# Patient Record
Sex: MALE | Race: BLACK OR AFRICAN AMERICAN | Employment: UNEMPLOYED | URBAN - METROPOLITAN AREA
[De-identification: names, ages, dates, MRNs, and addresses within clinical notes are randomized per-mention and may not be internally consistent; named-entity substitution may affect disease eponyms.]

---

## 2018-04-18 LAB — DIABETIC RETINOPATHY: POSITIVE

## 2018-07-26 ENCOUNTER — HOSPITAL ENCOUNTER (OUTPATIENT)
Dept: VASCULAR LAB | Age: 48
Discharge: HOME | End: 2018-07-26

## 2018-07-26 DIAGNOSIS — R42 DIZZINESS AND GIDDINESS: ICD-10-CM

## 2018-08-06 ENCOUNTER — INITIAL CONSULT (OUTPATIENT)
Dept: SURGERY | Age: 48
End: 2018-08-06

## 2018-08-06 VITALS
WEIGHT: 264 LBS | SYSTOLIC BLOOD PRESSURE: 148 MMHG | DIASTOLIC BLOOD PRESSURE: 91 MMHG | BODY MASS INDEX: 42.61 KG/M2 | HEART RATE: 81 BPM

## 2018-08-06 DIAGNOSIS — N60.81 CYST OF SKIN OF BREAST, RIGHT: Primary | ICD-10-CM

## 2018-08-06 PROCEDURE — G8417 CALC BMI ABV UP PARAM F/U: HCPCS | Performed by: SURGERY

## 2018-08-06 PROCEDURE — 99213 OFFICE O/P EST LOW 20 MIN: CPT | Performed by: SURGERY

## 2018-08-06 PROCEDURE — G8427 DOCREV CUR MEDS BY ELIG CLIN: HCPCS | Performed by: SURGERY

## 2018-08-07 ENCOUNTER — PROCEDURE VISIT (OUTPATIENT)
Dept: SURGERY | Age: 48
End: 2018-08-07

## 2018-08-07 VITALS
HEART RATE: 80 BPM | DIASTOLIC BLOOD PRESSURE: 96 MMHG | BODY MASS INDEX: 42.61 KG/M2 | WEIGHT: 264 LBS | SYSTOLIC BLOOD PRESSURE: 194 MMHG

## 2018-08-07 DIAGNOSIS — N60.81 CYST OF SKIN OF BREAST, RIGHT: ICD-10-CM

## 2018-08-07 DIAGNOSIS — L02.213 ABSCESS OF CHEST WALL: Primary | ICD-10-CM

## 2018-08-07 PROCEDURE — 10060 I&D ABSCESS SIMPLE/SINGLE: CPT | Performed by: SURGERY

## 2018-08-07 RX ORDER — SULFAMETHOXAZOLE AND TRIMETHOPRIM 800; 160 MG/1; MG/1
1 TABLET ORAL 2 TIMES DAILY
Qty: 20 TABLET | Refills: 0 | Status: SHIPPED | OUTPATIENT
Start: 2018-08-07 | End: 2018-08-17

## 2018-08-07 NOTE — PATIENT INSTRUCTIONS
OK to shower overtop of water tight dressing. You can remove this Thursday 8-9-18. Inner Sutures are dissolvable. These will absorb over time. Will need to get outer sutures removed in 10-14 days. OK to take tylenol or ibuprofen for pain control. Will start on antibiotics for any residual infection or bacteria. Call for any worsening redness, pain, or fevers greater than 101.5. No restrictions, activities as tolerated.   Follow up in 2 weeks to check incision and suture removal.   If questions or concerns, please call Flory @ [phone]

## 2018-08-21 NOTE — PROGRESS NOTES
apparent that the patient had an abscess most likely from hydradenitis. The skin and subcutaneous tissue that was involved was removed, and the wound was only partially closed on the periphery of the incision so it could continue to drain. A corner of sterile guaze was inserted for hemostasis. He tolerated the procedure well. Will start him on bactrim for his abscess. Follow up in 1-2 weeks prn.     Electronically signed by Callie Wilkins MD

## 2018-08-28 ENCOUNTER — OFFICE VISIT (OUTPATIENT)
Dept: SURGERY | Age: 48
End: 2018-08-28

## 2018-08-28 VITALS
BODY MASS INDEX: 42.77 KG/M2 | DIASTOLIC BLOOD PRESSURE: 85 MMHG | WEIGHT: 265 LBS | SYSTOLIC BLOOD PRESSURE: 152 MMHG | HEART RATE: 86 BPM

## 2018-08-28 DIAGNOSIS — L02.213 ABSCESS OF CHEST WALL: Primary | ICD-10-CM

## 2018-08-28 PROCEDURE — 99024 POSTOP FOLLOW-UP VISIT: CPT | Performed by: SURGERY

## 2018-08-28 RX ORDER — SULFAMETHOXAZOLE AND TRIMETHOPRIM 800; 160 MG/1; MG/1
1 TABLET ORAL 2 TIMES DAILY
Qty: 28 TABLET | Refills: 0 | Status: SHIPPED | OUTPATIENT
Start: 2018-08-28 | End: 2018-09-11

## 2018-08-28 NOTE — PROGRESS NOTES
awake  Orientation:   person, place, time  SKIN: right chest incision healing well, sutures removed, no surrounding erythema, no active drainage        ASSESSMENT:     Diagnosis Orders   1. Abscess of chest wall  sulfamethoxazole-trimethoprim (BACTRIM DS) 800-160 MG per tablet       PLAN:    Kaylee Vang is doing well s/p I&D of chest wall abscess/possible hydradenitis. His incision is healing well. Continue abx until gone. If still having drainage after completing current course of antibiotics, continue Bactrim x 14 more days. Will plan on having him follow up prn.      Electronically signed by Gunner Fatima MD on 8/28/2018 at 9:42 AM

## 2018-09-28 ENCOUNTER — OFFICE VISIT (OUTPATIENT)
Dept: SURGERY | Age: 48
End: 2018-09-28
Payer: COMMERCIAL

## 2018-09-28 VITALS
WEIGHT: 265 LBS | SYSTOLIC BLOOD PRESSURE: 136 MMHG | DIASTOLIC BLOOD PRESSURE: 73 MMHG | HEIGHT: 66 IN | HEART RATE: 84 BPM | BODY MASS INDEX: 42.59 KG/M2

## 2018-09-28 DIAGNOSIS — I70.229 TYPE 2 DIABETES MELLITUS WITH ATHEROSCLEROSIS OF NATIVE ARTERIES OF EXTREMITY WITH REST PAIN (HCC): Primary | ICD-10-CM

## 2018-09-28 DIAGNOSIS — I73.9 PVD (PERIPHERAL VASCULAR DISEASE) (HCC): ICD-10-CM

## 2018-09-28 DIAGNOSIS — I70.229 CRITICAL LOWER LIMB ISCHEMIA (HCC): ICD-10-CM

## 2018-09-28 DIAGNOSIS — I70.245 ATHEROSCLEROSIS OF NATIVE ARTERY OF LEFT LOWER EXTREMITY WITH ULCERATION OF OTHER PART OF FOOT (HCC): ICD-10-CM

## 2018-09-28 DIAGNOSIS — E11.51 TYPE 2 DIABETES MELLITUS WITH ATHEROSCLEROSIS OF NATIVE ARTERIES OF EXTREMITY WITH REST PAIN (HCC): Primary | ICD-10-CM

## 2018-09-28 PROBLEM — I70.209 ATHEROSCLEROSIS OF NATIVE ARTERY OF EXTREMITY (HCC): Status: ACTIVE | Noted: 2018-09-28

## 2018-09-28 PROBLEM — I70.25 ATHEROSCLEROSIS OF NATIVE ARTERY OF EXTREMITY WITH ULCERATION (HCC): Status: ACTIVE | Noted: 2018-09-28

## 2018-09-28 PROCEDURE — 2022F DILAT RTA XM EVC RTNOPTHY: CPT | Performed by: SURGERY

## 2018-09-28 PROCEDURE — G8417 CALC BMI ABV UP PARAM F/U: HCPCS | Performed by: SURGERY

## 2018-09-28 PROCEDURE — G8427 DOCREV CUR MEDS BY ELIG CLIN: HCPCS | Performed by: SURGERY

## 2018-09-28 PROCEDURE — 99244 OFF/OP CNSLTJ NEW/EST MOD 40: CPT | Performed by: SURGERY

## 2018-09-28 RX ORDER — ATORVASTATIN CALCIUM 40 MG/1
40 TABLET, FILM COATED ORAL
COMMUNITY
End: 2018-09-28 | Stop reason: SDUPTHER

## 2018-09-28 ASSESSMENT — ENCOUNTER SYMPTOMS
ALLERGIC/IMMUNOLOGIC NEGATIVE: 1
EYES NEGATIVE: 1
RESPIRATORY NEGATIVE: 1
GASTROINTESTINAL NEGATIVE: 1

## 2018-09-28 NOTE — PROGRESS NOTES
Subjective:      Patient ID: Brandyn Rodriguez is a 51 y.o. male. Chief Complaint   Patient presents with   [facility] Consult     Patient here today referred by Dr. Eva Cabrera for left foot ulcer. cancer      Leg Pain    The incident occurred more than 1 week ago. The incident occurred at home. The pain is present in the left leg and right leg. The quality of the pain is described as shooting and aching. The pain is at a severity of 6/10. The pain is moderate. The pain has been intermittent since onset. Pertinent negatives include no loss of sensation or muscle weakness. It is unknown if a foreign body is present. He has tried rest and elevation for the symptoms. The treatment provided mild relief. Review of Systems   Constitutional: Negative. HENT: Negative. Eyes: Negative. Respiratory: Negative. Cardiovascular: Negative. Gastrointestinal: Negative. Endocrine: Negative. Genitourinary: Negative. Musculoskeletal: Negative. Skin: Positive for wound (left great toe). Allergic/Immunologic: Negative. Hematological: Negative. Psychiatric/Behavioral: Negative. Objective:   Physical Exam   Constitutional: He is oriented to person, place, and time. He appears well-developed and well-nourished. HENT:   Head: Normocephalic and atraumatic. Right Ear: External ear normal.   Left Ear: External ear normal.   Nose: Nose normal.   Mouth/Throat: Oropharynx is clear and moist.   Eyes: Pupils are equal, round, and reactive to light. Conjunctivae and EOM are normal.   Neck: Normal range of motion. Neck supple. Cardiovascular: Normal rate, regular rhythm and normal heart sounds. Pulses:       Carotid pulses are 2+ on the right side, and 2+ on the left side. Radial pulses are 2+ on the right side, and 2+ on the left side. Femoral pulses are 2+ on the right side, and 2+ on the left side. Popliteal pulses are 1+ on the right side, and 1+ on the left side. Dorsalis pedis pulses are 0 on the right side, and 0 on the left side. Posterior tibial pulses are 0 on the right side, and 0 on the left side. ABIs are noncompressible   Pulmonary/Chest: Effort normal and breath sounds normal.   Abdominal: Soft. Bowel sounds are normal.   Musculoskeletal: Normal range of motion. Neurological: He is alert and oriented to person, place, and time. Skin: Skin is warm and dry. Ulcer left first toe   Psychiatric: He has a normal mood and affect. His behavior is normal.       Assessment:       Diagnosis Orders   1. Type 2 diabetes mellitus with atherosclerosis of native arteries of extremity with rest pain ([address]     2. 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 Critical lower limb ischemia     4. PVD (peripheral vascular disease) ([address]     Patient presents with critical limb ischemia with absent pedal pulses and nonhealing ulceration of the left foot with bone exposed at the distal left first toe. He is at high risk for major amputation and/or death without revascularization. Plan:       Patient is to continue his daily 81MG Aspirin for his daily antiplatelet therapy. Patient is to continue his 40MG Lipitor for his daily statin therapy. Patient is to keep follow up with Dr. Zain Saul. In order to promote wound healing and provide long-term limb salvage, recommend proceeding with diagnostic angiography with possible endovascular revascularization. The Patient will be scheduled for an Out-patient angiogram of the aorta and bilateral lower extremity w/possible revascularization at [facility].    Patient is scheduled for the angiogram on 10/04/2018. Patient was given the letter, procedure date and arrival time in office today.      PITER Guadarrama MA am scribing for and in the presence of Tien Brandon MD on this date of 09/28/18 at 11:23 AM    I Tien Brandon MD personally performed the

## 2018-09-28 NOTE — LETTER
[facility] Vascular Surgery  [address] [place] [id number]  Phone: [phone]  Fax: [phone]      September 28, 2018       Patient: Moraima Duncan   MR Number: [id number]   YOB: 1970   Date of Visit: 9/28/2018   Dear Dr. Toyin Thomson:    Thank you for the request for consultation for Moraima Duncan to me. Below are the relevant portions of my assessment and plan of care. Assessment:       Diagnosis   1. Type 2 diabetes mellitus with atherosclerosis of native arteries of extremity with rest pain ([address]    2. Atherosclerosis of native artery of left lower extremity with ulceration of other part of foot ([address]    3. Critical lower limb ischemia    4. PVD (peripheral vascular disease) ([facility]    Patient presents with critical limb ischemia with absent pedal pulses and nonhealing ulceration of the left foot with bone exposed at the distal left first toe. He is at high risk for major amputation and/or death without revascularization. Plan:       Patient is to continue his daily 81MG Aspirin for his daily antiplatelet therapy. Patient is to continue his 40MG Lipitor for his daily statin therapy. Patient is to keep follow up with Dr. Toyin Thomson. In order to promote wound healing and provide long-term limb salvage, recommend proceeding with diagnostic angiography with possible endovascular revascularization. The Patient will be scheduled for an Out-patient angiogram of the aorta and bilateral lower extremity w/possible revascularization at [facility].    Patient is scheduled for the angiogram on 10/04/2018. Patient was given the letter, procedure date and arrival time in office today. If you have questions, please do not hesitate to call me. I look forward to following Rabia Peterson along with you.     Sincerely,          Lew Black MD    CC providers:  No Recipients

## 2018-09-28 NOTE — PATIENT INSTRUCTIONS
Patient is to continue his daily 81MG Aspirin for his daily antiplatelet therapy. Patient is to continue his 40MG Lipitor for his daily statin therapy. Patient is to keep follow up with Dr. Tran Quintana. In order to promote wound healing and provide long-term limb salvage, recommend proceeding with diagnostic angiography with possible endovascular revascularization. The Patient will be scheduled for an Out-patient angiogram of the aorta and bilateral lower extremity w/possible revascularization at [facility].    Patient is scheduled for the angiogram on 10/04/2018. Patient was given the letter, procedure date and arrival time in office today.

## 2018-10-01 ENCOUNTER — APPOINTMENT (OUTPATIENT)
Dept: CT IMAGING | Age: 48
End: 2018-10-01
Payer: COMMERCIAL

## 2018-10-01 LAB
ANION GAP SERPL CALCULATED.3IONS-SCNC: 13 MMOL/L (ref 3–16)
BASOPHILS ABSOLUTE: 0 K/UL (ref 0–0.2)
BASOPHILS RELATIVE PERCENT: 0.3 %
BUN BLDV-MCNC: 25 MG/DL (ref 7–20)
CALCIUM SERPL-MCNC: 9.4 MG/DL (ref 8.3–10.6)
CHLORIDE BLD-SCNC: 91 MMOL/L (ref 99–110)
CO2: 27 MMOL/L (ref 21–32)
CREAT SERPL-MCNC: 1.2 MG/DL (ref 0.9–1.3)
EOSINOPHILS ABSOLUTE: 0.1 K/UL (ref 0–0.6)
EOSINOPHILS RELATIVE PERCENT: 1.2 %
GFR AFRICAN AMERICAN: >60
GFR NON-AFRICAN AMERICAN: >60
GLUCOSE BLD-MCNC: 429 MG/DL (ref 70–99)
HCT VFR BLD CALC: 39.7 % (ref 40.5–52.5)
HEMOGLOBIN: 12.9 G/DL (ref 13.5–17.5)
LYMPHOCYTES ABSOLUTE: 2.8 K/UL (ref 1–5.1)
LYMPHOCYTES RELATIVE PERCENT: 25.3 %
MCH RBC QN AUTO: 27.5 PG (ref 26–34)
MCHC RBC AUTO-ENTMCNC: 32.4 G/DL (ref 31–36)
MCV RBC AUTO: 84.7 FL (ref 80–100)
MONOCYTES ABSOLUTE: 0.9 K/UL (ref 0–1.3)
MONOCYTES RELATIVE PERCENT: 7.6 %
NEUTROPHILS ABSOLUTE: 7.4 K/UL (ref 1.7–7.7)
NEUTROPHILS RELATIVE PERCENT: 65.6 %
PDW BLD-RTO: 13.9 % (ref 12.4–15.4)
PLATELET # BLD: 215 K/UL (ref 135–450)
PMV BLD AUTO: 9.1 FL (ref 5–10.5)
POTASSIUM SERPL-SCNC: 4.4 MMOL/L (ref 3.5–5.1)
RBC # BLD: 4.68 M/UL (ref 4.2–5.9)
SODIUM BLD-SCNC: 131 MMOL/L (ref 136–145)
WBC # BLD: 11.2 K/UL (ref 4–11)

## 2018-10-01 PROCEDURE — 85025 COMPLETE CBC W/AUTO DIFF WBC: CPT

## 2018-10-01 PROCEDURE — 80048 BASIC METABOLIC PNL TOTAL CA: CPT

## 2018-10-01 PROCEDURE — 70491 CT SOFT TISSUE NECK W/DYE: CPT

## 2018-10-01 PROCEDURE — 6360000004 HC RX CONTRAST MEDICATION: Performed by: PHYSICIAN ASSISTANT

## 2018-10-01 ASSESSMENT — PAIN SCALES - GENERAL: PAINLEVEL_OUTOF10: 8

## 2018-10-01 ASSESSMENT — PAIN DESCRIPTION - LOCATION: LOCATION: NECK

## 2018-10-02 ENCOUNTER — HOSPITAL ENCOUNTER (EMERGENCY)
Age: 48
Discharge: HOME | End: 2018-10-02
Payer: COMMERCIAL

## 2018-10-02 VITALS
SYSTOLIC BLOOD PRESSURE: 137 MMHG | RESPIRATION RATE: 18 BRPM | BODY MASS INDEX: 42.43 KG/M2 | HEIGHT: 66 IN | DIASTOLIC BLOOD PRESSURE: 70 MMHG | OXYGEN SATURATION: 95 % | WEIGHT: 264 LBS | TEMPERATURE: 99 F | HEART RATE: 86 BPM

## 2018-10-02 DIAGNOSIS — L02.11 ABSCESS OF SKIN OF NECK: Primary | ICD-10-CM

## 2018-10-02 PROCEDURE — APPNB30 APP NON BILLABLE TIME 0-30 MINS: Performed by: NURSE PRACTITIONER

## 2018-10-02 PROCEDURE — 99284 EMERGENCY DEPT VISIT MOD MDM: CPT

## 2018-10-02 PROCEDURE — 6370000000 HC RX 637 (ALT 250 FOR IP): Performed by: PHYSICIAN ASSISTANT

## 2018-10-02 PROCEDURE — 4500000024 HC ED LEVEL 4 PROCEDURE

## 2018-10-02 PROCEDURE — 6360000002 HC RX W HCPCS: Performed by: PHYSICIAN ASSISTANT

## 2018-10-02 PROCEDURE — 90471 IMMUNIZATION ADMIN: CPT | Performed by: PHYSICIAN ASSISTANT

## 2018-10-02 PROCEDURE — 90715 TDAP VACCINE 7 YRS/> IM: CPT | Performed by: PHYSICIAN ASSISTANT

## 2018-10-02 PROCEDURE — 6360000004 HC RX CONTRAST MEDICATION: Performed by: PHYSICIAN ASSISTANT

## 2018-10-02 RX ORDER — BACITRACIN, NEOMYCIN, POLYMYXIN B 400; 3.5; 5 [USP'U]/G; MG/G; [USP'U]/G
OINTMENT TOPICAL ONCE
Status: COMPLETED | OUTPATIENT
Start: 2018-10-02 | End: 2018-10-02

## 2018-10-02 RX ORDER — SULFAMETHOXAZOLE AND TRIMETHOPRIM 800; 160 MG/1; MG/1
1 TABLET ORAL 2 TIMES DAILY
Qty: 20 TABLET | Refills: 0 | Status: SHIPPED | OUTPATIENT
Start: 2018-10-02 | End: 2018-10-12

## 2018-10-02 RX ADMIN — IOPAMIDOL 75 ML: 755 INJECTION, SOLUTION INTRAVENOUS at 01:23

## 2018-10-02 RX ADMIN — BACITRACIN ZINC, NEOMYCIN SULFATE, AND POLYMYXIN B SULFATE: 400; 3.5; 5 OINTMENT TOPICAL at 02:24

## 2018-10-02 RX ADMIN — TETANUS TOXOID, REDUCED DIPHTHERIA TOXOID AND ACELLULAR PERTUSSIS VACCINE, ADSORBED 0.5 ML: 5; 2.5; 8; 8; 2.5 SUSPENSION INTRAMUSCULAR at 02:24

## 2018-10-02 NOTE — ED PROVIDER NOTES
labs:  Results for orders placed or performed during the hospital encounter of 10/02/18   CBC Auto Differential   Result Value Ref Range    WBC 11.2 (H) 4.0 - 11.0 K/uL    RBC 4.68 4.20 - 5.90 M/uL    Hemoglobin 12.9 (L) 13.5 - 17.5 g/dL    Hematocrit 39.7 (L) 40.5 - 52.5 %    MCV 84.7 80.0 - 100.0 fL    MCH 27.5 26.0 - 34.0 pg    MCHC 32.4 31.0 - 36.0 g/dL    RDW 13.9 12.4 - 15.4 %    Platelets 047 489 - 104 K/uL    MPV 9.1 5.0 - 10.5 fL    Neutrophils % 65.6 %    Lymphocytes % 25.3 %    Monocytes % 7.6 %    Eosinophils % 1.2 %    Basophils % 0.3 %    Neutrophils # 7.4 1.7 - 7.7 K/uL    Lymphocytes # 2.8 1.0 - 5.1 K/uL    Monocytes # 0.9 0.0 - 1.3 K/uL    Eosinophils # 0.1 0.0 - 0.6 K/uL    Basophils # 0.0 0.0 - 0.2 K/uL   Basic Metabolic Panel   Result Value Ref Range    Sodium 131 (L) 136 - 145 mmol/L    Potassium 4.4 3.5 - 5.1 mmol/L    Chloride 91 (L) 99 - 110 mmol/L    CO2 27 21 - 32 mmol/L    Anion Gap 13 3 - 16    Glucose 429 (H) 70 - 99 mg/dL    BUN 25 (H) 7 - 20 mg/dL    CREATININE 1.2 0.9 - 1.3 mg/dL    GFR Non-African American >60 >60    GFR African American >60 >60    Calcium 9.4 8.3 - 10.6 mg/dL       DIFFERENTIAL DIAGNOSIS   I have considered the following differential diagnoses however, I estimate there is LOW risk for SEVERE CELLULITIS, SEPSIS OR NECROTIZING FASCIITIS. EMERGENCY DEPARTMENT COURSE and DIFFERENTIAL DIAGNOSIS/MDM:   Vitals:    Vitals:    10/01/18 2255 10/02/18 0153   BP: (!) 166/102    Pulse: 90    Resp: 16 16   Temp: 99.4 °F (37.4 °C) 99 °F (37.2 °C)   TempSrc: Oral    SpO2: 95%    Weight: 264 lb (119.7 kg)    Height: 5' 6\" (1.676 m)      Medications   neomycin-bacitracin-polymyxin (NEOSPORIN) ointment (not administered)   Tetanus-Diphth-Acell Pertussis (BOOSTRIX) injection 0.5 mL (not administered)   iopamidol (ISOVUE-370) 76 % injection 75 mL (75 mLs Intravenous Given 10/2/18 0123)       PROCEDURES:  Incision and Drainage Procedure Note    Indication: Abscess    Procedure:  The

## 2018-10-02 NOTE — ED NOTES
Discharge and education instructions reviewed. Patient verbalized understanding, teach-back successful. Patient denied questions at this time. No acute distress noted. Patient instructed to follow-up as noted - return to emergency department if symptoms worsen. Patient verbalized understanding. Discharged per EDMD with discharge instructions.         Luis M Lyons RN  10/02/18 6119

## 2018-10-03 ENCOUNTER — PREP FOR PROCEDURE (OUTPATIENT)
Dept: VASCULAR SURGERY | Age: 48
End: 2018-10-03

## 2018-10-03 DIAGNOSIS — I73.9 PVD (PERIPHERAL VASCULAR DISEASE) (HCC): Primary | ICD-10-CM

## 2018-10-04 ENCOUNTER — HOSPITAL ENCOUNTER (OUTPATIENT)
Dept: CARDIAC CATH/INVASIVE PROCEDURES | Age: 48
Discharge: HOME | End: 2018-10-04
Payer: COMMERCIAL

## 2018-10-04 ENCOUNTER — ANESTHESIA EVENT (OUTPATIENT)
Dept: OPERATING ROOM | Age: 48
End: 2018-10-04
Payer: COMMERCIAL

## 2018-10-04 VITALS — HEIGHT: 66 IN | WEIGHT: 262.35 LBS | BODY MASS INDEX: 42.16 KG/M2

## 2018-10-04 DIAGNOSIS — I70.245 ATHEROSCLEROSIS OF NATIVE ARTERY OF LEFT LOWER EXTREMITY WITH ULCERATION OF OTHER PART OF FOOT (HCC): Primary | ICD-10-CM

## 2018-10-04 LAB
POC ACT LR: 232 SEC
POC ACT LR: 237 SEC

## 2018-10-04 PROCEDURE — 37225 HC FEM POP TERRITORY ATHERECTOMY: CPT | Performed by: SURGERY

## 2018-10-04 PROCEDURE — 37225 PR REVSC OPN/PRQ FEM/POP W/ATHRC/ANGIOP SM VSL: CPT | Performed by: SURGERY

## 2018-10-04 PROCEDURE — 37232 HC TIB PER TERR ADDL PLASTY: CPT | Performed by: SURGERY

## 2018-10-04 PROCEDURE — 76937 US GUIDE VASCULAR ACCESS: CPT | Performed by: SURGERY

## 2018-10-04 PROCEDURE — 37186 SEC ART THROMBECTOMY ADD-ON: CPT | Performed by: SURGERY

## 2018-10-04 PROCEDURE — 37232 PR REVSC OPN/PRQ TIB/PERO W/ANGIOPLASTY UNI EA VSL: CPT | Performed by: SURGERY

## 2018-10-04 PROCEDURE — 37229 HC TIB PER TERRITORY ATHER: CPT | Performed by: SURGERY

## 2018-10-04 PROCEDURE — 99152 MOD SED SAME PHYS/QHP 5/>YRS: CPT | Performed by: SURGERY

## 2018-10-04 PROCEDURE — 75716 ARTERY X-RAYS ARMS/LEGS: CPT | Performed by: SURGERY

## 2018-10-04 PROCEDURE — C1894 INTRO/SHEATH, NON-LASER: HCPCS

## 2018-10-04 PROCEDURE — 6360000004 HC RX CONTRAST MEDICATION: Performed by: SURGERY

## 2018-10-04 PROCEDURE — 99153 MOD SED SAME PHYS/QHP EA: CPT | Performed by: SURGERY

## 2018-10-04 PROCEDURE — C1769 GUIDE WIRE: HCPCS

## 2018-10-04 PROCEDURE — C1887 CATHETER, GUIDING: HCPCS

## 2018-10-04 PROCEDURE — 75625 CONTRAST EXAM ABDOMINL AORTA: CPT | Performed by: SURGERY

## 2018-10-04 PROCEDURE — 75774 ARTERY X-RAY EACH VESSEL: CPT | Performed by: SURGERY

## 2018-10-04 PROCEDURE — C1757 CATH, THROMBECTOMY/EMBOLECT: HCPCS

## 2018-10-04 PROCEDURE — 2780000010 HC IMPLANT OTHER

## 2018-10-04 PROCEDURE — 2709999900 HC NON-CHARGEABLE SUPPLY

## 2018-10-04 PROCEDURE — 37229 PR REVSC OPN/PRQ TIB/PERO W/ATHRC/ANGIOP SM VSL: CPT | Performed by: SURGERY

## 2018-10-04 PROCEDURE — 85347 COAGULATION TIME ACTIVATED: CPT

## 2018-10-04 PROCEDURE — C1724 CATH, TRANS ATHEREC,ROTATION: HCPCS

## 2018-10-04 PROCEDURE — C1725 CATH, TRANSLUMIN NON-LASER: HCPCS

## 2018-10-04 RX ORDER — ACETAMINOPHEN 325 MG/1
650 TABLET ORAL EVERY 4 HOURS PRN
Status: DISCONTINUED | OUTPATIENT
Start: 2018-10-04 | End: 2018-10-05 | Stop reason: HOSPADM

## 2018-10-04 RX ORDER — ONDANSETRON 2 MG/ML
4 INJECTION INTRAMUSCULAR; INTRAVENOUS EVERY 6 HOURS PRN
Status: DISCONTINUED | OUTPATIENT
Start: 2018-10-04 | End: 2018-10-05 | Stop reason: HOSPADM

## 2018-10-04 RX ORDER — 0.9 % SODIUM CHLORIDE 0.9 %
500 INTRAVENOUS SOLUTION INTRAVENOUS PRN
Status: DISCONTINUED | OUTPATIENT
Start: 2018-10-04 | End: 2018-10-05 | Stop reason: HOSPADM

## 2018-10-04 RX ORDER — SODIUM CHLORIDE 9 MG/ML
INJECTION, SOLUTION INTRAVENOUS CONTINUOUS
Status: DISCONTINUED | OUTPATIENT
Start: 2018-10-04 | End: 2018-10-04 | Stop reason: ALTCHOICE

## 2018-10-04 RX ORDER — SODIUM CHLORIDE 0.9 % (FLUSH) 0.9 %
10 SYRINGE (ML) INJECTION PRN
Status: DISCONTINUED | OUTPATIENT
Start: 2018-10-04 | End: 2018-10-04 | Stop reason: ALTCHOICE

## 2018-10-04 RX ORDER — SODIUM CHLORIDE 9 MG/ML
INJECTION, SOLUTION INTRAVENOUS CONTINUOUS
Status: DISCONTINUED | OUTPATIENT
Start: 2018-10-04 | End: 2018-10-05 | Stop reason: HOSPADM

## 2018-10-04 RX ORDER — IODIXANOL 320 MG/ML
190 INJECTION, SOLUTION INTRAVASCULAR
Status: COMPLETED | OUTPATIENT
Start: 2018-10-04 | End: 2018-10-04

## 2018-10-04 RX ORDER — MORPHINE SULFATE 10 MG/ML
2 INJECTION, SOLUTION INTRAMUSCULAR; INTRAVENOUS
Status: ACTIVE | OUTPATIENT
Start: 2018-10-04 | End: 2018-10-04

## 2018-10-04 RX ORDER — ALPRAZOLAM 0.25 MG/1
0.5 TABLET ORAL
Status: DISCONTINUED | OUTPATIENT
Start: 2018-10-04 | End: 2018-10-04 | Stop reason: ALTCHOICE

## 2018-10-04 RX ORDER — SODIUM CHLORIDE 0.9 % (FLUSH) 0.9 %
10 SYRINGE (ML) INJECTION EVERY 12 HOURS SCHEDULED
Status: DISCONTINUED | OUTPATIENT
Start: 2018-10-04 | End: 2018-10-04 | Stop reason: ALTCHOICE

## 2018-10-04 RX ORDER — FENTANYL CITRATE 50 UG/ML
25 INJECTION, SOLUTION INTRAMUSCULAR; INTRAVENOUS
Status: ACTIVE | OUTPATIENT
Start: 2018-10-04 | End: 2018-10-04

## 2018-10-04 RX ADMIN — IODIXANOL 190 ML: 320 INJECTION, SOLUTION INTRAVASCULAR at 14:37

## 2018-10-04 NOTE — H&P
Dorsalis pedis pulses are 0 on the right side, and 0 on the left side. Posterior tibial pulses are 0 on the right side, and 0 on the left side. ABIs are noncompressible   Pulmonary/Chest: Effort normal and breath sounds normal.   Abdominal: Soft. Bowel sounds are normal.   Musculoskeletal: Normal range of motion. Neurological: He is alert and oriented to person, place, and time. Skin: Skin is warm and dry. Ulcer left first toe   Psychiatric: He has a normal mood and affect. His behavior is normal.         Assessment:     Diagnosis Orders   1. Type 2 diabetes mellitus with atherosclerosis of native arteries of extremity with rest pain (HCC)      2. Atherosclerosis of native artery of left lower extremity with ulceration of other part of foot ([facility]      3. Critical lower limb ischemia      4. PVD (peripheral vascular disease) ([facility] [facility]      Patient presents with critical limb ischemia with absent pedal pulses and nonhealing ulceration of the left foot with bone exposed at the distal left first toe. He is at high risk for major amputation and/or death without revascularization.                   Plan:       Patient is to continue his daily 81MG Aspirin for his daily antiplatelet therapy. Patient is to continue his 40MG Lipitor for his daily statin therapy. Patient is to keep follow up with Dr. Oscar Mora.      In order to promote wound healing and provide long-term limb salvage, recommend proceeding with diagnostic angiography with possible endovascular revascularization. The Patient will be scheduled for an Out-patient angiogram of the aorta and bilateral lower extremity w/possible revascularization at [facility] [address].     Patient is scheduled for the angiogram on 10/04/2018.  Patient was given the letter, procedure date and arrival time in office today.

## 2018-10-04 NOTE — PROGRESS NOTES
C-Difficile admission screening and protocol:     * Admitted with diarrhea? YES____    NO__X___     *Prior history of C-Diff. In last 3 months? YES____   NO__X___     *Antibiotic use in the past 6-8 weeks? NO______YES_X_____                 If yes which  ANTIBIOTIC AND REASON__INFECTION IN FOOT____     *Prior hospitalization or nursing home in the last month?  YES____   NO__X__

## 2018-10-05 ENCOUNTER — HOSPITAL ENCOUNTER (OUTPATIENT)
Age: 48
Setting detail: OUTPATIENT SURGERY
Discharge: HOME | End: 2018-10-05
Attending: PODIATRIST | Admitting: PODIATRIST
Payer: COMMERCIAL

## 2018-10-05 ENCOUNTER — APPOINTMENT (OUTPATIENT)
Dept: GENERAL RADIOLOGY | Age: 48
End: 2018-10-05
Attending: PODIATRIST
Payer: COMMERCIAL

## 2018-10-05 ENCOUNTER — ANESTHESIA (OUTPATIENT)
Dept: OPERATING ROOM | Age: 48
End: 2018-10-05
Payer: COMMERCIAL

## 2018-10-05 VITALS
DIASTOLIC BLOOD PRESSURE: 82 MMHG | SYSTOLIC BLOOD PRESSURE: 170 MMHG | WEIGHT: 258.93 LBS | HEART RATE: 83 BPM | RESPIRATION RATE: 18 BRPM | OXYGEN SATURATION: 93 % | HEIGHT: 66 IN | TEMPERATURE: 97.9 F | BODY MASS INDEX: 41.61 KG/M2

## 2018-10-05 VITALS
SYSTOLIC BLOOD PRESSURE: 118 MMHG | DIASTOLIC BLOOD PRESSURE: 65 MMHG | RESPIRATION RATE: 25 BRPM | OXYGEN SATURATION: 100 %

## 2018-10-05 PROBLEM — M86.9 OSTEOMYELITIS OF TOE OF LEFT FOOT (HCC): Status: ACTIVE | Noted: 2018-10-05

## 2018-10-05 LAB
ANAEROBIC CULTURE: NORMAL
GLUCOSE BLD-MCNC: 357 MG/DL (ref 70–99)
GLUCOSE BLD-MCNC: 364 MG/DL (ref 70–99)
PERFORMED ON: ABNORMAL
PERFORMED ON: ABNORMAL
WOUND/ABSCESS: NORMAL

## 2018-10-05 PROCEDURE — 2580000003 HC RX 258: Performed by: ANESTHESIOLOGY

## 2018-10-05 PROCEDURE — 7100000011 HC PHASE II RECOVERY - ADDTL 15 MIN: Performed by: PODIATRIST

## 2018-10-05 PROCEDURE — 3700000000 HC ANESTHESIA ATTENDED CARE: Performed by: PODIATRIST

## 2018-10-05 PROCEDURE — 87077 CULTURE AEROBIC IDENTIFY: CPT

## 2018-10-05 PROCEDURE — 88305 TISSUE EXAM BY PATHOLOGIST: CPT

## 2018-10-05 PROCEDURE — 3700000001 HC ADD 15 MINUTES (ANESTHESIA): Performed by: PODIATRIST

## 2018-10-05 PROCEDURE — 2500000003 HC RX 250 WO HCPCS: Performed by: NURSE ANESTHETIST, CERTIFIED REGISTERED

## 2018-10-05 PROCEDURE — 7100000000 HC PACU RECOVERY - FIRST 15 MIN: Performed by: PODIATRIST

## 2018-10-05 PROCEDURE — 3600000003 HC SURGERY LEVEL 3 BASE: Performed by: PODIATRIST

## 2018-10-05 PROCEDURE — 87070 CULTURE OTHR SPECIMN AEROBIC: CPT

## 2018-10-05 PROCEDURE — S0028 INJECTION, FAMOTIDINE, 20 MG: HCPCS | Performed by: ANESTHESIOLOGY

## 2018-10-05 PROCEDURE — 87076 CULTURE ANAEROBE IDENT EACH: CPT

## 2018-10-05 PROCEDURE — 2500000003 HC RX 250 WO HCPCS: Performed by: PODIATRIST

## 2018-10-05 PROCEDURE — 87176 TISSUE HOMOGENIZATION CULTR: CPT

## 2018-10-05 PROCEDURE — 87185 SC STD ENZYME DETCJ PER NZM: CPT

## 2018-10-05 PROCEDURE — 2709999900 HC NON-CHARGEABLE SUPPLY: Performed by: PODIATRIST

## 2018-10-05 PROCEDURE — 87075 CULTR BACTERIA EXCEPT BLOOD: CPT

## 2018-10-05 PROCEDURE — 87205 SMEAR GRAM STAIN: CPT

## 2018-10-05 PROCEDURE — 73630 X-RAY EXAM OF FOOT: CPT

## 2018-10-05 PROCEDURE — 6360000002 HC RX W HCPCS: Performed by: NURSE ANESTHETIST, CERTIFIED REGISTERED

## 2018-10-05 PROCEDURE — 7100000001 HC PACU RECOVERY - ADDTL 15 MIN: Performed by: PODIATRIST

## 2018-10-05 PROCEDURE — 3600000013 HC SURGERY LEVEL 3 ADDTL 15MIN: Performed by: PODIATRIST

## 2018-10-05 PROCEDURE — 7100000010 HC PHASE II RECOVERY - FIRST 15 MIN: Performed by: PODIATRIST

## 2018-10-05 PROCEDURE — 2500000003 HC RX 250 WO HCPCS: Performed by: ANESTHESIOLOGY

## 2018-10-05 RX ORDER — SODIUM CHLORIDE 9 MG/ML
INJECTION, SOLUTION INTRAVENOUS CONTINUOUS
Status: DISCONTINUED | OUTPATIENT
Start: 2018-10-05 | End: 2018-10-08 | Stop reason: HOSPADM

## 2018-10-05 RX ORDER — LIDOCAINE HYDROCHLORIDE 20 MG/ML
INJECTION, SOLUTION EPIDURAL; INFILTRATION; INTRACAUDAL; PERINEURAL PRN
Status: DISCONTINUED | OUTPATIENT
Start: 2018-10-05 | End: 2018-10-05 | Stop reason: SDUPTHER

## 2018-10-05 RX ORDER — LIDOCAINE HYDROCHLORIDE 10 MG/ML
INJECTION, SOLUTION INFILTRATION; PERINEURAL
Status: COMPLETED | OUTPATIENT
Start: 2018-10-05 | End: 2018-10-05

## 2018-10-05 RX ORDER — BUPIVACAINE HYDROCHLORIDE 5 MG/ML
INJECTION, SOLUTION EPIDURAL; INTRACAUDAL
Status: COMPLETED | OUTPATIENT
Start: 2018-10-05 | End: 2018-10-05

## 2018-10-05 RX ORDER — OXYCODONE HYDROCHLORIDE AND ACETAMINOPHEN 5; 325 MG/1; MG/1
1 TABLET ORAL PRN
Status: ACTIVE | OUTPATIENT
Start: 2018-10-05 | End: 2018-10-05

## 2018-10-05 RX ORDER — FENTANYL CITRATE 50 UG/ML
25 INJECTION, SOLUTION INTRAMUSCULAR; INTRAVENOUS EVERY 5 MIN PRN
Status: DISCONTINUED | OUTPATIENT
Start: 2018-10-05 | End: 2018-10-08 | Stop reason: HOSPADM

## 2018-10-05 RX ORDER — FENTANYL CITRATE 50 UG/ML
INJECTION, SOLUTION INTRAMUSCULAR; INTRAVENOUS PRN
Status: DISCONTINUED | OUTPATIENT
Start: 2018-10-05 | End: 2018-10-05 | Stop reason: SDUPTHER

## 2018-10-05 RX ORDER — SODIUM CHLORIDE 0.9 % (FLUSH) 0.9 %
10 SYRINGE (ML) INJECTION PRN
Status: DISCONTINUED | OUTPATIENT
Start: 2018-10-05 | End: 2018-10-08 | Stop reason: HOSPADM

## 2018-10-05 RX ORDER — MIDAZOLAM HYDROCHLORIDE 1 MG/ML
INJECTION INTRAMUSCULAR; INTRAVENOUS PRN
Status: DISCONTINUED | OUTPATIENT
Start: 2018-10-05 | End: 2018-10-05 | Stop reason: SDUPTHER

## 2018-10-05 RX ORDER — PROPOFOL 10 MG/ML
INJECTION, EMULSION INTRAVENOUS PRN
Status: DISCONTINUED | OUTPATIENT
Start: 2018-10-05 | End: 2018-10-05 | Stop reason: SDUPTHER

## 2018-10-05 RX ORDER — SODIUM CHLORIDE 0.9 % (FLUSH) 0.9 %
10 SYRINGE (ML) INJECTION EVERY 12 HOURS SCHEDULED
Status: DISCONTINUED | OUTPATIENT
Start: 2018-10-05 | End: 2018-10-08 | Stop reason: HOSPADM

## 2018-10-05 RX ORDER — GLYCOPYRROLATE 0.2 MG/ML
INJECTION INTRAMUSCULAR; INTRAVENOUS PRN
Status: DISCONTINUED | OUTPATIENT
Start: 2018-10-05 | End: 2018-10-05 | Stop reason: SDUPTHER

## 2018-10-05 RX ORDER — OXYCODONE HYDROCHLORIDE AND ACETAMINOPHEN 5; 325 MG/1; MG/1
2 TABLET ORAL PRN
Status: ACTIVE | OUTPATIENT
Start: 2018-10-05 | End: 2018-10-05

## 2018-10-05 RX ORDER — ONDANSETRON 2 MG/ML
4 INJECTION INTRAMUSCULAR; INTRAVENOUS
Status: ACTIVE | OUTPATIENT
Start: 2018-10-05 | End: 2018-10-05

## 2018-10-05 RX ADMIN — PROPOFOL 100 MG: 10 INJECTION, EMULSION INTRAVENOUS at 12:51

## 2018-10-05 RX ADMIN — PROPOFOL 50 MG: 10 INJECTION, EMULSION INTRAVENOUS at 13:01

## 2018-10-05 RX ADMIN — FAMOTIDINE 20 MG: 10 INJECTION, SOLUTION INTRAVENOUS at 12:41

## 2018-10-05 RX ADMIN — PROPOFOL 50 MG: 10 INJECTION, EMULSION INTRAVENOUS at 13:05

## 2018-10-05 RX ADMIN — LIDOCAINE HYDROCHLORIDE 100 MG: 20 INJECTION, SOLUTION EPIDURAL; INFILTRATION; INTRACAUDAL; PERINEURAL at 12:51

## 2018-10-05 RX ADMIN — PROPOFOL 50 MG: 10 INJECTION, EMULSION INTRAVENOUS at 12:58

## 2018-10-05 RX ADMIN — SODIUM CHLORIDE: 9 INJECTION, SOLUTION INTRAVENOUS at 12:41

## 2018-10-05 RX ADMIN — PROPOFOL 50 MG: 10 INJECTION, EMULSION INTRAVENOUS at 13:10

## 2018-10-05 RX ADMIN — PROPOFOL 50 MG: 10 INJECTION, EMULSION INTRAVENOUS at 13:08

## 2018-10-05 RX ADMIN — MIDAZOLAM 2 MG: 1 INJECTION INTRAMUSCULAR; INTRAVENOUS at 12:45

## 2018-10-05 RX ADMIN — GLYCOPYRROLATE 0.2 MG: 0.2 INJECTION, SOLUTION INTRAMUSCULAR; INTRAVENOUS at 12:45

## 2018-10-05 RX ADMIN — PROPOFOL 50 MG: 10 INJECTION, EMULSION INTRAVENOUS at 12:54

## 2018-10-05 RX ADMIN — FENTANYL CITRATE 100 MCG: 50 INJECTION INTRAMUSCULAR; INTRAVENOUS at 12:50

## 2018-10-05 ASSESSMENT — PULMONARY FUNCTION TESTS
PIF_VALUE: 0
PIF_VALUE: 1
PIF_VALUE: 0
PIF_VALUE: 0
PIF_VALUE: 22
PIF_VALUE: 0
PIF_VALUE: 3
PIF_VALUE: 0
PIF_VALUE: 0
PIF_VALUE: 2
PIF_VALUE: 0
PIF_VALUE: 1
PIF_VALUE: 0
PIF_VALUE: 1
PIF_VALUE: 18

## 2018-10-05 ASSESSMENT — PAIN SCALES - GENERAL: PAINLEVEL_OUTOF10: 0

## 2018-10-05 ASSESSMENT — PAIN DESCRIPTION - LOCATION: LOCATION: FOOT

## 2018-10-05 ASSESSMENT — PAIN DESCRIPTION - PAIN TYPE: TYPE: SURGICAL PAIN

## 2018-10-05 ASSESSMENT — LIFESTYLE VARIABLES: SMOKING_STATUS: 0

## 2018-10-05 ASSESSMENT — PAIN DESCRIPTION - ORIENTATION: ORIENTATION: LEFT

## 2018-10-05 ASSESSMENT — PAIN - FUNCTIONAL ASSESSMENT: PAIN_FUNCTIONAL_ASSESSMENT: 0-10

## 2018-10-05 ASSESSMENT — ENCOUNTER SYMPTOMS: SHORTNESS OF BREATH: 0

## 2018-10-05 NOTE — DISCHARGE SUMMARY
Patient had outpatient surgery on 10/05/2018 (refer to operative note). Patient tolerated the procedure and anesthesia well, and he was discharged to home the same day.

## 2018-10-09 ENCOUNTER — INITIAL CONSULT (OUTPATIENT)
Dept: SURGERY | Age: 48
End: 2018-10-09
Payer: COMMERCIAL

## 2018-10-09 ENCOUNTER — TELEPHONE (OUTPATIENT)
Dept: SURGERY | Age: 48
End: 2018-10-09

## 2018-10-09 VITALS
SYSTOLIC BLOOD PRESSURE: 130 MMHG | BODY MASS INDEX: 41.3 KG/M2 | DIASTOLIC BLOOD PRESSURE: 80 MMHG | HEIGHT: 66 IN | WEIGHT: 257 LBS | HEART RATE: 91 BPM

## 2018-10-09 DIAGNOSIS — L02.11 ABSCESS OF NECK: Primary | ICD-10-CM

## 2018-10-09 PROCEDURE — 10060 I&D ABSCESS SIMPLE/SINGLE: CPT | Performed by: SURGERY

## 2018-10-09 RX ORDER — SULFAMETHOXAZOLE AND TRIMETHOPRIM 800; 160 MG/1; MG/1
1 TABLET ORAL 2 TIMES DAILY
Qty: 20 TABLET | Refills: 0 | Status: SHIPPED | OUTPATIENT
Start: 2018-10-09 | End: 2018-10-19

## 2018-10-11 LAB
ANAEROBIC CULTURE: ABNORMAL
ANAEROBIC CULTURE: ABNORMAL
CULTURE SURGICAL: ABNORMAL
GRAM STAIN RESULT: ABNORMAL
GRAM STAIN RESULT: ABNORMAL
ORGANISM: ABNORMAL

## 2018-10-14 LAB
ANAEROBIC CULTURE: ABNORMAL
GRAM STAIN RESULT: ABNORMAL
ORGANISM: ABNORMAL
ORGANISM: ABNORMAL
WOUND/ABSCESS: ABNORMAL

## 2018-10-17 PROCEDURE — APPNB15 APP NON BILLABLE TIME 0-15 MINS: Performed by: NURSE PRACTITIONER

## 2018-10-18 ENCOUNTER — HOSPITAL ENCOUNTER (OUTPATIENT)
Dept: CARDIAC CATH/INVASIVE PROCEDURES | Age: 48
Discharge: HOME | DRG: 710 | End: 2018-10-18
Payer: COMMERCIAL

## 2018-10-18 VITALS — WEIGHT: 252.87 LBS | HEIGHT: 66 IN | BODY MASS INDEX: 40.64 KG/M2

## 2018-10-18 DIAGNOSIS — I70.245 ATHEROSCLEROSIS OF NATIVE ARTERY OF LEFT LOWER EXTREMITY WITH ULCERATION OF OTHER PART OF FOOT (HCC): ICD-10-CM

## 2018-10-18 DIAGNOSIS — M86.9 OSTEOMYELITIS OF TOE OF LEFT FOOT (HCC): Primary | ICD-10-CM

## 2018-10-18 LAB
CALCIUM IONIZED: 1.18 MMOL/L (ref 1.12–1.32)
GFR AFRICAN AMERICAN: >60
GFR NON-AFRICAN AMERICAN: >60
GLUCOSE BLD-MCNC: 445 MG/DL (ref 70–99)
HCT VFR BLD CALC: 37.8 % (ref 40.5–52.5)
HEMOGLOBIN: 12.2 G/DL (ref 13.5–17.5)
MCH RBC QN AUTO: 27.2 PG (ref 26–34)
MCHC RBC AUTO-ENTMCNC: 32.3 G/DL (ref 31–36)
MCV RBC AUTO: 84.2 FL (ref 80–100)
PDW BLD-RTO: 13.5 % (ref 12.4–15.4)
PERFORMED ON: ABNORMAL
PLATELET # BLD: 388 K/UL (ref 135–450)
PMV BLD AUTO: 8.1 FL (ref 5–10.5)
POC ACT LR: 267 SEC
POC CHLORIDE: 93 MMOL/L (ref 99–110)
POC CREATININE: 1.2 MG/DL (ref 0.9–1.3)
POC POTASSIUM: 4.4 MMOL/L (ref 3.5–5.1)
POC SAMPLE TYPE: ABNORMAL
POC SODIUM: 129 MMOL/L (ref 136–145)
RBC # BLD: 4.48 M/UL (ref 4.2–5.9)
WBC # BLD: 17.8 K/UL (ref 4–11)

## 2018-10-18 PROCEDURE — 75710 ARTERY X-RAYS ARM/LEG: CPT | Performed by: SURGERY

## 2018-10-18 PROCEDURE — 2709999900 HC NON-CHARGEABLE SUPPLY

## 2018-10-18 PROCEDURE — 37225 HC FEM POP TERRITORY ATHERECTOMY: CPT | Performed by: SURGERY

## 2018-10-18 PROCEDURE — 82565 ASSAY OF CREATININE: CPT

## 2018-10-18 PROCEDURE — C1724 CATH, TRANS ATHEREC,ROTATION: HCPCS

## 2018-10-18 PROCEDURE — C1725 CATH, TRANSLUMIN NON-LASER: HCPCS

## 2018-10-18 PROCEDURE — 82435 ASSAY OF BLOOD CHLORIDE: CPT

## 2018-10-18 PROCEDURE — 99152 MOD SED SAME PHYS/QHP 5/>YRS: CPT | Performed by: SURGERY

## 2018-10-18 PROCEDURE — 04CM3ZZ EXTIRPATION OF MATTER FROM RIGHT POPLITEAL ARTERY, PERCUTANEOUS APPROACH: ICD-10-PCS | Performed by: SURGERY

## 2018-10-18 PROCEDURE — 85027 COMPLETE CBC AUTOMATED: CPT

## 2018-10-18 PROCEDURE — 99153 MOD SED SAME PHYS/QHP EA: CPT | Performed by: SURGERY

## 2018-10-18 PROCEDURE — 85347 COAGULATION TIME ACTIVATED: CPT

## 2018-10-18 PROCEDURE — 37229 PR REVSC OPN/PRQ TIB/PERO W/ATHRC/ANGIOP SM VSL: CPT | Performed by: SURGERY

## 2018-10-18 PROCEDURE — 82947 ASSAY GLUCOSE BLOOD QUANT: CPT

## 2018-10-18 PROCEDURE — C1894 INTRO/SHEATH, NON-LASER: HCPCS

## 2018-10-18 PROCEDURE — 84295 ASSAY OF SERUM SODIUM: CPT

## 2018-10-18 PROCEDURE — 6360000004 HC RX CONTRAST MEDICATION: Performed by: SURGERY

## 2018-10-18 PROCEDURE — 76937 US GUIDE VASCULAR ACCESS: CPT | Performed by: SURGERY

## 2018-10-18 PROCEDURE — 75774 ARTERY X-RAY EACH VESSEL: CPT | Performed by: SURGERY

## 2018-10-18 PROCEDURE — 37225 PR REVSC OPN/PRQ FEM/POP W/ATHRC/ANGIOP SM VSL: CPT | Performed by: SURGERY

## 2018-10-18 PROCEDURE — C1887 CATHETER, GUIDING: HCPCS

## 2018-10-18 PROCEDURE — 82330 ASSAY OF CALCIUM: CPT

## 2018-10-18 PROCEDURE — 37229 HC TIB PER TERRITORY ATHER: CPT | Performed by: SURGERY

## 2018-10-18 PROCEDURE — 2780000010 HC IMPLANT OTHER

## 2018-10-18 PROCEDURE — 37232 HC TIB PER TERR ADDL PLASTY: CPT | Performed by: SURGERY

## 2018-10-18 PROCEDURE — 04CP3ZZ EXTIRPATION OF MATTER FROM RIGHT ANTERIOR TIBIAL ARTERY, PERCUTANEOUS APPROACH: ICD-10-PCS | Performed by: SURGERY

## 2018-10-18 PROCEDURE — 047M3ZZ DILATION OF RIGHT POPLITEAL ARTERY, PERCUTANEOUS APPROACH: ICD-10-PCS | Performed by: SURGERY

## 2018-10-18 PROCEDURE — 047P3ZZ DILATION OF RIGHT ANTERIOR TIBIAL ARTERY, PERCUTANEOUS APPROACH: ICD-10-PCS | Performed by: SURGERY

## 2018-10-18 PROCEDURE — 37232 PR REVSC OPN/PRQ TIB/PERO W/ANGIOPLASTY UNI EA VSL: CPT | Performed by: SURGERY

## 2018-10-18 PROCEDURE — B41F1ZZ FLUOROSCOPY OF RIGHT LOWER EXTREMITY ARTERIES USING LOW OSMOLAR CONTRAST: ICD-10-PCS | Performed by: SURGERY

## 2018-10-18 PROCEDURE — C1769 GUIDE WIRE: HCPCS

## 2018-10-18 PROCEDURE — 047T3ZZ DILATION OF RIGHT PERONEAL ARTERY, PERCUTANEOUS APPROACH: ICD-10-PCS | Performed by: SURGERY

## 2018-10-18 PROCEDURE — 84132 ASSAY OF SERUM POTASSIUM: CPT

## 2018-10-18 RX ORDER — ALPRAZOLAM 0.25 MG/1
0.5 TABLET ORAL
Status: CANCELLED | OUTPATIENT
Start: 2018-10-18 | End: 2018-10-18

## 2018-10-18 RX ORDER — FENTANYL CITRATE 50 UG/ML
25 INJECTION, SOLUTION INTRAMUSCULAR; INTRAVENOUS
Status: ACTIVE | OUTPATIENT
Start: 2018-10-18 | End: 2018-10-18

## 2018-10-18 RX ORDER — IODIXANOL 320 MG/ML
110 INJECTION, SOLUTION INTRAVASCULAR
Status: COMPLETED | OUTPATIENT
Start: 2018-10-18 | End: 2018-10-18

## 2018-10-18 RX ORDER — 0.9 % SODIUM CHLORIDE 0.9 %
500 INTRAVENOUS SOLUTION INTRAVENOUS PRN
Status: DISCONTINUED | OUTPATIENT
Start: 2018-10-18 | End: 2018-10-19 | Stop reason: HOSPADM

## 2018-10-18 RX ORDER — SODIUM CHLORIDE 9 MG/ML
INJECTION, SOLUTION INTRAVENOUS CONTINUOUS
Status: DISCONTINUED | OUTPATIENT
Start: 2018-10-18 | End: 2018-10-18 | Stop reason: ALTCHOICE

## 2018-10-18 RX ORDER — ONDANSETRON 2 MG/ML
4 INJECTION INTRAMUSCULAR; INTRAVENOUS EVERY 6 HOURS PRN
Status: DISCONTINUED | OUTPATIENT
Start: 2018-10-18 | End: 2018-10-19 | Stop reason: HOSPADM

## 2018-10-18 RX ORDER — SODIUM CHLORIDE 9 MG/ML
INJECTION, SOLUTION INTRAVENOUS CONTINUOUS
Status: CANCELLED | OUTPATIENT
Start: 2018-10-18

## 2018-10-18 RX ORDER — ACETAMINOPHEN 325 MG/1
650 TABLET ORAL EVERY 4 HOURS PRN
Status: DISCONTINUED | OUTPATIENT
Start: 2018-10-18 | End: 2018-10-19 | Stop reason: HOSPADM

## 2018-10-18 RX ORDER — SODIUM CHLORIDE 0.9 % (FLUSH) 0.9 %
10 SYRINGE (ML) INJECTION EVERY 12 HOURS SCHEDULED
Status: DISCONTINUED | OUTPATIENT
Start: 2018-10-18 | End: 2018-10-18 | Stop reason: ALTCHOICE

## 2018-10-18 RX ORDER — SODIUM CHLORIDE 0.9 % (FLUSH) 0.9 %
10 SYRINGE (ML) INJECTION EVERY 12 HOURS SCHEDULED
Status: CANCELLED | OUTPATIENT
Start: 2018-10-18

## 2018-10-18 RX ORDER — MORPHINE SULFATE 10 MG/ML
2 INJECTION, SOLUTION INTRAMUSCULAR; INTRAVENOUS
Status: ACTIVE | OUTPATIENT
Start: 2018-10-18 | End: 2018-10-18

## 2018-10-18 RX ORDER — SODIUM CHLORIDE 9 MG/ML
INJECTION, SOLUTION INTRAVENOUS CONTINUOUS
Status: DISCONTINUED | OUTPATIENT
Start: 2018-10-18 | End: 2018-10-19 | Stop reason: HOSPADM

## 2018-10-18 RX ORDER — SODIUM CHLORIDE 0.9 % (FLUSH) 0.9 %
10 SYRINGE (ML) INJECTION PRN
Status: DISCONTINUED | OUTPATIENT
Start: 2018-10-18 | End: 2018-10-18 | Stop reason: ALTCHOICE

## 2018-10-18 RX ORDER — SODIUM CHLORIDE 0.9 % (FLUSH) 0.9 %
10 SYRINGE (ML) INJECTION PRN
Status: CANCELLED | OUTPATIENT
Start: 2018-10-18

## 2018-10-18 RX ORDER — ALPRAZOLAM 0.25 MG/1
0.5 TABLET ORAL
Status: DISCONTINUED | OUTPATIENT
Start: 2018-10-18 | End: 2018-10-18 | Stop reason: ALTCHOICE

## 2018-10-18 RX ADMIN — IODIXANOL 110 ML: 320 INJECTION, SOLUTION INTRAVASCULAR at 10:31

## 2018-10-18 NOTE — PRE SEDATION
Sedation Pre-Procedure Note    Patient Name: Nick Haley   YOB: 1970  Room/Bed: Room/bed info not found  Medical Record Number: [id number]  Date: 10/18/2018   Time: 9:19 AM       Indication:  PAD    Consent: I have discussed with the patient and/or the patient representative the indication, alternatives, and the possible risks and/or complications of the planned procedure and the anesthesia methods. The patient and/or patient representative appear to understand and agree to proceed. Vital Signs: There were no vitals filed for this visit. Past Medical History:   has a past medical history of Angina effort ([facility] Arthritis; CAD (coronary artery disease); Carotid stenosis; Diabetes mellitus with neurological manifestations, uncontrolled ([facility] Diarrhea; Hyperlipidemia; Hypertension; Obesity; and Type II or unspecified type diabetes mellitus without mention of complication, not stated as uncontrolled. Past Surgical History:   has a past surgical history that includes Cholecystectomy; Cardiac catheterization; Carotid endarterectomy (Right, 4-9-2015); Toe amputation (Left, 10/05/2018); and pr office/outpt visit,procedure only (Left, 10/5/2018). Medications:   Scheduled Meds:    sodium chloride flush  10 mL Intravenous 2 times per day     Continuous Infusions:    sodium chloride       PRN Meds: sodium chloride flush, ALPRAZolam  Home Meds:   Prior to Admission medications    Medication Sig Start Date End Date Taking? Authorizing Provider   sulfamethoxazole-trimethoprim (BACTRIM DS) 800-160 MG per tablet Take 1 tablet by mouth 2 times daily for 10 days 10/9/18 10/19/18 Yes Sherly Argueta MD   metFORMIN (GLUCOPHAGE) 500 MG tablet Take 1 tablet by mouth 2 times daily (with meals) 10/31/17  Yes CASANDRA Kern   L-methylfolate-B6-B12 (METANX) 3-35-2 MG tablet Take 1 tablet by mouth daily For neuropathy.  12/27/15  Yes Didi Simon MD   glucose blood VI test strips [facility] VERIO) strip 1 each by In Vitro route 3 times daily ; Dx E11.65; Z79.4 12/24/15  Yes GINA Mendes CNP   canagliflozin-metformin HCl (INVOKAMET) 150-500 MG tablet Take 2 tablets by mouth daily 12/10/15  Yes Luigi Anthony MD   exenatide (BYDUREON) 2 MG SUSR injection Inject 1 Syringe into the skin once a week 12/10/15  Yes Luigi Anthony MD   insulin aspart (NOVOLOG FLEXPEN) 100 UNIT/ML injection pen Inject 10-15 Units into the skin 3 times daily (before meals) 12/10/15  Yes Luigi Anthony MD   metoprolol (TOPROL-XL) 25 MG XL tablet Take 25 mg by mouth every morning    Yes Historical Provider, MD   atorvastatin (LIPITOR) 40 MG tablet Take 40 mg by mouth every morning  11/30/14  Yes Historical Provider, MD   lisinopril (PRINIVIL;ZESTRIL) 10 MG tablet 40 mg every morning  11/30/14  Yes Historical Provider, MD   aspirin (ASPIRIN LOW DOSE) 81 MG EC tablet Take 1 tablet by mouth daily 7/21/18   CASANDRA Lucio   Blood Glucose Monitoring Suppl (FREESTYLE LITE) INEZ TEST BLOOD GLUCOSE THREE TO FOUR TIMES DAILY 1/7/16   Luigi Anthony MD   ONE TOUCH LANCETS MISC 1 each by Does not apply route 3 times daily ; Dx E11.65; Z79.4 12/24/15   GINA Mendes CNP   Insulin Pen Needle (B-D UF III MINI PEN NEEDLES) 31G X 5 MM MISC 1 each by Does not apply route 4 times daily (before meals and nightly) 12/10/15   Luigi Anthony MD         Pre-Sedation Documentation and Exam:   I have personally completed a history, physical exam & review of systems for this patient (see notes).     Mallampati Airway Assessment:  normal    Prior History of Anesthesia Complications:   none    ASA Classification:  Class 3 - A patient with severe systemic disease that limits activity but is not incapacitating    Sedation/ Anesthesia Plan:   intravenous sedation    Medications Planned:   midazolam (Versed) intravenously and fentanyl intravenously    Patient is an appropriate candidate for plan of sedation:

## 2018-10-18 NOTE — H&P
Dorsalis pedis pulses are 0 on the right side, and 0 on the left side. Posterior tibial pulses are 0 on the right side, and 0 on the left side. ABIs are noncompressible   Pulmonary/Chest: Effort normal and breath sounds normal.   Abdominal: Soft. Bowel sounds are normal.   Musculoskeletal: Normal range of motion. Neurological: He is alert and oriented to person, place, and time. Skin: Skin is warm and dry. Ulcer left first toe   Psychiatric: He has a normal mood and affect. His behavior is normal.         Assessment:     Diagnosis Orders   1. Type 2 diabetes mellitus with atherosclerosis of native arteries of extremity with rest pain (HCC)      2. Atherosclerosis of native artery of left lower extremity with ulceration of other part of foot ([facility]      3. Critical lower limb ischemia      4. PVD (peripheral vascular disease) ([facility] [address]      Patient presents with critical limb ischemia with absent pedal pulses and nonhealing ulceration of the left foot with bone exposed at the distal left first toe. He is at high risk for major amputation and/or death without revascularization.                   Plan:       Patient is to continue his daily 81MG Aspirin for his daily antiplatelet therapy. Patient is to continue his 40MG Lipitor for his daily statin therapy. Patient is to keep follow up with Dr. Alex Palacio.      In order to promote wound healing and provide long-term limb salvage, recommend proceeding with diagnostic angiography with possible endovascular revascularization.   The Patient will be scheduled for an Out-patient angiogram of the aorta and bilateral lower extremity w/possible revascularization at [facility].

## 2018-10-21 ENCOUNTER — HOSPITAL ENCOUNTER (INPATIENT)
Age: 48
LOS: 8 days | Discharge: HOME HEALTH | DRG: 710 | End: 2018-10-29
Attending: EMERGENCY MEDICINE | Admitting: INTERNAL MEDICINE
Payer: COMMERCIAL

## 2018-10-21 ENCOUNTER — APPOINTMENT (OUTPATIENT)
Dept: GENERAL RADIOLOGY | Age: 48
DRG: 710 | End: 2018-10-21
Payer: COMMERCIAL

## 2018-10-21 DIAGNOSIS — M86.9 OSTEOMYELITIS OF TOE OF LEFT FOOT (HCC): Primary | ICD-10-CM

## 2018-10-21 LAB
A/G RATIO: 0.6 (ref 1.1–2.2)
ABO/RH: NORMAL
ALBUMIN SERPL-MCNC: 3.1 G/DL (ref 3.4–5)
ALP BLD-CCNC: 162 U/L (ref 40–129)
ALT SERPL-CCNC: 16 U/L (ref 10–40)
ANION GAP SERPL CALCULATED.3IONS-SCNC: 16 MMOL/L (ref 3–16)
ANTIBODY SCREEN: NORMAL
AST SERPL-CCNC: 17 U/L (ref 15–37)
ATYPICAL LYMPHOCYTE RELATIVE PERCENT: 2 % (ref 0–6)
BANDED NEUTROPHILS RELATIVE PERCENT: 4 % (ref 0–7)
BASOPHILS ABSOLUTE: 0 K/UL (ref 0–0.2)
BASOPHILS RELATIVE PERCENT: 0 %
BILIRUB SERPL-MCNC: 0.4 MG/DL (ref 0–1)
BUN BLDV-MCNC: 26 MG/DL (ref 7–20)
CALCIUM SERPL-MCNC: 9.4 MG/DL (ref 8.3–10.6)
CHLORIDE BLD-SCNC: 91 MMOL/L (ref 99–110)
CO2: 25 MMOL/L (ref 21–32)
CREAT SERPL-MCNC: 0.9 MG/DL (ref 0.9–1.3)
EOSINOPHILS ABSOLUTE: 0 K/UL (ref 0–0.6)
EOSINOPHILS RELATIVE PERCENT: 0 %
GFR AFRICAN AMERICAN: >60
GFR NON-AFRICAN AMERICAN: >60
GLOBULIN: 4.9 G/DL
GLUCOSE BLD-MCNC: 297 MG/DL (ref 70–99)
GLUCOSE BLD-MCNC: 313 MG/DL (ref 70–99)
GLUCOSE BLD-MCNC: 330 MG/DL (ref 70–99)
HCT VFR BLD CALC: 36 % (ref 40.5–52.5)
HEMOGLOBIN: 11.6 G/DL (ref 13.5–17.5)
INR BLD: 1.25 (ref 0.86–1.14)
LACTIC ACID: 1.6 MMOL/L (ref 0.4–2)
LYMPHOCYTES ABSOLUTE: 3.5 K/UL (ref 1–5.1)
LYMPHOCYTES RELATIVE PERCENT: 15 %
MCH RBC QN AUTO: 27.2 PG (ref 26–34)
MCHC RBC AUTO-ENTMCNC: 32.3 G/DL (ref 31–36)
MCV RBC AUTO: 84 FL (ref 80–100)
MONOCYTES ABSOLUTE: 1.6 K/UL (ref 0–1.3)
MONOCYTES RELATIVE PERCENT: 8 %
NEUTROPHILS ABSOLUTE: 15.3 K/UL (ref 1.7–7.7)
NEUTROPHILS RELATIVE PERCENT: 71 %
PDW BLD-RTO: 13.3 % (ref 12.4–15.4)
PERFORMED ON: ABNORMAL
PERFORMED ON: ABNORMAL
PLATELET # BLD: 402 K/UL (ref 135–450)
PLATELET SLIDE REVIEW: ADEQUATE
PMV BLD AUTO: 8.1 FL (ref 5–10.5)
POTASSIUM SERPL-SCNC: 5.2 MMOL/L (ref 3.5–5.1)
PROTHROMBIN TIME: 14.2 SEC (ref 9.8–13)
RBC # BLD: 4.28 M/UL (ref 4.2–5.9)
SLIDE REVIEW: ABNORMAL
SODIUM BLD-SCNC: 132 MMOL/L (ref 136–145)
TOTAL PROTEIN: 8 G/DL (ref 6.4–8.2)
WBC # BLD: 20.4 K/UL (ref 4–11)

## 2018-10-21 PROCEDURE — 2580000003 HC RX 258: Performed by: PHYSICIAN ASSISTANT

## 2018-10-21 PROCEDURE — 0JBR0ZZ EXCISION OF LEFT FOOT SUBCUTANEOUS TISSUE AND FASCIA, OPEN APPROACH: ICD-10-PCS | Performed by: PODIATRIST

## 2018-10-21 PROCEDURE — 94760 N-INVAS EAR/PLS OXIMETRY 1: CPT

## 2018-10-21 PROCEDURE — 87205 SMEAR GRAM STAIN: CPT

## 2018-10-21 PROCEDURE — 2580000003 HC RX 258: Performed by: INTERNAL MEDICINE

## 2018-10-21 PROCEDURE — 83036 HEMOGLOBIN GLYCOSYLATED A1C: CPT

## 2018-10-21 PROCEDURE — 86850 RBC ANTIBODY SCREEN: CPT

## 2018-10-21 PROCEDURE — 87070 CULTURE OTHR SPECIMN AEROBIC: CPT

## 2018-10-21 PROCEDURE — 6370000000 HC RX 637 (ALT 250 FOR IP): Performed by: NURSE PRACTITIONER

## 2018-10-21 PROCEDURE — 86900 BLOOD TYPING SEROLOGIC ABO: CPT

## 2018-10-21 PROCEDURE — 87040 BLOOD CULTURE FOR BACTERIA: CPT

## 2018-10-21 PROCEDURE — 6370000000 HC RX 637 (ALT 250 FOR IP): Performed by: INTERNAL MEDICINE

## 2018-10-21 PROCEDURE — 85025 COMPLETE CBC W/AUTO DIFF WBC: CPT

## 2018-10-21 PROCEDURE — 83605 ASSAY OF LACTIC ACID: CPT

## 2018-10-21 PROCEDURE — 86901 BLOOD TYPING SEROLOGIC RH(D): CPT

## 2018-10-21 PROCEDURE — 73630 X-RAY EXAM OF FOOT: CPT

## 2018-10-21 PROCEDURE — 85610 PROTHROMBIN TIME: CPT

## 2018-10-21 PROCEDURE — 80053 COMPREHEN METABOLIC PANEL: CPT

## 2018-10-21 PROCEDURE — 1200000000 HC SEMI PRIVATE

## 2018-10-21 PROCEDURE — 6360000002 HC RX W HCPCS: Performed by: INTERNAL MEDICINE

## 2018-10-21 PROCEDURE — 99285 EMERGENCY DEPT VISIT HI MDM: CPT

## 2018-10-21 PROCEDURE — 36415 COLL VENOUS BLD VENIPUNCTURE: CPT

## 2018-10-21 RX ORDER — SODIUM CHLORIDE 0.9 % (FLUSH) 0.9 %
10 SYRINGE (ML) INJECTION EVERY 12 HOURS SCHEDULED
Status: DISCONTINUED | OUTPATIENT
Start: 2018-10-21 | End: 2018-10-29 | Stop reason: HOSPADM

## 2018-10-21 RX ORDER — DEXTROSE MONOHYDRATE 50 MG/ML
100 INJECTION, SOLUTION INTRAVENOUS PRN
Status: DISCONTINUED | OUTPATIENT
Start: 2018-10-21 | End: 2018-10-29 | Stop reason: HOSPADM

## 2018-10-21 RX ORDER — SODIUM CHLORIDE 0.9 % (FLUSH) 0.9 %
10 SYRINGE (ML) INJECTION PRN
Status: DISCONTINUED | OUTPATIENT
Start: 2018-10-21 | End: 2018-10-29 | Stop reason: HOSPADM

## 2018-10-21 RX ORDER — DEXTROSE MONOHYDRATE 50 MG/ML
100 INJECTION, SOLUTION INTRAVENOUS PRN
Status: DISCONTINUED | OUTPATIENT
Start: 2018-10-21 | End: 2018-10-21 | Stop reason: SDUPTHER

## 2018-10-21 RX ORDER — ACETAMINOPHEN 325 MG/1
650 TABLET ORAL EVERY 4 HOURS PRN
Status: DISCONTINUED | OUTPATIENT
Start: 2018-10-21 | End: 2018-10-29 | Stop reason: HOSPADM

## 2018-10-21 RX ORDER — NICOTINE POLACRILEX 4 MG
15 LOZENGE BUCCAL PRN
Status: DISCONTINUED | OUTPATIENT
Start: 2018-10-21 | End: 2018-10-29 | Stop reason: HOSPADM

## 2018-10-21 RX ORDER — LISINOPRIL 20 MG/1
40 TABLET ORAL DAILY
Status: DISCONTINUED | OUTPATIENT
Start: 2018-10-22 | End: 2018-10-22

## 2018-10-21 RX ORDER — ASPIRIN 81 MG/1
81 TABLET ORAL DAILY
Status: DISCONTINUED | OUTPATIENT
Start: 2018-10-21 | End: 2018-10-29 | Stop reason: HOSPADM

## 2018-10-21 RX ORDER — DEXTROSE MONOHYDRATE 25 G/50ML
12.5 INJECTION, SOLUTION INTRAVENOUS PRN
Status: DISCONTINUED | OUTPATIENT
Start: 2018-10-21 | End: 2018-10-21 | Stop reason: SDUPTHER

## 2018-10-21 RX ORDER — NICOTINE POLACRILEX 4 MG
15 LOZENGE BUCCAL PRN
Status: DISCONTINUED | OUTPATIENT
Start: 2018-10-21 | End: 2018-10-21 | Stop reason: SDUPTHER

## 2018-10-21 RX ORDER — METOPROLOL SUCCINATE 25 MG/1
25 TABLET, EXTENDED RELEASE ORAL EVERY MORNING
Status: DISCONTINUED | OUTPATIENT
Start: 2018-10-22 | End: 2018-10-27

## 2018-10-21 RX ORDER — MECOBAL/LEVOMEFOLAT CA/B6 PHOS 2-3-35 MG
1 TABLET ORAL DAILY
Status: DISCONTINUED | OUTPATIENT
Start: 2018-10-22 | End: 2018-10-22 | Stop reason: SDUPTHER

## 2018-10-21 RX ORDER — ATORVASTATIN CALCIUM 40 MG/1
40 TABLET, FILM COATED ORAL EVERY MORNING
Status: DISCONTINUED | OUTPATIENT
Start: 2018-10-22 | End: 2018-10-29 | Stop reason: HOSPADM

## 2018-10-21 RX ORDER — DEXTROSE MONOHYDRATE 25 G/50ML
12.5 INJECTION, SOLUTION INTRAVENOUS PRN
Status: DISCONTINUED | OUTPATIENT
Start: 2018-10-21 | End: 2018-10-29 | Stop reason: HOSPADM

## 2018-10-21 RX ORDER — SODIUM CHLORIDE 9 MG/ML
INJECTION, SOLUTION INTRAVENOUS ONCE
Status: DISCONTINUED | OUTPATIENT
Start: 2018-10-21 | End: 2018-10-22

## 2018-10-21 RX ORDER — ONDANSETRON 2 MG/ML
4 INJECTION INTRAMUSCULAR; INTRAVENOUS EVERY 6 HOURS PRN
Status: DISCONTINUED | OUTPATIENT
Start: 2018-10-21 | End: 2018-10-29 | Stop reason: HOSPADM

## 2018-10-21 RX ADMIN — PIPERACILLIN SODIUM,TAZOBACTAM SODIUM 3.38 G: 3; .375 INJECTION, POWDER, FOR SOLUTION INTRAVENOUS at 15:28

## 2018-10-21 RX ADMIN — ENOXAPARIN SODIUM 40 MG: 40 INJECTION SUBCUTANEOUS at 21:26

## 2018-10-21 RX ADMIN — INSULIN LISPRO 2 UNITS: 100 INJECTION, SOLUTION INTRAVENOUS; SUBCUTANEOUS at 21:27

## 2018-10-21 RX ADMIN — ACETAMINOPHEN 650 MG: 325 TABLET, FILM COATED ORAL at 21:25

## 2018-10-21 RX ADMIN — Medication 10 ML: at 21:26

## 2018-10-21 RX ADMIN — INSULIN LISPRO 4 UNITS: 100 INJECTION, SOLUTION INTRAVENOUS; SUBCUTANEOUS at 16:41

## 2018-10-21 RX ADMIN — VANCOMYCIN HYDROCHLORIDE 1500 MG: 10 INJECTION, POWDER, LYOPHILIZED, FOR SOLUTION INTRAVENOUS at 16:41

## 2018-10-21 RX ADMIN — ASPIRIN 81 MG: 81 TABLET, COATED ORAL at 15:48

## 2018-10-21 RX ADMIN — PIPERACILLIN SODIUM,TAZOBACTAM SODIUM 3.38 G: 3; .375 INJECTION, POWDER, FOR SOLUTION INTRAVENOUS at 21:26

## 2018-10-21 ASSESSMENT — PAIN DESCRIPTION - ORIENTATION: ORIENTATION: LEFT

## 2018-10-21 ASSESSMENT — PAIN SCALES - GENERAL
PAINLEVEL_OUTOF10: 0
PAINLEVEL_OUTOF10: 2
PAINLEVEL_OUTOF10: 0
PAINLEVEL_OUTOF10: 0

## 2018-10-21 ASSESSMENT — PAIN DESCRIPTION - FREQUENCY: FREQUENCY: CONTINUOUS

## 2018-10-21 ASSESSMENT — PAIN DESCRIPTION - DESCRIPTORS: DESCRIPTORS: ACHING

## 2018-10-21 ASSESSMENT — PAIN DESCRIPTION - LOCATION: LOCATION: FOOT

## 2018-10-21 ASSESSMENT — PAIN DESCRIPTION - PAIN TYPE: TYPE: CHRONIC PAIN

## 2018-10-21 NOTE — ED NOTES
Pt toe/foot cleaned with sterile water, all the dried blood was removed, toe appears to be healing well, no puss or mal-ordeous smell noted.      Toma Galdamez RN  10/21/18 9427

## 2018-10-21 NOTE — ED NOTES
Bed: B-10  Expected date:   Expected time:   Means of arrival:   Comments:  Held #1     Joann Lindsey RN  10/21/18 9077

## 2018-10-21 NOTE — CONSULTS
understanding. Dispo: Decompressed infection with bedside I&D in ED this afternoon. Patient hemodynamically stable and will go to the OR Monday for likely amputation. Patient npo at midnight, ordered consent and pre-op labs. Discussed case with Dr. Mel Ramirez, Anesthesia and ED. Dr. Mel Ramirez will perform the surgery as patient is established with him. Over 30 minutes spent reviewing labs/imaging, coordinating care, evaluating patient and discussion with family and/or staff with >50% of the time spent with the patient.      Electronically signed by Bharti Roe DPM on 10/21/2018 at 1:47 PM    Bharti Roe, [address] [facility] hospitalsLaBelle: [phone]  Fax: [phone]

## 2018-10-21 NOTE — H&P
first toe, patient did have surgery early on this month, this morning he did notice worsening of edema along with bleeding from surgical site, admitted to the hospital inpatient, IV antibiotics, IV vancomycin, and Zosyn, podiatry consult, and plan discussed with the patient or questions answered. 2.  Diabetes mellitus, sliding scale. 3.  Peripheral vascular disease, consult vascular surgery. 4.  Essential hypertension, by mouth medication. 5. CAD, no chest pain  DVT Prophylaxis: lovenox  Diet:    Code Status: Prior        Dispo - inpatient 2-3 days. Santana Cardona MD    Thank you Nafisa Lemos for the opportunity to be involved in this patient's care. If you have any questions or concerns please feel free to contact me at [phone].

## 2018-10-21 NOTE — ED NOTES
Podiatry Dr. Zackery Lazaro called back. Speaking to Yuval GUAJARDO at this time.       Micahel Perez RN  10/21/18 6302

## 2018-10-21 NOTE — PROGRESS NOTES
4 Eyes Skin Assessment     The patient is being assess for  Admission    I agree that 2 RN's have performed a thorough Head to Toe Skin Assessment on the patient. ALL assessment sites listed below have been assessed. Areas assessed by both nurses: yes  [x]   Head, Face, and Ears   [x]   Shoulders, Back, and Chest  [x]   Arms, Elbows, and Hands   [x]   Coccyx, Sacrum, and IschIum  [x]   Legs, Feet, and Heels        Does the Patient have Skin Breakdown?   Yes LDA WOUND CARE was Initiated documentation include the Jina-wound, Wound Assessment, Measurements, Dressing Treatment, Drainage, and Color\",         Randolph Prevention initiated:  NA   Wound Care Orders initiated:  NA      WOC nurse consulted for Pressure Injury (Stage 3,4, Unstageable, DTI, NWPT, and Complex wounds), New and Established Ostomies:  NA      Nurse 1 eSignature: Electronically signed by Ismael Knight RN on 10/21/18 at 6:27 PM    **SHARE this note so that the co-signing nurse is able to place an eSignature**    Nurse 2 eSignature: Electronically signed by Roseanne Echeverria RN on 10/21/18 at 6:37 PM

## 2018-10-22 ENCOUNTER — ANESTHESIA (OUTPATIENT)
Dept: OPERATING ROOM | Age: 48
DRG: 710 | End: 2018-10-22
Payer: COMMERCIAL

## 2018-10-22 ENCOUNTER — APPOINTMENT (OUTPATIENT)
Dept: GENERAL RADIOLOGY | Age: 48
DRG: 710 | End: 2018-10-22
Payer: COMMERCIAL

## 2018-10-22 ENCOUNTER — ANESTHESIA EVENT (OUTPATIENT)
Dept: OPERATING ROOM | Age: 48
DRG: 710 | End: 2018-10-22
Payer: COMMERCIAL

## 2018-10-22 VITALS
TEMPERATURE: 100.9 F | RESPIRATION RATE: 27 BRPM | SYSTOLIC BLOOD PRESSURE: 92 MMHG | DIASTOLIC BLOOD PRESSURE: 55 MMHG | OXYGEN SATURATION: 100 %

## 2018-10-22 LAB
A/G RATIO: 0.5 (ref 1.1–2.2)
ALBUMIN SERPL-MCNC: 2.6 G/DL (ref 3.4–5)
ALP BLD-CCNC: 139 U/L (ref 40–129)
ALT SERPL-CCNC: 12 U/L (ref 10–40)
ANION GAP SERPL CALCULATED.3IONS-SCNC: 15 MMOL/L (ref 3–16)
AST SERPL-CCNC: 12 U/L (ref 15–37)
BASOPHILS ABSOLUTE: 0.1 K/UL (ref 0–0.2)
BASOPHILS RELATIVE PERCENT: 0.4 %
BILIRUB SERPL-MCNC: 0.5 MG/DL (ref 0–1)
BUN BLDV-MCNC: 28 MG/DL (ref 7–20)
CALCIUM SERPL-MCNC: 9.4 MG/DL (ref 8.3–10.6)
CHLORIDE BLD-SCNC: 87 MMOL/L (ref 99–110)
CO2: 24 MMOL/L (ref 21–32)
CREAT SERPL-MCNC: 1.4 MG/DL (ref 0.9–1.3)
EOSINOPHILS ABSOLUTE: 0.1 K/UL (ref 0–0.6)
EOSINOPHILS RELATIVE PERCENT: 0.4 %
ESTIMATED AVERAGE GLUCOSE: 389.5 MG/DL
GFR AFRICAN AMERICAN: >60
GFR NON-AFRICAN AMERICAN: 54
GLOBULIN: 5.7 G/DL
GLUCOSE BLD-MCNC: 210 MG/DL (ref 70–99)
GLUCOSE BLD-MCNC: 235 MG/DL (ref 70–99)
GLUCOSE BLD-MCNC: 281 MG/DL (ref 70–99)
GLUCOSE BLD-MCNC: 302 MG/DL (ref 70–99)
GLUCOSE BLD-MCNC: 310 MG/DL (ref 70–99)
GLUCOSE BLD-MCNC: 326 MG/DL (ref 70–99)
GLUCOSE BLD-MCNC: 467 MG/DL (ref 70–99)
HBA1C MFR BLD: 15.2 %
HCT VFR BLD CALC: 32.8 % (ref 40.5–52.5)
HEMOGLOBIN: 10.8 G/DL (ref 13.5–17.5)
LYMPHOCYTES ABSOLUTE: 2.2 K/UL (ref 1–5.1)
LYMPHOCYTES RELATIVE PERCENT: 11.4 %
MCH RBC QN AUTO: 27.5 PG (ref 26–34)
MCHC RBC AUTO-ENTMCNC: 32.9 G/DL (ref 31–36)
MCV RBC AUTO: 83.5 FL (ref 80–100)
MONOCYTES ABSOLUTE: 1.7 K/UL (ref 0–1.3)
MONOCYTES RELATIVE PERCENT: 8.8 %
NEUTROPHILS ABSOLUTE: 15.2 K/UL (ref 1.7–7.7)
NEUTROPHILS RELATIVE PERCENT: 79 %
PDW BLD-RTO: 13.2 % (ref 12.4–15.4)
PERFORMED ON: ABNORMAL
PLATELET # BLD: 419 K/UL (ref 135–450)
PMV BLD AUTO: 7.8 FL (ref 5–10.5)
POTASSIUM REFLEX MAGNESIUM: 4.6 MMOL/L (ref 3.5–5.1)
RBC # BLD: 3.93 M/UL (ref 4.2–5.9)
SODIUM BLD-SCNC: 126 MMOL/L (ref 136–145)
TOTAL PROTEIN: 8.3 G/DL (ref 6.4–8.2)
WBC # BLD: 19.2 K/UL (ref 4–11)

## 2018-10-22 PROCEDURE — 83930 ASSAY OF BLOOD OSMOLALITY: CPT

## 2018-10-22 PROCEDURE — 87186 SC STD MICRODIL/AGAR DIL: CPT

## 2018-10-22 PROCEDURE — 3600000015 HC SURGERY LEVEL 5 ADDTL 15MIN: Performed by: PODIATRIST

## 2018-10-22 PROCEDURE — 87185 SC STD ENZYME DETCJ PER NZM: CPT

## 2018-10-22 PROCEDURE — 73630 X-RAY EXAM OF FOOT: CPT

## 2018-10-22 PROCEDURE — 6360000002 HC RX W HCPCS: Performed by: NURSE ANESTHETIST, CERTIFIED REGISTERED

## 2018-10-22 PROCEDURE — 88311 DECALCIFY TISSUE: CPT

## 2018-10-22 PROCEDURE — 3700000000 HC ANESTHESIA ATTENDED CARE: Performed by: PODIATRIST

## 2018-10-22 PROCEDURE — 0Y6Q0Z0 DETACHMENT AT LEFT 1ST TOE, COMPLETE, OPEN APPROACH: ICD-10-PCS | Performed by: PODIATRIST

## 2018-10-22 PROCEDURE — 87070 CULTURE OTHR SPECIMN AEROBIC: CPT

## 2018-10-22 PROCEDURE — 87076 CULTURE ANAEROBE IDENT EACH: CPT

## 2018-10-22 PROCEDURE — 6360000002 HC RX W HCPCS: Performed by: ANESTHESIOLOGY

## 2018-10-22 PROCEDURE — 7100000001 HC PACU RECOVERY - ADDTL 15 MIN: Performed by: PODIATRIST

## 2018-10-22 PROCEDURE — 1200000000 HC SEMI PRIVATE

## 2018-10-22 PROCEDURE — 85025 COMPLETE CBC W/AUTO DIFF WBC: CPT

## 2018-10-22 PROCEDURE — 6360000002 HC RX W HCPCS: Performed by: INTERNAL MEDICINE

## 2018-10-22 PROCEDURE — 6370000000 HC RX 637 (ALT 250 FOR IP): Performed by: INTERNAL MEDICINE

## 2018-10-22 PROCEDURE — 2709999900 HC NON-CHARGEABLE SUPPLY: Performed by: PODIATRIST

## 2018-10-22 PROCEDURE — 80053 COMPREHEN METABOLIC PANEL: CPT

## 2018-10-22 PROCEDURE — 87077 CULTURE AEROBIC IDENTIFY: CPT

## 2018-10-22 PROCEDURE — 87205 SMEAR GRAM STAIN: CPT

## 2018-10-22 PROCEDURE — 3700000001 HC ADD 15 MINUTES (ANESTHESIA): Performed by: PODIATRIST

## 2018-10-22 PROCEDURE — 87075 CULTR BACTERIA EXCEPT BLOOD: CPT

## 2018-10-22 PROCEDURE — 2580000003 HC RX 258: Performed by: INTERNAL MEDICINE

## 2018-10-22 PROCEDURE — 88304 TISSUE EXAM BY PATHOLOGIST: CPT

## 2018-10-22 PROCEDURE — 2580000003 HC RX 258: Performed by: STUDENT IN AN ORGANIZED HEALTH CARE EDUCATION/TRAINING PROGRAM

## 2018-10-22 PROCEDURE — 36415 COLL VENOUS BLD VENIPUNCTURE: CPT

## 2018-10-22 PROCEDURE — 0QBP0ZZ EXCISION OF LEFT METATARSAL, OPEN APPROACH: ICD-10-PCS | Performed by: PODIATRIST

## 2018-10-22 PROCEDURE — 7100000000 HC PACU RECOVERY - FIRST 15 MIN: Performed by: PODIATRIST

## 2018-10-22 PROCEDURE — 94760 N-INVAS EAR/PLS OXIMETRY 1: CPT

## 2018-10-22 PROCEDURE — 0Y6S0Z0 DETACHMENT AT LEFT 2ND TOE, COMPLETE, OPEN APPROACH: ICD-10-PCS | Performed by: PODIATRIST

## 2018-10-22 PROCEDURE — 2500000003 HC RX 250 WO HCPCS: Performed by: NURSE ANESTHETIST, CERTIFIED REGISTERED

## 2018-10-22 PROCEDURE — 3600000005 HC SURGERY LEVEL 5 BASE: Performed by: PODIATRIST

## 2018-10-22 PROCEDURE — 6370000000 HC RX 637 (ALT 250 FOR IP): Performed by: NURSE PRACTITIONER

## 2018-10-22 PROCEDURE — 2500000003 HC RX 250 WO HCPCS: Performed by: PODIATRIST

## 2018-10-22 RX ORDER — LIDOCAINE HYDROCHLORIDE 10 MG/ML
INJECTION, SOLUTION INFILTRATION; PERINEURAL
Status: COMPLETED | OUTPATIENT
Start: 2018-10-22 | End: 2018-10-22

## 2018-10-22 RX ORDER — FENTANYL CITRATE 50 UG/ML
25 INJECTION, SOLUTION INTRAMUSCULAR; INTRAVENOUS EVERY 5 MIN PRN
Status: DISCONTINUED | OUTPATIENT
Start: 2018-10-22 | End: 2018-10-22 | Stop reason: HOSPADM

## 2018-10-22 RX ORDER — LABETALOL HYDROCHLORIDE 5 MG/ML
5 INJECTION, SOLUTION INTRAVENOUS EVERY 10 MIN PRN
Status: DISCONTINUED | OUTPATIENT
Start: 2018-10-22 | End: 2018-10-22 | Stop reason: HOSPADM

## 2018-10-22 RX ORDER — MORPHINE SULFATE 4 MG/ML
4 INJECTION, SOLUTION INTRAMUSCULAR; INTRAVENOUS
Status: DISCONTINUED | OUTPATIENT
Start: 2018-10-22 | End: 2018-10-29 | Stop reason: HOSPADM

## 2018-10-22 RX ORDER — OXYCODONE HYDROCHLORIDE 5 MG/1
5 TABLET ORAL EVERY 4 HOURS PRN
Status: DISCONTINUED | OUTPATIENT
Start: 2018-10-22 | End: 2018-10-29 | Stop reason: HOSPADM

## 2018-10-22 RX ORDER — MORPHINE SULFATE 2 MG/ML
2 INJECTION, SOLUTION INTRAMUSCULAR; INTRAVENOUS
Status: DISCONTINUED | OUTPATIENT
Start: 2018-10-22 | End: 2018-10-29 | Stop reason: HOSPADM

## 2018-10-22 RX ORDER — LINEZOLID 2 MG/ML
600 INJECTION, SOLUTION INTRAVENOUS EVERY 12 HOURS
Status: DISCONTINUED | OUTPATIENT
Start: 2018-10-22 | End: 2018-10-23

## 2018-10-22 RX ORDER — DEXAMETHASONE SODIUM PHOSPHATE 4 MG/ML
INJECTION, SOLUTION INTRA-ARTICULAR; INTRALESIONAL; INTRAMUSCULAR; INTRAVENOUS; SOFT TISSUE PRN
Status: DISCONTINUED | OUTPATIENT
Start: 2018-10-22 | End: 2018-10-22 | Stop reason: SDUPTHER

## 2018-10-22 RX ORDER — MIDAZOLAM HYDROCHLORIDE 1 MG/ML
INJECTION INTRAMUSCULAR; INTRAVENOUS PRN
Status: DISCONTINUED | OUTPATIENT
Start: 2018-10-22 | End: 2018-10-22 | Stop reason: SDUPTHER

## 2018-10-22 RX ORDER — SODIUM CHLORIDE 9 MG/ML
INJECTION, SOLUTION INTRAVENOUS CONTINUOUS
Status: DISCONTINUED | OUTPATIENT
Start: 2018-10-22 | End: 2018-10-23

## 2018-10-22 RX ORDER — INSULIN GLARGINE 100 [IU]/ML
10 INJECTION, SOLUTION SUBCUTANEOUS NIGHTLY
Status: DISCONTINUED | OUTPATIENT
Start: 2018-10-22 | End: 2018-10-22

## 2018-10-22 RX ORDER — GLYCOPYRROLATE 0.2 MG/ML
INJECTION INTRAMUSCULAR; INTRAVENOUS PRN
Status: DISCONTINUED | OUTPATIENT
Start: 2018-10-22 | End: 2018-10-22 | Stop reason: SDUPTHER

## 2018-10-22 RX ORDER — INSULIN GLARGINE 100 [IU]/ML
15 INJECTION, SOLUTION SUBCUTANEOUS NIGHTLY
Status: DISCONTINUED | OUTPATIENT
Start: 2018-10-22 | End: 2018-10-23

## 2018-10-22 RX ORDER — PROPOFOL 10 MG/ML
INJECTION, EMULSION INTRAVENOUS PRN
Status: DISCONTINUED | OUTPATIENT
Start: 2018-10-22 | End: 2018-10-22 | Stop reason: SDUPTHER

## 2018-10-22 RX ORDER — PROMETHAZINE HYDROCHLORIDE 25 MG/ML
6.25 INJECTION, SOLUTION INTRAMUSCULAR; INTRAVENOUS
Status: DISCONTINUED | OUTPATIENT
Start: 2018-10-22 | End: 2018-10-22 | Stop reason: HOSPADM

## 2018-10-22 RX ORDER — SUCCINYLCHOLINE CHLORIDE 20 MG/ML
INJECTION INTRAMUSCULAR; INTRAVENOUS PRN
Status: DISCONTINUED | OUTPATIENT
Start: 2018-10-22 | End: 2018-10-22 | Stop reason: SDUPTHER

## 2018-10-22 RX ORDER — LIDOCAINE HYDROCHLORIDE 20 MG/ML
INJECTION, SOLUTION INFILTRATION; PERINEURAL PRN
Status: DISCONTINUED | OUTPATIENT
Start: 2018-10-22 | End: 2018-10-22 | Stop reason: SDUPTHER

## 2018-10-22 RX ORDER — FENTANYL CITRATE 50 UG/ML
INJECTION, SOLUTION INTRAMUSCULAR; INTRAVENOUS PRN
Status: DISCONTINUED | OUTPATIENT
Start: 2018-10-22 | End: 2018-10-22 | Stop reason: SDUPTHER

## 2018-10-22 RX ORDER — ONDANSETRON 2 MG/ML
INJECTION INTRAMUSCULAR; INTRAVENOUS PRN
Status: DISCONTINUED | OUTPATIENT
Start: 2018-10-22 | End: 2018-10-22 | Stop reason: SDUPTHER

## 2018-10-22 RX ORDER — BUPIVACAINE HYDROCHLORIDE 2.5 MG/ML
INJECTION, SOLUTION EPIDURAL; INFILTRATION; INTRACAUDAL
Status: COMPLETED | OUTPATIENT
Start: 2018-10-22 | End: 2018-10-22

## 2018-10-22 RX ADMIN — MIDAZOLAM 2 MG: 1 INJECTION INTRAMUSCULAR; INTRAVENOUS at 12:50

## 2018-10-22 RX ADMIN — PIPERACILLIN SODIUM,TAZOBACTAM SODIUM 3.38 G: 3; .375 INJECTION, POWDER, FOR SOLUTION INTRAVENOUS at 17:02

## 2018-10-22 RX ADMIN — LISINOPRIL 40 MG: 20 TABLET ORAL at 09:43

## 2018-10-22 RX ADMIN — INSULIN LISPRO 4 UNITS: 100 INJECTION, SOLUTION INTRAVENOUS; SUBCUTANEOUS at 17:11

## 2018-10-22 RX ADMIN — ONDANSETRON 4 MG: 2 INJECTION INTRAMUSCULAR; INTRAVENOUS at 13:00

## 2018-10-22 RX ADMIN — Medication 10 ML: at 09:43

## 2018-10-22 RX ADMIN — INSULIN GLARGINE 15 UNITS: 100 INJECTION, SOLUTION SUBCUTANEOUS at 21:34

## 2018-10-22 RX ADMIN — FENTANYL CITRATE 25 MCG: 50 INJECTION, SOLUTION INTRAMUSCULAR; INTRAVENOUS at 14:46

## 2018-10-22 RX ADMIN — FENTANYL CITRATE 25 MCG: 50 INJECTION, SOLUTION INTRAMUSCULAR; INTRAVENOUS at 14:51

## 2018-10-22 RX ADMIN — Medication 1 TABLET: at 09:43

## 2018-10-22 RX ADMIN — Medication 10 ML: at 21:28

## 2018-10-22 RX ADMIN — INSULIN LISPRO 8 UNITS: 100 INJECTION, SOLUTION INTRAVENOUS; SUBCUTANEOUS at 17:10

## 2018-10-22 RX ADMIN — ATORVASTATIN CALCIUM 40 MG: 40 TABLET, FILM COATED ORAL at 09:43

## 2018-10-22 RX ADMIN — SODIUM CHLORIDE: 9 INJECTION, SOLUTION INTRAVENOUS at 12:48

## 2018-10-22 RX ADMIN — INSULIN LISPRO 10 UNITS: 100 INJECTION, SOLUTION INTRAVENOUS; SUBCUTANEOUS at 21:35

## 2018-10-22 RX ADMIN — VANCOMYCIN HYDROCHLORIDE 1500 MG: 10 INJECTION, POWDER, LYOPHILIZED, FOR SOLUTION INTRAVENOUS at 04:28

## 2018-10-22 RX ADMIN — METOPROLOL SUCCINATE 25 MG: 25 TABLET, EXTENDED RELEASE ORAL at 09:43

## 2018-10-22 RX ADMIN — INSULIN LISPRO 8 UNITS: 100 INJECTION, SOLUTION INTRAVENOUS; SUBCUTANEOUS at 09:41

## 2018-10-22 RX ADMIN — FENTANYL CITRATE 100 MCG: 50 INJECTION INTRAMUSCULAR; INTRAVENOUS at 12:53

## 2018-10-22 RX ADMIN — PIPERACILLIN SODIUM,TAZOBACTAM SODIUM 3.38 G: 3; .375 INJECTION, POWDER, FOR SOLUTION INTRAVENOUS at 02:44

## 2018-10-22 RX ADMIN — HYDROMORPHONE HYDROCHLORIDE 1 MG: 1 INJECTION, SOLUTION INTRAMUSCULAR; INTRAVENOUS; SUBCUTANEOUS at 13:40

## 2018-10-22 RX ADMIN — INSULIN LISPRO 6 UNITS: 100 INJECTION, SOLUTION INTRAVENOUS; SUBCUTANEOUS at 21:38

## 2018-10-22 RX ADMIN — MORPHINE SULFATE 2 MG: 2 INJECTION, SOLUTION INTRAMUSCULAR; INTRAVENOUS at 17:08

## 2018-10-22 RX ADMIN — PIPERACILLIN SODIUM,TAZOBACTAM SODIUM 3.38 G: 3; .375 INJECTION, POWDER, FOR SOLUTION INTRAVENOUS at 09:40

## 2018-10-22 RX ADMIN — PROPOFOL 100 MG: 10 INJECTION, EMULSION INTRAVENOUS at 13:30

## 2018-10-22 RX ADMIN — PIPERACILLIN SODIUM,TAZOBACTAM SODIUM 3.38 G: 3; .375 INJECTION, POWDER, FOR SOLUTION INTRAVENOUS at 21:28

## 2018-10-22 RX ADMIN — DEXAMETHASONE SODIUM PHOSPHATE 8 MG: 4 INJECTION, SOLUTION INTRAMUSCULAR; INTRAVENOUS at 13:00

## 2018-10-22 RX ADMIN — LIDOCAINE HYDROCHLORIDE 50 MG: 20 INJECTION, SOLUTION INFILTRATION; PERINEURAL at 12:53

## 2018-10-22 RX ADMIN — LINEZOLID 600 MG: 600 INJECTION, SOLUTION INTRAVENOUS at 18:27

## 2018-10-22 RX ADMIN — ENOXAPARIN SODIUM 40 MG: 40 INJECTION SUBCUTANEOUS at 21:28

## 2018-10-22 RX ADMIN — INSULIN LISPRO 6 UNITS: 100 INJECTION, SOLUTION INTRAVENOUS; SUBCUTANEOUS at 11:52

## 2018-10-22 RX ADMIN — SUCCINYLCHOLINE CHLORIDE 160 MG: 20 INJECTION, SOLUTION INTRAMUSCULAR; INTRAVENOUS at 12:53

## 2018-10-22 RX ADMIN — PROPOFOL 150 MG: 10 INJECTION, EMULSION INTRAVENOUS at 12:53

## 2018-10-22 RX ADMIN — GLYCOPYRROLATE 0.2 MG: 0.2 INJECTION, SOLUTION INTRAMUSCULAR; INTRAVENOUS at 13:00

## 2018-10-22 ASSESSMENT — PULMONARY FUNCTION TESTS
PIF_VALUE: 20
PIF_VALUE: 20
PIF_VALUE: 4
PIF_VALUE: 20
PIF_VALUE: 20
PIF_VALUE: 0
PIF_VALUE: 21
PIF_VALUE: 21
PIF_VALUE: 16
PIF_VALUE: 21
PIF_VALUE: 1
PIF_VALUE: 16
PIF_VALUE: 21
PIF_VALUE: 21
PIF_VALUE: 4
PIF_VALUE: 21
PIF_VALUE: 16
PIF_VALUE: 20
PIF_VALUE: 4
PIF_VALUE: 20
PIF_VALUE: 21
PIF_VALUE: 21
PIF_VALUE: 19
PIF_VALUE: 19
PIF_VALUE: 21
PIF_VALUE: 4
PIF_VALUE: 22
PIF_VALUE: 4
PIF_VALUE: 21
PIF_VALUE: 16
PIF_VALUE: 20
PIF_VALUE: 19
PIF_VALUE: 20
PIF_VALUE: 21
PIF_VALUE: 4
PIF_VALUE: 16
PIF_VALUE: 22
PIF_VALUE: 0
PIF_VALUE: 46
PIF_VALUE: 20
PIF_VALUE: 20
PIF_VALUE: 21
PIF_VALUE: 21
PIF_VALUE: 20
PIF_VALUE: 0
PIF_VALUE: 23
PIF_VALUE: 21
PIF_VALUE: 4
PIF_VALUE: 24
PIF_VALUE: 20
PIF_VALUE: 19
PIF_VALUE: 0
PIF_VALUE: 20
PIF_VALUE: 4
PIF_VALUE: 21
PIF_VALUE: 20
PIF_VALUE: 23
PIF_VALUE: 20
PIF_VALUE: 22
PIF_VALUE: 26
PIF_VALUE: 20
PIF_VALUE: 22
PIF_VALUE: 23
PIF_VALUE: 22
PIF_VALUE: 19

## 2018-10-22 ASSESSMENT — PAIN SCALES - GENERAL
PAINLEVEL_OUTOF10: 5
PAINLEVEL_OUTOF10: 5
PAINLEVEL_OUTOF10: 2
PAINLEVEL_OUTOF10: 4
PAINLEVEL_OUTOF10: 2

## 2018-10-22 NOTE — ANESTHESIA PRE PROCEDURE
[facility] Department of Anesthesiology  Pre-Anesthesia Evaluation/Consultation       Name:  Charles Galicia  : 1970  Age:  51 y.o. MRN:  [id number]  Date: 10/22/2018           Surgeon: Surgeon(s):  Juan Plaza    Procedure: Procedure(s):  INCISION AND DRAINAGE LEFT FOOT     No Known Allergies  Patient Active Problem List   Diagnosis    Carotid artery stenosis without cerebral infarction    Diabetes mellitus type 2, insulin dependent ([address]    Obese    Hypertension    Other and unspecified hyperlipidemia    Diabetes mellitus with peripheral circulatory disorder ([address]    Carotid stenosis    Type 2 diabetes mellitus with atherosclerosis of native arteries of extremity with rest pain (HCC)    Atherosclerosis of native artery of extremity with ulceration ([address]    Critical ischemia of lower extremity    PVD (peripheral vascular disease) (HCC)    Osteomyelitis of toe of left foot ([address]    Osteomyelitis ([address]     Past Medical History:   Diagnosis Date    Angina effort ([address]     Arthritis     CAD (coronary artery disease)     Carotid stenosis     Diabetes mellitus with neurological manifestations, uncontrolled ([address]     Diarrhea     Hyperlipidemia     Hypertension     Obesity     Type II or unspecified type diabetes mellitus without mention of complication, not stated as uncontrolled      Past Surgical History:   Procedure Laterality Date    CARDIAC CATHETERIZATION      CAROTID ENDARTERECTOMY Right 2015    CHOLECYSTECTOMY      [place] OFFICE/OUTPT VISIT,PROCEDURE ONLY Left 10/5/2018    AMPUTATION LEFT GREAT DIGIT performed by [address], DPM at [address] [facility] [address] TOE AMPUTATION Left 10/05/2018    left great toe amputation     Social History   Substance Use Topics    Smoking status: Never Smoker    Smokeless tobacco: Never Used    Alcohol use No     Medications  No current facility-administered medications on file prior to encounter.

## 2018-10-22 NOTE — ANESTHESIA POSTPROCEDURE EVALUATION
[facility] Department of Anesthesiology  Post-Anesthesia Note       Name:  Alexandra Helm                                  Age:  51 y.o. MRN:  [id number]     Last Vitals & Oxygen Saturation: BP (!) 116/51   Pulse 79   Temp 99 °F (37.2 °C) (Oral)   Resp 18   Ht 5' 6\" (1.676 m)   Wt 246 lb 4.1 oz (111.7 kg)   SpO2 92%   BMI 39.75 kg/m²   Patient Vitals for the past 4 hrs:   BP Temp Temp src Pulse Resp SpO2   10/22/18 1530 - - - - - 92 %   10/22/18 1529 (!) 116/51 99 °F (37.2 °C) Oral 79 18 (!) 84 %   10/22/18 1505 123/73 - - 82 13 94 %   10/22/18 1500 115/72 - - 83 17 95 %   10/22/18 1455 115/72 99.1 °F (37.3 °C) Temporal 82 18 94 %   10/22/18 1450 122/71 - - 82 22 95 %   10/22/18 1445 122/71 - - 83 10 97 %   10/22/18 1435 111/68 - - 86 9 94 %   10/22/18 1430 108/69 - - 173 23 94 %   10/22/18 1425 108/69 - - 174 22 94 %   10/22/18 1420 95/66 - - 173 24 95 %   10/22/18 1415 99/63 - - 173 18 96 %   10/22/18 1411 95/62 99.8 °F (37.7 °C) Temporal 175 23 96 %   10/22/18 1215 123/76 98.1 °F (36.7 °C) Temporal 91 16 94 %       Level of consciousness:  Awake, alert to baseline    Respiratory: Respirations easy, no distress. Stable. Cardiovascular: Hemodynamically stable. Hydration: Adequate. PONV: Adequately managed. Post-op pain: Adequately controlled. Post-op assessment: Tolerated anesthetic well without complication. Complications:  None.     Maya Whitlock MD  October 22, 2018   3:54 PM

## 2018-10-22 NOTE — CONSULTS
Infectious Diseases Inpatient Consult Note      Reason for Consult:  Left diabetic foot infection and osteomyelitis      Requesting Physician:      Primary Care Physician:  Iram Terry    History Obtained From:  Pt and Medical records     CHIEF COMPLAINT:     Chief Complaint   Patient presents with    Laceration     Pt had partial left big toe amputation on 10/05/18 due to diabetis, stiches are in but he bumped his foot this morning and the area started to bleed. . Pt foot is currently wrapped, follow scheduled for 10/23/18. HISTORY OF PRESENT ILLNESS:  51 y.o. man with DM and poor control and diabetic foot infection followed by  and h/o PVD s/p intervention by Fernanda Mejia admitted with non healing wound and he underwent surgery on 10/5 partial amputation of Left Great toe. He is now admitted for Left foot infection on going and for further ID and drainage of abscess on 10/22 and or cx from 10/22 pending he recently had Neck abscess s/p ID by  and cx MRSA on Broth only. WBC elevation on admit and now with KOLE with elevation in creat, fevers on admit and we are consulted for antibiotic recommendations.         Past Medical History:    Past Medical History:   Diagnosis Date    Angina effort ([address]     Arthritis     CAD (coronary artery disease)     Carotid stenosis     Diabetes mellitus with neurological manifestations, uncontrolled ([address]     Diarrhea     Hyperlipidemia     Hypertension     Obesity     Type II or unspecified type diabetes mellitus without mention of complication, not stated as uncontrolled        Past Surgical History:    Past Surgical History:   Procedure Laterality Date    CARDIAC CATHETERIZATION      CAROTID ENDARTERECTOMY Right 4-9-2015    CHOLECYSTECTOMY      [place] OFFICE/OUTPT VISIT,PROCEDURE ONLY Left 10/5/2018    AMPUTATION LEFT GREAT DIGIT performed by Dwayne Tong DPM at [facility] OFFICE/OUTPT [facility] Left 10/22/2018 sensorium, mood normal  Lines:  Left foot post op dressing+  Skin changes from DM+           DATA:    Lab Results   Component Value Date    WBC 19.8 (H) 10/23/2018    HGB 10.3 (L) 10/23/2018    HCT 32.0 (L) 10/23/2018    MCV 83.9 10/23/2018     10/23/2018     Lab Results   Component Value Date    CREATININE 1.8 (H) 10/23/2018    BUN 34 (H) 10/23/2018     (L) 10/23/2018    K 4.7 10/23/2018    CL 88 (L) 10/23/2018    CO2 24 10/23/2018       Hepatic Function Panel:   Lab Results   Component Value Date    ALKPHOS 139 10/22/2018    ALT 12 10/22/2018    AST 12 10/22/2018    PROT 8.3 10/22/2018    BILITOT 0.5 10/22/2018    LABALBU 2.4 10/23/2018     UA:  Lab Results   Component Value Date    COLORU YELLOW 12/01/2017    CLARITYU Clear 12/01/2017    GLUCOSEU >=1000 12/01/2017    BILIRUBINUR Negative 12/01/2017    KETUA Negative 12/01/2017    SPECGRAV 1.015 12/01/2017    BLOODU SMALL 12/01/2017    PHUR 5.0 12/01/2017    PHUR 5.0 12/01/2017    PROTEINU 30 12/01/2017    UROBILINOGEN 0.2 12/01/2017    NITRU Negative 12/01/2017    LEUKOCYTESUR Negative 12/01/2017    LABMICR YES 12/01/2017    URINETYPE Not Specified 12/01/2017      Urine Microscopic:   Lab Results   Component Value Date    LABCAST 5-10 Hyaline 03/03/2016    HYALCAST 0 12/01/2017    WBCUA 0 12/01/2017    RBCUA 1 12/01/2017    EPIU 0 12/01/2017         Scheduled Meds:   heparin (porcine)  5,000 Units Subcutaneous 3 times per day    insulin regular  10 Units Intravenous Once    albumin human  25 g Intravenous O1F    folic acid-pyridoxine-cyanocobalamine  1 tablet Oral Daily    linezolid  600 mg Intravenous Q12H    insulin glargine  15 Units Subcutaneous Nightly    aspirin  81 mg Oral Daily    atorvastatin  40 mg Oral QAM    metoprolol succinate  25 mg Oral QAM    sodium chloride flush  10 mL Intravenous 2 times per day    piperacillin-tazobactam  3.375 g Intravenous 4 times per day    influenza virus vaccine  0.5 mL Intramuscular Once    Organism  (Abnormal) 10/05/2018  2:00 PM [address] [facility]   Prevotella corporis     Anaerobic Culture 10/05/2018  2:00 PM [address] [facility]   Light growth   Beta Lactamase POSITIVE. Sensitivities not routinely done. Drugs of choice are: Metronidazole,   Cefoxitin, or Piperacillin/Tazobactam.     Organism  (Abnormal) 10/05/2018  2:00 PM [address] [facility]   Non-hemolytic Streptococcus     Culture Surgical 10/05/2018  2:00 PM [address] [facility]   Rare growth   Organism is non-viable for further testing   No further workup     Testing Performed By     Lab - Abbreviation Name Director Address Valid Date Range   -[facility] Si[facility] M.D., Ph.D. [address] [place] [address] 08/30/17 1217-Present   Narrative     ORDER#: [id number]                          ORDERED BY: DWIGHT Sheehan  SOURCE: Tissue Toe (1st), Great Left Foot  COLLECTED:  10/05/18 14:00  ANTIBIOTICS AT [facility]. :                      RECEIVED :  10/05/18 14:15  Performed at:     ORDER#: [id number]                          ORDERED BY: Javon Manzo  SOURCE: Abscess                            COLLECTED:  10/09/18 11:39  ANTIBIOTICS AT [facility]. :                      RECEIVED :  10/09/18 16:00  Performed at:  [address] [place] [address]   Phone [phone]   Culture & Susceptibility     [facility] MRSA     Antibiotic Interpretation [facility] Unit   ceFAZolin Resistant >16 mcg/mL   clindamycin Sensitive <=0.5 mcg/mL   erythromycin Resistant >4 mcg/mL   oxacillin Resistant >2 mcg/mL   tetracycline Sensitive <=0.5 mcg/mL   trimethoprim-sulfamethoxazole Sensitive <=0.5/9.5 mcg/mL   vancomycin Sensitive 1 mcg/mL        Time       Surgical Culture [[id number]] Collected: 10/22/18 1336   Order Status: Sent Specimen: Specimen from Foot Updated: 10/22/18 1353   Culture Blood #1 [[id number]] Collected: 10/21/18 1135   Order Status:

## 2018-10-22 NOTE — INTERVAL H&P NOTE
I reviewed the patient's History and Physical performed by Dr. Taylor Elmore on 10/21/2018. Patient was admitted on 10/21/2018 for cellulitis and osteomyelitis of the left foot. Patient is scheduled for inpatient surgery on 10/22/2018. Patient is currently on IV antibiotics as an inpatient.  There are no changes to be made to the H&P.

## 2018-10-22 NOTE — PLAN OF CARE
Problem: Nutrition  Goal: Optimal nutrition therapy  Outcome: Ongoing  Nutrition Problem: No nutrition diagnosis at this time  Intervention: Food and/or Nutrient Delivery: Continue current diet  Nutritional Goals:  Tolerate most appropriate form of nutrition once diet is advanced

## 2018-10-23 ENCOUNTER — APPOINTMENT (OUTPATIENT)
Dept: MRI IMAGING | Age: 48
DRG: 710 | End: 2018-10-23
Payer: COMMERCIAL

## 2018-10-23 ENCOUNTER — APPOINTMENT (OUTPATIENT)
Dept: ULTRASOUND IMAGING | Age: 48
DRG: 710 | End: 2018-10-23
Payer: COMMERCIAL

## 2018-10-23 LAB
ALBUMIN SERPL-MCNC: 2.4 G/DL (ref 3.4–5)
ANION GAP SERPL CALCULATED.3IONS-SCNC: 11 MMOL/L (ref 3–16)
BASOPHILS ABSOLUTE: 0 K/UL (ref 0–0.2)
BASOPHILS RELATIVE PERCENT: 0.1 %
BUN BLDV-MCNC: 34 MG/DL (ref 7–20)
CALCIUM SERPL-MCNC: 9.1 MG/DL (ref 8.3–10.6)
CHLORIDE BLD-SCNC: 88 MMOL/L (ref 99–110)
CO2: 24 MMOL/L (ref 21–32)
CREAT SERPL-MCNC: 1.8 MG/DL (ref 0.9–1.3)
EOSINOPHILS ABSOLUTE: 0 K/UL (ref 0–0.6)
EOSINOPHILS RELATIVE PERCENT: 0 %
GFR AFRICAN AMERICAN: 49
GFR NON-AFRICAN AMERICAN: 40
GLUCOSE BLD-MCNC: 261 MG/DL (ref 70–99)
GLUCOSE BLD-MCNC: 346 MG/DL (ref 70–99)
GLUCOSE BLD-MCNC: 406 MG/DL (ref 70–99)
GLUCOSE BLD-MCNC: 426 MG/DL (ref 70–99)
GLUCOSE BLD-MCNC: 435 MG/DL (ref 70–99)
GLUCOSE BLD-MCNC: 446 MG/DL (ref 70–99)
GRAM STAIN RESULT: ABNORMAL
HCT VFR BLD CALC: 32 % (ref 40.5–52.5)
HEMOGLOBIN: 10.3 G/DL (ref 13.5–17.5)
LYMPHOCYTES ABSOLUTE: 1.6 K/UL (ref 1–5.1)
LYMPHOCYTES RELATIVE PERCENT: 8.2 %
MCH RBC QN AUTO: 27.1 PG (ref 26–34)
MCHC RBC AUTO-ENTMCNC: 32.4 G/DL (ref 31–36)
MCV RBC AUTO: 83.9 FL (ref 80–100)
MONOCYTES ABSOLUTE: 1.5 K/UL (ref 0–1.3)
MONOCYTES RELATIVE PERCENT: 7.7 %
NEUTROPHILS ABSOLUTE: 16.7 K/UL (ref 1.7–7.7)
NEUTROPHILS RELATIVE PERCENT: 84 %
ORGANISM: ABNORMAL
OSMOLALITY: 290 MOSM/KG (ref 278–305)
OSMOLALITY: 292 MOSM/KG (ref 278–305)
PDW BLD-RTO: 13.5 % (ref 12.4–15.4)
PERFORMED ON: ABNORMAL
PHOSPHORUS: 3.4 MG/DL (ref 2.5–4.9)
PLATELET # BLD: 422 K/UL (ref 135–450)
PMV BLD AUTO: 7.7 FL (ref 5–10.5)
POTASSIUM SERPL-SCNC: 4.7 MMOL/L (ref 3.5–5.1)
RBC # BLD: 3.81 M/UL (ref 4.2–5.9)
SODIUM BLD-SCNC: 123 MMOL/L (ref 136–145)
SODIUM BLD-SCNC: 127 MMOL/L (ref 136–145)
SODIUM BLD-SCNC: 128 MMOL/L (ref 136–145)
TOTAL CK: 51 U/L (ref 39–308)
TSH SERPL DL<=0.05 MIU/L-ACNC: 0.77 UIU/ML (ref 0.27–4.2)
VANCOMYCIN RANDOM: 8.2 UG/ML
WBC # BLD: 19.8 K/UL (ref 4–11)
WOUND/ABSCESS: ABNORMAL
WOUND/ABSCESS: ABNORMAL

## 2018-10-23 PROCEDURE — 85025 COMPLETE CBC W/AUTO DIFF WBC: CPT

## 2018-10-23 PROCEDURE — G8979 MOBILITY GOAL STATUS: HCPCS | Performed by: PHYSICAL THERAPIST

## 2018-10-23 PROCEDURE — G8988 SELF CARE GOAL STATUS: HCPCS

## 2018-10-23 PROCEDURE — 36415 COLL VENOUS BLD VENIPUNCTURE: CPT

## 2018-10-23 PROCEDURE — 6370000000 HC RX 637 (ALT 250 FOR IP): Performed by: PODIATRIST

## 2018-10-23 PROCEDURE — 1200000000 HC SEMI PRIVATE

## 2018-10-23 PROCEDURE — 2580000003 HC RX 258: Performed by: INTERNAL MEDICINE

## 2018-10-23 PROCEDURE — 6370000000 HC RX 637 (ALT 250 FOR IP): Performed by: INTERNAL MEDICINE

## 2018-10-23 PROCEDURE — 97166 OT EVAL MOD COMPLEX 45 MIN: CPT

## 2018-10-23 PROCEDURE — 82550 ASSAY OF CK (CPK): CPT

## 2018-10-23 PROCEDURE — 76770 US EXAM ABDO BACK WALL COMP: CPT

## 2018-10-23 PROCEDURE — 97163 PT EVAL HIGH COMPLEX 45 MIN: CPT | Performed by: PHYSICAL THERAPIST

## 2018-10-23 PROCEDURE — 73718 MRI LOWER EXTREMITY W/O DYE: CPT

## 2018-10-23 PROCEDURE — P9046 ALBUMIN (HUMAN), 25%, 20 ML: HCPCS | Performed by: INTERNAL MEDICINE

## 2018-10-23 PROCEDURE — 6370000000 HC RX 637 (ALT 250 FOR IP): Performed by: NURSE PRACTITIONER

## 2018-10-23 PROCEDURE — 97530 THERAPEUTIC ACTIVITIES: CPT

## 2018-10-23 PROCEDURE — 94760 N-INVAS EAR/PLS OXIMETRY 1: CPT

## 2018-10-23 PROCEDURE — 84295 ASSAY OF SERUM SODIUM: CPT

## 2018-10-23 PROCEDURE — 6360000002 HC RX W HCPCS: Performed by: INTERNAL MEDICINE

## 2018-10-23 PROCEDURE — 6370000000 HC RX 637 (ALT 250 FOR IP): Performed by: STUDENT IN AN ORGANIZED HEALTH CARE EDUCATION/TRAINING PROGRAM

## 2018-10-23 PROCEDURE — 84443 ASSAY THYROID STIM HORMONE: CPT

## 2018-10-23 PROCEDURE — 6360000002 HC RX W HCPCS: Performed by: STUDENT IN AN ORGANIZED HEALTH CARE EDUCATION/TRAINING PROGRAM

## 2018-10-23 PROCEDURE — 83930 ASSAY OF BLOOD OSMOLALITY: CPT

## 2018-10-23 PROCEDURE — 97530 THERAPEUTIC ACTIVITIES: CPT | Performed by: PHYSICAL THERAPIST

## 2018-10-23 PROCEDURE — 80069 RENAL FUNCTION PANEL: CPT

## 2018-10-23 PROCEDURE — 80202 ASSAY OF VANCOMYCIN: CPT

## 2018-10-23 PROCEDURE — G8987 SELF CARE CURRENT STATUS: HCPCS

## 2018-10-23 PROCEDURE — G8978 MOBILITY CURRENT STATUS: HCPCS | Performed by: PHYSICAL THERAPIST

## 2018-10-23 PROCEDURE — 99255 IP/OBS CONSLTJ NEW/EST HI 80: CPT | Performed by: INTERNAL MEDICINE

## 2018-10-23 PROCEDURE — 2580000003 HC RX 258: Performed by: STUDENT IN AN ORGANIZED HEALTH CARE EDUCATION/TRAINING PROGRAM

## 2018-10-23 RX ORDER — SUB-Q INSULIN DEVICE, 40 UNIT
EACH MISCELLANEOUS
COMMUNITY
End: 2019-09-23

## 2018-10-23 RX ORDER — LINEZOLID 2 MG/ML
600 INJECTION, SOLUTION INTRAVENOUS EVERY 12 HOURS
Status: DISCONTINUED | OUTPATIENT
Start: 2018-10-23 | End: 2018-10-29 | Stop reason: HOSPADM

## 2018-10-23 RX ORDER — SODIUM CHLORIDE 9 MG/ML
INJECTION, SOLUTION INTRAVENOUS CONTINUOUS
Status: DISCONTINUED | OUTPATIENT
Start: 2018-10-23 | End: 2018-10-26

## 2018-10-23 RX ORDER — INSULIN GLARGINE 100 [IU]/ML
20 INJECTION, SOLUTION SUBCUTANEOUS NIGHTLY
Status: DISCONTINUED | OUTPATIENT
Start: 2018-10-23 | End: 2018-10-24

## 2018-10-23 RX ORDER — ALBUMIN (HUMAN) 12.5 G/50ML
25 SOLUTION INTRAVENOUS EVERY 8 HOURS
Status: COMPLETED | OUTPATIENT
Start: 2018-10-23 | End: 2018-10-25

## 2018-10-23 RX ORDER — HEPARIN SODIUM 5000 [USP'U]/ML
5000 INJECTION, SOLUTION INTRAVENOUS; SUBCUTANEOUS EVERY 8 HOURS SCHEDULED
Status: DISCONTINUED | OUTPATIENT
Start: 2018-10-23 | End: 2018-10-29 | Stop reason: HOSPADM

## 2018-10-23 RX ADMIN — INSULIN LISPRO 3 UNITS: 100 INJECTION, SOLUTION INTRAVENOUS; SUBCUTANEOUS at 22:16

## 2018-10-23 RX ADMIN — PIPERACILLIN SODIUM,TAZOBACTAM SODIUM 3.38 G: 3; .375 INJECTION, POWDER, FOR SOLUTION INTRAVENOUS at 08:22

## 2018-10-23 RX ADMIN — INSULIN HUMAN 10 UNITS: 100 INJECTION, SOLUTION PARENTERAL at 12:06

## 2018-10-23 RX ADMIN — INSULIN LISPRO 4 UNITS: 100 INJECTION, SOLUTION INTRAVENOUS; SUBCUTANEOUS at 08:29

## 2018-10-23 RX ADMIN — HEPARIN SODIUM 5000 UNITS: 5000 INJECTION INTRAVENOUS; SUBCUTANEOUS at 20:53

## 2018-10-23 RX ADMIN — INSULIN LISPRO 4 UNITS: 100 INJECTION, SOLUTION INTRAVENOUS; SUBCUTANEOUS at 11:48

## 2018-10-23 RX ADMIN — LINEZOLID 600 MG: 600 INJECTION, SOLUTION INTRAVENOUS at 20:53

## 2018-10-23 RX ADMIN — INSULIN LISPRO 12 UNITS: 100 INJECTION, SOLUTION INTRAVENOUS; SUBCUTANEOUS at 08:25

## 2018-10-23 RX ADMIN — PIPERACILLIN SODIUM,TAZOBACTAM SODIUM 3.38 G: 3; .375 INJECTION, POWDER, FOR SOLUTION INTRAVENOUS at 18:37

## 2018-10-23 RX ADMIN — ASPIRIN 81 MG: 81 TABLET, COATED ORAL at 08:23

## 2018-10-23 RX ADMIN — INSULIN LISPRO 10 UNITS: 100 INJECTION, SOLUTION INTRAVENOUS; SUBCUTANEOUS at 18:12

## 2018-10-23 RX ADMIN — PIPERACILLIN SODIUM,TAZOBACTAM SODIUM 3.38 G: 3; .375 INJECTION, POWDER, FOR SOLUTION INTRAVENOUS at 03:55

## 2018-10-23 RX ADMIN — SODIUM CHLORIDE: 9 INJECTION, SOLUTION INTRAVENOUS at 11:50

## 2018-10-23 RX ADMIN — LINEZOLID 600 MG: 600 INJECTION, SOLUTION INTRAVENOUS at 04:22

## 2018-10-23 RX ADMIN — ALBUMIN (HUMAN) 25 G: 0.25 INJECTION, SOLUTION INTRAVENOUS at 11:42

## 2018-10-23 RX ADMIN — SODIUM CHLORIDE: 9 INJECTION, SOLUTION INTRAVENOUS at 04:21

## 2018-10-23 RX ADMIN — Medication 1 TABLET: at 08:23

## 2018-10-23 RX ADMIN — INSULIN LISPRO 8 UNITS: 100 INJECTION, SOLUTION INTRAVENOUS; SUBCUTANEOUS at 18:04

## 2018-10-23 RX ADMIN — ALBUMIN (HUMAN) 25 G: 0.25 INJECTION, SOLUTION INTRAVENOUS at 20:53

## 2018-10-23 RX ADMIN — ACETAMINOPHEN 650 MG: 325 TABLET, FILM COATED ORAL at 18:12

## 2018-10-23 RX ADMIN — METOPROLOL SUCCINATE 25 MG: 25 TABLET, EXTENDED RELEASE ORAL at 08:23

## 2018-10-23 RX ADMIN — ATORVASTATIN CALCIUM 40 MG: 40 TABLET, FILM COATED ORAL at 08:22

## 2018-10-23 RX ADMIN — INSULIN GLARGINE 20 UNITS: 100 INJECTION, SOLUTION SUBCUTANEOUS at 20:53

## 2018-10-23 RX ADMIN — MORPHINE SULFATE 4 MG: 4 INJECTION INTRAVENOUS at 22:15

## 2018-10-23 RX ADMIN — OXYCODONE HYDROCHLORIDE 5 MG: 5 TABLET ORAL at 20:53

## 2018-10-23 RX ADMIN — INSULIN LISPRO 12 UNITS: 100 INJECTION, SOLUTION INTRAVENOUS; SUBCUTANEOUS at 11:47

## 2018-10-23 RX ADMIN — OXYCODONE HYDROCHLORIDE 5 MG: 5 TABLET ORAL at 11:42

## 2018-10-23 RX ADMIN — OXYCODONE HYDROCHLORIDE 5 MG: 5 TABLET ORAL at 04:22

## 2018-10-23 ASSESSMENT — PAIN SCALES - GENERAL
PAINLEVEL_OUTOF10: 6
PAINLEVEL_OUTOF10: 7
PAINLEVEL_OUTOF10: 6
PAINLEVEL_OUTOF10: 0
PAINLEVEL_OUTOF10: 7
PAINLEVEL_OUTOF10: 7
PAINLEVEL_OUTOF10: 4
PAINLEVEL_OUTOF10: 4
PAINLEVEL_OUTOF10: 0

## 2018-10-23 ASSESSMENT — PAIN DESCRIPTION - DESCRIPTORS: DESCRIPTORS: ACHING

## 2018-10-23 ASSESSMENT — PAIN DESCRIPTION - FREQUENCY: FREQUENCY: CONTINUOUS

## 2018-10-23 ASSESSMENT — PAIN DESCRIPTION - LOCATION: LOCATION: FOOT

## 2018-10-23 ASSESSMENT — PAIN DESCRIPTION - ORIENTATION: ORIENTATION: LEFT

## 2018-10-23 ASSESSMENT — PAIN DESCRIPTION - PAIN TYPE: TYPE: CHRONIC PAIN

## 2018-10-23 NOTE — PROCEDURES
[address]                                PROCEDURE NOTE    PATIENT NAME: Mago Bradley                      :         1970  MED REC NO:   [id number]                          ROOM:  ACCOUNT NO:   [de-identified]                           ADMIT DATE:  10/18/2018  PROVIDER:     Kalia Davison MD    DATE OF PROCEDURE:  10/18/2018    PREPROCEDURE DIAGNOSES:  1. Lower extremity atherosclerosis with ulceration. 2.  Peripheral arterial disease. POSTPROCEDURE DIAGNOSES:  1. Lower extremity atherosclerosis with ulceration. 2.  Peripheral arterial disease. PROCEDURES PERFORMED:  1. Ultrasound-guided access to the left common femoral artery. 2.  Angiogram of the right lower extremity. 3.  Selective angiography of the right popliteal artery. 4.  Selective angiography of the right anterior tibial artery. 5.  Atherectomy with angioplasty, right popliteal artery. 6.  Atherectomy with angioplasty, right anterior tibial artery. 7.  Angioplasty of the right peroneal artery. SURGEON:  Kalia Davison MD    ANESTHESIA:  Local with IV sedation. INDICATIONS:  The patient is a 39-year-old male with severe diabetic  peripheral arterial disease and nonhealing ulceration of his feet. Angiography is indicated to determine if he is a candidate for  revascularization. Endovascular revascularization is indicated to  promote wound healing and provide limb perseveration. He is aware of  the risks, benefits, and alternatives of the procedure and willing to  proceed. PROCEDURE:  Using ultrasound guidance, percutaneous access was gained  to the left common femoral artery with a long 6-Welsh sheath. A  permanent image of the access was obtained and recorded. The 6-Welsh  sheath was placed up and over the aortic bifurcation and into the  right common femoral artery.   A full and complete angiogram of the  right lower

## 2018-10-23 NOTE — PROGRESS NOTES
neurological manifestations, uncontrolled ([facility] Diarrhea; Hyperlipidemia; Hypertension; Obesity; and Type II or unspecified type diabetes mellitus without mention of complication, not stated as uncontrolled. has a past surgical history that includes Cholecystectomy; Cardiac catheterization; Carotid endarterectomy (Right, 4-9-2015); Toe amputation (Left, 10/05/2018); pr office/outpt visit,procedure only (Left, 10/5/2018); and pr office/outpt visit,procedure only (Left, 10/22/2018). Restrictions  Restrictions/Precautions  Restrictions/Precautions: Isolation, Contact Precautions, Weight Bearing (MRSA)  Lower Extremity Weight Bearing Restrictions  Left Lower Extremity Weight Bearing: Non Weight Bearing       Subjective   General  Chart Reviewed: Yes  Additional Pertinent Hx: Pt is a 51 y.o. male admitted with osteomyelitis and infection of the left foot. Pt s/p I&D of left foot, partial amputation of the left 1st and 2nd metatarsals, and amputation of the left 1st and 2nd toes 10/22/2018. Pt NWB L LE. PMHx includes: arthritis, CAD, DM II, HTN, obesity, cardiac cath, R carotid endarterectomy, L great toe amp 10/5/18  Family / Caregiver Present: No  Referring Practitioner: [place]  Diagnosis: osteomyelitis and infection of the left foot, Hyponatremia, KOLE  Subjective  Subjective: Pt met b/s for OT eval/tx with PT. Pt supine in bed on arrival, agreeable to participate in therapy. Pt denies pain L foot at rest, reports just having a headache.    Pain Assessment  Patient Currently in Pain: Denies    Social/Functional History  Social/Functional History  Lives With: Significant other  Type of Home: House  Home Layout: Bed/Bath upstairs, 1/2 bath on main level, Two level, Able to Live on Main level with bedroom/bathroom  Home Access: Stairs to enter without rails  Entrance Stairs - Number of Steps: 3  Bathroom Shower/Tub: Walk-in shower (walkin shower on main level and tub shower for upstairs)  Bathroom Toilet: Standard  Home Equipment: Contur  ADL Assistance: Independent  Homemaking Assistance:  (pt was cooking)  Homemaking Responsibilities: No  Ambulation Assistance: Independent  Transfer Assistance: Independent  Active : Yes  Mode of Transportation: Car  Occupation: On disability       Objective   Vision: Within Functional Limits  Hearing: Within functional limits      Orientation  Overall Orientation Status: Within Normal Limits     Balance  Sitting Balance: Stand by assistance  Standing Balance: Minimal assistance (min A for static standing stance at walker)  Standing Balance  Functional Mobility  Functional - Mobility Device: Rolling Walker  Assist Level: Moderate assistance  Functional Mobility Comments: Pt completed fxl mobility bed-chair with RW and min/mod A. Pt initally WB through L LE, verbal cues to correct, then needed min/mod A when hopping. ADL  LE Dressing: Minimal assistance (SBA to don R shoe, anticipate min A for complete LB dressing)  Additional Comments: Anticipate pt is independent feeding, SBA seated grooming, UB dressing/bathing, mod A LB bathing, toileting based on ROM/strength, balance, endurance. Pt educated on ADL retraining with NWB status, pt verbalized understanding and will further address next session.       Tone RUE  RUE Tone: Normotonic  Tone LUE  LUE Tone: Normotonic  Coordination  Movements Are Fluid And Coordinated: Yes     Bed mobility  Supine to Sit: Modified independent (HOB elevated)  Sit to Supine: Unable to assess (pt seated in recliner at end of session)     Transfers  Sit to stand: Minimal assistance (EOB>RW with min A x2 +verbal cues for hand placement and technique with NWB L LE)  Stand to sit: Minimal assistance (min A x2 RW>recliner +verbal cues for safe technique with NWB L LE)     Cognition  Overall Cognitive Status: WFL  Safety Judgement: Decreased awareness of need for safety;Decreased awareness of need for assistance  Insights: Decreased awareness of

## 2018-10-23 NOTE — PROGRESS NOTES
Physical Therapy    Facility/Department: [address] MED SURG  Initial Assessment    NAME: Naz Dash  : 1970  MRN: [id number]    Date of Service: 10/23/2018    Discharge Recommendations:  24 hour supervision or assist, Patient would benefit from continued therapy after discharge, 2-3 sessions per week, S Level 1  (as long as pt able to manage appropriate AD to maintain NWB status  PT Equipment Recommendations  Equipment Needed: Yes  Other: would benefit from an AD to use at home to ensure pt is able to maintain his NWB status; feel pt would not be able to maintain with RW therefore may benefit from knee scooter or w/c depending on insurance coverage and pt's home set up (unsure if w/c could fit into bathroom)      Patient Diagnosis(es): The encounter diagnosis was Osteomyelitis of toe of left foot ([address] has a past medical history of Angina effort ([address] Arthritis; CAD (coronary artery disease); Carotid stenosis; Diabetes mellitus with neurological manifestations, uncontrolled ([facility] [address] [facility] Diarrhea; Hyperlipidemia; Hypertension; Obesity; and Type II or unspecified type diabetes mellitus without mention of complication, not stated as uncontrolled. has a past surgical history that includes Cholecystectomy; Cardiac catheterization; Carotid endarterectomy (Right, 2015); Toe amputation (Left, 10/05/2018); pr office/outpt visit,procedure only (Left, 10/5/2018); and pr office/outpt visit,procedure only (Left, 10/22/2018). Restrictions  Restrictions/Precautions  Restrictions/Precautions: Isolation, Contact Precautions, Weight Bearing (MRSA)  Lower Extremity Weight Bearing Restrictions  Left Lower Extremity Weight Bearing: Non Weight Bearing  Vision/Hearing  Vision: Within Functional Limits  Hearing: Within functional limits     Subjective  General  Chart Reviewed:  Yes  Additional Pertinent Hx: pt is a 52 yo male who was adm to hosp with osteomyelitis of toe of L foot and L foot abcess s/p I&D/amputation of L 1st and 2nd metatarsal bones  Response To Previous Treatment: Not applicable  Family / Caregiver Present: No  Follows Commands: Within Functional Limits  Subjective  Subjective: pt agreeable to therapy  Pain Screening  Patient Currently in Pain: Denies  Vital Signs  Patient Currently in Pain: Denies       Orientation  Orientation  Overall Orientation Status: Within Functional Limits (decreased insight into deficits)    Social/Functional History  Social/Functional History  Lives With: Significant other  Type of Home: House  Home Layout: Bed/Bath upstairs, 1/2 bath on main level, Two level, Able to Live on Main level with bedroom/bathroom  Home Access: Stairs to enter without rails  Entrance Stairs - Number of Steps: 3  Bathroom Shower/Tub: Walk-in shower (walkin shower on main level and tub shower for upstairs)  Bathroom Toilet: Standard  Home Equipment: Cane, Crutches  ADL Assistance: Independent  Homemaking Assistance:  (pt was cooking)  Homemaking Responsibilities: No  Ambulation Assistance: Independent  Transfer Assistance: Independent  Active : Yes  Mode of Transportation: Car  Occupation: On disability  Objective          AROM RLE (degrees)  RLE AROM: WFL  AROM LLE (degrees)  LLE AROM : WFL  Strength RLE  Strength RLE: WFL  Strength LLE  Strength LLE: WFL     Sensation  Overall Sensation Status:  (reports intact sensation)     Transfers  Sit to Stand: Contact guard assistance;Minimal Assistance (x2)  Stand to sit: Contact guard assistance;Minimal Assistance (x2)  Ambulation  Ambulation?: Yes  Ambulation 1  Surface: level tile  Device: Rolling Walker  Assistance:  Moderate assistance;Minimal assistance (x2)  Quality of Gait: unsteady and difficulty with maintaining NWB LLE  Distance: 3'     Balance  Comments: sup for sitting balance; CGA/Min A for standing with walker        Assessment   Body structures, Functions, Activity limitations: Decreased functional mobility   Assessment: pt is a 52 yo male who is

## 2018-10-23 NOTE — PROGRESS NOTES
Progress Note  PGY-3    Hospital Day: 3                                                           Code:Full Code  Admit Date: 10/21/2018                                             PCP: Greer Arias                                SUBJECTIVE:   Interval Hx: Pt seen and examined at the bedside. He is doing well post op. No complaints. No acute overnight events. Amputation of left 1st and 2nd toes yesterday in OR. Sodium continues to be an issue, as is his glucose control.      MEDICATIONS:   Scheduled Meds:   heparin (porcine)  5,000 Units Subcutaneous 3 times per day    insulin regular  10 Units Intravenous Once    albumin human  25 g Intravenous I9I    folic acid-pyridoxine-cyanocobalamine  1 tablet Oral Daily    linezolid  600 mg Intravenous Q12H    insulin glargine  15 Units Subcutaneous Nightly    aspirin  81 mg Oral Daily    atorvastatin  40 mg Oral QAM    metoprolol succinate  25 mg Oral QAM    sodium chloride flush  10 mL Intravenous 2 times per day    piperacillin-tazobactam  3.375 g Intravenous 4 times per day    influenza virus vaccine  0.5 mL Intramuscular Once    insulin lispro  0-12 Units Subcutaneous TID WC    insulin lispro  0-6 Units Subcutaneous Nightly    insulin lispro  4 Units Subcutaneous TID WC      Continuous Infusions:   sodium chloride 100 mL/hr at 10/23/18 1150    dextrose       PRN Meds:morphine **OR** morphine, oxyCODONE, sodium chloride flush, magnesium hydroxide, ondansetron, glucose, dextrose, glucagon (rDNA), dextrose, acetaminophen    Allergies: No Known Allergies  Antibiotics:    PHYSICAL EXAM:       Vitals: /82   Pulse 78   Temp 97.5 °F (36.4 °C) (Oral)   Resp 18   Ht 5' 6\" (1.676 m)   Wt 251 lb 5.2 oz (114 kg)   SpO2 95%   BMI 40.56 kg/m²     I/O:    Intake/Output Summary (Last 24 hours) at 10/23/18 1151  Last data filed at 10/23/18 7061   Gross per 24 hour   Intake             2552 ml   Output             1550 ml   Net             1002 ml     I/O this shift:  In: -   Out: 900 [Urine:900]  I/O last 3 completed shifts: In: 7862 [P.O.:240; I.V.:2262; IV Piggyback:50]  Out: 7918 [Urine:1240; Blood:50]    Physical Examination:   · General appearance: alert, appears stated age and cooperative  · Skin: Skin color, texture, turgor normal.   · HEENT: EOMI: Normocephalic, no lesions, without obvious abnormality. · Lungs: clear to auscultation bilaterally  · Heart: regular rate and rhythm, S1, S2 normal, no murmur, click, rub or gallop  · Abdomen: soft, non-tender; bowel sounds normal; no masses,  no organomegaly  · MSK: left foot wrapped in thick gauze and ACEI wrap  · Neurologic:  Mental status: Alert, oriented, thought content appropriate  · Lines:   DATA:       Labs:  CBC:   Recent Labs      10/21/18   1140  10/22/18   0654  10/23/18   0705   WBC  20.4*  19.2*  19.8*   HGB  11.6*  10.8*  10.3*   HCT  36.0*  32.8*  32.0*   PLT  402  419  422       BMP: Recent Labs      10/21/18   1140  10/22/18   0654  10/23/18   0705   NA  132*  126*  123*   K  5.2*  4.6  4.7   CL  91*  87*  88*   CO2  25  24  24   BUN  26*  28*  34*   CREATININE  0.9  1.4*  1.8*   GLUCOSE  297*  310*  426*     LFT's: Recent Labs      10/21/18   1140  10/22/18   0654   AST  17  12*   ALT  16  12   BILITOT  0.4  0.5   ALKPHOS  162*  139*     Troponin: No results for input(s): TROPONINI in the last 72 hours. BNP: No results for input(s): BNP in the last 72 hours. ABGs: No results for input(s): PHART, LYL7HBC, PO2ART in the last 72 hours. INR:   Recent Labs      10/21/18   1140   INR  1.25*     Lipids: No results for input(s): CHOL, HDL in the last 72 hours. Invalid input(s): LDLCALCU    U/A:No results for input(s): NITRITE, COLORU, PHUR, LABCAST, WBCUA, RBCUA, MUCUS, TRICHOMONAS, YEAST, BACTERIA, CLARITYU, SPECGRAV, LEUKOCYTESUR, UROBILINOGEN, BILIRUBINUR, BLOODU, GLUCOSEU, AMORPHOUS in the last 72 hours. Invalid input(s): [place]     Micro:   ASSESSMENT AND PLAN:   Micha Reyes is a 51 y.o.

## 2018-10-23 NOTE — CONSULTS
texture and turgor and no rash. PSYCHIATRIC:  Appropriate affect. Alert and oriented to time, place,  and person. LABORATORY DATA:  Sodium 123, potassium 4.7, chloride 88, bicarb 24,  BUN 34, creatinine 1.8. Sugar is 426 mg/dL. IMPRESSION:  1. KOLE, likely due to the use of lisinopril along with the  hypotensive episode the patient had last night. I will continue the  patient's IV fluids and increase rate and continue to monitor I's and  O's, measure postvoid residuals and insert Tovar if needed. Ultrasound, urine lytes, UA are all pending and I would avoid  hypotension. 2.  Hyponatremia, partly hypovolemic and partly pseudohyponatremia  from hyperglycemia. His corrected serum sodium is 128. With IV fluid  resuscitation, I suspect his sodium will improve. RECOMMENDATIONS:  1. Restart IV fluids in the form of normal saline, rate of 100 an  hour. 2.  Ordered renal ultrasound. 3.  Ordered CK. 4.  Ordered urine eosinophils. 5.  Follow up on the results of the UA and urine lytes. 6.  Avoid exposure to nephrotoxic agents. 7.  Agree with holding the lisinopril. 8.  Achieve more adequate glycemic control. Thank you for the consultation.         Velma Can MD    D: 10/23/2018 10:41:02       T: 10/23/2018 17:54:33     [email or web address]  Job#: [id number]     Doc#: [id number]    CC:  <>

## 2018-10-23 NOTE — CONSULTS
Nephrology Attending Consult Note  Full Note Dictated      SUBJECTIVE:  We are following this patient for KOLE. Scheduled Meds:   heparin (porcine)  5,000 Units Subcutaneous 3 times per day    insulin regular  10 Units Intravenous Once    folic acid-pyridoxine-cyanocobalamine  1 tablet Oral Daily    linezolid  600 mg Intravenous Q12H    insulin glargine  15 Units Subcutaneous Nightly    aspirin  81 mg Oral Daily    atorvastatin  40 mg Oral QAM    metoprolol succinate  25 mg Oral QAM    sodium chloride flush  10 mL Intravenous 2 times per day    piperacillin-tazobactam  3.375 g Intravenous 4 times per day    influenza virus vaccine  0.5 mL Intramuscular Once    insulin lispro  0-12 Units Subcutaneous TID WC    insulin lispro  0-6 Units Subcutaneous Nightly    insulin lispro  4 Units Subcutaneous TID WC     Continuous Infusions:   dextrose       PRN Meds:.morphine **OR** morphine, oxyCODONE, sodium chloride flush, magnesium hydroxide, ondansetron, glucose, dextrose, glucagon (rDNA), dextrose, acetaminophen    Physical Exam:    TEMPERATURE:  Current - Temp: 97.5 °F (36.4 °C);  Max - Temp  Av.6 °F (38.1 °C)  Min: 97.5 °F (36.4 °C)  Max: 101.1 °F (38.4 °C)  RESPIRATIONS RANGE: Resp  Av.6  Min: 8  Max: 28  PULSE RANGE: Pulse  Av.3  Min: 68  Max: 175  BLOOD PRESSURE RANGE:  Systolic (46IIX), JWM:267 , Min:83 , KSX:655   ; Diastolic (61TQP), QZA:64, Min:50, Max:92    24HR INTAKE/OUTPUT:    Intake/Output Summary (Last 24 hours) at 10/23/18 1020  Last data filed at 10/23/18 7688   Gross per 24 hour   Intake             2552 ml   Output             1550 ml   Net             1002 ml           LAB DATA:    CBC: Lab Results   Component Value Date    WBC 19.8 10/23/2018    RBC 3.81 10/23/2018    HGB 10.3 10/23/2018    HCT 32.0 10/23/2018    MCV 83.9 10/23/2018    MCH 27.1 10/23/2018    MCHC 32.4 10/23/2018    RDW 13.5 10/23/2018    PLT

## 2018-10-24 ENCOUNTER — APPOINTMENT (OUTPATIENT)
Dept: GENERAL RADIOLOGY | Age: 48
DRG: 710 | End: 2018-10-24
Payer: COMMERCIAL

## 2018-10-24 LAB
ALBUMIN SERPL-MCNC: 2.6 G/DL (ref 3.4–5)
ANION GAP SERPL CALCULATED.3IONS-SCNC: 12 MMOL/L (ref 3–16)
BASOPHILS ABSOLUTE: 0 K/UL (ref 0–0.2)
BASOPHILS RELATIVE PERCENT: 0.3 %
BUN BLDV-MCNC: 25 MG/DL (ref 7–20)
CALCIUM SERPL-MCNC: 8.9 MG/DL (ref 8.3–10.6)
CHLORIDE BLD-SCNC: 95 MMOL/L (ref 99–110)
CO2: 23 MMOL/L (ref 21–32)
CREAT SERPL-MCNC: 1.1 MG/DL (ref 0.9–1.3)
EOSINOPHILS ABSOLUTE: 0.1 K/UL (ref 0–0.6)
EOSINOPHILS RELATIVE PERCENT: 0.4 %
GFR AFRICAN AMERICAN: >60
GFR NON-AFRICAN AMERICAN: >60
GLUCOSE BLD-MCNC: 153 MG/DL (ref 70–99)
GLUCOSE BLD-MCNC: 187 MG/DL (ref 70–99)
GLUCOSE BLD-MCNC: 209 MG/DL (ref 70–99)
GLUCOSE BLD-MCNC: 241 MG/DL (ref 70–99)
GLUCOSE BLD-MCNC: 249 MG/DL (ref 70–99)
HCT VFR BLD CALC: 31.9 % (ref 40.5–52.5)
HEMOGLOBIN: 10.3 G/DL (ref 13.5–17.5)
LYMPHOCYTES ABSOLUTE: 1.7 K/UL (ref 1–5.1)
LYMPHOCYTES RELATIVE PERCENT: 11.5 %
MCH RBC QN AUTO: 27 PG (ref 26–34)
MCHC RBC AUTO-ENTMCNC: 32.3 G/DL (ref 31–36)
MCV RBC AUTO: 83.5 FL (ref 80–100)
MONOCYTES ABSOLUTE: 1.1 K/UL (ref 0–1.3)
MONOCYTES RELATIVE PERCENT: 7 %
NEUTROPHILS ABSOLUTE: 12.2 K/UL (ref 1.7–7.7)
NEUTROPHILS RELATIVE PERCENT: 80.8 %
PDW BLD-RTO: 13.5 % (ref 12.4–15.4)
PERFORMED ON: ABNORMAL
PHOSPHORUS: 2.5 MG/DL (ref 2.5–4.9)
PLATELET # BLD: 434 K/UL (ref 135–450)
PMV BLD AUTO: 7.5 FL (ref 5–10.5)
POTASSIUM SERPL-SCNC: 4.6 MMOL/L (ref 3.5–5.1)
RBC # BLD: 3.82 M/UL (ref 4.2–5.9)
SODIUM BLD-SCNC: 129 MMOL/L (ref 136–145)
SODIUM BLD-SCNC: 130 MMOL/L (ref 136–145)
SODIUM BLD-SCNC: 130 MMOL/L (ref 136–145)
SODIUM BLD-SCNC: 131 MMOL/L (ref 136–145)
URIC ACID, SERUM: 6.2 MG/DL (ref 3.5–7.2)
WBC # BLD: 15.1 K/UL (ref 4–11)

## 2018-10-24 PROCEDURE — 97116 GAIT TRAINING THERAPY: CPT

## 2018-10-24 PROCEDURE — 97535 SELF CARE MNGMENT TRAINING: CPT

## 2018-10-24 PROCEDURE — 6370000000 HC RX 637 (ALT 250 FOR IP): Performed by: INTERNAL MEDICINE

## 2018-10-24 PROCEDURE — 1200000000 HC SEMI PRIVATE

## 2018-10-24 PROCEDURE — 6370000000 HC RX 637 (ALT 250 FOR IP): Performed by: STUDENT IN AN ORGANIZED HEALTH CARE EDUCATION/TRAINING PROGRAM

## 2018-10-24 PROCEDURE — 97530 THERAPEUTIC ACTIVITIES: CPT

## 2018-10-24 PROCEDURE — 90686 IIV4 VACC NO PRSV 0.5 ML IM: CPT | Performed by: INTERNAL MEDICINE

## 2018-10-24 PROCEDURE — 2580000003 HC RX 258: Performed by: INTERNAL MEDICINE

## 2018-10-24 PROCEDURE — 84295 ASSAY OF SERUM SODIUM: CPT

## 2018-10-24 PROCEDURE — 6360000002 HC RX W HCPCS: Performed by: INTERNAL MEDICINE

## 2018-10-24 PROCEDURE — 85025 COMPLETE CBC W/AUTO DIFF WBC: CPT

## 2018-10-24 PROCEDURE — 80069 RENAL FUNCTION PANEL: CPT

## 2018-10-24 PROCEDURE — P9046 ALBUMIN (HUMAN), 25%, 20 ML: HCPCS | Performed by: INTERNAL MEDICINE

## 2018-10-24 PROCEDURE — 6360000002 HC RX W HCPCS: Performed by: STUDENT IN AN ORGANIZED HEALTH CARE EDUCATION/TRAINING PROGRAM

## 2018-10-24 PROCEDURE — 84550 ASSAY OF BLOOD/URIC ACID: CPT

## 2018-10-24 PROCEDURE — 99233 SBSQ HOSP IP/OBS HIGH 50: CPT | Performed by: INTERNAL MEDICINE

## 2018-10-24 PROCEDURE — 36415 COLL VENOUS BLD VENIPUNCTURE: CPT

## 2018-10-24 PROCEDURE — 73630 X-RAY EXAM OF FOOT: CPT

## 2018-10-24 PROCEDURE — 2500000003 HC RX 250 WO HCPCS: Performed by: INTERNAL MEDICINE

## 2018-10-24 PROCEDURE — G0008 ADMIN INFLUENZA VIRUS VAC: HCPCS | Performed by: INTERNAL MEDICINE

## 2018-10-24 PROCEDURE — 6370000000 HC RX 637 (ALT 250 FOR IP): Performed by: HOSPITALIST

## 2018-10-24 RX ORDER — METFORMIN HYDROCHLORIDE 500 MG/1
1000 TABLET, EXTENDED RELEASE ORAL 2 TIMES DAILY WITH MEALS
COMMUNITY
End: 2019-03-06 | Stop reason: SINTOL

## 2018-10-24 RX ORDER — TRAMADOL HYDROCHLORIDE 50 MG/1
50 TABLET ORAL ONCE
Status: COMPLETED | OUTPATIENT
Start: 2018-10-24 | End: 2018-10-24

## 2018-10-24 RX ORDER — INSULIN GLARGINE 100 [IU]/ML
25 INJECTION, SOLUTION SUBCUTANEOUS NIGHTLY
Status: DISCONTINUED | OUTPATIENT
Start: 2018-10-24 | End: 2018-10-28

## 2018-10-24 RX ADMIN — HEPARIN SODIUM 5000 UNITS: 5000 INJECTION INTRAVENOUS; SUBCUTANEOUS at 06:14

## 2018-10-24 RX ADMIN — INSULIN LISPRO 10 UNITS: 100 INJECTION, SOLUTION INTRAVENOUS; SUBCUTANEOUS at 08:13

## 2018-10-24 RX ADMIN — Medication 1 TABLET: at 08:10

## 2018-10-24 RX ADMIN — INSULIN LISPRO 10 UNITS: 100 INJECTION, SOLUTION INTRAVENOUS; SUBCUTANEOUS at 12:59

## 2018-10-24 RX ADMIN — HEPARIN SODIUM 5000 UNITS: 5000 INJECTION INTRAVENOUS; SUBCUTANEOUS at 22:44

## 2018-10-24 RX ADMIN — PIPERACILLIN SODIUM,TAZOBACTAM SODIUM 3.38 G: 3; .375 INJECTION, POWDER, FOR SOLUTION INTRAVENOUS at 22:40

## 2018-10-24 RX ADMIN — MORPHINE SULFATE 4 MG: 4 INJECTION INTRAVENOUS at 06:14

## 2018-10-24 RX ADMIN — ALBUMIN (HUMAN) 25 G: 0.25 INJECTION, SOLUTION INTRAVENOUS at 11:29

## 2018-10-24 RX ADMIN — HEPARIN SODIUM 5000 UNITS: 5000 INJECTION INTRAVENOUS; SUBCUTANEOUS at 13:11

## 2018-10-24 RX ADMIN — Medication 10 ML: at 20:32

## 2018-10-24 RX ADMIN — ASPIRIN 81 MG: 81 TABLET, COATED ORAL at 08:09

## 2018-10-24 RX ADMIN — ATORVASTATIN CALCIUM 40 MG: 40 TABLET, FILM COATED ORAL at 08:09

## 2018-10-24 RX ADMIN — MORPHINE SULFATE 4 MG: 4 INJECTION INTRAVENOUS at 01:13

## 2018-10-24 RX ADMIN — TRAMADOL HYDROCHLORIDE 50 MG: 50 TABLET, FILM COATED ORAL at 04:37

## 2018-10-24 RX ADMIN — ALBUMIN (HUMAN) 25 G: 0.25 INJECTION, SOLUTION INTRAVENOUS at 20:33

## 2018-10-24 RX ADMIN — MORPHINE SULFATE 4 MG: 4 INJECTION INTRAVENOUS at 12:49

## 2018-10-24 RX ADMIN — PIPERACILLIN SODIUM,TAZOBACTAM SODIUM 3.38 G: 3; .375 INJECTION, POWDER, FOR SOLUTION INTRAVENOUS at 13:11

## 2018-10-24 RX ADMIN — Medication 10 ML: at 08:13

## 2018-10-24 RX ADMIN — LINEZOLID 600 MG: 600 INJECTION, SOLUTION INTRAVENOUS at 08:16

## 2018-10-24 RX ADMIN — LINEZOLID 600 MG: 600 INJECTION, SOLUTION INTRAVENOUS at 20:32

## 2018-10-24 RX ADMIN — INSULIN GLARGINE 25 UNITS: 100 INJECTION, SOLUTION SUBCUTANEOUS at 20:33

## 2018-10-24 RX ADMIN — METOPROLOL SUCCINATE 25 MG: 25 TABLET, EXTENDED RELEASE ORAL at 08:12

## 2018-10-24 RX ADMIN — INSULIN LISPRO 4 UNITS: 100 INJECTION, SOLUTION INTRAVENOUS; SUBCUTANEOUS at 08:12

## 2018-10-24 RX ADMIN — INFLUENZA A VIRUS A/MICHIGAN/45/2015 X-275 (H1N1) ANTIGEN (FORMALDEHYDE INACTIVATED), INFLUENZA A VIRUS A/SINGAPORE/INFIMH-16-0019/2016 IVR-186 (H3N2) ANTIGEN (FORMALDEHYDE INACTIVATED), INFLUENZA B VIRUS B/PHUKET/3073/2013 ANTIGEN (FORMALDEHYDE INACTIVATED), AND INFLUENZA B VIRUS B/MARYLAND/15/2016 BX-69A ANTIGEN (FORMALDEHYDE INACTIVATED) 0.5 ML: 15; 15; 15; 15 INJECTION, SUSPENSION INTRAMUSCULAR at 08:10

## 2018-10-24 RX ADMIN — PROCHLORPERAZINE EDISYLATE 10 MG: 5 INJECTION INTRAMUSCULAR; INTRAVENOUS at 15:45

## 2018-10-24 RX ADMIN — ALBUMIN (HUMAN) 25 G: 0.25 INJECTION, SOLUTION INTRAVENOUS at 04:37

## 2018-10-24 RX ADMIN — INSULIN LISPRO 2 UNITS: 100 INJECTION, SOLUTION INTRAVENOUS; SUBCUTANEOUS at 16:55

## 2018-10-24 RX ADMIN — INSULIN LISPRO 1 UNITS: 100 INJECTION, SOLUTION INTRAVENOUS; SUBCUTANEOUS at 20:35

## 2018-10-24 RX ADMIN — PIPERACILLIN SODIUM,TAZOBACTAM SODIUM 3.38 G: 3; .375 INJECTION, POWDER, FOR SOLUTION INTRAVENOUS at 06:14

## 2018-10-24 RX ADMIN — INSULIN LISPRO 10 UNITS: 100 INJECTION, SOLUTION INTRAVENOUS; SUBCUTANEOUS at 16:56

## 2018-10-24 RX ADMIN — INSULIN LISPRO 4 UNITS: 100 INJECTION, SOLUTION INTRAVENOUS; SUBCUTANEOUS at 12:59

## 2018-10-24 RX ADMIN — SODIUM PHOSPHATE, MONOBASIC, MONOHYDRATE 15 MMOL: 276; 142 INJECTION, SOLUTION INTRAVENOUS at 12:53

## 2018-10-24 RX ADMIN — PIPERACILLIN SODIUM,TAZOBACTAM SODIUM 3.38 G: 3; .375 INJECTION, POWDER, FOR SOLUTION INTRAVENOUS at 01:13

## 2018-10-24 ASSESSMENT — PAIN SCALES - GENERAL
PAINLEVEL_OUTOF10: 8
PAINLEVEL_OUTOF10: 8
PAINLEVEL_OUTOF10: 7
PAINLEVEL_OUTOF10: 7
PAINLEVEL_OUTOF10: 8
PAINLEVEL_OUTOF10: 3
PAINLEVEL_OUTOF10: 9
PAINLEVEL_OUTOF10: 5
PAINLEVEL_OUTOF10: 7
PAINLEVEL_OUTOF10: 7
PAINLEVEL_OUTOF10: 6

## 2018-10-24 ASSESSMENT — PAIN DESCRIPTION - FREQUENCY: FREQUENCY: CONTINUOUS

## 2018-10-24 ASSESSMENT — PAIN DESCRIPTION - ORIENTATION
ORIENTATION: LEFT

## 2018-10-24 ASSESSMENT — PAIN DESCRIPTION - PAIN TYPE
TYPE: CHRONIC PAIN
TYPE: ACUTE PAIN

## 2018-10-24 ASSESSMENT — PAIN DESCRIPTION - DESCRIPTORS: DESCRIPTORS: ACHING;CONSTANT

## 2018-10-24 ASSESSMENT — PAIN DESCRIPTION - LOCATION
LOCATION: FOOT
LOCATION: EYE
LOCATION: FOOT
LOCATION: HEAD;EYE

## 2018-10-24 NOTE — PROGRESS NOTES
input(s): KETONESU     Micro:   ASSESSMENT AND PLAN:   Karin Baker a 51 y.o. male with PMH of DM, PVD, and HTN who was admitted with Osteomyelitis of toe of left foot ([address]      Osteomyelitis left first toe   To Or 10/22 for I&D/amputation of the left 1st and 2nd toes; Partial resection of the left 1st and 2nd metatarsal bones. - Scheduled to return to OR for wound closure on 10/26   Findings:  1. Abscess left foot  2. Non-viable bone and soft tissue left foot  3. Healthy bleeding bone and soft tissue post procedures  - Continue Zosyn and zyvox     Hypovolemic Hyponatremia  - improving with IVF  - Nephrology consulted     KOLE  - resolved, Cr 1.1 today from 1.8  - Nephrology consulted      Diabetes mellitus with hyperglycemia  - Difficult to control  - reduce carbs in diet   - medium sliding scale  - lantus to 25u nightly  - premeal insulin     Peripheral vascular disease   - consult vascular surgery.     Essential hypertension  - by mouth medication     CAD   - Continue ASA, lipitor, BB  - no chest pain     Code Status:Full Code  FEN: NS @ 100cc/hr; NPO  PPX: [facility]  DISPO:GMF to OR  Discussed with attending physician,  Samy Higgins MD   -----------------------------  Nikolai Mckeon M.D. PGY-3, Pager [phone] 11:28 AM,        Addendum to Resident H&P/Progress note:  Pt seen,examined and evaluated. I have reviewed the current history, physical findings, labs and assessment and plan and agree with note as documented by resident MD      Surgical pain controlled. Developed Pain behind his R eye with mild R eye lacrimation. Sepsis POA due to OM L 1st and 2nd toes. S/p L 1st and 2nd toe and metatarsal amputation 10/22.        BG better this am -               s/p 10 units of humulin R IV.               - Lantus increased further    - cont Humalog prandial.               - dietary compliance discussed.      KOLE - ?due to hypotension or Vancomycin or combination.    Nephrology eval.   US no

## 2018-10-24 NOTE — PROGRESS NOTES
Johnn Soulier (: 1970), was seen at bedside for osteomyelitis and infection of the left foot. Pt is s/p incision and drainage of left foot, partial amputation of the left 1st and 2nd metatarsals, and amputation of the left 1st and 2nd toes, DOS: 10/22/2018. Pt reports he has some aching and occasionally sharp pain to the left foot, but it is well controlled with pain medication as needed. Pt states he has kept the dressings on clean, dry and intact. Pt states he is not bearing weight on the left foot and keeping the foot elevated as advised. Pt denies any F/N/V/C and SOB. Objective:  Patient is alert and oriented x 3, and is in no acute distress.     Vascular: DP pulse palpable bilateral. PT pulse weakly palpable on right foot, no palpable on left foot. CFT less than 5 seconds to all toes bilateral. Sparse pedal hair noted bilateral. Local edema noted to left foot consistent with post operative course. Derm:  Skin is warm to warm from proximal leg to distal foot bilateral, with normal texture. Nails 1-8 are thickened, yellow and dystrophic. Neuro:  Protective sensation absent to all toes bilateral via a 5.07 [place] monofilament. Musculoskeletal: Muscle strength 5/5 with PF/DF/I and E of both feet. Full and stable ROM of the right 1st MTPJ without pain or crepitus. Amputation of the left 1st and 2nd toes. Partial amputation of the left 1st and 2nd metatarsal bones.     Surgical wound: Skin edges on the medial and lateral aspects of the surgical wound are well approximated, skin sutures are all intact. Central aspect of the wound is packed open with 1/4 inch iodoform packing gauze. Mild bleeding noted during dressing change which was well controlled with pressure. Local edema noted to left foot consistent with post operative course.     Radiographs (10/22/2018):    FINDINGS:   There has been interval amputation of the 1st and 2nd toes at the level of   the distal metatarsal

## 2018-10-24 NOTE — PROGRESS NOTES
0-10  Pain Level: 9  Pain Type: Acute pain  Pain Location: Head;Eye  Pain Orientation: Left  Vital Signs  Patient Currently in Pain: Yes       Objective   Bed mobility  Supine to Sit: Modified independent (use of bedrail, HOB elevated)  Sit to Supine: Modified independent  Transfers  Sit to Stand: Minimal Assistance (from EOB and commode to roll about, therapist positioned roll about and verbal cues given for technique)  Stand to sit: Minimal Assistance  Ambulation  Ambulation?: Yes  Ambulation 1  Surface: level tile  Device:  (roll about)  Assistance: Minimal assistance  Quality of Gait: pt able to maintain balance with min A while on roll about requiring cues for turns and positioning roll about correctly for transfers; decreased abdias  Distance: 25' x2  Stairs/Curb  Stairs?: No     Balance  Comments: min A for standing balance with use of roll about     Assessment   Body structures, Functions, Activity limitations: Decreased functional mobility   Assessment: Pt demonstrating improvement with transfers and functional mobility this session with use of roll about. Pt requires min A for transfers and ambulation with use of roll about. Pt continues to require cues for safety with use of roll about. Roll about not covered by patient's insurance and pt unable to cover out of pocekt expense for roll about; recommend pt use wheelchair for mobility at home as pt is unsafe using a walker and maintaining NWB on LLE. Will continue to progress mobility as pt tolerates. Recommend continued therapy upon discharge to ensure safe transition home.   Prognosis: Fair  Patient Education: Educated on use of roll about, safety with transfers and maintaining NWB  REQUIRES PT FOLLOW UP: Yes  Activity Tolerance  Activity Tolerance: Patient limited by pain   AM-PAC Score  AM-PAC Inpatient Mobility Raw Score : 16  AM-PAC Inpatient T-Scale Score : 40.78  Mobility Inpatient CMS 0-100% Score: 54.16  Mobility Inpatient CMS G-Code Modifier :

## 2018-10-24 NOTE — PLAN OF CARE
Problem: Infection:  Goal: Will remain free from infection  Will remain free from infection   Outcome: Ongoing        Problem: Safety:  Goal: Free from accidental physical injury  Free from accidental physical injury   Outcome: Ongoing        Problem: Daily Care:  Goal: Daily care needs are met  Daily care needs are met   Outcome: Ongoing

## 2018-10-24 NOTE — PROGRESS NOTES
AMPUTATION (DIGITS ONE, TWO AND METARSAL ONE AND TWO) performed by Ghislaine Hodgson DPM at [facility] Left 10/05/2018    left great toe amputation       Current Medications:    Outpatient Prescriptions Marked as Taking for the 10/21/18 encounter [facility] Encounter)   Medication Sig Dispense Refill    metFORMIN (GLUCOPHAGE-XR) 500 MG extended release tablet Take 1,000 mg by mouth 2 times daily (with meals)      Insulin Disposable Pump (V-GO 40) KIT by Does not apply route      insulin lispro (HUMALOG) 100 UNIT/ML injection vial Inject into the skin 3 times daily (before meals)      aspirin (ASPIRIN LOW DOSE) 81 MG EC tablet Take 1 tablet by mouth daily 30 tablet 0    Blood Glucose Monitoring Suppl (FREESTYLE LITE) INEZ TEST BLOOD GLUCOSE THREE TO FOUR TIMES DAILY 1 Device 0    L-methylfolate-B6-B12 (METANX) 3-35-2 MG tablet Take 1 tablet by mouth daily For neuropathy. 90 tablet 3    glucose blood VI test strips (ONETOUCH VERIO) strip 1 each by In Vitro route 3 times daily ; Dx E11.65; Z79.4 100 each 11    ONE TOUCH LANCETS MISC 1 each by Does not apply route 3 times daily ; Dx E11.65; Z79.4 100 each 11    Insulin Pen Needle (B-D UF III MINI PEN NEEDLES) 31G X 5 MM MISC 1 each by Does not apply route 4 times daily (before meals and nightly) 150 each 6    metoprolol (TOPROL-XL) 25 MG XL tablet Take 25 mg by mouth every morning       atorvastatin (LIPITOR) 40 MG tablet Take 40 mg by mouth every morning       lisinopril (PRINIVIL;ZESTRIL) 10 MG tablet 40 mg every morning          Allergies:  Patient has no known allergies.     Immunizations :   Immunization History   Administered Date(s) Administered    Influenza, Quadv, 6 mo and older, IM, PF (Flulaval, Fluarix) 10/24/2018    Tdap (Boostrix, Adacel) 10/02/2018       Social History:   Social History   Substance Use Topics    Smoking status: Never Smoker    Smokeless tobacco: Never Used    Alcohol use No     History   Smoking Status   

## 2018-10-24 NOTE — PROGRESS NOTES
Nephrology Attending Progress Note      SUBJECTIVE:  We are following this patient for KOLE/Hyponatremia. The patient was admitted with non healing wound. S/P I & D, Amputation of the left 1st and 2nd toes; Partial resection of the left 1st and 2nd metatarsal bones. Developed KOLE. ACE was held - UO is adequate - KOLE has resolved. Appetite poor. NAD; No SOB    Scheduled Meds:   heparin (porcine)  5,000 Units Subcutaneous 3 times per day    albumin human  25 g Intravenous Q8H    insulin glargine  20 Units Subcutaneous Nightly    insulin lispro  10 Units Subcutaneous TID WC    piperacillin-tazobactam  3.375 g Intravenous 4 times per day    linezolid  600 mg Intravenous F76O    folic acid-pyridoxine-cyanocobalamine  1 tablet Oral Daily    aspirin  81 mg Oral Daily    atorvastatin  40 mg Oral QAM    metoprolol succinate  25 mg Oral QAM    sodium chloride flush  10 mL Intravenous 2 times per day    insulin lispro  0-12 Units Subcutaneous TID WC    insulin lispro  0-6 Units Subcutaneous Nightly     Continuous Infusions:   sodium chloride 100 mL/hr at 10/23/18 1150    dextrose       PRN Meds:.morphine **OR** morphine, oxyCODONE, sodium chloride flush, magnesium hydroxide, ondansetron, glucose, dextrose, glucagon (rDNA), dextrose, acetaminophen    Physical Exam:    TEMPERATURE:  Current - Temp: 98.1 °F (36.7 °C);  Max - Temp  Av.4 °F (36.9 °C)  Min: 97.9 °F (36.6 °C)  Max: 99.4 °F (37.4 °C)  RESPIRATIONS RANGE: Resp  Av.5  Min: 16  Max: 18  PULSE RANGE: Pulse  Av.5  Min: 73  Max: 86  BLOOD PRESSURE RANGE:  Systolic (21QGI), UJS:022 , Min:120 , SHT:036   ; Diastolic (69NVE), GTJ:36, Min:69, Max:90    24HR INTAKE/OUTPUT:    Intake/Output Summary (Last 24 hours) at 10/24/18 1101  Last data filed at 10/24/18 0614   Gross per 24 hour   Intake             2330 ml   Output             1125 ml   Net             1205 ml       General appearance: alert, appears stated age and cooperative  Head: Normocephalic, without obvious abnormality, atraumatic  Eyes: PERRL, EOM's intact. Nose: Nares normal.  No drainage or sinus tenderness. Neck:  no JVD, supple, symmetrical, trachea midline and thyroid not enlarged  Lungs: clear to auscultation bilaterally  Heart: regular rate and rhythm, no rubs  Abdomen: soft, non-tender; bowel sounds normal; no organomegaly  Extremities: no edema  Skin: Skin color, texture, turgor normal. No rashes or lesions  Neurologic: Grossly normal        LAB DATA:    CBC: Lab Results   Component Value Date    WBC 15.1 10/24/2018    RBC 3.82 10/24/2018    HGB 10.3 10/24/2018    HCT 31.9 10/24/2018    MCV 83.5 10/24/2018    MCH 27.0 10/24/2018    MCHC 32.3 10/24/2018    RDW 13.5 10/24/2018     10/24/2018    MPV 7.5 10/24/2018     BMP:  Lab Results   Component Value Date     10/24/2018    K 4.6 10/24/2018    K 4.6 10/22/2018    CL 95 10/24/2018    CO2 23 10/24/2018    BUN 25 10/24/2018    CREATININE 1.1 10/24/2018    CALCIUM 8.9 10/24/2018    GFRAA >60 10/24/2018    LABGLOM >60 10/24/2018    GLUCOSE 249 10/24/2018     Ionized Calcium:  No results found for: IONCA  Magnesium:  No results found for: MG  Phosphorus:    Lab Results   Component Value Date    PHOS 2.5 10/24/2018     IMPRESSION/RECOMMENDATIONS:      Principal Problem:    Osteomyelitis of toe of left foot ([address]  Active Problems:    Hypertension    Type 2 diabetes mellitus with atherosclerosis of native arteries of extremity with rest pain (HCC)    PVD (peripheral vascular disease) (HCC)    Osteomyelitis (HCC)  Resolved Problems:    * No resolved hospital problems. *    1. KOLE - resolved - Etiology likely prerenal/Medication induced. Reduce IVF - US revealed no Hydronephrosis    2. Osteomyelitis of the left hallux with abscess of left foot - s/p I & D and Amputation of the left 1st and 2nd toes; Partial resection of the left 1st and 2nd metatarsal bones     3.

## 2018-10-25 ENCOUNTER — ANESTHESIA EVENT (OUTPATIENT)
Dept: OPERATING ROOM | Age: 48
DRG: 710 | End: 2018-10-25
Payer: COMMERCIAL

## 2018-10-25 LAB
ALBUMIN SERPL-MCNC: 3.4 G/DL (ref 3.4–5)
ANION GAP SERPL CALCULATED.3IONS-SCNC: 13 MMOL/L (ref 3–16)
BASOPHILS ABSOLUTE: 0.1 K/UL (ref 0–0.2)
BASOPHILS RELATIVE PERCENT: 0.4 %
BILIRUBIN URINE: NEGATIVE
BLOOD, URINE: ABNORMAL
BUN BLDV-MCNC: 12 MG/DL (ref 7–20)
CALCIUM SERPL-MCNC: 9 MG/DL (ref 8.3–10.6)
CASTS: ABNORMAL /LPF
CHLORIDE BLD-SCNC: 93 MMOL/L (ref 99–110)
CLARITY: ABNORMAL
CO2: 24 MMOL/L (ref 21–32)
COLOR: YELLOW
COMMENT UA: ABNORMAL
CREAT SERPL-MCNC: 0.9 MG/DL (ref 0.9–1.3)
CREATININE URINE: 108.1 MG/DL (ref 39–259)
EOSINOPHIL,URINE: NORMAL
EOSINOPHILS ABSOLUTE: 0.1 K/UL (ref 0–0.6)
EOSINOPHILS RELATIVE PERCENT: 0.5 %
EPITHELIAL CELLS, UA: 0 /HPF (ref 0–5)
GFR AFRICAN AMERICAN: >60
GFR NON-AFRICAN AMERICAN: >60
GLUCOSE BLD-MCNC: 126 MG/DL (ref 70–99)
GLUCOSE BLD-MCNC: 166 MG/DL (ref 70–99)
GLUCOSE BLD-MCNC: 171 MG/DL (ref 70–99)
GLUCOSE BLD-MCNC: 186 MG/DL (ref 70–99)
GLUCOSE BLD-MCNC: 192 MG/DL (ref 70–99)
GLUCOSE URINE: NEGATIVE MG/DL
HCT VFR BLD CALC: 29.7 % (ref 40.5–52.5)
HEMOGLOBIN: 9.6 G/DL (ref 13.5–17.5)
HYALINE CASTS: 3 /LPF (ref 0–8)
KETONES, URINE: NEGATIVE MG/DL
LEUKOCYTE ESTERASE, URINE: NEGATIVE
LYMPHOCYTES ABSOLUTE: 2.2 K/UL (ref 1–5.1)
LYMPHOCYTES RELATIVE PERCENT: 16 %
MCH RBC QN AUTO: 26.6 PG (ref 26–34)
MCHC RBC AUTO-ENTMCNC: 32.2 G/DL (ref 31–36)
MCV RBC AUTO: 82.5 FL (ref 80–100)
MICROSCOPIC EXAMINATION: YES
MONOCYTES ABSOLUTE: 1 K/UL (ref 0–1.3)
MONOCYTES RELATIVE PERCENT: 7.2 %
NEUTROPHILS ABSOLUTE: 10.5 K/UL (ref 1.7–7.7)
NEUTROPHILS RELATIVE PERCENT: 75.9 %
NITRITE, URINE: NEGATIVE
OSMOLALITY URINE: 393 MOSM/KG (ref 390–1070)
PDW BLD-RTO: 13.2 % (ref 12.4–15.4)
PERFORMED ON: ABNORMAL
PH UA: 5
PHOSPHORUS: 2.5 MG/DL (ref 2.5–4.9)
PLATELET # BLD: 436 K/UL (ref 135–450)
PMV BLD AUTO: 7.3 FL (ref 5–10.5)
POTASSIUM SERPL-SCNC: 3.9 MMOL/L (ref 3.5–5.1)
PROTEIN UA: 100 MG/DL
RBC # BLD: 3.6 M/UL (ref 4.2–5.9)
RBC UA: 1 /HPF (ref 0–4)
SODIUM BLD-SCNC: 130 MMOL/L (ref 136–145)
SODIUM BLD-SCNC: 132 MMOL/L (ref 136–145)
SODIUM BLD-SCNC: 134 MMOL/L (ref 136–145)
SODIUM URINE: 37 MMOL/L
SPECIFIC GRAVITY UA: 1.02
TROPONIN: <0.01 NG/ML
URIC ACID, SERUM: 5 MG/DL (ref 3.5–7.2)
UROBILINOGEN, URINE: 1 E.U./DL
WBC # BLD: 13.8 K/UL (ref 4–11)
WBC UA: 2 /HPF (ref 0–5)

## 2018-10-25 PROCEDURE — 6370000000 HC RX 637 (ALT 250 FOR IP): Performed by: STUDENT IN AN ORGANIZED HEALTH CARE EDUCATION/TRAINING PROGRAM

## 2018-10-25 PROCEDURE — 80069 RENAL FUNCTION PANEL: CPT

## 2018-10-25 PROCEDURE — 6360000002 HC RX W HCPCS: Performed by: INTERNAL MEDICINE

## 2018-10-25 PROCEDURE — P9046 ALBUMIN (HUMAN), 25%, 20 ML: HCPCS | Performed by: INTERNAL MEDICINE

## 2018-10-25 PROCEDURE — 2500000003 HC RX 250 WO HCPCS: Performed by: INTERNAL MEDICINE

## 2018-10-25 PROCEDURE — 6370000000 HC RX 637 (ALT 250 FOR IP): Performed by: INTERNAL MEDICINE

## 2018-10-25 PROCEDURE — 81001 URINALYSIS AUTO W/SCOPE: CPT

## 2018-10-25 PROCEDURE — 99233 SBSQ HOSP IP/OBS HIGH 50: CPT | Performed by: INTERNAL MEDICINE

## 2018-10-25 PROCEDURE — 82570 ASSAY OF URINE CREATININE: CPT

## 2018-10-25 PROCEDURE — 97535 SELF CARE MNGMENT TRAINING: CPT

## 2018-10-25 PROCEDURE — 2580000003 HC RX 258: Performed by: INTERNAL MEDICINE

## 2018-10-25 PROCEDURE — 84550 ASSAY OF BLOOD/URIC ACID: CPT

## 2018-10-25 PROCEDURE — 87205 SMEAR GRAM STAIN: CPT

## 2018-10-25 PROCEDURE — 97530 THERAPEUTIC ACTIVITIES: CPT

## 2018-10-25 PROCEDURE — 99252 IP/OBS CONSLTJ NEW/EST SF 35: CPT | Performed by: NURSE PRACTITIONER

## 2018-10-25 PROCEDURE — 83935 ASSAY OF URINE OSMOLALITY: CPT

## 2018-10-25 PROCEDURE — 84484 ASSAY OF TROPONIN QUANT: CPT

## 2018-10-25 PROCEDURE — 36415 COLL VENOUS BLD VENIPUNCTURE: CPT

## 2018-10-25 PROCEDURE — 6370000000 HC RX 637 (ALT 250 FOR IP): Performed by: PODIATRIST

## 2018-10-25 PROCEDURE — 85025 COMPLETE CBC W/AUTO DIFF WBC: CPT

## 2018-10-25 PROCEDURE — 6370000000 HC RX 637 (ALT 250 FOR IP): Performed by: NURSE PRACTITIONER

## 2018-10-25 PROCEDURE — 1200000000 HC SEMI PRIVATE

## 2018-10-25 PROCEDURE — 97542 WHEELCHAIR MNGMENT TRAINING: CPT

## 2018-10-25 PROCEDURE — 84295 ASSAY OF SERUM SODIUM: CPT

## 2018-10-25 PROCEDURE — 6360000002 HC RX W HCPCS: Performed by: STUDENT IN AN ORGANIZED HEALTH CARE EDUCATION/TRAINING PROGRAM

## 2018-10-25 PROCEDURE — 93005 ELECTROCARDIOGRAM TRACING: CPT | Performed by: STUDENT IN AN ORGANIZED HEALTH CARE EDUCATION/TRAINING PROGRAM

## 2018-10-25 PROCEDURE — 84300 ASSAY OF URINE SODIUM: CPT

## 2018-10-25 RX ORDER — LIDOCAINE HYDROCHLORIDE 10 MG/ML
5 INJECTION, SOLUTION EPIDURAL; INFILTRATION; INTRACAUDAL; PERINEURAL ONCE
Status: COMPLETED | OUTPATIENT
Start: 2018-10-25 | End: 2018-10-25

## 2018-10-25 RX ORDER — SODIUM CHLORIDE 0.9 % (FLUSH) 0.9 %
10 SYRINGE (ML) INJECTION EVERY 12 HOURS SCHEDULED
Status: DISCONTINUED | OUTPATIENT
Start: 2018-10-25 | End: 2018-10-29 | Stop reason: HOSPADM

## 2018-10-25 RX ORDER — FLUCONAZOLE 100 MG/1
200 TABLET ORAL DAILY
Status: DISCONTINUED | OUTPATIENT
Start: 2018-10-25 | End: 2018-10-29 | Stop reason: HOSPADM

## 2018-10-25 RX ORDER — SODIUM CHLORIDE 0.9 % (FLUSH) 0.9 %
10 SYRINGE (ML) INJECTION PRN
Status: DISCONTINUED | OUTPATIENT
Start: 2018-10-25 | End: 2018-10-29 | Stop reason: HOSPADM

## 2018-10-25 RX ADMIN — INSULIN LISPRO 10 UNITS: 100 INJECTION, SOLUTION INTRAVENOUS; SUBCUTANEOUS at 16:57

## 2018-10-25 RX ADMIN — SODIUM CHLORIDE: 9 INJECTION, SOLUTION INTRAVENOUS at 06:44

## 2018-10-25 RX ADMIN — ACETAMINOPHEN 650 MG: 325 TABLET, FILM COATED ORAL at 04:42

## 2018-10-25 RX ADMIN — INSULIN LISPRO 10 UNITS: 100 INJECTION, SOLUTION INTRAVENOUS; SUBCUTANEOUS at 12:14

## 2018-10-25 RX ADMIN — SODIUM CHLORIDE: 9 INJECTION, SOLUTION INTRAVENOUS at 20:48

## 2018-10-25 RX ADMIN — OXYCODONE HYDROCHLORIDE 5 MG: 5 TABLET ORAL at 04:42

## 2018-10-25 RX ADMIN — LIDOCAINE HYDROCHLORIDE 5 ML: 10 INJECTION, SOLUTION EPIDURAL; INFILTRATION; INTRACAUDAL; PERINEURAL at 16:00

## 2018-10-25 RX ADMIN — LINEZOLID 600 MG: 600 INJECTION, SOLUTION INTRAVENOUS at 08:28

## 2018-10-25 RX ADMIN — FLUCONAZOLE 200 MG: 100 TABLET ORAL at 13:45

## 2018-10-25 RX ADMIN — PIPERACILLIN SODIUM,TAZOBACTAM SODIUM 3.38 G: 3; .375 INJECTION, POWDER, FOR SOLUTION INTRAVENOUS at 13:51

## 2018-10-25 RX ADMIN — ASPIRIN 81 MG: 81 TABLET, COATED ORAL at 08:15

## 2018-10-25 RX ADMIN — MORPHINE SULFATE 4 MG: 4 INJECTION INTRAVENOUS at 11:51

## 2018-10-25 RX ADMIN — Medication 10 ML: at 22:13

## 2018-10-25 RX ADMIN — HEPARIN SODIUM 5000 UNITS: 5000 INJECTION INTRAVENOUS; SUBCUTANEOUS at 22:15

## 2018-10-25 RX ADMIN — PIPERACILLIN SODIUM,TAZOBACTAM SODIUM 3.38 G: 3; .375 INJECTION, POWDER, FOR SOLUTION INTRAVENOUS at 22:13

## 2018-10-25 RX ADMIN — METOPROLOL SUCCINATE 25 MG: 25 TABLET, EXTENDED RELEASE ORAL at 08:28

## 2018-10-25 RX ADMIN — INSULIN LISPRO 2 UNITS: 100 INJECTION, SOLUTION INTRAVENOUS; SUBCUTANEOUS at 16:56

## 2018-10-25 RX ADMIN — LINEZOLID 600 MG: 600 INJECTION, SOLUTION INTRAVENOUS at 20:47

## 2018-10-25 RX ADMIN — INSULIN LISPRO 2 UNITS: 100 INJECTION, SOLUTION INTRAVENOUS; SUBCUTANEOUS at 08:16

## 2018-10-25 RX ADMIN — HEPARIN SODIUM 5000 UNITS: 5000 INJECTION INTRAVENOUS; SUBCUTANEOUS at 06:07

## 2018-10-25 RX ADMIN — OXYCODONE HYDROCHLORIDE 5 MG: 5 TABLET ORAL at 00:17

## 2018-10-25 RX ADMIN — ATORVASTATIN CALCIUM 40 MG: 40 TABLET, FILM COATED ORAL at 08:15

## 2018-10-25 RX ADMIN — ALBUMIN (HUMAN) 25 G: 0.25 INJECTION, SOLUTION INTRAVENOUS at 04:31

## 2018-10-25 RX ADMIN — HEPARIN SODIUM 5000 UNITS: 5000 INJECTION INTRAVENOUS; SUBCUTANEOUS at 13:45

## 2018-10-25 RX ADMIN — INSULIN LISPRO 2 UNITS: 100 INJECTION, SOLUTION INTRAVENOUS; SUBCUTANEOUS at 12:12

## 2018-10-25 RX ADMIN — INSULIN GLARGINE 25 UNITS: 100 INJECTION, SOLUTION SUBCUTANEOUS at 20:53

## 2018-10-25 RX ADMIN — PIPERACILLIN SODIUM,TAZOBACTAM SODIUM 3.38 G: 3; .375 INJECTION, POWDER, FOR SOLUTION INTRAVENOUS at 06:07

## 2018-10-25 RX ADMIN — INSULIN LISPRO 10 UNITS: 100 INJECTION, SOLUTION INTRAVENOUS; SUBCUTANEOUS at 08:20

## 2018-10-25 RX ADMIN — Medication 1 TABLET: at 08:14

## 2018-10-25 RX ADMIN — Medication 10 ML: at 20:49

## 2018-10-25 ASSESSMENT — PAIN SCALES - GENERAL
PAINLEVEL_OUTOF10: 0
PAINLEVEL_OUTOF10: 6
PAINLEVEL_OUTOF10: 7

## 2018-10-25 NOTE — CONSULTS
increase WOB  ABDOMEN: abdomen is soft without significant tenderness, masses, organomegaly or guarding  WOUND:  Left foot dressing intact       Current Facility-Administered Medications   Medication Dose Route Frequency Provider Last Rate Last Dose    fluconazole (DIFLUCAN) tablet 200 mg  200 mg Oral Daily Yu Drummond MD        piperacillin-tazobactam (ZOSYN) 3.375 g in dextrose 5 % 100 mL IVPB extended infusion (mini-bag)  3.375 g Intravenous Q8H Sinai Shepard MD   Stopped at 10/25/18 1009    insulin glargine (LANTUS) injection vial 25 Units  25 Units Subcutaneous Nightly Zach Breen MD   25 Units at 10/24/18 2033    heparin (porcine) injection 5,000 Units  5,000 Units Subcutaneous 3 times per day Zach Breen MD   5,000 Units at 10/25/18 0607    0.9 % sodium chloride infusion   Intravenous Continuous Amr Georgia Marrero MD 75 mL/hr at 10/25/18 0644      insulin lispro (HUMALOG) injection vial 10 Units  10 Units Subcutaneous TID WC Sinai Shepard MD   10 Units at 10/25/18 1214    linezolid (ZYVOX) IVPB 600 mg  600 mg Intravenous Q12H Sinai Shepard MD   Stopped at 81/47/00 1382    folic acid-pyridoxine-cyanocobalamine (FOLTX) 2.5-25-1 MG tablet 1 tablet  1 tablet Oral Daily Darvin Núñez MD   1 tablet at 10/25/18 0814    morphine (PF) injection 2 mg  2 mg Intravenous Q2H PRN Sinai Shepard MD   2 mg at 10/22/18 1708    Or    morphine (PF) injection 4 mg  4 mg Intravenous Q2H PRN Sinai Shepard MD   4 mg at 10/25/18 1151    oxyCODONE (ROXICODONE) immediate release tablet 5 mg  5 mg Oral Q4H PRN Jake Pink DPM   5 mg at 10/25/18 0442    aspirin EC tablet 81 mg  81 mg Oral Daily Darvin Núñez MD   81 mg at 10/25/18 0815    atorvastatin (LIPITOR) tablet 40 mg  40 mg Oral QAM Darvin Núñez MD   40 mg at 10/25/18 0815    metoprolol succinate (TOPROL XL) extended release tablet 25 mg  25 mg Oral FAN Núñez MD   25 mg at 10/25/18 0593  sodium chloride flush 0.9 % injection 10 mL  10 mL Intravenous 2 times per day Darvin Reyes MD   10 mL at 10/24/18 2032    sodium chloride flush 0.9 % injection 10 mL  10 mL Intravenous PRN Darvin Núñez MD        magnesium hydroxide (MILK OF MAGNESIA) 400 MG/5ML suspension 30 mL  30 mL Oral Daily PRN Darvin Núñez MD        ondansetron (ZOFRAN) injection 4 mg  4 mg Intravenous Q6H PRN Darvin Núñez MD        glucose (GLUTOSE) 40 % oral gel 15 g  15 g Oral PRN Darvin Núñez MD        dextrose 50 % solution 12.5 g  12.5 g Intravenous PRN Darvin Núñez MD        glucagon (rDNA) injection 1 mg  1 mg Intramuscular PRN Darvin Núñez MD        dextrose 5 % solution  100 mL/hr Intravenous PRN Darvin Núñez MD        acetaminophen (TYLENOL) tablet 650 mg  650 mg Oral Q4H PRN GINA Lacey - CNP   650 mg at 10/25/18 0442    insulin lispro (HUMALOG) injection vial 0-12 Units  0-12 Units Subcutaneous TID WC Darvin Núñez MD   2 Units at 10/25/18 1212    insulin lispro (HUMALOG) injection vial 0-6 Units  0-6 Units Subcutaneous Nightly Darvin Mcbride MD   1 Units at 10/24/18 2035         ASSESSMENT/PLAN:      51 y.o. male admitted with   1. Osteomyelitis of toe of left foot ([facility]    2.  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

## 2018-10-25 NOTE — PROGRESS NOTES
Infectious Disease Follow up Notes  Admit Date: 10/21/2018  Hospital Day: 5    Antibiotics : IV Linezolid  IV Zosyn     CHIEF COMPLAINT:     Left diabetic foot infection  Left Toe osteomyelitis   Fevers  WBC elevation   Sepsis   KOLE    Subjective interval History :  51 y.o. man with DM and poor control and diabetic foot infection followed by  and h/o PVD s/p intervention by Vanessa Linares admitted with non healing wound and he underwent surgery on 10/5 partial amputation of Left Great toe. He is now admitted for Left foot infection on going and for further ID and drainage of abscess on 10/22 and or cx from 10/22 pending he recently had Neck abscess s/p ID by  and cx MRSA on Broth only.  WBC elevation on admit and now with KOLE with elevation in creat, fevers on admit and we are consulted for antibiotic recommendations      Left foot on going swelling and local redness and drainage+ sutures intact and worried about recovery, WBC remain elevated and creat is better and fevers low grade     Past Medical History:    Past Medical History:   Diagnosis Date    Angina effort ([address]     Arthritis     CAD (coronary artery disease)     Carotid stenosis     Diabetes mellitus with neurological manifestations, uncontrolled ([address]     Diarrhea     Hyperlipidemia     Hypertension     Obesity     Type II or unspecified type diabetes mellitus without mention of complication, not stated as uncontrolled        Past Surgical History:    Past Surgical History:   Procedure Laterality Date    CARDIAC CATHETERIZATION      CAROTID ENDARTERECTOMY Right 4-9-2015    CHOLECYSTECTOMY      MS OFFICE/OUTPT VISIT,PROCEDURE ONLY Left 10/5/2018    AMPUTATION LEFT GREAT DIGIT performed by Xander Bar DPM at [place] [address] OFFICE/OUTPT VISIT,PROCEDURE ONLY Left 10/22/2018    INCISION AND DRAINAGE LEFT FOOT FIRST AND SECOND RAY AMPUTATION (DIGITS ONE, TWO AND METARSAL ONE AND TWO) performed by Annie Ivy DPM at [facility] Left 10/05/2018    left great toe amputation       Current Medications:    Outpatient Prescriptions Marked as Taking for the 10/21/18 encounter [facility] Encounter)   Medication Sig Dispense Refill    metFORMIN (GLUCOPHAGE-XR) 500 MG extended release tablet Take 1,000 mg by mouth 2 times daily (with meals)      Insulin Disposable Pump (V-GO 40) KIT by Does not apply route      insulin lispro (HUMALOG) 100 UNIT/ML injection vial Inject into the skin 3 times daily (before meals)      aspirin (ASPIRIN LOW DOSE) 81 MG EC tablet Take 1 tablet by mouth daily 30 tablet 0    Blood Glucose Monitoring Suppl (FREESTYLE LITE) INEZ TEST BLOOD GLUCOSE THREE TO FOUR TIMES DAILY 1 Device 0    L-methylfolate-B6-B12 (METANX) 3-35-2 MG tablet Take 1 tablet by mouth daily For neuropathy. 90 tablet 3    glucose blood VI test strips (ONETOUCH VERIO) strip 1 each by In Vitro route 3 times daily ; Dx E11.65; Z79.4 100 each 11    ONE TOUCH LANCETS MISC 1 each by Does not apply route 3 times daily ; Dx E11.65; Z79.4 100 each 11    Insulin Pen Needle (B-D UF III MINI PEN NEEDLES) 31G X 5 MM MISC 1 each by Does not apply route 4 times daily (before meals and nightly) 150 each 6    metoprolol (TOPROL-XL) 25 MG XL tablet Take 25 mg by mouth every morning       atorvastatin (LIPITOR) 40 MG tablet Take 40 mg by mouth every morning       lisinopril (PRINIVIL;ZESTRIL) 10 MG tablet 40 mg every morning          Allergies:  Patient has no known allergies.     Immunizations :   Immunization History   Administered Date(s) Administered    Influenza, Quadv, 6 mo and older, IM, PF (Flulaval, Fluarix) 10/24/2018    Tdap (Boostrix, Adacel) 10/02/2018       Social History:   Social History   Substance Use Topics    Smoking status: Never Smoker    Smokeless tobacco: Never Used    Alcohol use No     History   Smoking Status    Never Smoker   Smokeless

## 2018-10-25 NOTE — PROGRESS NOTES
Occupational Therapy  Facility/Department: [facility] SURG  Daily Treatment Note  NAME: Pipo Lombardo  : 1970  MRN: [id number]    Date of Service: 10/25/2018     Assessment   Performance deficits / Impairments: Decreased functional mobility ; Decreased ADL status; Decreased endurance;Decreased balance;Decreased high-level IADLs  Assessment: Pt tolerated treatment well, making progress towards goals. This date, pt improved with bathing SBA, dressing SBA, seated grooming supervision, fxl stand pivot transfers to w/c and ADL surfaces with CGA and verbal cues, and fxl mobility with w/c and SBA. Pt continues to be limited by NWB status L LE affecting pt's balance, fxl mobility, fxl activity tolerance, and ADL status. Cont per OT POC to address these limitations and maximize pt's independence to facilitate safe return home. Anticipate pt will be safe to return home with assist prn and home OT (level 3 home care). Discharge Recommendations:  Patient would benefit from continued therapy after discharge, Home with Home health OT, S Level 3, Home with assist PRN  OT Equipment Recommendations  Equipment Needed: Yes  ADL Assistive Devices: Shower Chair with back  Other: Pt would benefit from shower chair for safety and energy conservation d/t decreased balance and fxl activity tolerance with NWB status L LE. Pipo Lombardo scored a 21/24 on the AM-PAC ADL Inpatient form. Current research shows that an AM-PAC score of 18 or greater is typically associated with a discharge to the patient's home setting. Based on the patients AM-PAC score and their current ADL deficits, it is recommended that the patient have 2-3 sessions per week of Occupational Therapy at d/c to increase the patients independence. Patient Diagnosis(es): The encounter diagnosis was Osteomyelitis of toe of left foot ([facility] has a past medical history of Angina effort ([facility] [address] Arthritis; CAD (coronary artery disease);  Carotid stenosis;

## 2018-10-25 NOTE — PROGRESS NOTES
Resection margins are smooth.  No new   abnormality is demonstrated      MRI Left Foot (10/23/2018): Impression   1. Status post amputation of the 1st and 2nd toes with no evidence for   osteomyelitis. 2. Apparent packing material at the level of the 1st metatarsal amputation   site as well as locule of subcutaneous gas adjacent to the 2nd metatarsal   amputation site.  No organized drainable fluid collections are identified. 3. Diffuse soft tissue edema most pronounced dorsally.  Correlate clinically   for cellulitis. 4. Intrinsic muscular edema which can be seen in the setting of diabetic   myopathy/long-standing denervation changes versus myositis in the appropriate   clinical setting.         Assessment:  - Osteomyelitis left hallux; s/p incision and drainage of left foot, partial amputation of the left 1st and 2nd metatarsals, and amputation of the left 1st and 2nd toes, DOS: 10/22/2018  - Cellulitis left foot  - DM with peripheral neuropathy     Plan:  -  Discussed plan for delayed primary closure of left foot wound on 10/26/2018. All of the patient's questions were answered to his satisfaction. Patient elected to proceed with surgery. - Patient has been evaluated by social work and PT/OT services. Spoke to patient's nurse about the patient's treatment plan. - Performed dressing change on left foot. Applied 1/4 iodoform packing gauze into the wound followed by Betadine soaked Adaptic, 4x4 gauze, ABD pad, Kerlix roll, and an ACE bandage up to the ankle for mild compression. Instructed patient to keep dressings clean, dry and intact. Patient is to non-weight bearing on left foot and is to elevate the foot. There are standing nursing orders for dressing changes. - Patient is currently on IV Zyvox and Linezolid. Thank you Dr. Radha Lomeli for your recommendations.    - Patients PMHx/PFSHx/Meds reviewed and updated as necessary in patients chart.     I appreciate the opportunity to assist in the treatment

## 2018-10-25 NOTE — PROGRESS NOTES
Physical Therapy  Facility/Department: [address] MED SURG  Daily Treatment Note- COTX  This note serves as D/C summary if patient is discharged prior to next treatment session. Assessment: Today, pt able to perform pivot transfers to and from w/c with CGA and maintained NWB LLE throughout. Pt instructed on safe use of w/c and was able to propel through tight spaces and in and out of the restroom with SBA. Recommend standard w/c (20\") with standard cushion and elevated leg rests for home due to pt's inability to maintain NWB status when hopping with RW. Recommend home with assist PRN and home health PT level 3 for safety to ensure safe transtion home. Will continue to follow. NAME: Nella Steiner  : 1970  MRN: [id number]    Date of Service: 10/25/2018    Discharge Recommendations:  Home with assist PRN, Patient would benefit from continued therapy after discharge, Home with Home health PT, S Level 3   PT Equipment Recommendations  Equipment Needed: Yes  Mobility Devices: Wheelchair  Wheelchair: Standard  Other: with elevating leg rests    Patient Diagnosis(es): The encounter diagnosis was Osteomyelitis of toe of left foot ([address] has a past medical history of Angina effort ([address] Arthritis; CAD (coronary artery disease); Carotid stenosis; Diabetes mellitus with neurological manifestations, uncontrolled ([address] Diarrhea; Hyperlipidemia; Hypertension; Obesity; and Type II or unspecified type diabetes mellitus without mention of complication, not stated as uncontrolled. has a past surgical history that includes Cholecystectomy; Cardiac catheterization; Carotid endarterectomy (Right, 2015); Toe amputation (Left, 10/05/2018); pr office/outpt visit,procedure only (Left, 10/5/2018); and pr office/outpt visit,procedure only (Left, 10/22/2018).     Restrictions  Restrictions/Precautions  Restrictions/Precautions: Isolation, Contact Precautions, Weight Bearing (MRSA)  Lower Extremity Weight Bearing

## 2018-10-26 ENCOUNTER — APPOINTMENT (OUTPATIENT)
Dept: GENERAL RADIOLOGY | Age: 48
DRG: 710 | End: 2018-10-26
Payer: COMMERCIAL

## 2018-10-26 ENCOUNTER — ANESTHESIA (OUTPATIENT)
Dept: OPERATING ROOM | Age: 48
DRG: 710 | End: 2018-10-26
Payer: COMMERCIAL

## 2018-10-26 VITALS
TEMPERATURE: 96.8 F | SYSTOLIC BLOOD PRESSURE: 122 MMHG | DIASTOLIC BLOOD PRESSURE: 60 MMHG | RESPIRATION RATE: 1 BRPM | OXYGEN SATURATION: 97 %

## 2018-10-26 LAB
ALBUMIN SERPL-MCNC: 3 G/DL (ref 3.4–5)
ANION GAP SERPL CALCULATED.3IONS-SCNC: 11 MMOL/L (ref 3–16)
BASOPHILS ABSOLUTE: 0.1 K/UL (ref 0–0.2)
BASOPHILS RELATIVE PERCENT: 0.4 %
BLOOD CULTURE, ROUTINE: NORMAL
BUN BLDV-MCNC: 9 MG/DL (ref 7–20)
CALCIUM SERPL-MCNC: 9 MG/DL (ref 8.3–10.6)
CHLORIDE BLD-SCNC: 96 MMOL/L (ref 99–110)
CO2: 26 MMOL/L (ref 21–32)
CREAT SERPL-MCNC: 0.8 MG/DL (ref 0.9–1.3)
CULTURE, BLOOD 2: NORMAL
EKG ATRIAL RATE: 75 BPM
EKG DIAGNOSIS: NORMAL
EKG P AXIS: 23 DEGREES
EKG P-R INTERVAL: 166 MS
EKG Q-T INTERVAL: 430 MS
EKG QRS DURATION: 92 MS
EKG QTC CALCULATION (BAZETT): 480 MS
EKG R AXIS: -7 DEGREES
EKG T AXIS: 6 DEGREES
EKG VENTRICULAR RATE: 75 BPM
EOSINOPHILS ABSOLUTE: 0.2 K/UL (ref 0–0.6)
EOSINOPHILS RELATIVE PERCENT: 1 %
GFR AFRICAN AMERICAN: >60
GFR NON-AFRICAN AMERICAN: >60
GLUCOSE BLD-MCNC: 129 MG/DL (ref 70–99)
GLUCOSE BLD-MCNC: 130 MG/DL (ref 70–99)
GLUCOSE BLD-MCNC: 131 MG/DL (ref 70–99)
GLUCOSE BLD-MCNC: 164 MG/DL (ref 70–99)
GLUCOSE BLD-MCNC: 165 MG/DL (ref 70–99)
GLUCOSE BLD-MCNC: 180 MG/DL (ref 70–99)
GLUCOSE BLD-MCNC: 249 MG/DL (ref 70–99)
HCT VFR BLD CALC: 30.4 % (ref 40.5–52.5)
HEMOGLOBIN: 9.9 G/DL (ref 13.5–17.5)
LYMPHOCYTES ABSOLUTE: 2.5 K/UL (ref 1–5.1)
LYMPHOCYTES RELATIVE PERCENT: 15.9 %
MCH RBC QN AUTO: 27.1 PG (ref 26–34)
MCHC RBC AUTO-ENTMCNC: 32.5 G/DL (ref 31–36)
MCV RBC AUTO: 83.4 FL (ref 80–100)
MONOCYTES ABSOLUTE: 1.1 K/UL (ref 0–1.3)
MONOCYTES RELATIVE PERCENT: 7 %
NEUTROPHILS ABSOLUTE: 11.9 K/UL (ref 1.7–7.7)
NEUTROPHILS RELATIVE PERCENT: 75.7 %
PDW BLD-RTO: 13.5 % (ref 12.4–15.4)
PERFORMED ON: ABNORMAL
PHOSPHORUS: 2.6 MG/DL (ref 2.5–4.9)
PLATELET # BLD: 474 K/UL (ref 135–450)
PMV BLD AUTO: 7 FL (ref 5–10.5)
POTASSIUM SERPL-SCNC: 3.8 MMOL/L (ref 3.5–5.1)
RBC # BLD: 3.64 M/UL (ref 4.2–5.9)
SODIUM BLD-SCNC: 133 MMOL/L (ref 136–145)
TROPONIN: <0.01 NG/ML
URIC ACID, SERUM: 4.1 MG/DL (ref 3.5–7.2)
WBC # BLD: 15.8 K/UL (ref 4–11)

## 2018-10-26 PROCEDURE — 71045 X-RAY EXAM CHEST 1 VIEW: CPT

## 2018-10-26 PROCEDURE — 73630 X-RAY EXAM OF FOOT: CPT

## 2018-10-26 PROCEDURE — 80069 RENAL FUNCTION PANEL: CPT

## 2018-10-26 PROCEDURE — 6360000002 HC RX W HCPCS: Performed by: NURSE ANESTHETIST, CERTIFIED REGISTERED

## 2018-10-26 PROCEDURE — 93010 ELECTROCARDIOGRAM REPORT: CPT | Performed by: INTERNAL MEDICINE

## 2018-10-26 PROCEDURE — C1713 ANCHOR/SCREW BN/BN,TIS/BN: HCPCS | Performed by: PODIATRIST

## 2018-10-26 PROCEDURE — 3700000001 HC ADD 15 MINUTES (ANESTHESIA): Performed by: PODIATRIST

## 2018-10-26 PROCEDURE — 2700000000 HC OXYGEN THERAPY PER DAY

## 2018-10-26 PROCEDURE — 94761 N-INVAS EAR/PLS OXIMETRY MLT: CPT

## 2018-10-26 PROCEDURE — 97535 SELF CARE MNGMENT TRAINING: CPT

## 2018-10-26 PROCEDURE — 2500000003 HC RX 250 WO HCPCS: Performed by: PODIATRIST

## 2018-10-26 PROCEDURE — 2580000003 HC RX 258: Performed by: PODIATRIST

## 2018-10-26 PROCEDURE — 84484 ASSAY OF TROPONIN QUANT: CPT

## 2018-10-26 PROCEDURE — 2580000003 HC RX 258: Performed by: ANESTHESIOLOGY

## 2018-10-26 PROCEDURE — S0028 INJECTION, FAMOTIDINE, 20 MG: HCPCS | Performed by: ANESTHESIOLOGY

## 2018-10-26 PROCEDURE — 6370000000 HC RX 637 (ALT 250 FOR IP): Performed by: INTERNAL MEDICINE

## 2018-10-26 PROCEDURE — 7100000000 HC PACU RECOVERY - FIRST 15 MIN: Performed by: PODIATRIST

## 2018-10-26 PROCEDURE — 36415 COLL VENOUS BLD VENIPUNCTURE: CPT

## 2018-10-26 PROCEDURE — 93005 ELECTROCARDIOGRAM TRACING: CPT | Performed by: ANESTHESIOLOGY

## 2018-10-26 PROCEDURE — 2709999900 HC NON-CHARGEABLE SUPPLY: Performed by: PODIATRIST

## 2018-10-26 PROCEDURE — 6360000002 HC RX W HCPCS: Performed by: PODIATRIST

## 2018-10-26 PROCEDURE — 1200000000 HC SEMI PRIVATE

## 2018-10-26 PROCEDURE — 2500000003 HC RX 250 WO HCPCS: Performed by: ANESTHESIOLOGY

## 2018-10-26 PROCEDURE — 3700000000 HC ANESTHESIA ATTENDED CARE: Performed by: PODIATRIST

## 2018-10-26 PROCEDURE — 2580000003 HC RX 258: Performed by: INTERNAL MEDICINE

## 2018-10-26 PROCEDURE — 3600000005 HC SURGERY LEVEL 5 BASE: Performed by: PODIATRIST

## 2018-10-26 PROCEDURE — 85025 COMPLETE CBC W/AUTO DIFF WBC: CPT

## 2018-10-26 PROCEDURE — 7100000001 HC PACU RECOVERY - ADDTL 15 MIN: Performed by: PODIATRIST

## 2018-10-26 PROCEDURE — 76937 US GUIDE VASCULAR ACCESS: CPT

## 2018-10-26 PROCEDURE — 0JBR0ZZ EXCISION OF LEFT FOOT SUBCUTANEOUS TISSUE AND FASCIA, OPEN APPROACH: ICD-10-PCS | Performed by: PODIATRIST

## 2018-10-26 PROCEDURE — 6370000000 HC RX 637 (ALT 250 FOR IP): Performed by: STUDENT IN AN ORGANIZED HEALTH CARE EDUCATION/TRAINING PROGRAM

## 2018-10-26 PROCEDURE — C1751 CATH, INF, PER/CENT/MIDLINE: HCPCS

## 2018-10-26 PROCEDURE — 99233 SBSQ HOSP IP/OBS HIGH 50: CPT | Performed by: INTERNAL MEDICINE

## 2018-10-26 PROCEDURE — 6360000002 HC RX W HCPCS: Performed by: STUDENT IN AN ORGANIZED HEALTH CARE EDUCATION/TRAINING PROGRAM

## 2018-10-26 PROCEDURE — 3600000015 HC SURGERY LEVEL 5 ADDTL 15MIN: Performed by: PODIATRIST

## 2018-10-26 PROCEDURE — 36569 INSJ PICC 5 YR+ W/O IMAGING: CPT

## 2018-10-26 PROCEDURE — 84550 ASSAY OF BLOOD/URIC ACID: CPT

## 2018-10-26 PROCEDURE — 97530 THERAPEUTIC ACTIVITIES: CPT

## 2018-10-26 PROCEDURE — 2500000003 HC RX 250 WO HCPCS: Performed by: NURSE ANESTHETIST, CERTIFIED REGISTERED

## 2018-10-26 PROCEDURE — 6360000002 HC RX W HCPCS: Performed by: INTERNAL MEDICINE

## 2018-10-26 DEVICE — STIMULAN® RAPID CURE PROVIDED STERILE FOR SINGLE PATIENT USE. STIMULAN® RAPID CURE CONTAINS CALCIUM SULFATE POWDER AND MIXING SOLUTION IN PRE-MEASURED QUANTITIES SO THAT WHEN MIXED TOGETHER IN A STERILE MIXING BOWL, THE RESULTANT PASTE IS TO BE DIGITALLY PACKED INTO OPEN BONE VOID/GAP TO SET INSITU OR PLACED INTO THE MOULD PROVIDED, THE MIXTURE SETS TO FORM BEADS. THE BIODEGRADABLE, RADIOPAQUE BEADS ARE RESORBED IN APPROXIMATELY 30 – 60 DAYS WHEN USED IN ACCORDANCE WITH THE DEVICE LABELLING. STIMULAN® RAPID CURE IS MANUFACTURED FROM SYNTHETIC IMPLANT GRADE CALCIUM SULFATE DIHYDRATE(CASO4.2H2O) THAT RESORBS AND IS REPLACED WITH BONE DURING THE HEALING PROCESS. ALSO, AS THE BONE VOID FILLER BEADS ARE BIODEGRADABLE AND BIOCOMPATIBLE, THEY MAY BE USED AT AN INFECTED SITE.
Type: IMPLANTABLE DEVICE | Site: FOOT | Status: FUNCTIONAL
Brand: STIMULAN® RAPID CURE

## 2018-10-26 RX ORDER — HYDRALAZINE HYDROCHLORIDE 20 MG/ML
10 INJECTION INTRAMUSCULAR; INTRAVENOUS EVERY 6 HOURS PRN
Status: DISCONTINUED | OUTPATIENT
Start: 2018-10-26 | End: 2018-10-29 | Stop reason: HOSPADM

## 2018-10-26 RX ORDER — VANCOMYCIN HYDROCHLORIDE 1 G/20ML
INJECTION, POWDER, LYOPHILIZED, FOR SOLUTION INTRAVENOUS
Status: COMPLETED | OUTPATIENT
Start: 2018-10-26 | End: 2018-10-26

## 2018-10-26 RX ORDER — SODIUM CHLORIDE 0.9 % (FLUSH) 0.9 %
10 SYRINGE (ML) INJECTION PRN
Status: DISCONTINUED | OUTPATIENT
Start: 2018-10-26 | End: 2018-10-26 | Stop reason: HOSPADM

## 2018-10-26 RX ORDER — BUPIVACAINE HYDROCHLORIDE 5 MG/ML
INJECTION, SOLUTION EPIDURAL; INTRACAUDAL
Status: COMPLETED | OUTPATIENT
Start: 2018-10-26 | End: 2018-10-26

## 2018-10-26 RX ORDER — SODIUM CHLORIDE 0.9 % (FLUSH) 0.9 %
10 SYRINGE (ML) INJECTION EVERY 12 HOURS SCHEDULED
Status: DISCONTINUED | OUTPATIENT
Start: 2018-10-26 | End: 2018-10-26 | Stop reason: HOSPADM

## 2018-10-26 RX ORDER — ONDANSETRON 2 MG/ML
4 INJECTION INTRAMUSCULAR; INTRAVENOUS
Status: DISCONTINUED | OUTPATIENT
Start: 2018-10-26 | End: 2018-10-26 | Stop reason: HOSPADM

## 2018-10-26 RX ORDER — LIDOCAINE HYDROCHLORIDE 10 MG/ML
INJECTION, SOLUTION INFILTRATION; PERINEURAL
Status: COMPLETED | OUTPATIENT
Start: 2018-10-26 | End: 2018-10-26

## 2018-10-26 RX ORDER — MIDAZOLAM HYDROCHLORIDE 1 MG/ML
INJECTION INTRAMUSCULAR; INTRAVENOUS PRN
Status: DISCONTINUED | OUTPATIENT
Start: 2018-10-26 | End: 2018-10-26 | Stop reason: SDUPTHER

## 2018-10-26 RX ORDER — FENTANYL CITRATE 50 UG/ML
50 INJECTION, SOLUTION INTRAMUSCULAR; INTRAVENOUS EVERY 5 MIN PRN
Status: DISCONTINUED | OUTPATIENT
Start: 2018-10-26 | End: 2018-10-26 | Stop reason: HOSPADM

## 2018-10-26 RX ORDER — PROPOFOL 10 MG/ML
INJECTION, EMULSION INTRAVENOUS CONTINUOUS PRN
Status: DISCONTINUED | OUTPATIENT
Start: 2018-10-26 | End: 2018-10-26 | Stop reason: SDUPTHER

## 2018-10-26 RX ORDER — LIDOCAINE HYDROCHLORIDE 20 MG/ML
INJECTION, SOLUTION INFILTRATION; PERINEURAL PRN
Status: DISCONTINUED | OUTPATIENT
Start: 2018-10-26 | End: 2018-10-26 | Stop reason: SDUPTHER

## 2018-10-26 RX ORDER — FENTANYL CITRATE 50 UG/ML
INJECTION, SOLUTION INTRAMUSCULAR; INTRAVENOUS PRN
Status: DISCONTINUED | OUTPATIENT
Start: 2018-10-26 | End: 2018-10-26 | Stop reason: SDUPTHER

## 2018-10-26 RX ORDER — SODIUM CHLORIDE 9 MG/ML
INJECTION, SOLUTION INTRAVENOUS CONTINUOUS
Status: DISCONTINUED | OUTPATIENT
Start: 2018-10-26 | End: 2018-10-27

## 2018-10-26 RX ORDER — PROMETHAZINE HYDROCHLORIDE 25 MG/ML
6.25 INJECTION, SOLUTION INTRAMUSCULAR; INTRAVENOUS
Status: DISCONTINUED | OUTPATIENT
Start: 2018-10-26 | End: 2018-10-26 | Stop reason: HOSPADM

## 2018-10-26 RX ORDER — FENTANYL CITRATE 50 UG/ML
25 INJECTION, SOLUTION INTRAMUSCULAR; INTRAVENOUS EVERY 5 MIN PRN
Status: DISCONTINUED | OUTPATIENT
Start: 2018-10-26 | End: 2018-10-26 | Stop reason: HOSPADM

## 2018-10-26 RX ORDER — PROPOFOL 10 MG/ML
INJECTION, EMULSION INTRAVENOUS PRN
Status: DISCONTINUED | OUTPATIENT
Start: 2018-10-26 | End: 2018-10-26 | Stop reason: SDUPTHER

## 2018-10-26 RX ORDER — MAGNESIUM HYDROXIDE 1200 MG/15ML
LIQUID ORAL CONTINUOUS PRN
Status: COMPLETED | OUTPATIENT
Start: 2018-10-26 | End: 2018-10-26

## 2018-10-26 RX ORDER — ONDANSETRON 2 MG/ML
INJECTION INTRAMUSCULAR; INTRAVENOUS PRN
Status: DISCONTINUED | OUTPATIENT
Start: 2018-10-26 | End: 2018-10-26 | Stop reason: SDUPTHER

## 2018-10-26 RX ADMIN — MIDAZOLAM 2 MG: 1 INJECTION INTRAMUSCULAR; INTRAVENOUS at 13:55

## 2018-10-26 RX ADMIN — FENTANYL CITRATE 25 MCG: 50 INJECTION INTRAMUSCULAR; INTRAVENOUS at 14:26

## 2018-10-26 RX ADMIN — FAMOTIDINE 20 MG: 10 INJECTION, SOLUTION INTRAVENOUS at 11:59

## 2018-10-26 RX ADMIN — ASPIRIN 81 MG: 81 TABLET, COATED ORAL at 08:00

## 2018-10-26 RX ADMIN — Medication 10 ML: at 20:26

## 2018-10-26 RX ADMIN — HYDRALAZINE HYDROCHLORIDE 10 MG: 20 INJECTION INTRAMUSCULAR; INTRAVENOUS at 16:34

## 2018-10-26 RX ADMIN — PIPERACILLIN SODIUM,TAZOBACTAM SODIUM 3.38 G: 3; .375 INJECTION, POWDER, FOR SOLUTION INTRAVENOUS at 05:36

## 2018-10-26 RX ADMIN — FENTANYL CITRATE 50 MCG: 50 INJECTION INTRAMUSCULAR; INTRAVENOUS at 13:57

## 2018-10-26 RX ADMIN — PROPOFOL 100 MG: 10 INJECTION, EMULSION INTRAVENOUS at 13:58

## 2018-10-26 RX ADMIN — HEPARIN SODIUM 5000 UNITS: 5000 INJECTION INTRAVENOUS; SUBCUTANEOUS at 05:41

## 2018-10-26 RX ADMIN — INSULIN GLARGINE 25 UNITS: 100 INJECTION, SOLUTION SUBCUTANEOUS at 22:32

## 2018-10-26 RX ADMIN — LINEZOLID 600 MG: 600 INJECTION, SOLUTION INTRAVENOUS at 07:59

## 2018-10-26 RX ADMIN — HEPARIN SODIUM 5000 UNITS: 5000 INJECTION INTRAVENOUS; SUBCUTANEOUS at 22:31

## 2018-10-26 RX ADMIN — SODIUM CHLORIDE: 9 INJECTION, SOLUTION INTRAVENOUS at 11:49

## 2018-10-26 RX ADMIN — LIDOCAINE HYDROCHLORIDE 50 MG: 20 INJECTION, SOLUTION INFILTRATION; PERINEURAL at 13:59

## 2018-10-26 RX ADMIN — FENTANYL CITRATE 25 MCG: 50 INJECTION INTRAMUSCULAR; INTRAVENOUS at 14:39

## 2018-10-26 RX ADMIN — FLUCONAZOLE 200 MG: 100 TABLET ORAL at 08:00

## 2018-10-26 RX ADMIN — ONDANSETRON 4 MG: 2 INJECTION INTRAMUSCULAR; INTRAVENOUS at 14:57

## 2018-10-26 RX ADMIN — Medication 1 TABLET: at 08:00

## 2018-10-26 RX ADMIN — LINEZOLID 600 MG: 600 INJECTION, SOLUTION INTRAVENOUS at 20:25

## 2018-10-26 RX ADMIN — INSULIN LISPRO 10 UNITS: 100 INJECTION, SOLUTION INTRAVENOUS; SUBCUTANEOUS at 17:29

## 2018-10-26 RX ADMIN — INSULIN LISPRO 2 UNITS: 100 INJECTION, SOLUTION INTRAVENOUS; SUBCUTANEOUS at 22:32

## 2018-10-26 RX ADMIN — ATORVASTATIN CALCIUM 40 MG: 40 TABLET, FILM COATED ORAL at 08:00

## 2018-10-26 RX ADMIN — INSULIN LISPRO 2 UNITS: 100 INJECTION, SOLUTION INTRAVENOUS; SUBCUTANEOUS at 17:28

## 2018-10-26 RX ADMIN — PIPERACILLIN SODIUM,TAZOBACTAM SODIUM 3.38 G: 3; .375 INJECTION, POWDER, FOR SOLUTION INTRAVENOUS at 22:31

## 2018-10-26 RX ADMIN — METOPROLOL SUCCINATE 25 MG: 25 TABLET, EXTENDED RELEASE ORAL at 08:00

## 2018-10-26 RX ADMIN — PROPOFOL 180 MCG/KG/MIN: 10 INJECTION, EMULSION INTRAVENOUS at 13:59

## 2018-10-26 ASSESSMENT — PULMONARY FUNCTION TESTS
PIF_VALUE: 0
PIF_VALUE: 0
PIF_VALUE: 10
PIF_VALUE: 1
PIF_VALUE: 11
PIF_VALUE: 10
PIF_VALUE: 9
PIF_VALUE: 8
PIF_VALUE: 0
PIF_VALUE: 0
PIF_VALUE: 10
PIF_VALUE: 9
PIF_VALUE: 10
PIF_VALUE: 10
PIF_VALUE: 13
PIF_VALUE: 9
PIF_VALUE: 9
PIF_VALUE: 7
PIF_VALUE: 9
PIF_VALUE: 12
PIF_VALUE: 0
PIF_VALUE: 9
PIF_VALUE: 7
PIF_VALUE: 0
PIF_VALUE: 10
PIF_VALUE: 0
PIF_VALUE: 2
PIF_VALUE: 13
PIF_VALUE: 12
PIF_VALUE: 9
PIF_VALUE: 0
PIF_VALUE: 10
PIF_VALUE: 0
PIF_VALUE: 6
PIF_VALUE: 12
PIF_VALUE: 1
PIF_VALUE: 0
PIF_VALUE: 0
PIF_VALUE: 10
PIF_VALUE: 10
PIF_VALUE: 13
PIF_VALUE: 0
PIF_VALUE: 11
PIF_VALUE: 9
PIF_VALUE: 7
PIF_VALUE: 0
PIF_VALUE: 9
PIF_VALUE: 11
PIF_VALUE: 14
PIF_VALUE: 10
PIF_VALUE: 8
PIF_VALUE: 9
PIF_VALUE: 0
PIF_VALUE: 0
PIF_VALUE: 11
PIF_VALUE: 8
PIF_VALUE: 15
PIF_VALUE: 11
PIF_VALUE: 0
PIF_VALUE: 9
PIF_VALUE: 10
PIF_VALUE: 9
PIF_VALUE: 23
PIF_VALUE: 0
PIF_VALUE: 9
PIF_VALUE: 0

## 2018-10-26 ASSESSMENT — PAIN DESCRIPTION - LOCATION: LOCATION: FOOT

## 2018-10-26 ASSESSMENT — PAIN SCALES - GENERAL
PAINLEVEL_OUTOF10: 0
PAINLEVEL_OUTOF10: 0
PAINLEVEL_OUTOF10: 4
PAINLEVEL_OUTOF10: 0

## 2018-10-26 ASSESSMENT — PAIN DESCRIPTION - PAIN TYPE
TYPE: SURGICAL PAIN
TYPE: ACUTE PAIN

## 2018-10-26 ASSESSMENT — PAIN DESCRIPTION - ORIENTATION: ORIENTATION: LEFT

## 2018-10-26 NOTE — PROGRESS NOTES
Occupational Therapy  Facility/Department: [facility] MED SURG  Daily Treatment Note  NAME: Rebeca Larsen  : 1970  MRN: [id number]    Date of Service: 10/26/2018    Discharge Recommendations:  Patient would benefit from continued therapy after discharge, S Level 1, Home with assist PRN, Home with Home health OT  OT Equipment Recommendations  Equipment Needed: Yes  Mobility Devices: ADL Assistive Devices  ADL Assistive Devices: Shower Chair with back  Other: Pt would benefit from shower chair for safety and energy conservation d/t decreased balance and fxl activity tolerance with NWB status L LE. Patient Diagnosis(es): The encounter diagnosis was Osteomyelitis of toe of left foot ([facility] [address] has a past medical history of Angina effort ([facility] [address] Arthritis; CAD (coronary artery disease); Carotid stenosis; Diabetes mellitus with neurological manifestations, uncontrolled ([facility] [address] Diarrhea; Hyperlipidemia; Hypertension; Obesity; and Type II or unspecified type diabetes mellitus without mention of complication, not stated as uncontrolled. has a past surgical history that includes Cholecystectomy; Cardiac catheterization; Carotid endarterectomy (Right, 2015); Toe amputation (Left, 10/05/2018); pr office/outpt visit,procedure only (Left, 10/5/2018); and pr office/outpt visit,procedure only (Left, 10/22/2018). Restrictions  Restrictions/Precautions  Restrictions/Precautions: Isolation, Contact Precautions, Weight Bearing (MRSA)  Lower Extremity Weight Bearing Restrictions  Left Lower Extremity Weight Bearing: Non Weight Bearing  Position Activity Restriction  Other position/activity restrictions: 2L 02 per NC; NWB L foot     Subjective   General  Chart Reviewed: Yes  Additional Pertinent Hx: Pt is a 51 y.o. male admitted with osteomyelitis and infection of the left foot. Pt s/p I&D of left foot, partial amputation of the left 1st and 2nd metatarsals, and amputation of the left 1st and 2nd toes 10/22/2018.  Pt NWB

## 2018-10-26 NOTE — PROGRESS NOTES
was found to be in the bed and reports he is wating for surgery. Per nursing, he is waiting for cardiac clearance.    Pain Screening  Patient Currently in Pain: Yes  Pain Assessment  Pain Assessment: 0-10  Pain Level: 4  Pain Type: Acute pain  Pain Location: Foot  Pain Orientation: Left  Vital Signs  Patient Currently in Pain: Yes       Social/Functional History  Social/Functional History  Lives With: Significant other  Type of Home: House  Home Layout: Bed/Bath upstairs, 1/2 bath on main level, Two level, Able to Live on Main level with bedroom/bathroom  Home Access: Stairs to enter without rails  Entrance Stairs - Number of Steps: 3  Bathroom Shower/Tub: Walk-in shower (walkin shower on main level and tub shower for upstairs)  Bathroom Toilet: Standard  Home Equipment: Cane, Crutches  ADL Assistance: Independent  Homemaking Assistance:  (pt was cooking)  Homemaking Responsibilities: No  Ambulation Assistance: Independent  Transfer Assistance: Independent  Active : Yes  Mode of Transportation: Car  Occupation: On disability     Objective  Bed mobility  Supine to Sit: Modified independent (HOB elevated)  Sit to Supine: Unable to assess (pt up to the chair at end of session)  Transfers  Sit to Stand: Stand by assistance  Stand to sit: Stand by assistance  Stand Pivot Transfers: Stand by assistance (from bed > w/c; w/c <> commode; w/c > recliner)  Comment: the pt was able to maintian his NWB thru-out; cues needed for safe placement of the walker for transfers (he does better when the w/c is place directly in front of surface he is going to)  Ambulation  Ambulation?: No (pt with difficulty maintaining NWB LLE so initiated w/c propulsion training instead)  Stairs/Curb  Stairs?: No (pt has no rails and 3 [address] so will need stretcher transport home at d/c)  Wheelchair Activities  Wheelchair Size: 20\"  Wheelchair Type: Standard  Wheelchair Cushion: None  Pressure Relief Type: Self Adjusts  Level of Assistance for technique with w/c  Barriers to Learning: decreased insight into safety issues and NWB status  REQUIRES PT FOLLOW UP: Yes  Activity Tolerance  Activity Tolerance: Patient Tolerated treatment well         Plan   Plan  Times per week: 3-5  Specific instructions for Next Treatment: continue w/c propulsion training  Current Treatment Recommendations: Functional Mobility Training  Safety Devices  Type of devices: Call light within reach, Nurse notified, Left in chair, Gait belt, Chair alarm in place, Patient at risk for falls (RN Sophia Ruffin aware )  Restraints  Initially in place: No    AM-PAC Score  AM-PAC Inpatient Mobility Raw Score : 18  AM-PAC Inpatient T-Scale Score : 43.63  Mobility Inpatient CMS 0-100% Score: 46.58  Mobility Inpatient CMS G-Code Modifier : CK          Goals  Short term goals  Time Frame for Short term goals: by discharge (all goals ongoing as of 10/26 except noted below)  Short term goal 1: transfers MI  Short term goal 2: perform mobility tasks with appropriate device (walker, knee scooter or w/c) SBA- MET with w/c- new goal mod I  Patient Goals   Patient goals : te be able to walk       Therapy Time   Individual Concurrent Group Co-treatment   Time In 1037         Time Out 1105         Minutes 28               Electronically signed by Geraldo Kent, PT [id number] on 10/26/2018 at 11:15 AM

## 2018-10-26 NOTE — PROGRESS NOTES
Patient returned from surgery with s/p left toes debridement with delayed closure per Dr. Tim Montero. He is completely awake and alert. He denies pain/discomforts. He is talking on his cell phone. The dressing on the left foot is dry and intact, and up on a pillow. The call light is in pt's reach. IVF of N.S. @ 75 mls/hr. Via pump, through his newly placed dual PICC line.

## 2018-10-26 NOTE — PROGRESS NOTES
Nutrition Assessment    Type and Reason for Visit: Reassess    Nutrition Recommendations:   Monitor for diet advancement. Resume Carb controlled diet when diet advances. Monitor need for ONS. Continue to monitor PO intakes, weights, I&O's, skin integrity, and nutrition-related labs. Malnutrition Assessment:  · Malnutrition Status: At risk for malnutrition  · Context: Acute illness or injury  · Findings of the 6 clinical characteristics of malnutrition (Minimum of 2 out of 6 clinical characteristics is required to make the diagnosis of moderate or severe Protein Calorie Malnutrition based on AND/ASPEN Guidelines):  1. Energy Intake-Not available, not able to assess    2. Weight Loss-5% loss or greater,  (in 2 weeks)  3. Fat Loss-No significant subcutaneous fat loss,    4. Muscle Loss-No significant muscle mass loss,    5. Fluid Accumulation-Mild fluid accumulation,    6.  Strength-Not measured    Nutrition Diagnosis:   · Problem: Increased nutrient needs  · Etiology: related to Increased demand for energy/nutrients due to     Signs and symptoms:  as evidenced by Presence of wounds    Nutrition Assessment:  · Subjective Assessment: Follow-up. Pt with left foot toe osteomyletis. Pt reprots that he didn't eat last night but thinks he ate lunch. No documented intakes for the past 4 days. He reports he has been drinking a lot of fluids. Pt with uncontrolled DM but doens't follow a carb control diet at home. Plate method and educations given to pt and he verbalized understanding. Pt trying to lose some weight and explained carb control diet would help.    · Nutrition-Focused Physical Findings: See malnutrition assessment  · Wound Type: Surgical Wound  · Current Nutrition Therapies:  · Oral Diet Orders:  (for procedure)   · Oral Diet intake: Unable to assess (no intakes documented for the past 4 days, pt unable to remember intakes)  · Anthropometric Measures:  · Ht: 5' 6\" (167.6 cm)   · Current Body Wt: 257

## 2018-10-26 NOTE — ANESTHESIA PRE PROCEDURE
[facility] Department of Anesthesiology  Pre-Anesthesia Evaluation/Consultation       Name:  Yesenia Romero  : 1970  Age:  51 y.o.                                            MRN:  [id number]  Date: 10/26/2018           Surgeon: Surgeon(s[facility]  [facility]    Procedure: Procedure(s):  INCISION AND DRAINAGE LEFT FOOT     No Known Allergies  Patient Active Problem List   Diagnosis    Carotid artery stenosis without cerebral infarction    Diabetes mellitus type 2, insulin dependent ([address]    Obese    Hypertension    Other and unspecified hyperlipidemia    Diabetes mellitus with peripheral circulatory disorder ([address]    Carotid stenosis    Type 2 diabetes mellitus with atherosclerosis of native arteries of extremity with rest pain (HCC)    Atherosclerosis of native artery of extremity with ulceration ([address]    Critical ischemia of lower extremity    PVD (peripheral vascular disease) (HCC)    Osteomyelitis of toe of left foot ([address]    Osteomyelitis ([address]    Sepsis ([address]    Diabetic foot infection ([address]    Neutrophilic leukocytosis    Fever chills     Past Medical History:   Diagnosis Date    Angina effort ([address]     Arthritis     CAD (coronary artery disease)     Carotid stenosis     Diabetes mellitus with neurological manifestations, uncontrolled ([address]     Diarrhea     Hyperlipidemia     Hypertension     Obesity     Type II or unspecified type diabetes mellitus without mention of complication, not stated as uncontrolled      Past Surgical History:   Procedure Laterality Date    CARDIAC CATHETERIZATION      CAROTID ENDARTERECTOMY Right 2015    CHOLECYSTECTOMY      AL OFFICE/OUTPT VISIT,PROCEDURE ONLY Left 10/5/2018    AMPUTATION LEFT GREAT DIGIT performed by [address] [place], [place] [address]ANTONIO at [facility] 21 OFFICE/OUTPT [facility] Left 10/22/2018    INCISION AND DRAINAGE LEFT FOOT FIRST AND SECOND RAY AMPUTATION (DIGITS ONE, TWO AND METARSAL ONE AND TWO) performed by Lisa Moss tablet at 10/26/18 0800    morphine (PF) injection 2 mg  2 mg Intravenous Q2H PRN Leny Powers MD   2 mg at 10/22/18 1708    Or    morphine (PF) injection 4 mg  4 mg Intravenous Q2H PRN Leny Powers MD   4 mg at 10/25/18 1151    oxyCODONE (ROXICODONE) immediate release tablet 5 mg  5 mg Oral Q4H PRN Azevedo Beasley, DPM   5 mg at 10/25/18 0442    aspirin EC tablet 81 mg  81 mg Oral Daily Darvin Núñez MD   81 mg at 10/26/18 0800    atorvastatin (LIPITOR) tablet 40 mg  40 mg Oral FAN Núñez MD   40 mg at 10/26/18 0800    metoprolol succinate (TOPROL XL) extended release tablet 25 mg  25 mg Oral FAN Núñez MD   25 mg at 10/26/18 0800    sodium chloride flush 0.9 % injection 10 mL  10 mL Intravenous 2 times per day Darvin Guzman MD   10 mL at 10/25/18 2213    sodium chloride flush 0.9 % injection 10 mL  10 mL Intravenous PRN Darvin Núñez MD        magnesium hydroxide (MILK OF MAGNESIA) 400 MG/5ML suspension 30 mL  30 mL Oral Daily PRN Darvin Núñez MD        ondansetron (ZOFRAN) injection 4 mg  4 mg Intravenous Q6H PRN Darvin Núñez MD        glucose (GLUTOSE) 40 % oral gel 15 g  15 g Oral PRN Darvin Núñez MD        dextrose 50 % solution 12.5 g  12.5 g Intravenous PRN Darvin Núñez MD        glucagon (rDNA) injection 1 mg  1 mg Intramuscular PRN Darvin Núñez MD        dextrose 5 % solution  100 mL/hr Intravenous PRN Darvin Núñez MD        acetaminophen (TYLENOL) tablet 650 mg  650 mg Oral Q4H PRN GeorgeGINA Suarez - CNP   650 mg at 10/25/18 0442    insulin lispro (HUMALOG) injection vial 0-12 Units  0-12 Units Subcutaneous TID WC Darvin Núñez MD   2 Units at 10/25/18 1656    insulin lispro (HUMALOG) injection vial 0-6 Units  0-6 Units Subcutaneous Nightly Darvin Arenas MD   Stopped at 10/25/18 2052     Current Outpatient Prescriptions on File Prior to Visit   Medication Sig Dispense Refill    injection 10 mL  10 mL Intravenous PRN Darvin Núñez MD        magnesium hydroxide (MILK OF MAGNESIA) 400 MG/5ML suspension 30 mL  30 mL Oral Daily PRN Darvin Núñez MD        ondansetron (ZOFRAN) injection 4 mg  4 mg Intravenous Q6H PRN Darvin Núñez MD        glucose (GLUTOSE) 40 % oral gel 15 g  15 g Oral PRN Darvin Núñez MD        dextrose 50 % solution 12.5 g  12.5 g Intravenous PRN Darvin Núñez MD        glucagon (rDNA) injection 1 mg  1 mg Intramuscular PRN Darvin Núñez MD        dextrose 5 % solution  100 mL/hr Intravenous PRN Darvin Núñez MD        acetaminophen (TYLENOL) tablet 650 mg  650 mg Oral Q4H PRN GINA Ramirez - CNP   650 mg at 10/25/18 044    insulin lispro (HUMALOG) injection vial 0-12 Units  0-12 Units Subcutaneous TID WC Darvin Núñez MD   2 Units at 10/25/18 1656    insulin lispro (HUMALOG) injection vial 0-6 Units  0-6 Units Subcutaneous Nightly Darvin Ballard MD   Stopped at 10/25/18 2052     Vital Signs (Current)   There were no vitals filed for this visit. Vital Signs Statistics (for past 48 hrs)     Temp  Av.1 °F (37.3 °C)  Min: 98.5 °F (36.9 °C)   Min taken time: 10/24/18 2353  Max: 100.7 °F (38.2 °C)   Max taken time: 10/25/18 0435  Pulse  Av.4  Min: 68   Min taken time: 10/26/18 1129  Max: 92   Max taken time: 10/25/18 1652  Resp  Av.6  Min: 16   Min taken time: 10/24/18 2353  Max: 18   Max taken time: 10/26/18 0941  BP  Min: 147/69   Min taken time: 10/25/18 2033  Max: 201/93   Max taken time: 10/26/18 112  SpO2  Av.4 %  Min: 90 %   Min taken time: 10/25/18 0435  Max: 96 %   Max taken time: 10/26/18 1129    BP Readings from Last 3 Encounters:   10/26/18 (!) 201/93   10/22/18 (!) 92/55   10/09/18 130/80     BMI  There is no height or weight on file to calculate BMI. Estimated body mass index is 41.63 kg/m² as calculated from the following:    Height as of 10/21/18: 5' 6\" (1.676 m).     Weight as regular  Rate: normal           Beta Blocker:  Dose within 24 Hrs         Neuro/Psych:   Negative Neuro/Psych ROS              GI/Hepatic/Renal:   (+) morbid obesity          Endo/Other:    (+) DiabetesType II DM, using insulin, .    (-) blood dyscrasia               Abdominal:   (+) obese,         Vascular:   + PVD, aortic or cerebral (KRISTIN S/P CEA), . Anesthesia Plan      general     ASA 3       Induction: intravenous. MIPS: Postoperative opioids intended and Prophylactic antiemetics administered. Anesthetic plan and risks discussed with patient. Plan discussed with CRNA. This pre-anesthesia assessment may be used as a history and physical.    DOS STAFF ADDENDUM:    Pt seen and examined, chart reviewed (including anesthesia, drug and allergy history). No interval changes to history and physical examination. Anesthetic plan, risks, benefits, alternatives, and personnel involved discussed with patient. Patient verbalized an understanding and agrees to proceed.       Adrienne Krueger MD  October 26, 2018  12:31 PM

## 2018-10-26 NOTE — PROGRESS NOTES
[Urine:2050]    Physical Examination:   · General appearance: alert, appears stated age and cooperative  · Skin: Skin color, texture, turgor normal.   · HEENT: EOMI: Normocephalic, no lesions, without obvious abnormality. · Lungs: clear to auscultation bilaterally  · Heart: regular rate and rhythm, S1, S2 normal, no murmur, click, rub or gallop  · Abdomen: soft, non-tender; bowel sounds normal; no masses,  no organomegaly  · MSK: left foot wrapped in ACE wrap  · Neurologic:  Mental status: Alert, oriented, thought content appropriate  · Lines:   DATA:       Labs:  CBC:   Recent Labs      10/24/18   0616  10/25/18   0550  10/26/18   0626   WBC  15.1*  13.8*  15.8*   HGB  10.3*  9.6*  9.9*   HCT  31.9*  29.7*  30.4*   PLT  434  436  474*       BMP:   Recent Labs      10/24/18   0616   10/25/18   0550  10/25/18   1250  10/26/18   0626   NA  130*   < >  130*  134*  133*   K  4.6   --   3.9   --   3.8   CL  95*   --   93*   --   96*   CO2  23   --   24   --   26   BUN  25*   --   12   --   9   CREATININE  1.1   --   0.9   --   0.8*   GLUCOSE  249*   --   166*   --   130*    < > = values in this interval not displayed. LFT's: No results for input(s): AST, ALT, ALB, BILITOT, ALKPHOS in the last 72 hours. Troponin:   Recent Labs      10/25/18   1250   TROPONINI  <0.01     BNP: No results for input(s): BNP in the last 72 hours. ABGs: No results for input(s): PHART, ISY5YON, PO2ART in the last 72 hours. INR: No results for input(s): INR in the last 72 hours. Lipids: No results for input(s): CHOL, HDL in the last 72 hours.     Invalid input(s): LDLCALCU    U/A:  Recent Labs      10/25/18   0440   COLORU  YELLOW   PHUR  5.0   LABCAST  0-1 Coarse Gran*   WBCUA  2   RBCUA  1   CLARITYU  CLOUDY*   SPECGRAV  1.017   LEUKOCYTESUR  Negative   UROBILINOGEN  1.0   BILIRUBINUR  Negative   BLOODU  MODERATE*   GLUCOSEU  Negative        Micro:   ASSESSMENT AND PLAN:   Shreyas Driscoll a 51 y.o. male with PMH of DM, PVD, and possible cluster headache - resolved with O2 and compazine.         Sepsis POA due to OM L 1st and 2nd toes due to uncontrolled DMII. S/p L 1st and 2nd toe and metatarsal amputation 10/22.    IV abx per ID. Diflucan added. On zosyn and zyvox.       DMII uncontrolled. BG much better this am -               s/p 10 units of humulin R IV earlier in admit.               - Lantus increased further               - cont Humalog prandial.               - dietary compliance discussed.         KOLE - ?due to hypotension or Vancomycin or combination. Nephrology eval.   US no obstruction   Cr better with IVF.        Hyponatremia - in part pseudohyponatremia of severe hyperglycemia. Trend q6 till stable - to daily. Nephrology involved. Stop IVF     EKG changes - new LBBB after the first procedure earlier this week with a run of tachycradia in PACU unit. Has since been going in and out of wide complex and narrow complex sinus rhythm. No CP. High risk diabetic vasculopath. I called PreOP and asked to postpone surgery till he is evaluated by cardiology. He will need ischemia work up in the near future. This will be challenging with active infection.          S/p PICC line and will need Iv Abx on discharge.   Loida Alex  Hospitalist Service

## 2018-10-26 NOTE — PROGRESS NOTES
To room 4115 via bed with transport and pca assist. Free of distress.   Electronically signed by Tee Burger RN on 10/26/2018 at 4:07 PM

## 2018-10-26 NOTE — PROGRESS NOTES
Received from OR. Vss. ivf kvo via right arm picc. picc dsg inatct. . dsg intact left foot. Left foot elevated. Tip of toes warm cap refill brisk. Ice pk left foot. Non responsive. Oral airway intact. O2 in use.    Electronically signed by Carlin Booth RN on 10/26/2018 at 3:34 PM

## 2018-10-26 NOTE — PROGRESS NOTES
Dr. Marline Vasquez, (pod), was called and was made aware of patient needing cardiac clearance before surgery due to new onset LBBB. Dr. Krishan Millan nurse was notified of need for cardiac clearance. Dr. Krishan Millan nurse said Dr. Fany Morataya has 2 cardiac cath cases and is unsure as to when Dr. Fany Morataya would be able to see pt. Dr. Marline Vasquez was inform of this information.

## 2018-10-26 NOTE — PROGRESS NOTES
Laboratory  [address]   Phone [phone]   Culture Blood #1 [[id number]] Collected: 10/21/18 0000   Order Status: Canceled Specimen: Blood    Culture Blood #2 [[id number]] Collected: 10/21/18 0000   Order Status: Canceled Specimen: Blood      ORDER#: [id number]                          ORDERED BY: Gaudencio Taveras  SOURCE: Abscess                            COLLECTED:  10/09/18 11:39  ANTIBIOTICS AT ADA. :                      RECEIVED :  10/09/18 16:00  Performed at:  [facility]  [address]   Phone [phone]   Culture & Susceptibility     ST[facility] MRSA     Antibiotic Interpretation [facility] Unit   ceFAZolin Resistant >16 mcg/mL   clindamycin Sensitive <=0.5 mcg/mL   erythromycin Resistant >4 mcg/mL   oxacillin Resistant >2 mcg/mL   tetracycline Sensitive <=0.5 mcg/mL   trimethoprim-sulfamethoxazole Sensitive <=0.5/9.5 mcg/mL   vancomycin Sensitive 1 mcg/mL          Component Collected Lab   Gram Stain Result 10/05/2018  2:00 PM [address] [facility]   No Epithelial Cells seen   No WBC's seen   No organisms seen     Organism  (Abnormal) 10/05/2018  2:00 PM [address] [facility]   Yeast     Culture Surgical 10/05/2018  2:00 PM [address] [facility]   Rare growth   No further workup     Organism  (Abnormal) 10/05/2018  2:00 PM [address] [facility]   Non-hemolytic Streptococcus     Culture Surgical 10/05/2018  2:00 PM [address] [facility]   Rare growth   No further workup   Organism is non-viable for further testing     Organism  (Abnormal) 10/05/2018  2:00 PM [address] [facility]   Prevotella corporis     Anaerobic Culture 10/05/2018  2:00 PM [address] [facility]   Light growth   Beta Lactamase POSITIVE. Sensitivities not routinely done.  Drugs of choice are: Metronidazole,   Cefoxitin, or Piperacillin/Tazobactam.             Surgical Culture [[id number]] (Abnormal) Collected: 10/22/18 1336   Order

## 2018-10-26 NOTE — PLAN OF CARE
Problem: Pain:  Goal: Patient's pain/discomfort is manageable  Patient's pain/discomfort is manageable   Outcome: Ongoing  Pain level assessed. Pt able to rate pain on a scale of 0-10. Pt denies pain at this time. LLE elevated. Will continue to monitor for s/s of discomfort. Problem: Skin Integrity:  Goal: Skin integrity will stabilize  Skin integrity will stabilize   Outcome: Ongoing  Skin assessed per protocol. Randolph score obtained. Skin kept clean, dry and warm. Encouraged to reposition to reduce the risk of PUs. Pillow support in place. Dressing changed per MD. LLE elevated. Will continue to monitor. Problem: Falls - Risk of:  Goal: Will remain free from falls  Will remain free from falls   Outcome: Ongoing  Fall risk assessed. Precautions in place. Bed low and locked with side rails up x2. Nonskid socks on when OOB. Pt refused bed alarm. agrees to call for assistance as needed. Call light within reach. Frequent checks performed. No falls at this time.

## 2018-10-26 NOTE — OP NOTE
Kaylee Mondragon  YOB: 1970  MRN: [id number]  Surgical Date: 10/22/2018     Surgeon:  Juan Romero     Pre-operative Diagnosis: Osteomyelitis left hallux with abscess of left foot    Post-operative Diagnosis: Same    Procedure:   1. Incision and drainage of left foot abscess  2. Amputation of the left 1st and 2nd toes  3. Partial resection of the left 1st and 2nd metatarsal bones    Findings:  1. Abscess left foot  2. Non-viable bone and soft tissue left foot  3. Healthy bleeding bone and soft tissue post procedures    Anesthesia: MAC with left foot Shepherd block consisting of 10 cc of 1% lidocaine plain    Hemostasis:  Pneumatic left ankle tourniquet @ 250 mmHg     Estimated Blood Loss: less than 5 mL    Materials: Vicryl 3-0, Ethilon 3-0, 1/2 inch Iodoform packing gauze strips    Injectables: 10 cc of 0.5% marcaine plain    Complications: none    Specimens:   1. Bone from left 1st metatarsal  2. Swab culture of left foot wound    Drains/Lines: none    INDICATIONS FOR PROCEDURE: This patient was admitted on 10/21/2018 for cellulitis of the left foot after presenting to the ED for bleeding through his left foot dressings s/p partial hallux amputation, DOS: 10/05/2018 . Radiographs of the left foot were consistent with gas in the soft tissue surrounding the left hallux and osteomyelitis of the left hallux proximal phalanx. After discussion of treatment options with the patient, the patient decided to have surgical intervention. Discussed incision and drainage of the left foot, amputation of the left 1st and 2nd toes, and partial resection of the left 1st and 2nd metatarsals with the patient in detail. The potential risks, benefits, complications, and alternatives to the surgical procedure were discussed with the patient prior to the scheduling of surgery. The patient stated he was aware the surgery is a step-wise approach with possible delayed primary closure of the surgical wound.   The patient
DETAIL:  The patient was seen in the preoperative holding area on a stretcher where IV access was previously established. Patient received IV Zyvox and Linezolid as scheduled earlier in the day. The patient was given the opportunity to ask questions concerning the procedure. All of the patient's questions were answered to the patient's satisfaction. A member of the operative team marked the left foot as the correct operative site. The patient was transported to the operative room and placed on the operating table in the supine position. Monitored anesthesia care was initiated and a left ankle block consisting of 20 cc of 1% lidocaine plain was administered. A well padded ankle tourniquet was applied to the left ankle. The left foot and ankle were prepped and draped in the usual sterile manner. A time-out was performed to ensure the correct patient, procedure, and operative site. Attention was directed to the left foot. The surgical wound s/p partial amputations of the left 1st and 2nd rays at the the level of the distal metatarsals was noted. Retention sutures were intact to the medial and lateral aspects of the incision. The 1/4 iodoform packing gauze inside the wound was removed prior to the procedure. The retention sutures were all removed to expose the wound in its entirety. Excisional debridement of the wound was performed. Non-viable epidermal, dermal, and subcutaneous tissue were removed. The surgical site was inspected for any residual bone fragments and none were noted. The surgical site was flushed with copious amounts of sterile normal saline. The wound was packed with antibiotic beads prior to closure. The subcutaneous tissue was reapproximated with simple sutures of 3-0 Vicryl. The skin was reapproximated with simple sutures of 3-0 nylon. 10 cc of 0.5% Bupivacaine plain was administered to the left foot for post operative pain management.  The surgical site was washed with saline-soaked

## 2018-10-26 NOTE — PROGRESS NOTES
Report called to [facility].   Electronically signed by Gayle Mejía RN on 10/26/2018 at 4:00 PM

## 2018-10-27 LAB
ALBUMIN SERPL-MCNC: 2.9 G/DL (ref 3.4–5)
ANAEROBIC CULTURE: ABNORMAL
ANION GAP SERPL CALCULATED.3IONS-SCNC: 8 MMOL/L (ref 3–16)
BASOPHILS ABSOLUTE: 0.1 K/UL (ref 0–0.2)
BASOPHILS RELATIVE PERCENT: 0.9 %
BUN BLDV-MCNC: 13 MG/DL (ref 7–20)
CALCIUM SERPL-MCNC: 9 MG/DL (ref 8.3–10.6)
CHLORIDE BLD-SCNC: 95 MMOL/L (ref 99–110)
CO2: 28 MMOL/L (ref 21–32)
CREAT SERPL-MCNC: 0.9 MG/DL (ref 0.9–1.3)
CULTURE SURGICAL: ABNORMAL
CULTURE SURGICAL: ABNORMAL
EOSINOPHILS ABSOLUTE: 0 K/UL (ref 0–0.6)
EOSINOPHILS RELATIVE PERCENT: 0.1 %
GFR AFRICAN AMERICAN: >60
GFR NON-AFRICAN AMERICAN: >60
GLUCOSE BLD-MCNC: 209 MG/DL (ref 70–99)
GLUCOSE BLD-MCNC: 233 MG/DL (ref 70–99)
GLUCOSE BLD-MCNC: 278 MG/DL (ref 70–99)
GLUCOSE BLD-MCNC: 287 MG/DL (ref 70–99)
GLUCOSE BLD-MCNC: 291 MG/DL (ref 70–99)
GRAM STAIN RESULT: ABNORMAL
HCT VFR BLD CALC: 31.5 % (ref 40.5–52.5)
HEMOGLOBIN: 10 G/DL (ref 13.5–17.5)
LYMPHOCYTES ABSOLUTE: 1.5 K/UL (ref 1–5.1)
LYMPHOCYTES RELATIVE PERCENT: 10.3 %
MCH RBC QN AUTO: 26.5 PG (ref 26–34)
MCHC RBC AUTO-ENTMCNC: 31.7 G/DL (ref 31–36)
MCV RBC AUTO: 83.4 FL (ref 80–100)
MONOCYTES ABSOLUTE: 0.8 K/UL (ref 0–1.3)
MONOCYTES RELATIVE PERCENT: 5.5 %
NEUTROPHILS ABSOLUTE: 12.2 K/UL (ref 1.7–7.7)
NEUTROPHILS RELATIVE PERCENT: 83.2 %
ORGANISM: ABNORMAL
PDW BLD-RTO: 13.8 % (ref 12.4–15.4)
PERFORMED ON: ABNORMAL
PHOSPHORUS: 2.8 MG/DL (ref 2.5–4.9)
PLATELET # BLD: 391 K/UL (ref 135–450)
PMV BLD AUTO: 7.3 FL (ref 5–10.5)
POTASSIUM SERPL-SCNC: 5.6 MMOL/L (ref 3.5–5.1)
RBC # BLD: 3.77 M/UL (ref 4.2–5.9)
SODIUM BLD-SCNC: 131 MMOL/L (ref 136–145)
URIC ACID, SERUM: 4.3 MG/DL (ref 3.5–7.2)
WBC # BLD: 14.7 K/UL (ref 4–11)

## 2018-10-27 PROCEDURE — 6370000000 HC RX 637 (ALT 250 FOR IP): Performed by: NURSE PRACTITIONER

## 2018-10-27 PROCEDURE — 6360000002 HC RX W HCPCS: Performed by: STUDENT IN AN ORGANIZED HEALTH CARE EDUCATION/TRAINING PROGRAM

## 2018-10-27 PROCEDURE — 2580000003 HC RX 258: Performed by: INTERNAL MEDICINE

## 2018-10-27 PROCEDURE — 93005 ELECTROCARDIOGRAM TRACING: CPT | Performed by: INTERNAL MEDICINE

## 2018-10-27 PROCEDURE — 6370000000 HC RX 637 (ALT 250 FOR IP): Performed by: INTERNAL MEDICINE

## 2018-10-27 PROCEDURE — 85025 COMPLETE CBC W/AUTO DIFF WBC: CPT

## 2018-10-27 PROCEDURE — 94664 DEMO&/EVAL PT USE INHALER: CPT

## 2018-10-27 PROCEDURE — 99254 IP/OBS CNSLTJ NEW/EST MOD 60: CPT | Performed by: INTERNAL MEDICINE

## 2018-10-27 PROCEDURE — 6360000002 HC RX W HCPCS: Performed by: INTERNAL MEDICINE

## 2018-10-27 PROCEDURE — 1200000000 HC SEMI PRIVATE

## 2018-10-27 PROCEDURE — 80069 RENAL FUNCTION PANEL: CPT

## 2018-10-27 PROCEDURE — 2580000003 HC RX 258: Performed by: ANESTHESIOLOGY

## 2018-10-27 PROCEDURE — 84550 ASSAY OF BLOOD/URIC ACID: CPT

## 2018-10-27 PROCEDURE — 94760 N-INVAS EAR/PLS OXIMETRY 1: CPT

## 2018-10-27 PROCEDURE — 6370000000 HC RX 637 (ALT 250 FOR IP): Performed by: STUDENT IN AN ORGANIZED HEALTH CARE EDUCATION/TRAINING PROGRAM

## 2018-10-27 RX ORDER — FUROSEMIDE 10 MG/ML
40 INJECTION INTRAMUSCULAR; INTRAVENOUS ONCE
Status: COMPLETED | OUTPATIENT
Start: 2018-10-27 | End: 2018-10-27

## 2018-10-27 RX ORDER — ISOSORBIDE MONONITRATE 30 MG/1
30 TABLET, EXTENDED RELEASE ORAL DAILY
Status: DISCONTINUED | OUTPATIENT
Start: 2018-10-27 | End: 2018-10-29

## 2018-10-27 RX ORDER — METOPROLOL SUCCINATE 50 MG/1
50 TABLET, EXTENDED RELEASE ORAL EVERY MORNING
Status: DISCONTINUED | OUTPATIENT
Start: 2018-10-27 | End: 2018-10-29

## 2018-10-27 RX ADMIN — HEPARIN SODIUM 5000 UNITS: 5000 INJECTION INTRAVENOUS; SUBCUTANEOUS at 06:20

## 2018-10-27 RX ADMIN — METOPROLOL SUCCINATE 50 MG: 50 TABLET, EXTENDED RELEASE ORAL at 10:09

## 2018-10-27 RX ADMIN — INSULIN GLARGINE 25 UNITS: 100 INJECTION, SOLUTION SUBCUTANEOUS at 21:02

## 2018-10-27 RX ADMIN — MAGNESIUM HYDROXIDE 30 ML: 400 SUSPENSION ORAL at 21:02

## 2018-10-27 RX ADMIN — PIPERACILLIN SODIUM,TAZOBACTAM SODIUM 3.38 G: 3; .375 INJECTION, POWDER, FOR SOLUTION INTRAVENOUS at 15:52

## 2018-10-27 RX ADMIN — ATORVASTATIN CALCIUM 40 MG: 40 TABLET, FILM COATED ORAL at 10:09

## 2018-10-27 RX ADMIN — LINEZOLID 600 MG: 600 INJECTION, SOLUTION INTRAVENOUS at 19:58

## 2018-10-27 RX ADMIN — Medication 1 TABLET: at 12:34

## 2018-10-27 RX ADMIN — SODIUM CHLORIDE: 9 INJECTION, SOLUTION INTRAVENOUS at 02:11

## 2018-10-27 RX ADMIN — HEPARIN SODIUM 5000 UNITS: 5000 INJECTION INTRAVENOUS; SUBCUTANEOUS at 15:52

## 2018-10-27 RX ADMIN — INSULIN LISPRO 4 UNITS: 100 INJECTION, SOLUTION INTRAVENOUS; SUBCUTANEOUS at 18:17

## 2018-10-27 RX ADMIN — PIPERACILLIN SODIUM,TAZOBACTAM SODIUM 3.38 G: 3; .375 INJECTION, POWDER, FOR SOLUTION INTRAVENOUS at 06:20

## 2018-10-27 RX ADMIN — FUROSEMIDE 40 MG: 10 INJECTION, SOLUTION INTRAMUSCULAR; INTRAVENOUS at 10:09

## 2018-10-27 RX ADMIN — ASPIRIN 81 MG: 81 TABLET, COATED ORAL at 12:35

## 2018-10-27 RX ADMIN — Medication 10 ML: at 10:14

## 2018-10-27 RX ADMIN — PIPERACILLIN SODIUM,TAZOBACTAM SODIUM 3.38 G: 3; .375 INJECTION, POWDER, FOR SOLUTION INTRAVENOUS at 21:20

## 2018-10-27 RX ADMIN — LINEZOLID 600 MG: 600 INJECTION, SOLUTION INTRAVENOUS at 10:32

## 2018-10-27 RX ADMIN — HYDRALAZINE HYDROCHLORIDE 10 MG: 20 INJECTION INTRAMUSCULAR; INTRAVENOUS at 03:40

## 2018-10-27 RX ADMIN — ISOSORBIDE MONONITRATE 30 MG: 30 TABLET, EXTENDED RELEASE ORAL at 12:34

## 2018-10-27 RX ADMIN — HEPARIN SODIUM 5000 UNITS: 5000 INJECTION INTRAVENOUS; SUBCUTANEOUS at 21:02

## 2018-10-27 RX ADMIN — INSULIN LISPRO 2 UNITS: 100 INJECTION, SOLUTION INTRAVENOUS; SUBCUTANEOUS at 21:03

## 2018-10-27 RX ADMIN — INSULIN LISPRO 15 UNITS: 100 INJECTION, SOLUTION INTRAVENOUS; SUBCUTANEOUS at 18:17

## 2018-10-27 RX ADMIN — ACETAMINOPHEN 650 MG: 325 TABLET, FILM COATED ORAL at 03:40

## 2018-10-27 RX ADMIN — FLUCONAZOLE 200 MG: 100 TABLET ORAL at 10:09

## 2018-10-27 RX ADMIN — INSULIN LISPRO 6 UNITS: 100 INJECTION, SOLUTION INTRAVENOUS; SUBCUTANEOUS at 10:08

## 2018-10-27 ASSESSMENT — PAIN SCALES - GENERAL
PAINLEVEL_OUTOF10: 0
PAINLEVEL_OUTOF10: 2

## 2018-10-27 NOTE — CONSULTS
[address]                                 CONSULTATION    PATIENT NAME: Nataliia Lopez                     :         1970  MED REC NO:   [id number]                          ROOM:       56  ACCOUNT NO:   [de-identified]                           ADMIT DATE:  10/21/2018  PROVIDER:     Bianca Ha MD    CARDIAC CONSULTATION    CONSULT DATE:  10/27/2018    HISTORY OF PRESENT ILLNESS:  This is a 65-year-old male with a history  of diabetes, known previous coronary artery disease, who came to the  hospital with infection of his foot. He had an intervention done by  Dr. Shaniqua Forman in the past and now had a nonhealing wound. The patient  subsequently underwent surgery during this admission. Due to his  known coronary artery disease and evidence of left bundle branch  block, Cardiology consult has been requested. The patient denied  having any chest pain, tightness, pressure, shortness of breath,  orthopnea, or PND. REVIEW OF SYSTEMS:  Please see HPI. All other systems are reviewed  and they are negative. PAST MEDICAL HISTORY:  1. The patient has history of small vessel coronary artery disease by  an angiogram performed at [facility].  2.  Diabetes. 3.  Hypertension. 4.  Hyperlipidemia. 5.  Peripheral artery disease. 6.  Carotid disease. SOCIAL HISTORY:  He denied any smoking or alcohol abuse. FAMILY HISTORY:  Mother had high blood pressure. PAST SURGICAL HISTORY:  Noted. PHYSICAL EXAMINATION:  GENERAL:  A pleasant male, in no acute distress. VITAL SIGNS:  His pulse is 75 and regular, blood pressure 180/90,  respirations 16, oxygen saturation is 95%, temperature is 97.6. CONSTITUTIONAL:  He is alert and oriented. HEENT:  Neck is supple. No JVD. No thyromegaly. No lymphadenopathy. No bruits are heard. CARDIAC:  Normal S1 and S2. No gallop, murmur, or rub. LUNGS:  Clear.   ABDOMEN:

## 2018-10-27 NOTE — PROGRESS NOTES
midline. Respiratory:  Normal respiratory effort. Clear to auscultation, bilaterally without Rales/Wheezes/Rhonchi. Cardiovascular: Regular rate and rhythm with normal S1/S2 without murmurs, rubs or gallops. Abdomen: Soft, non-tender, non-distended with normal bowel sounds. Musculoskeletal: left foot stable post op changes. Dressing intact, no signs of bleeding. Neurologic:  Neurovascularly intact without any focal sensory/motor deficits. Cranial nerves: II-XII intact, grossly non-focal.  Psychiatric: Alert and oriented, thought content appropriate, normal insight  Capillary Refill: Brisk,< 3 seconds   Peripheral Pulses: +2 palpable, equal bilaterally       Labs:   Recent Labs      10/25/18   0550  10/26/18   0626  10/27/18   0555   WBC  13.8*  15.8*  14.7*   HGB  9.6*  9.9*  10.0*   HCT  29.7*  30.4*  31.5*   PLT  436  474*  391     Recent Labs      10/25/18   0550  10/25/18   1250  10/26/18   0626  10/27/18   0555   NA  130*  134*  133*  131*   K  3.9   --   3.8  5.6*   CL  93*   --   96*  95*   CO2  24   --   26  28   BUN  12   --   9  13   CREATININE  0.9   --   0.8*  0.9   CALCIUM  9.0   --   9.0  9.0   PHOS  2.5   --   2.6  2.8     No results for input(s): AST, ALT, BILIDIR, BILITOT, ALKPHOS in the last 72 hours. No results for input(s): INR in the last 72 hours. Recent Labs      10/25/18   1250  10/26/18   0938   TROPONINI  <0.01  <0.01       Urinalysis:    Lab Results   Component Value Date    NITRU Negative 10/25/2018    WBCUA 2 10/25/2018    RBCUA 1 10/25/2018    BLOODU MODERATE 10/25/2018    SPECGRAV 1.017 10/25/2018    GLUCOSEU Negative 10/25/2018       Radiology:  XR FOOT LEFT (MIN 3 VIEWS)   Final Result   Interval placement of antibiotic beads along the distal aspects of the 1st   and 2nd metatarsal stumps. XR CHEST PORTABLE   Final Result   Satisfactory PICC line placement. XR FOOT LEFT (MIN 3 VIEWS)   Final Result   1st and 2nd metatarsal amputation unchanged in appearance. Persistent soft   tissue swelling with subcutaneous gas suspected. MRI FOOT LEFT WO CONTRAST   Final Result   1. Status post amputation of the 1st and 2nd toes with no evidence for   osteomyelitis. 2. Apparent packing material at the level of the 1st metatarsal amputation   site as well as locule of subcutaneous gas adjacent to the 2nd metatarsal   amputation site. No organized drainable fluid collections are identified. 3. Diffuse soft tissue edema most pronounced dorsally. Correlate clinically   for cellulitis. 4. Intrinsic muscular edema which can be seen in the setting of diabetic   myopathy/long-standing denervation changes versus myositis in the appropriate   clinical setting. US RENAL COMPLETE   Final Result   1. No evidence of hydronephrosis. XR FOOT LEFT (MIN 3 VIEWS)   Final Result   Status post 1st and 2nd toe amputation         XR FOOT LEFT (MIN 3 VIEWS)   Final Result   Soft tissue swelling and subcutaneous air surrounding the 1st toe with   suspected early osteomyelitis. Assessment/Plan:    Active Hospital Problems    Diagnosis Date Noted    Coronary artery disease involving native coronary artery of native heart without angina pectoris [I25.10]     Sepsis ([address] [A41.9]     Diabetic foot infection ([address] [B21.893, N50.7]     Neutrophilic leukocytosis [R08.0]     Fever chills [R50.9]     Osteomyelitis ([address] [M86.9] 10/21/2018    Osteomyelitis of toe of left foot ([address] [M86.9] 10/05/2018    Type 2 diabetes mellitus with atherosclerosis of native arteries of extremity with rest pain ([address] [E11.51, I70.229] 09/28/2018    PVD (peripheral vascular disease) ([address] [I73.9] 09/28/2018    Hypertension [I10] 12/23/2014       L Foot OM Diabetic Foot infection due to uncontrolled DMII and PVD. S/p L 1st and 2nd toe and metatarsal amputation 10/22.    S/p revision surgery and wound closure 10/26  Cont Iv Abx and pain control  Culture with MSSA, Prevotella and

## 2018-10-27 NOTE — PROGRESS NOTES
Pt with elevated BP= 198/98 HR=82. Administered PRN Hydralazine and Tylenol. Decreased BP= 154/90 HR= 76. No s/s of distress noted. Call light within reach. Will continue to monitor.

## 2018-10-27 NOTE — PLAN OF CARE
Problem: Infection:  Goal: Will remain free from infection  Will remain free from infection   Outcome: Ongoing  Pt will be monitored each shift and as needed for signs of infection. Catheter site will be kept clean and dry. DSG will be changed per protocol and as needed. Problem: Safety:  Goal: Free from intentional harm  Free from intentional harm   Outcome: Ongoing  Pt free from falls this shift. Fall precautions in place at all times. Call light always withinreach. Pt able and agreeable to contact for safety appropriately. Bed alarm on. Problem: Daily Care:  Goal: Daily care needs are met  Daily care needs are met   Outcome: Ongoing  Pt will be assisted with daily care needs pt is unable to perform for themselves. Problem: Pain:  Goal: Pain level will decrease  Pain level will decrease   Outcome: Ongoing  Pain/discomfort being managed with PRN analgesics per MD orders. Pt able to express presence and absence of pain and rate pain appropriately using numerical scale. Problem: Skin Integrity:  Goal: Skin integrity will stabilize  Skin integrity will stabilize   Outcome: Ongoing  Skin assessment performed each shift and as needed. Pt will be enc to turn, reposition, and ambulate. Skin will be kept clean and dry. Problem: Discharge Planning:  Goal: Patients continuum of care needs are met  Patients continuum of care needs are met   Outcome: Ongoing  Case management and or  will be consulted to assist pt with any discharge needs for home. Problem: Nutrition  Goal: Optimal nutrition therapy  Outcome: Ongoing  Pt will be enc to eat as much as tolerated. Pt will be offered supplements as needed. Problem: Risk for Impaired Skin Integrity  Goal: Tissue integrity - skin and mucous membranes  Structural intactness and normal physiological function of skin and  mucous membranes. Outcome: Ongoing  Skin assessment performed each shift and as needed.  Pt will be enc to turn, reposition, and

## 2018-10-28 LAB
ALBUMIN SERPL-MCNC: 2.7 G/DL (ref 3.4–5)
ANION GAP SERPL CALCULATED.3IONS-SCNC: 11 MMOL/L (ref 3–16)
BASOPHILS ABSOLUTE: 0.1 K/UL (ref 0–0.2)
BASOPHILS RELATIVE PERCENT: 0.5 %
BUN BLDV-MCNC: 18 MG/DL (ref 7–20)
CALCIUM SERPL-MCNC: 9 MG/DL (ref 8.3–10.6)
CHLORIDE BLD-SCNC: 96 MMOL/L (ref 99–110)
CO2: 28 MMOL/L (ref 21–32)
CREAT SERPL-MCNC: 0.9 MG/DL (ref 0.9–1.3)
EKG ATRIAL RATE: 76 BPM
EKG DIAGNOSIS: NORMAL
EKG P AXIS: 23 DEGREES
EKG P-R INTERVAL: 176 MS
EKG Q-T INTERVAL: 482 MS
EKG QRS DURATION: 174 MS
EKG QTC CALCULATION (BAZETT): 542 MS
EKG R AXIS: -41 DEGREES
EKG T AXIS: 106 DEGREES
EKG VENTRICULAR RATE: 76 BPM
EOSINOPHILS ABSOLUTE: 0.2 K/UL (ref 0–0.6)
EOSINOPHILS RELATIVE PERCENT: 1.2 %
GFR AFRICAN AMERICAN: >60
GFR NON-AFRICAN AMERICAN: >60
GLUCOSE BLD-MCNC: 102 MG/DL (ref 70–99)
GLUCOSE BLD-MCNC: 119 MG/DL (ref 70–99)
GLUCOSE BLD-MCNC: 152 MG/DL (ref 70–99)
GLUCOSE BLD-MCNC: 166 MG/DL (ref 70–99)
GLUCOSE BLD-MCNC: 193 MG/DL (ref 70–99)
HCT VFR BLD CALC: 30.1 % (ref 40.5–52.5)
HEMOGLOBIN: 9.6 G/DL (ref 13.5–17.5)
LYMPHOCYTES ABSOLUTE: 3.1 K/UL (ref 1–5.1)
LYMPHOCYTES RELATIVE PERCENT: 23.6 %
MCH RBC QN AUTO: 26.5 PG (ref 26–34)
MCHC RBC AUTO-ENTMCNC: 32 G/DL (ref 31–36)
MCV RBC AUTO: 82.9 FL (ref 80–100)
MONOCYTES ABSOLUTE: 0.8 K/UL (ref 0–1.3)
MONOCYTES RELATIVE PERCENT: 6.2 %
NEUTROPHILS ABSOLUTE: 8.9 K/UL (ref 1.7–7.7)
NEUTROPHILS RELATIVE PERCENT: 68.5 %
PDW BLD-RTO: 13.5 % (ref 12.4–15.4)
PERFORMED ON: ABNORMAL
PHOSPHORUS: 2.9 MG/DL (ref 2.5–4.9)
PLATELET # BLD: 390 K/UL (ref 135–450)
PMV BLD AUTO: 7.3 FL (ref 5–10.5)
POTASSIUM SERPL-SCNC: 4.2 MMOL/L (ref 3.5–5.1)
RBC # BLD: 3.63 M/UL (ref 4.2–5.9)
SODIUM BLD-SCNC: 135 MMOL/L (ref 136–145)
URIC ACID, SERUM: 4.7 MG/DL (ref 3.5–7.2)
WBC # BLD: 13 K/UL (ref 4–11)

## 2018-10-28 PROCEDURE — 2580000003 HC RX 258: Performed by: INTERNAL MEDICINE

## 2018-10-28 PROCEDURE — 99232 SBSQ HOSP IP/OBS MODERATE 35: CPT | Performed by: INTERNAL MEDICINE

## 2018-10-28 PROCEDURE — 1200000000 HC SEMI PRIVATE

## 2018-10-28 PROCEDURE — 80069 RENAL FUNCTION PANEL: CPT

## 2018-10-28 PROCEDURE — 6370000000 HC RX 637 (ALT 250 FOR IP): Performed by: INTERNAL MEDICINE

## 2018-10-28 PROCEDURE — 6360000002 HC RX W HCPCS: Performed by: INTERNAL MEDICINE

## 2018-10-28 PROCEDURE — 84550 ASSAY OF BLOOD/URIC ACID: CPT

## 2018-10-28 PROCEDURE — 6370000000 HC RX 637 (ALT 250 FOR IP): Performed by: NURSE PRACTITIONER

## 2018-10-28 PROCEDURE — 93010 ELECTROCARDIOGRAM REPORT: CPT | Performed by: INTERNAL MEDICINE

## 2018-10-28 PROCEDURE — 6360000002 HC RX W HCPCS: Performed by: STUDENT IN AN ORGANIZED HEALTH CARE EDUCATION/TRAINING PROGRAM

## 2018-10-28 PROCEDURE — 85025 COMPLETE CBC W/AUTO DIFF WBC: CPT

## 2018-10-28 RX ORDER — FUROSEMIDE 10 MG/ML
40 INJECTION INTRAMUSCULAR; INTRAVENOUS ONCE
Status: COMPLETED | OUTPATIENT
Start: 2018-10-28 | End: 2018-10-28

## 2018-10-28 RX ORDER — INSULIN GLARGINE 100 [IU]/ML
30 INJECTION, SOLUTION SUBCUTANEOUS NIGHTLY
Status: DISCONTINUED | OUTPATIENT
Start: 2018-10-28 | End: 2018-10-29 | Stop reason: HOSPADM

## 2018-10-28 RX ADMIN — INSULIN LISPRO 17 UNITS: 100 INJECTION, SOLUTION INTRAVENOUS; SUBCUTANEOUS at 09:21

## 2018-10-28 RX ADMIN — LINEZOLID 600 MG: 600 INJECTION, SOLUTION INTRAVENOUS at 20:17

## 2018-10-28 RX ADMIN — PIPERACILLIN SODIUM,TAZOBACTAM SODIUM 3.38 G: 3; .375 INJECTION, POWDER, FOR SOLUTION INTRAVENOUS at 16:00

## 2018-10-28 RX ADMIN — PIPERACILLIN SODIUM,TAZOBACTAM SODIUM 3.38 G: 3; .375 INJECTION, POWDER, FOR SOLUTION INTRAVENOUS at 06:09

## 2018-10-28 RX ADMIN — ASPIRIN 81 MG: 81 TABLET, COATED ORAL at 09:21

## 2018-10-28 RX ADMIN — FUROSEMIDE 40 MG: 10 INJECTION, SOLUTION INTRAMUSCULAR; INTRAVENOUS at 09:23

## 2018-10-28 RX ADMIN — INSULIN GLARGINE 30 UNITS: 100 INJECTION, SOLUTION SUBCUTANEOUS at 22:35

## 2018-10-28 RX ADMIN — HEPARIN SODIUM 5000 UNITS: 5000 INJECTION INTRAVENOUS; SUBCUTANEOUS at 22:35

## 2018-10-28 RX ADMIN — ACETAMINOPHEN 650 MG: 325 TABLET, FILM COATED ORAL at 16:04

## 2018-10-28 RX ADMIN — HEPARIN SODIUM 5000 UNITS: 5000 INJECTION INTRAVENOUS; SUBCUTANEOUS at 15:28

## 2018-10-28 RX ADMIN — ATORVASTATIN CALCIUM 40 MG: 40 TABLET, FILM COATED ORAL at 09:21

## 2018-10-28 RX ADMIN — ISOSORBIDE MONONITRATE 30 MG: 30 TABLET, EXTENDED RELEASE ORAL at 09:21

## 2018-10-28 RX ADMIN — INSULIN LISPRO 1 UNITS: 100 INJECTION, SOLUTION INTRAVENOUS; SUBCUTANEOUS at 22:36

## 2018-10-28 RX ADMIN — Medication 10 ML: at 20:23

## 2018-10-28 RX ADMIN — HEPARIN SODIUM 5000 UNITS: 5000 INJECTION INTRAVENOUS; SUBCUTANEOUS at 06:09

## 2018-10-28 RX ADMIN — Medication 1 TABLET: at 09:21

## 2018-10-28 RX ADMIN — FLUCONAZOLE 200 MG: 100 TABLET ORAL at 09:21

## 2018-10-28 RX ADMIN — INSULIN LISPRO 17 UNITS: 100 INJECTION, SOLUTION INTRAVENOUS; SUBCUTANEOUS at 18:31

## 2018-10-28 RX ADMIN — MORPHINE SULFATE 2 MG: 2 INJECTION, SOLUTION INTRAMUSCULAR; INTRAVENOUS at 05:05

## 2018-10-28 RX ADMIN — PIPERACILLIN SODIUM,TAZOBACTAM SODIUM 3.38 G: 3; .375 INJECTION, POWDER, FOR SOLUTION INTRAVENOUS at 22:35

## 2018-10-28 RX ADMIN — Medication 10 ML: at 20:17

## 2018-10-28 RX ADMIN — INSULIN LISPRO 2 UNITS: 100 INJECTION, SOLUTION INTRAVENOUS; SUBCUTANEOUS at 09:21

## 2018-10-28 RX ADMIN — LINEZOLID 600 MG: 600 INJECTION, SOLUTION INTRAVENOUS at 10:18

## 2018-10-28 RX ADMIN — METOPROLOL SUCCINATE 50 MG: 50 TABLET, EXTENDED RELEASE ORAL at 09:21

## 2018-10-28 ASSESSMENT — PAIN SCALES - GENERAL
PAINLEVEL_OUTOF10: 0
PAINLEVEL_OUTOF10: 6
PAINLEVEL_OUTOF10: 3

## 2018-10-28 NOTE — PLAN OF CARE
Problem: Infection:  Goal: Will remain free from infection  Will remain free from infection   Outcome: Ongoing  Pt will be monitored each shift and as needed for signs of infection. Catheter site will be kept clean and dry. DSG will be changed per protocol and as needed. Problem: Safety:  Goal: Free from accidental physical injury  Free from accidental physical injury   Outcome: Ongoing  Pt free from falls this shift. Fall precautions in place at all times. Call light always withinreach. Pt able and agreeable to contact for safety appropriately. Bed alarm on. Problem: Daily Care:  Goal: Daily care needs are met  Daily care needs are met   Outcome: Ongoing  Pt will be assisted with daily care needs pt is unable to perform for themselves. Problem: Pain:  Goal: Pain level will decrease  Pain level will decrease   Outcome: Ongoing  Pain/discomfort being managed with PRN analgesics per MD orders. Pt able to express presence and absence of pain and rate pain appropriately using numerical scale. Problem: Skin Integrity:  Goal: Skin integrity will stabilize  Skin integrity will stabilize   Outcome: Ongoing  Skin assessment performed each shift and as needed. Pt will be enc to turn, reposition, and ambulate. Skin will be kept clean and dry. Problem: Discharge Planning:  Goal: Patients continuum of care needs are met  Patients continuum of care needs are met   Outcome: Ongoing  Case management and or  will be consulted to assist pt with any discharge needs for home. Problem: Nutrition  Goal: Optimal nutrition therapy  Outcome: Ongoing  Pt will be enc to eat as much as tolerated. Pt will be offered supplements as needed. Problem: Risk for Impaired Skin Integrity  Goal: Tissue integrity - skin and mucous membranes  Structural intactness and normal physiological function of skin and  mucous membranes. Outcome: Ongoing  Skin assessment performed each shift and as needed.  Pt will be enc to

## 2018-10-29 VITALS
OXYGEN SATURATION: 95 % | TEMPERATURE: 98.2 F | BODY MASS INDEX: 41.67 KG/M2 | RESPIRATION RATE: 18 BRPM | DIASTOLIC BLOOD PRESSURE: 79 MMHG | HEART RATE: 84 BPM | HEIGHT: 66 IN | SYSTOLIC BLOOD PRESSURE: 171 MMHG | WEIGHT: 259.26 LBS

## 2018-10-29 LAB
ALBUMIN SERPL-MCNC: 2.9 G/DL (ref 3.4–5)
ANION GAP SERPL CALCULATED.3IONS-SCNC: 11 MMOL/L (ref 3–16)
BASOPHILS ABSOLUTE: 0.1 K/UL (ref 0–0.2)
BASOPHILS RELATIVE PERCENT: 0.9 %
BUN BLDV-MCNC: 13 MG/DL (ref 7–20)
CALCIUM SERPL-MCNC: 9.1 MG/DL (ref 8.3–10.6)
CHLORIDE BLD-SCNC: 95 MMOL/L (ref 99–110)
CO2: 30 MMOL/L (ref 21–32)
CREAT SERPL-MCNC: 0.8 MG/DL (ref 0.9–1.3)
EKG ATRIAL RATE: 73 BPM
EKG DIAGNOSIS: NORMAL
EKG P AXIS: 30 DEGREES
EKG P-R INTERVAL: 182 MS
EKG Q-T INTERVAL: 478 MS
EKG QRS DURATION: 180 MS
EKG QTC CALCULATION (BAZETT): 526 MS
EKG R AXIS: -41 DEGREES
EKG T AXIS: 94 DEGREES
EKG VENTRICULAR RATE: 73 BPM
EOSINOPHILS ABSOLUTE: 0.1 K/UL (ref 0–0.6)
EOSINOPHILS RELATIVE PERCENT: 1.2 %
GFR AFRICAN AMERICAN: >60
GFR NON-AFRICAN AMERICAN: >60
GLUCOSE BLD-MCNC: 124 MG/DL (ref 70–99)
GLUCOSE BLD-MCNC: 132 MG/DL (ref 70–99)
GLUCOSE BLD-MCNC: 213 MG/DL (ref 70–99)
GLUCOSE BLD-MCNC: 99 MG/DL (ref 70–99)
HCT VFR BLD CALC: 31.9 % (ref 40.5–52.5)
HEMOGLOBIN: 10.1 G/DL (ref 13.5–17.5)
LV EF: 60 %
LVEF MODALITY: NORMAL
LYMPHOCYTES ABSOLUTE: 2.6 K/UL (ref 1–5.1)
LYMPHOCYTES RELATIVE PERCENT: 21.8 %
MCH RBC QN AUTO: 26.2 PG (ref 26–34)
MCHC RBC AUTO-ENTMCNC: 31.7 G/DL (ref 31–36)
MCV RBC AUTO: 82.8 FL (ref 80–100)
MONOCYTES ABSOLUTE: 1 K/UL (ref 0–1.3)
MONOCYTES RELATIVE PERCENT: 8 %
NEUTROPHILS ABSOLUTE: 8.2 K/UL (ref 1.7–7.7)
NEUTROPHILS RELATIVE PERCENT: 68.1 %
PDW BLD-RTO: 13.8 % (ref 12.4–15.4)
PERFORMED ON: ABNORMAL
PERFORMED ON: ABNORMAL
PERFORMED ON: NORMAL
PHOSPHORUS: 4.1 MG/DL (ref 2.5–4.9)
PLATELET # BLD: 354 K/UL (ref 135–450)
PMV BLD AUTO: 7 FL (ref 5–10.5)
POTASSIUM SERPL-SCNC: 4.8 MMOL/L (ref 3.5–5.1)
RBC # BLD: 3.86 M/UL (ref 4.2–5.9)
SODIUM BLD-SCNC: 136 MMOL/L (ref 136–145)
URIC ACID, SERUM: 4.8 MG/DL (ref 3.5–7.2)
WBC # BLD: 12 K/UL (ref 4–11)

## 2018-10-29 PROCEDURE — 84550 ASSAY OF BLOOD/URIC ACID: CPT

## 2018-10-29 PROCEDURE — 6360000002 HC RX W HCPCS: Performed by: STUDENT IN AN ORGANIZED HEALTH CARE EDUCATION/TRAINING PROGRAM

## 2018-10-29 PROCEDURE — 80069 RENAL FUNCTION PANEL: CPT

## 2018-10-29 PROCEDURE — 6370000000 HC RX 637 (ALT 250 FOR IP): Performed by: INTERNAL MEDICINE

## 2018-10-29 PROCEDURE — 94760 N-INVAS EAR/PLS OXIMETRY 1: CPT

## 2018-10-29 PROCEDURE — 99232 SBSQ HOSP IP/OBS MODERATE 35: CPT | Performed by: INTERNAL MEDICINE

## 2018-10-29 PROCEDURE — 6360000002 HC RX W HCPCS: Performed by: INTERNAL MEDICINE

## 2018-10-29 PROCEDURE — 2580000003 HC RX 258: Performed by: INTERNAL MEDICINE

## 2018-10-29 PROCEDURE — 93306 TTE W/DOPPLER COMPLETE: CPT

## 2018-10-29 PROCEDURE — 85025 COMPLETE CBC W/AUTO DIFF WBC: CPT

## 2018-10-29 RX ORDER — METOPROLOL SUCCINATE 50 MG/1
50 TABLET, EXTENDED RELEASE ORAL EVERY MORNING
Qty: 30 TABLET | Refills: 2 | Status: ON HOLD | OUTPATIENT
Start: 2018-10-29 | End: 2019-03-07

## 2018-10-29 RX ORDER — FLUCONAZOLE 200 MG/1
200 TABLET ORAL DAILY
Qty: 7 TABLET | Refills: 0 | Status: SHIPPED | OUTPATIENT
Start: 2018-10-30 | End: 2018-10-29

## 2018-10-29 RX ORDER — LISINOPRIL 10 MG/1
10 TABLET ORAL DAILY
Status: DISCONTINUED | OUTPATIENT
Start: 2018-10-29 | End: 2018-10-29 | Stop reason: HOSPADM

## 2018-10-29 RX ORDER — BLOOD-GLUCOSE METER
1 KIT MISCELLANEOUS
Qty: 1 DEVICE | Refills: 0 | Status: SHIPPED | OUTPATIENT
Start: 2018-10-29

## 2018-10-29 RX ORDER — LANCETS 28 GAUGE
1 EACH MISCELLANEOUS DAILY
Qty: 100 EACH | Refills: 3 | Status: SHIPPED | OUTPATIENT
Start: 2018-10-29

## 2018-10-29 RX ORDER — ISOSORBIDE MONONITRATE 30 MG/1
30 TABLET, EXTENDED RELEASE ORAL DAILY
Qty: 30 TABLET | Refills: 3 | Status: SHIPPED | OUTPATIENT
Start: 2018-10-30 | End: 2019-05-13

## 2018-10-29 RX ORDER — ISOSORBIDE MONONITRATE 60 MG/1
60 TABLET, EXTENDED RELEASE ORAL DAILY
Status: DISCONTINUED | OUTPATIENT
Start: 2018-10-30 | End: 2018-10-29 | Stop reason: HOSPADM

## 2018-10-29 RX ORDER — FLUCONAZOLE 200 MG/1
200 TABLET ORAL DAILY
Qty: 21 TABLET | Refills: 0 | Status: SHIPPED | OUTPATIENT
Start: 2018-10-30 | End: 2018-11-20

## 2018-10-29 RX ADMIN — HEPARIN SODIUM 5000 UNITS: 5000 INJECTION INTRAVENOUS; SUBCUTANEOUS at 06:17

## 2018-10-29 RX ADMIN — PIPERACILLIN SODIUM,TAZOBACTAM SODIUM 3.38 G: 3; .375 INJECTION, POWDER, FOR SOLUTION INTRAVENOUS at 13:53

## 2018-10-29 RX ADMIN — INSULIN LISPRO 17 UNITS: 100 INJECTION, SOLUTION INTRAVENOUS; SUBCUTANEOUS at 18:46

## 2018-10-29 RX ADMIN — INSULIN LISPRO 17 UNITS: 100 INJECTION, SOLUTION INTRAVENOUS; SUBCUTANEOUS at 10:40

## 2018-10-29 RX ADMIN — ATORVASTATIN CALCIUM 40 MG: 40 TABLET, FILM COATED ORAL at 09:32

## 2018-10-29 RX ADMIN — LINEZOLID 600 MG: 600 INJECTION, SOLUTION INTRAVENOUS at 09:33

## 2018-10-29 RX ADMIN — Medication 1 TABLET: at 09:32

## 2018-10-29 RX ADMIN — Medication 10 ML: at 09:34

## 2018-10-29 RX ADMIN — HYDRALAZINE HYDROCHLORIDE 10 MG: 20 INJECTION INTRAMUSCULAR; INTRAVENOUS at 01:44

## 2018-10-29 RX ADMIN — HEPARIN SODIUM 5000 UNITS: 5000 INJECTION INTRAVENOUS; SUBCUTANEOUS at 15:05

## 2018-10-29 RX ADMIN — FLUCONAZOLE 200 MG: 100 TABLET ORAL at 09:32

## 2018-10-29 RX ADMIN — INSULIN LISPRO 4 UNITS: 100 INJECTION, SOLUTION INTRAVENOUS; SUBCUTANEOUS at 18:45

## 2018-10-29 RX ADMIN — ISOSORBIDE MONONITRATE 30 MG: 30 TABLET, EXTENDED RELEASE ORAL at 09:32

## 2018-10-29 RX ADMIN — Medication 10 ML: at 09:33

## 2018-10-29 RX ADMIN — METOPROLOL SUCCINATE 50 MG: 50 TABLET, EXTENDED RELEASE ORAL at 09:32

## 2018-10-29 RX ADMIN — LISINOPRIL 10 MG: 10 TABLET ORAL at 16:05

## 2018-10-29 RX ADMIN — MAGNESIUM HYDROXIDE 30 ML: 400 SUSPENSION ORAL at 15:10

## 2018-10-29 RX ADMIN — ASPIRIN 81 MG: 81 TABLET, COATED ORAL at 09:32

## 2018-10-29 RX ADMIN — PIPERACILLIN SODIUM,TAZOBACTAM SODIUM 3.38 G: 3; .375 INJECTION, POWDER, FOR SOLUTION INTRAVENOUS at 06:17

## 2018-10-29 NOTE — PROGRESS NOTES
Reviewed dc instructions with pt. Pt verbalized understanding. PIV removed. Dressing clean dry and intact. Pt dc to private residence. Wheelchair to transport pt. To vehicle. Pt dc with personal belongings.

## 2018-10-29 NOTE — CARE COORDINATION
[facility]  Diabetes Education   Progress Note       NAME:  Rose Mcdermott  MEDICAL RECORD NUMBER:  [id number]  AGE: 51 y.o. GENDER: male  : 1970  TODAY'S DATE:  10/23/2018    Subjective   Reason for Diabetic Education Evaluation and Assessment: general diabetes education    [facility] agrees to meet with me for diabetes education. He reports that he uses a V-Go device plus Metformin 1000 mg twice daily to manage his diabetes at home. [facility]'s pharmacy confirmed the last dispensed V-go as a V-Go 40 in 2018 and Humalog this month. He reports following at [facility] for diabetes care after his recent surgeries. He states he had resumed using unused V-go devices and picked up new insulin. He did not bring the V- Go devices to the hospital with him.       Visit Type: evaluation      Rose Mcdermott is a 51 y.o. 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    PAST MEDICAL HISTORY        Diagnosis Date    Angina effort ([address]     Arthritis     CAD (coronary artery disease)     Carotid stenosis     Diabetes mellitus with neurological manifestations, uncontrolled ([address]     Diarrhea     Hyperlipidemia     Hypertension     Obesity     Type II or unspecified type diabetes mellitus without mention of complication, not stated as uncontrolled        PAST SURGICAL HISTORY    Past Surgical History:   Procedure Laterality Date    CARDIAC CATHETERIZATION      CAROTID ENDARTERECTOMY Right 2015    CHOLECYSTECTOMY      [place] OFFICE/OUTPT VISIT,PROCEDURE ONLY Left 10/5/2018    AMPUTATION LEFT GREAT DIGIT performed by Chiki Dixon DPM at [facility] OFFICE[facility] Left 10/22/2018    INCISION AND DRAINAGE LEFT FOOT FIRST AND SECOND RAY AMPUTATION (DIGITS ONE, TWO AND METARSAL ONE AND TWO) performed by Chiki Dixon DPM at [address] [place] [address] TOE AMPUTATION Left 10/05/2018    left great toe amputation       FAMILY
Lab Results   Component Value Date    LABA1C 15.2 10/21/2018       Recent Labs      10/28/18   1634  10/28/18   2032  10/29/18   0927  10/29/18   1423   POCGLU  119*  152*  124*  99       BUN/Creatinine:    Lab Results   Component Value Date    BUN 13 10/29/2018    CREATININE 0.8 10/29/2018       Assessment        Diabetes Management and Education    Does the patient have a Primary Care Physician? Yes     Does the patient require new medication instruction? Yes. Kristyn Roldan is to resume his V-Go device at home. Discussed optimal timing of start of V-go based on last Lantus dose (9 PM)     Person responsible for administration of Insulin/Medication:        [x] Self     [] Caregiver       [] Spouse       [] Other Family Member   []  Other    Insulin Instruction: V-go  Injection Site:   [x] location    [x] rotation     Level of patient/caregiver understanding able to:      [x] Verbalized Understanding   [] Demonstrated Understanding       [] Teach Back       [] Needs Reinforcement     []  Other:        Does the patient/caregiver monitor Blood Glucoses? No:   Recommend testing before meals and bedtime. Reviewed glycemic control targets, testing frequency and when to call PCP. Level of patient/caregiver understanding able to:        [x] Verbalized Understanding   [] Demonstrated Understanding       [] Teach Back       [] Needs Reinforcement     []  Other:        Does the patient/caregiver understand S/S of Hypoglycemia? No:   Reviewed symptoms, prevention and treatment. Level of patient/caregiver understanding able to:       [x] Verbalized Understanding   [] Demonstrated Understanding       [] Teach Back       [] Needs Reinforcement     []  Other:                    Does the patient/caregiver understand S/S of Hyperglycemia? No:     Reviewed symptoms, prevention and treatment.     Level of patient/caregiver understanding able to:        [x] Verbalized Understanding   [] Demonstrated Understanding       []

## 2018-10-29 NOTE — DISCHARGE SUMMARY
Hospital Medicine Discharge Summary    Patient ID: Latasha Rooney      Patient's PCP: Ruma Allen    Admit Date: 10/21/2018     Discharge Date:   10/29/2018    Admitting Physician: Claire Amaya MD     Discharge Physician: El Carcamo MD     Discharge Diagnoses: Osteomyelitis       Active Hospital Problems    Diagnosis Date Noted    Coronary artery disease involving native coronary artery of native heart without angina pectoris [I25.10]     Sepsis ([address] [A41.9]     Diabetic foot infection ([address] [A42.647, D18.6]     Neutrophilic leukocytosis [A11.5]     Fever chills [R50.9]     Osteomyelitis ([address] [M86.9] 10/21/2018    Osteomyelitis of toe of left foot ([address] [M86.9] 10/05/2018    Type 2 diabetes mellitus with atherosclerosis of native arteries of extremity with rest pain ([address] [E11.51, I70.229] 09/28/2018    PVD (peripheral vascular disease) ([address] [I73.9] 09/28/2018    Hypertension [I10] 12/23/2014       The patient was seen and examined on day of discharge and this discharge summary is in conjunction with any daily progress note from day of discharge. Hospital Course:   Patient was admitted for bleeding and discharge from surgical wound of left foot. Diagnosed with osteomyelitis and started on antibiotics. Podiatry was consulted and took him to the OR for resection on 10/22 and then revision on 10/26. Cultures came back with provotella, pseudomonas, staph epi, and yeast. Antimicrobials were zyvox, zosyn, fluconazole per ID recommendations. PICC line was placed. IV Zosyn x 13.5 gm as continuous infusion x Q 24 HR X 28 DAYS  Oral Linezolid x 600 mg x Q 12 HR X 21 DAYS  Oral Fluconazole x 200 mg x daily x 21 days  CBC with diff, CMP , ESR, CRP weekly  Fax results to  [phone]  Follow up DR. Zuñiga x 3 weeks    After first trip to OR patient was found to have a new wide QRS on telemetry. He continued to switch in and out of a narrow and wide QRS through his admission.  Cardiology

## 2018-10-29 NOTE — PLAN OF CARE
Problem: Pain:  Goal: Patient's pain/discomfort is manageable  Patient's pain/discomfort is manageable   Outcome: Ongoing  Pain level assessed. Pt able to rate pain on a scale of 0-10. Pt denies pain at this time. Reposition for comfort. Will continue to monitor for s/s of discomfort. Problem: Risk for Impaired Skin Integrity  Goal: Tissue integrity - skin and mucous membranes  Structural intactness and normal physiological function of skin and  mucous membranes. Outcome: Ongoing  Skin assessed per protocol. Randolph score obtained. Skin kept clean, dry and warm. Encouraged pt to reposition to reduce the risk of PUs. LLE elevated with pillow support in place. Dressing changed per MD. Will continue to monitor. Problem: Falls - Risk of:  Goal: Will remain free from falls  Will remain free from falls   Outcome: Ongoing  Fall risk assessed. Precautions in place. Bed low and locked with side rails up x2. Nonskid socks on when OOB. Bed/chair alarm utilized. Call light within reach. Frequent checks performed. No falls at this time.

## 2018-10-29 NOTE — PROGRESS NOTES
TWO) performed by Ashwin Guerrero DPM at [facility] 21 OFFICE/OUTPT [address] Left 10/26/2018    LEFT FOOT WOUND DEBRIDEMENT WITH PRIMARY CLOSURE performed by Ashwin Guerrero DPM at [address] [place] [address] TOE AMPUTATION Left 10/05/2018    left great toe amputation       Current Medications:    Outpatient Prescriptions Marked as Taking for the 10/21/18 encounter [facility] Encounter)   Medication Sig Dispense Refill    [START ON 10/30/2018] isosorbide mononitrate (IMDUR) 30 MG extended release tablet Take 1 tablet by mouth daily 30 tablet 3    [START ON 10/30/2018] fluconazole (DIFLUCAN) 200 MG tablet Take 1 tablet by mouth daily for 7 days 7 tablet 0    metFORMIN (GLUCOPHAGE-XR) 500 MG extended release tablet Take 1,000 mg by mouth 2 times daily (with meals)      Insulin Disposable Pump (V-GO 40) KIT by Does not apply route      insulin lispro (HUMALOG) 100 UNIT/ML injection vial Inject into the skin 3 times daily (before meals)      aspirin (ASPIRIN LOW DOSE) 81 MG EC tablet Take 1 tablet by mouth daily 30 tablet 0    Blood Glucose Monitoring Suppl (FREESTYLE LITE) INEZ TEST BLOOD GLUCOSE THREE TO FOUR TIMES DAILY 1 Device 0    L-methylfolate-B6-B12 (METANX) 3-35-2 MG tablet Take 1 tablet by mouth daily For neuropathy. 90 tablet 3    glucose blood VI test strips (ONETOUCH VERIO) strip 1 each by In Vitro route 3 times daily ; Dx E11.65; Z79.4 100 each 11    ONE TOUCH LANCETS MISC 1 each by Does not apply route 3 times daily ; Dx E11.65; Z79.4 100 each 11    Insulin Pen Needle (B-D UF III MINI PEN NEEDLES) 31G X 5 MM MISC 1 each by Does not apply route 4 times daily (before meals and nightly) 150 each 6    metoprolol (TOPROL-XL) 25 MG XL tablet Take 25 mg by mouth every morning       atorvastatin (LIPITOR) 40 MG tablet Take 40 mg by mouth every morning       lisinopril (PRINIVIL;ZESTRIL) 10 MG tablet 40 mg every morning          Allergies:  Patient has no known allergies.     Immunizations : Immunization History   Administered Date(s) Administered    Influenza, Quadv, 6 mo and older, IM, PF (Flulaval, Fluarix) 10/24/2018    Tdap (Boostrix, Adacel) 10/02/2018       Social History:   Social History   Substance Use Topics    Smoking status: Never Smoker    Smokeless tobacco: Never Used    Alcohol use No     History   Smoking Status    Never Smoker   Smokeless Tobacco    Never Used      Family History   Problem Relation Age of Onset    High Blood Pressure Mother         REVIEW OF SYSTEMS:    No fever / chills / sweats. No weight loss. No visual change, eye pain, eye discharge. No oral lesion, sore throat, dysphagia. Denies cough / sputum/Sob   Denies chest pain, palpitations/ dizziness  Denies nausea/ vomiting/abdominal pain/diarrhea. Denies dysuria or change in urinary function. Denies joint swelling or pain. No myalgia, arthralgia. No rashes, skin lesions   Denies focal weakness, sensory change or other neurologic symptoms  No lymph node swelling or tenderness.     Left foot infection and swelling,  WBC elevation     PHYSICAL EXAM:      Vitals:    BP (!) 153/77   Pulse 76   Temp 98.6 °F (37 °C) (Oral)   Resp 18   Ht 5' 6\" (1.676 m)   Wt 259 lb 4.2 oz (117.6 kg)   SpO2 92%   BMI 41.85 kg/m²   General Appearance: alert,in some acute distress, +  pallor, no icterus   Skin: warm and dry, no rash or erythema  Head: normocephalic and atraumatic  Eyes: pupils equal, round, and reactive to light, conjunctivae normal  ENT: tympanic membrane, external ear and ear canal normal bilaterally, nose without deformity, nasal mucosa and turbinates normal without polyps  Neck: supple and non-tender without mass, no thyromegaly  no cervical lymphadenopathy  Pulmonary/Chest: clear to auscultation bilaterally- no wheezes, rales or rhonchi, normal air movement, no respiratory distress  Cardiovascular: normal rate, regular rhythm, normal S1 and S2, no murmurs, rubs, clicks, or gallops, no carotid Anaerobic Culture 10/05/2018  2:00 PM [address] [facility]   Light growth   Beta Lactamase POSITIVE. Sensitivities not routinely done. Drugs of choice are: Metronidazole,   Cefoxitin, or Piperacillin/Tazobactam.             Surgical Culture [[id number]] (Abnormal) Collected: 10/22/18 1336   Order Status: Completed Specimen: Specimen from Foot Updated: 10/25/18 1037    Gram Stain Result No Epithelial Cells seen   No WBC's seen   No organisms seen     Anaerobic Culture --    Further report to follow   No anaerobes isolated so far, Further report to follow     Organism Gram negative kamron (A)    Culture Surgical --    Isolated from Broth   ID and sensitivity to follow   Repeating sensitivity     Organism Staphylococcus coagulase-negative (A)    Culture Surgical --    Isolated from Broth   Sensitivity to follow    Narrative:     ORDER#: [id number]                          ORDERED BY: Edith Garza  SOURCE: Foot infected left foot            COLLECTED:  10/22/18 13:36  ANTIBIOTICS AT [facility]. :                      RECEIVED :  10/22/18 13:51     Component Results     Component Collected Lab   Gram Stain Result 10/22/2018  1:36 PM [address] [facility]   No Epithelial Cells seen   No WBC's seen   No organisms seen     Organism  (Abnormal) 10/22/2018  1:36 PM [address] [facility]   Staphylococcus epidermidis     Culture Surgical 10/22/2018  1:36 PM [address] [facility]   [facility] 10/22/2018  1:36 PM [address] [facility]   Pseudomonas luteola     Culture Surgical 10/22/2018  1:36 PM [address] [facility]   [facility]olated [facility] 10/22/2018  1:36 PM [address] [facility]   Prevotella disiens     Anaerobic Culture 10/22/2018  1:36 PM [address] [facility]   Rare growth   Beta Lactamase POSITIVE. Sensitivities not routinely done.  Drugs of choice are: Metronidazole,   Cefoxitin, or Piperacillin/Tazobactam.     Testing Performed By     Lab - [address]. Name Director

## 2018-11-01 ENCOUNTER — APPOINTMENT (OUTPATIENT)
Dept: MRI IMAGING | Age: 48
DRG: 305 | End: 2018-11-01
Attending: FAMILY MEDICINE
Payer: COMMERCIAL

## 2018-11-01 ENCOUNTER — ANESTHESIA EVENT (OUTPATIENT)
Dept: OPERATING ROOM | Age: 48
DRG: 305 | End: 2018-11-01
Payer: COMMERCIAL

## 2018-11-01 ENCOUNTER — HOSPITAL ENCOUNTER (INPATIENT)
Age: 48
LOS: 4 days | Discharge: HOME HEALTH | DRG: 305 | End: 2018-11-05
Attending: FAMILY MEDICINE | Admitting: FAMILY MEDICINE
Payer: COMMERCIAL

## 2018-11-01 DIAGNOSIS — I96 GANGRENE OF FOOT (HCC): Primary | ICD-10-CM

## 2018-11-01 LAB
ANION GAP SERPL CALCULATED.3IONS-SCNC: 12 MMOL/L (ref 3–16)
BUN BLDV-MCNC: 17 MG/DL (ref 7–20)
CALCIUM SERPL-MCNC: 9.5 MG/DL (ref 8.3–10.6)
CHLORIDE BLD-SCNC: 98 MMOL/L (ref 99–110)
CO2: 29 MMOL/L (ref 21–32)
CREAT SERPL-MCNC: 0.9 MG/DL (ref 0.9–1.3)
GFR AFRICAN AMERICAN: >60
GFR NON-AFRICAN AMERICAN: >60
GLUCOSE BLD-MCNC: 204 MG/DL (ref 70–99)
GLUCOSE BLD-MCNC: 223 MG/DL (ref 70–99)
GLUCOSE BLD-MCNC: 226 MG/DL (ref 70–99)
HCT VFR BLD CALC: 30.4 % (ref 40.5–52.5)
HEMOGLOBIN: 9.8 G/DL (ref 13.5–17.5)
MCH RBC QN AUTO: 26.7 PG (ref 26–34)
MCHC RBC AUTO-ENTMCNC: 32.3 G/DL (ref 31–36)
MCV RBC AUTO: 82.6 FL (ref 80–100)
PDW BLD-RTO: 13.9 % (ref 12.4–15.4)
PERFORMED ON: ABNORMAL
PERFORMED ON: ABNORMAL
PLATELET # BLD: 463 K/UL (ref 135–450)
PMV BLD AUTO: 7.1 FL (ref 5–10.5)
POTASSIUM REFLEX MAGNESIUM: 3.6 MMOL/L (ref 3.5–5.1)
RBC # BLD: 3.68 M/UL (ref 4.2–5.9)
SODIUM BLD-SCNC: 139 MMOL/L (ref 136–145)
WBC # BLD: 14.9 K/UL (ref 4–11)

## 2018-11-01 PROCEDURE — 2580000003 HC RX 258: Performed by: FAMILY MEDICINE

## 2018-11-01 PROCEDURE — 80048 BASIC METABOLIC PNL TOTAL CA: CPT

## 2018-11-01 PROCEDURE — 85027 COMPLETE CBC AUTOMATED: CPT

## 2018-11-01 PROCEDURE — 6370000000 HC RX 637 (ALT 250 FOR IP): Performed by: FAMILY MEDICINE

## 2018-11-01 PROCEDURE — 73718 MRI LOWER EXTREMITY W/O DYE: CPT

## 2018-11-01 PROCEDURE — 1200000000 HC SEMI PRIVATE

## 2018-11-01 PROCEDURE — 6360000002 HC RX W HCPCS: Performed by: FAMILY MEDICINE

## 2018-11-01 RX ORDER — LISINOPRIL 20 MG/1
40 TABLET ORAL EVERY MORNING
Status: DISCONTINUED | OUTPATIENT
Start: 2018-11-02 | End: 2018-11-05 | Stop reason: HOSPADM

## 2018-11-01 RX ORDER — ONDANSETRON 2 MG/ML
4 INJECTION INTRAMUSCULAR; INTRAVENOUS EVERY 6 HOURS PRN
Status: DISCONTINUED | OUTPATIENT
Start: 2018-11-01 | End: 2018-11-05 | Stop reason: HOSPADM

## 2018-11-01 RX ORDER — HYDRALAZINE HYDROCHLORIDE 20 MG/ML
10 INJECTION INTRAMUSCULAR; INTRAVENOUS EVERY 6 HOURS PRN
Status: DISCONTINUED | OUTPATIENT
Start: 2018-11-01 | End: 2018-11-05 | Stop reason: HOSPADM

## 2018-11-01 RX ORDER — SODIUM CHLORIDE 9 MG/ML
INJECTION, SOLUTION INTRAVENOUS CONTINUOUS
Status: DISCONTINUED | OUTPATIENT
Start: 2018-11-02 | End: 2018-11-02

## 2018-11-01 RX ORDER — INSULIN GLARGINE 100 [IU]/ML
30 INJECTION, SOLUTION SUBCUTANEOUS NIGHTLY
Status: DISCONTINUED | OUTPATIENT
Start: 2018-11-01 | End: 2018-11-05 | Stop reason: HOSPADM

## 2018-11-01 RX ORDER — ACETAMINOPHEN 325 MG/1
650 TABLET ORAL EVERY 4 HOURS PRN
Status: DISCONTINUED | OUTPATIENT
Start: 2018-11-01 | End: 2018-11-05 | Stop reason: HOSPADM

## 2018-11-01 RX ORDER — OXYCODONE HYDROCHLORIDE 5 MG/1
5 TABLET ORAL EVERY 4 HOURS PRN
Status: DISCONTINUED | OUTPATIENT
Start: 2018-11-01 | End: 2018-11-02

## 2018-11-01 RX ORDER — ATORVASTATIN CALCIUM 40 MG/1
40 TABLET, FILM COATED ORAL EVERY MORNING
Status: DISCONTINUED | OUTPATIENT
Start: 2018-11-02 | End: 2018-11-05 | Stop reason: HOSPADM

## 2018-11-01 RX ORDER — SODIUM CHLORIDE 0.9 % (FLUSH) 0.9 %
10 SYRINGE (ML) INJECTION EVERY 12 HOURS SCHEDULED
Status: DISCONTINUED | OUTPATIENT
Start: 2018-11-01 | End: 2018-11-05 | Stop reason: HOSPADM

## 2018-11-01 RX ORDER — METOPROLOL SUCCINATE 50 MG/1
50 TABLET, EXTENDED RELEASE ORAL EVERY MORNING
Status: DISCONTINUED | OUTPATIENT
Start: 2018-11-02 | End: 2018-11-05 | Stop reason: HOSPADM

## 2018-11-01 RX ORDER — ISOSORBIDE MONONITRATE 30 MG/1
30 TABLET, EXTENDED RELEASE ORAL DAILY
Status: DISCONTINUED | OUTPATIENT
Start: 2018-11-01 | End: 2018-11-05 | Stop reason: HOSPADM

## 2018-11-01 RX ORDER — SODIUM CHLORIDE 0.9 % (FLUSH) 0.9 %
10 SYRINGE (ML) INJECTION PRN
Status: DISCONTINUED | OUTPATIENT
Start: 2018-11-01 | End: 2018-11-05 | Stop reason: HOSPADM

## 2018-11-01 RX ORDER — ASPIRIN 81 MG/1
81 TABLET ORAL DAILY
Status: DISCONTINUED | OUTPATIENT
Start: 2018-11-01 | End: 2018-11-05 | Stop reason: HOSPADM

## 2018-11-01 RX ORDER — DEXTROSE MONOHYDRATE 50 MG/ML
100 INJECTION, SOLUTION INTRAVENOUS PRN
Status: DISCONTINUED | OUTPATIENT
Start: 2018-11-01 | End: 2018-11-05 | Stop reason: HOSPADM

## 2018-11-01 RX ORDER — FLUCONAZOLE 100 MG/1
200 TABLET ORAL DAILY
Status: DISCONTINUED | OUTPATIENT
Start: 2018-11-01 | End: 2018-11-05 | Stop reason: HOSPADM

## 2018-11-01 RX ORDER — LINEZOLID 2 MG/ML
600 INJECTION, SOLUTION INTRAVENOUS EVERY 12 HOURS
Status: DISCONTINUED | OUTPATIENT
Start: 2018-11-01 | End: 2018-11-03

## 2018-11-01 RX ORDER — DEXTROSE MONOHYDRATE 25 G/50ML
12.5 INJECTION, SOLUTION INTRAVENOUS PRN
Status: DISCONTINUED | OUTPATIENT
Start: 2018-11-01 | End: 2018-11-05 | Stop reason: HOSPADM

## 2018-11-01 RX ORDER — NICOTINE POLACRILEX 4 MG
15 LOZENGE BUCCAL PRN
Status: DISCONTINUED | OUTPATIENT
Start: 2018-11-01 | End: 2018-11-05 | Stop reason: HOSPADM

## 2018-11-01 RX ADMIN — ASPIRIN 81 MG: 81 TABLET, COATED ORAL at 16:27

## 2018-11-01 RX ADMIN — SODIUM CHLORIDE: 9 INJECTION, SOLUTION INTRAVENOUS at 23:38

## 2018-11-01 RX ADMIN — LINEZOLID 600 MG: 600 INJECTION, SOLUTION INTRAVENOUS at 19:04

## 2018-11-01 RX ADMIN — FLUCONAZOLE 200 MG: 100 TABLET ORAL at 16:27

## 2018-11-01 RX ADMIN — Medication 1 TABLET: at 16:33

## 2018-11-01 RX ADMIN — PIPERACILLIN SODIUM,TAZOBACTAM SODIUM 3.38 G: 3; .375 INJECTION, POWDER, FOR SOLUTION INTRAVENOUS at 17:15

## 2018-11-01 RX ADMIN — Medication 10 ML: at 20:17

## 2018-11-01 RX ADMIN — ISOSORBIDE MONONITRATE 30 MG: 30 TABLET, EXTENDED RELEASE ORAL at 16:27

## 2018-11-01 RX ADMIN — HYDRALAZINE HYDROCHLORIDE 10 MG: 20 INJECTION INTRAMUSCULAR; INTRAVENOUS at 20:16

## 2018-11-01 RX ADMIN — INSULIN LISPRO 4 UNITS: 100 INJECTION, SOLUTION INTRAVENOUS; SUBCUTANEOUS at 18:52

## 2018-11-01 RX ADMIN — INSULIN GLARGINE 15 UNITS: 100 INJECTION, SOLUTION SUBCUTANEOUS at 23:35

## 2018-11-01 RX ADMIN — ENOXAPARIN SODIUM 40 MG: 40 INJECTION SUBCUTANEOUS at 20:53

## 2018-11-01 ASSESSMENT — PAIN SCALES - GENERAL
PAINLEVEL_OUTOF10: 0
PAINLEVEL_OUTOF10: 0

## 2018-11-01 NOTE — PROGRESS NOTES
4 Eyes Skin Assessment     The patient is being assess for  Admission    I agree that 2 RN's have performed a thorough Head to Toe Skin Assessment on the patient. ALL assessment sites listed below have been assessed. Areas assessed by both nurses: richard/emelyn  [x]   Head, Face, and Ears   [x]   Shoulders, Back, and Chest  [x]   Arms, Elbows, and Hands   [x]   Coccyx, Sacrum, and IschIum  [x]   Legs, Feet, and Heels        Does the Patient have Skin Breakdown?   No         Randolph Prevention initiated:  No   Wound Care Orders initiated:  No      [facility] nurse consulted for Pressure Injury (Stage 3,4, Unstageable, DTI, NWPT, and Complex wounds), New and Established Ostomies:  No      Nurse 1 eSignature: Electronically signed by Karen Fregoso RN on 11/1/18 at 6:24 PM    **SHARE this note so that the co-signing nurse is able to place an eSignature**    Nurse 2 eSignature: Electronically signed by Prachi Andre RN on 11/1/18 at 6:27 PM

## 2018-11-01 NOTE — PROGRESS NOTES
Te Palomino (: 1970), was seen at bedside for diabetic foot infection of the left foot. Pt is s/p incision and drainage of left foot, partial amputation of the left 1st and 2nd metatarsals, and amputation of the left 1st and 2nd toes, DOS: 10/22/2018. Pt had late primary closure of the left foot wound on 10/26/2018. Pt was seen in my office today and was admitted to the hospital after physical exam revealed worsening infection of the left foot. Pt states he has very little pain to the left foot. Pt states he has kept the dressings on clean, dry and intact. Pt states he is not bearing weight on the left foot and keeping the foot elevated as advised. Pt denies any F/N/V/C and SOB.      Objective:  Patient is alert and oriented x 3, and is in no acute distress.     Vascular: DP pulse palpable bilateral. PT pulse weakly palpable on right foot, no palpable on left foot. CFT less than 5 seconds to all toes bilateral. Sparse pedal hair noted bilateral. Local edema noted to left foot consistent with post operative course. Derm:  Skin is warm to warm from proximal leg to distal foot bilateral, with normal texture. Nails 1-8 are thickened, yellow and dystrophic. Neuro:  Protective sensation absent to all toes bilateral via a 5.07 [place] monofilament.    Musculoskeletal: Muscle strength 5/5 with PF/DF/I and E of both feet. Full and stable ROM of the right 1st MTPJ without pain or crepitus. Amputation of the left 1st and 2nd toes. Partial amputation of the left 1st and 2nd metatarsal bones.     Surgical wound left foot:  Skin sutures are all intact to the medial and lateral aspects of the incision site. Center of incision site is packed open with iodoform packing gauze. Serosanguinous drainage was noted in the inner dressings at dressing change. Erythema and local edema noted to the distal forefoot.  Integrity of the dorsal skin flap is compromised with seepage of fluid through the skin and early signs Speedy. - Performed dressing change on left foot: Applied Iodoform packing gauze into the wound. Applied betadine to left foot surgical site followed by gauze, Kerlix roll, and an ACE bandage up to the ankle for mild compression. Patient is to be non-weight bearing on left foot and is to elevate the foot. - Patient is currently on IV Zosyn, Zyvox and Fluconazole via PICC line. Thank you, Dr. Graham Diss your recommendations. Thank you, Dr. Edwar Galvan, for your care of the patient. - Patients PMHx/PFSHx/Meds reviewed and updated as necessary in patients chart.     I appreciate the opportunity to assist in the treatment of one of your patients.  If you have any questions concerning the patient or his need for further foot and ankle care, please do not hesitate to contact my office.        Sincerely,           [facility] [address]: [phone]  F: [phone]

## 2018-11-01 NOTE — H&P
Katharyn Gaucher, MD   atorvastatin (LIPITOR) 40 MG tablet Take 40 mg by mouth every morning  11/30/14   Historical Provider, MD   lisinopril (PRINIVIL;ZESTRIL) 10 MG tablet 40 mg every morning  11/30/14   Historical Provider, MD       Allergies:  Patient has no known allergies. Social History:      TOBACCO:   reports that he has never smoked. He has never used smokeless tobacco.  ETOH:   reports that he does not drink alcohol. Family History:      Family History   Problem Relation Age of Onset    High Blood Pressure Mother        REVIEW OF SYSTEMS:   Positive for ulcer and as noted in the HPI. All other systems reviewed and negative. PHYSICAL EXAM:    BP (!) 166/81   Pulse 82   Temp 98.1 °F (36.7 °C) (Oral)   Resp 16   Ht 5' 6\" (1.676 m)   Wt 257 lb (116.6 kg)   SpO2 93%   BMI 41.48 kg/m²     General appearance: No apparent distress, cooperative. HEENT Normal cephalic, atraumatic without obvious deformity. PERRL. EOM. Conjunctivae/corneas clear. Neck: Supple, No jugular venous distention/bruits. Trachea midline   Lungs: Clear to auscultation, bilaterally without Rales/Wheezes/Rhonchi without accessory muscle use. Heart: Regular rate and rhythm with Normal S1/S2 without murmurs, rubs or gallops  Abdomen: Soft, non-tender or non-distended without rigidity or guarding and positive bowel sounds all four quadrants. Extremities: No clubbing, cyanosis, or edema bilaterally. Lt foot with great toe amputation, ulcer without purulent drainage. Skin: Skin color, texture, turgor normal.   Neurologic: Alert and oriented X 3, neurovascularly intact with sensory/motor intact upper extremities/lower extremities, bilaterally. Grossly non-focal.  Mental status: Alert, oriented, thought content appropriate.   Peripheral Pulses: +3 Easily felt, not easily obliterated with pressure  Cap refill  +2 sec    CXR:  I have reviewed the CXR with the following interpretation: not done    CBC   No results for input(s): WBC,

## 2018-11-02 ENCOUNTER — ANESTHESIA (OUTPATIENT)
Dept: OPERATING ROOM | Age: 48
DRG: 305 | End: 2018-11-02
Payer: COMMERCIAL

## 2018-11-02 ENCOUNTER — APPOINTMENT (OUTPATIENT)
Dept: GENERAL RADIOLOGY | Age: 48
DRG: 305 | End: 2018-11-02
Attending: FAMILY MEDICINE
Payer: COMMERCIAL

## 2018-11-02 VITALS
RESPIRATION RATE: 12 BRPM | SYSTOLIC BLOOD PRESSURE: 157 MMHG | TEMPERATURE: 98.6 F | OXYGEN SATURATION: 100 % | DIASTOLIC BLOOD PRESSURE: 82 MMHG

## 2018-11-02 LAB
ALBUMIN SERPL-MCNC: 2.9 G/DL (ref 3.4–5)
ANION GAP SERPL CALCULATED.3IONS-SCNC: 12 MMOL/L (ref 3–16)
BASOPHILS ABSOLUTE: 0.1 K/UL (ref 0–0.2)
BASOPHILS RELATIVE PERCENT: 0.6 %
BUN BLDV-MCNC: 16 MG/DL (ref 7–20)
CALCIUM SERPL-MCNC: 9.2 MG/DL (ref 8.3–10.6)
CHLORIDE BLD-SCNC: 98 MMOL/L (ref 99–110)
CO2: 27 MMOL/L (ref 21–32)
CREAT SERPL-MCNC: 0.9 MG/DL (ref 0.9–1.3)
EOSINOPHILS ABSOLUTE: 0.1 K/UL (ref 0–0.6)
EOSINOPHILS RELATIVE PERCENT: 1.2 %
GFR AFRICAN AMERICAN: >60
GFR NON-AFRICAN AMERICAN: >60
GLUCOSE BLD-MCNC: 202 MG/DL (ref 70–99)
GLUCOSE BLD-MCNC: 221 MG/DL (ref 70–99)
GLUCOSE BLD-MCNC: 245 MG/DL (ref 70–99)
GLUCOSE BLD-MCNC: 246 MG/DL (ref 70–99)
GLUCOSE BLD-MCNC: 272 MG/DL (ref 70–99)
GLUCOSE BLD-MCNC: 276 MG/DL (ref 70–99)
HCT VFR BLD CALC: 29.6 % (ref 40.5–52.5)
HEMOGLOBIN: 9.4 G/DL (ref 13.5–17.5)
LYMPHOCYTES ABSOLUTE: 3.1 K/UL (ref 1–5.1)
LYMPHOCYTES RELATIVE PERCENT: 25.5 %
MCH RBC QN AUTO: 26.5 PG (ref 26–34)
MCHC RBC AUTO-ENTMCNC: 31.7 G/DL (ref 31–36)
MCV RBC AUTO: 83.3 FL (ref 80–100)
MONOCYTES ABSOLUTE: 0.7 K/UL (ref 0–1.3)
MONOCYTES RELATIVE PERCENT: 5.7 %
NEUTROPHILS ABSOLUTE: 8.2 K/UL (ref 1.7–7.7)
NEUTROPHILS RELATIVE PERCENT: 67 %
PDW BLD-RTO: 13.8 % (ref 12.4–15.4)
PERFORMED ON: ABNORMAL
PHOSPHORUS: 3.3 MG/DL (ref 2.5–4.9)
PLATELET # BLD: 390 K/UL (ref 135–450)
PMV BLD AUTO: 6.9 FL (ref 5–10.5)
POTASSIUM SERPL-SCNC: 3.7 MMOL/L (ref 3.5–5.1)
RBC # BLD: 3.55 M/UL (ref 4.2–5.9)
SODIUM BLD-SCNC: 137 MMOL/L (ref 136–145)
WBC # BLD: 12.2 K/UL (ref 4–11)

## 2018-11-02 PROCEDURE — 6360000002 HC RX W HCPCS: Performed by: PODIATRIST

## 2018-11-02 PROCEDURE — 85025 COMPLETE CBC W/AUTO DIFF WBC: CPT

## 2018-11-02 PROCEDURE — 3600000003 HC SURGERY LEVEL 3 BASE: Performed by: PODIATRIST

## 2018-11-02 PROCEDURE — 87186 SC STD MICRODIL/AGAR DIL: CPT

## 2018-11-02 PROCEDURE — 6360000002 HC RX W HCPCS: Performed by: ANESTHESIOLOGY

## 2018-11-02 PROCEDURE — 2580000003 HC RX 258

## 2018-11-02 PROCEDURE — 88304 TISSUE EXAM BY PATHOLOGIST: CPT

## 2018-11-02 PROCEDURE — 7100000001 HC PACU RECOVERY - ADDTL 15 MIN: Performed by: PODIATRIST

## 2018-11-02 PROCEDURE — 87075 CULTR BACTERIA EXCEPT BLOOD: CPT

## 2018-11-02 PROCEDURE — 1200000000 HC SEMI PRIVATE

## 2018-11-02 PROCEDURE — 6360000002 HC RX W HCPCS

## 2018-11-02 PROCEDURE — 80069 RENAL FUNCTION PANEL: CPT

## 2018-11-02 PROCEDURE — 6370000000 HC RX 637 (ALT 250 FOR IP): Performed by: FAMILY MEDICINE

## 2018-11-02 PROCEDURE — 0Y6N0Z0 DETACHMENT AT LEFT FOOT, COMPLETE, OPEN APPROACH: ICD-10-PCS | Performed by: PODIATRIST

## 2018-11-02 PROCEDURE — 73630 X-RAY EXAM OF FOOT: CPT

## 2018-11-02 PROCEDURE — 2709999900 HC NON-CHARGEABLE SUPPLY: Performed by: PODIATRIST

## 2018-11-02 PROCEDURE — 2580000003 HC RX 258: Performed by: FAMILY MEDICINE

## 2018-11-02 PROCEDURE — 2500000003 HC RX 250 WO HCPCS: Performed by: PODIATRIST

## 2018-11-02 PROCEDURE — 2500000003 HC RX 250 WO HCPCS

## 2018-11-02 PROCEDURE — 7100000000 HC PACU RECOVERY - FIRST 15 MIN: Performed by: PODIATRIST

## 2018-11-02 PROCEDURE — 6360000002 HC RX W HCPCS: Performed by: FAMILY MEDICINE

## 2018-11-02 PROCEDURE — 3600000013 HC SURGERY LEVEL 3 ADDTL 15MIN: Performed by: PODIATRIST

## 2018-11-02 PROCEDURE — 36592 COLLECT BLOOD FROM PICC: CPT

## 2018-11-02 PROCEDURE — 87077 CULTURE AEROBIC IDENTIFY: CPT

## 2018-11-02 PROCEDURE — 87070 CULTURE OTHR SPECIMN AEROBIC: CPT

## 2018-11-02 PROCEDURE — 3700000001 HC ADD 15 MINUTES (ANESTHESIA): Performed by: PODIATRIST

## 2018-11-02 PROCEDURE — 3700000000 HC ANESTHESIA ATTENDED CARE: Performed by: PODIATRIST

## 2018-11-02 PROCEDURE — C1729 CATH, DRAINAGE: HCPCS | Performed by: PODIATRIST

## 2018-11-02 PROCEDURE — 2580000003 HC RX 258: Performed by: PODIATRIST

## 2018-11-02 PROCEDURE — 88311 DECALCIFY TISSUE: CPT

## 2018-11-02 PROCEDURE — 87205 SMEAR GRAM STAIN: CPT

## 2018-11-02 RX ORDER — PROMETHAZINE HYDROCHLORIDE 25 MG/ML
6.25 INJECTION, SOLUTION INTRAMUSCULAR; INTRAVENOUS
Status: DISCONTINUED | OUTPATIENT
Start: 2018-11-02 | End: 2018-11-02 | Stop reason: HOSPADM

## 2018-11-02 RX ORDER — FENTANYL CITRATE 50 UG/ML
50 INJECTION, SOLUTION INTRAMUSCULAR; INTRAVENOUS EVERY 5 MIN PRN
Status: DISCONTINUED | OUTPATIENT
Start: 2018-11-02 | End: 2018-11-02 | Stop reason: HOSPADM

## 2018-11-02 RX ORDER — FENTANYL CITRATE 50 UG/ML
25 INJECTION, SOLUTION INTRAMUSCULAR; INTRAVENOUS EVERY 5 MIN PRN
Status: DISCONTINUED | OUTPATIENT
Start: 2018-11-02 | End: 2018-11-02 | Stop reason: HOSPADM

## 2018-11-02 RX ORDER — GLYCOPYRROLATE 0.2 MG/ML
INJECTION INTRAMUSCULAR; INTRAVENOUS PRN
Status: DISCONTINUED | OUTPATIENT
Start: 2018-11-02 | End: 2018-11-02 | Stop reason: SDUPTHER

## 2018-11-02 RX ORDER — MORPHINE SULFATE 4 MG/ML
4 INJECTION, SOLUTION INTRAMUSCULAR; INTRAVENOUS EVERY 4 HOURS PRN
Status: DISCONTINUED | OUTPATIENT
Start: 2018-11-02 | End: 2018-11-05 | Stop reason: HOSPADM

## 2018-11-02 RX ORDER — OXYCODONE HYDROCHLORIDE 5 MG/1
10 TABLET ORAL EVERY 4 HOURS PRN
Status: DISCONTINUED | OUTPATIENT
Start: 2018-11-02 | End: 2018-11-05 | Stop reason: HOSPADM

## 2018-11-02 RX ORDER — KETAMINE HYDROCHLORIDE 50 MG/ML
INJECTION, SOLUTION, CONCENTRATE INTRAMUSCULAR; INTRAVENOUS PRN
Status: DISCONTINUED | OUTPATIENT
Start: 2018-11-02 | End: 2018-11-02 | Stop reason: SDUPTHER

## 2018-11-02 RX ORDER — ONDANSETRON 2 MG/ML
4 INJECTION INTRAMUSCULAR; INTRAVENOUS
Status: DISCONTINUED | OUTPATIENT
Start: 2018-11-02 | End: 2018-11-02 | Stop reason: HOSPADM

## 2018-11-02 RX ORDER — PROPOFOL 10 MG/ML
INJECTION, EMULSION INTRAVENOUS CONTINUOUS PRN
Status: DISCONTINUED | OUTPATIENT
Start: 2018-11-02 | End: 2018-11-02 | Stop reason: SDUPTHER

## 2018-11-02 RX ORDER — MAGNESIUM HYDROXIDE 1200 MG/15ML
LIQUID ORAL CONTINUOUS PRN
Status: COMPLETED | OUTPATIENT
Start: 2018-11-02 | End: 2018-11-02

## 2018-11-02 RX ORDER — SODIUM CHLORIDE, SODIUM LACTATE, POTASSIUM CHLORIDE, CALCIUM CHLORIDE 600; 310; 30; 20 MG/100ML; MG/100ML; MG/100ML; MG/100ML
INJECTION, SOLUTION INTRAVENOUS CONTINUOUS PRN
Status: DISCONTINUED | OUTPATIENT
Start: 2018-11-02 | End: 2018-11-02 | Stop reason: SDUPTHER

## 2018-11-02 RX ORDER — LIDOCAINE HYDROCHLORIDE 10 MG/ML
INJECTION, SOLUTION INFILTRATION; PERINEURAL
Status: COMPLETED | OUTPATIENT
Start: 2018-11-02 | End: 2018-11-02

## 2018-11-02 RX ORDER — PROPOFOL 10 MG/ML
INJECTION, EMULSION INTRAVENOUS PRN
Status: DISCONTINUED | OUTPATIENT
Start: 2018-11-02 | End: 2018-11-02 | Stop reason: SDUPTHER

## 2018-11-02 RX ORDER — BUPIVACAINE HYDROCHLORIDE 5 MG/ML
INJECTION, SOLUTION EPIDURAL; INTRACAUDAL
Status: COMPLETED | OUTPATIENT
Start: 2018-11-02 | End: 2018-11-02

## 2018-11-02 RX ORDER — MIDAZOLAM HYDROCHLORIDE 1 MG/ML
INJECTION INTRAMUSCULAR; INTRAVENOUS PRN
Status: DISCONTINUED | OUTPATIENT
Start: 2018-11-02 | End: 2018-11-02 | Stop reason: SDUPTHER

## 2018-11-02 RX ADMIN — INSULIN GLARGINE 30 UNITS: 100 INJECTION, SOLUTION SUBCUTANEOUS at 21:53

## 2018-11-02 RX ADMIN — Medication 50 MG: at 09:03

## 2018-11-02 RX ADMIN — LINEZOLID 600 MG: 600 INJECTION, SOLUTION INTRAVENOUS at 06:16

## 2018-11-02 RX ADMIN — Medication 10 MG: at 10:10

## 2018-11-02 RX ADMIN — FENTANYL CITRATE 50 MCG: 50 INJECTION, SOLUTION INTRAMUSCULAR; INTRAVENOUS at 11:40

## 2018-11-02 RX ADMIN — OXYCODONE HYDROCHLORIDE 5 MG: 5 TABLET ORAL at 21:51

## 2018-11-02 RX ADMIN — LISINOPRIL 40 MG: 20 TABLET ORAL at 13:23

## 2018-11-02 RX ADMIN — Medication 10 MG: at 09:40

## 2018-11-02 RX ADMIN — ENOXAPARIN SODIUM 40 MG: 40 INJECTION SUBCUTANEOUS at 21:51

## 2018-11-02 RX ADMIN — PROPOFOL 70 MG: 10 INJECTION, EMULSION INTRAVENOUS at 09:03

## 2018-11-02 RX ADMIN — ACETAMINOPHEN 650 MG: 325 TABLET, FILM COATED ORAL at 21:51

## 2018-11-02 RX ADMIN — SODIUM CHLORIDE: 9 INJECTION, SOLUTION INTRAVENOUS at 13:25

## 2018-11-02 RX ADMIN — MORPHINE SULFATE 4 MG: 4 INJECTION INTRAVENOUS at 15:43

## 2018-11-02 RX ADMIN — LINEZOLID 600 MG: 600 INJECTION, SOLUTION INTRAVENOUS at 18:04

## 2018-11-02 RX ADMIN — Medication 10 MG: at 09:25

## 2018-11-02 RX ADMIN — ISOSORBIDE MONONITRATE 30 MG: 30 TABLET, EXTENDED RELEASE ORAL at 13:22

## 2018-11-02 RX ADMIN — MORPHINE SULFATE 4 MG: 4 INJECTION INTRAVENOUS at 19:49

## 2018-11-02 RX ADMIN — PIPERACILLIN SODIUM,TAZOBACTAM SODIUM 3.38 G: 3; .375 INJECTION, POWDER, FOR SOLUTION INTRAVENOUS at 18:03

## 2018-11-02 RX ADMIN — PROPOFOL 50 MG: 10 INJECTION, EMULSION INTRAVENOUS at 10:54

## 2018-11-02 RX ADMIN — Medication 10 MG: at 09:55

## 2018-11-02 RX ADMIN — ASPIRIN 81 MG: 81 TABLET, COATED ORAL at 13:22

## 2018-11-02 RX ADMIN — Medication 10 ML: at 13:24

## 2018-11-02 RX ADMIN — GLYCOPYRROLATE 0.1 MG: 0.2 INJECTION, SOLUTION INTRAMUSCULAR; INTRAVENOUS at 09:03

## 2018-11-02 RX ADMIN — ATORVASTATIN CALCIUM 40 MG: 40 TABLET, FILM COATED ORAL at 13:23

## 2018-11-02 RX ADMIN — Medication 10 ML: at 21:51

## 2018-11-02 RX ADMIN — Medication 10 MG: at 09:15

## 2018-11-02 RX ADMIN — INSULIN LISPRO 2 UNITS: 100 INJECTION, SOLUTION INTRAVENOUS; SUBCUTANEOUS at 21:53

## 2018-11-02 RX ADMIN — PROPOFOL 160 MCG/KG/MIN: 10 INJECTION, EMULSION INTRAVENOUS at 09:03

## 2018-11-02 RX ADMIN — OXYCODONE HYDROCHLORIDE 5 MG: 5 TABLET ORAL at 18:05

## 2018-11-02 RX ADMIN — FLUCONAZOLE 200 MG: 100 TABLET ORAL at 13:23

## 2018-11-02 RX ADMIN — PIPERACILLIN SODIUM,TAZOBACTAM SODIUM 3.38 G: 3; .375 INJECTION, POWDER, FOR SOLUTION INTRAVENOUS at 01:52

## 2018-11-02 RX ADMIN — FENTANYL CITRATE 50 MCG: 50 INJECTION, SOLUTION INTRAMUSCULAR; INTRAVENOUS at 11:21

## 2018-11-02 RX ADMIN — MIDAZOLAM 2 MG: 1 INJECTION INTRAMUSCULAR; INTRAVENOUS at 08:56

## 2018-11-02 RX ADMIN — INSULIN LISPRO 6 UNITS: 100 INJECTION, SOLUTION INTRAVENOUS; SUBCUTANEOUS at 18:04

## 2018-11-02 RX ADMIN — OXYCODONE HYDROCHLORIDE 5 MG: 5 TABLET ORAL at 13:21

## 2018-11-02 RX ADMIN — METOPROLOL SUCCINATE 50 MG: 50 TABLET, EXTENDED RELEASE ORAL at 13:23

## 2018-11-02 RX ADMIN — SODIUM CHLORIDE, POTASSIUM CHLORIDE, SODIUM LACTATE AND CALCIUM CHLORIDE: 600; 310; 30; 20 INJECTION, SOLUTION INTRAVENOUS at 10:52

## 2018-11-02 ASSESSMENT — PULMONARY FUNCTION TESTS
PIF_VALUE: 0
PIF_VALUE: 1
PIF_VALUE: 0
PIF_VALUE: 0
PIF_VALUE: 1
PIF_VALUE: 0
PIF_VALUE: 1
PIF_VALUE: 0
PIF_VALUE: 1
PIF_VALUE: 0
PIF_VALUE: 1
PIF_VALUE: 1
PIF_VALUE: 0
PIF_VALUE: 0
PIF_VALUE: 1
PIF_VALUE: 0
PIF_VALUE: 1
PIF_VALUE: 0
PIF_VALUE: 1
PIF_VALUE: 0
PIF_VALUE: 1
PIF_VALUE: 0
PIF_VALUE: 1
PIF_VALUE: 0
PIF_VALUE: 0
PIF_VALUE: 1
PIF_VALUE: 1
PIF_VALUE: 0
PIF_VALUE: 1
PIF_VALUE: 0
PIF_VALUE: 1
PIF_VALUE: 0
PIF_VALUE: 1
PIF_VALUE: 0
PIF_VALUE: 1
PIF_VALUE: 1
PIF_VALUE: 0
PIF_VALUE: 1
PIF_VALUE: 0
PIF_VALUE: 1
PIF_VALUE: 0
PIF_VALUE: 1
PIF_VALUE: 0
PIF_VALUE: 1
PIF_VALUE: 0
PIF_VALUE: 1
PIF_VALUE: 0
PIF_VALUE: 1
PIF_VALUE: 0
PIF_VALUE: 1
PIF_VALUE: 1
PIF_VALUE: 0
PIF_VALUE: 1
PIF_VALUE: 0
PIF_VALUE: 1
PIF_VALUE: 0
PIF_VALUE: 1
PIF_VALUE: 0
PIF_VALUE: 1

## 2018-11-02 ASSESSMENT — PAIN SCALES - GENERAL
PAINLEVEL_OUTOF10: 0
PAINLEVEL_OUTOF10: 10
PAINLEVEL_OUTOF10: 7
PAINLEVEL_OUTOF10: 5
PAINLEVEL_OUTOF10: 7
PAINLEVEL_OUTOF10: 9
PAINLEVEL_OUTOF10: 10
PAINLEVEL_OUTOF10: 8
PAINLEVEL_OUTOF10: 7
PAINLEVEL_OUTOF10: 9
PAINLEVEL_OUTOF10: 7
PAINLEVEL_OUTOF10: 7
PAINLEVEL_OUTOF10: 10
PAINLEVEL_OUTOF10: 10

## 2018-11-02 ASSESSMENT — ENCOUNTER SYMPTOMS
EYE REDNESS: 0
EYE DISCHARGE: 0
VOMITING: 0
SHORTNESS OF BREATH: 0
ABDOMINAL PAIN: 0
FACIAL SWELLING: 0
BACK PAIN: 0
NAUSEA: 0
CHOKING: 0
APNEA: 0

## 2018-11-02 ASSESSMENT — PAIN DESCRIPTION - LOCATION
LOCATION: FOOT

## 2018-11-02 ASSESSMENT — PAIN DESCRIPTION - PAIN TYPE
TYPE: SURGICAL PAIN
TYPE: SURGICAL PAIN
TYPE: ACUTE PAIN
TYPE: SURGICAL PAIN
TYPE: ACUTE PAIN
TYPE: SURGICAL PAIN
TYPE: ACUTE PAIN

## 2018-11-02 ASSESSMENT — PAIN DESCRIPTION - ORIENTATION
ORIENTATION: LEFT

## 2018-11-02 ASSESSMENT — PAIN DESCRIPTION - FREQUENCY
FREQUENCY: CONTINUOUS

## 2018-11-02 ASSESSMENT — PAIN DESCRIPTION - DESCRIPTORS
DESCRIPTORS: ACHING
DESCRIPTORS: ACHING;CONSTANT
DESCRIPTORS: ACHING

## 2018-11-02 ASSESSMENT — PAIN DESCRIPTION - PROGRESSION
CLINICAL_PROGRESSION: NOT CHANGED

## 2018-11-02 ASSESSMENT — PAIN DESCRIPTION - ONSET
ONSET: ON-GOING

## 2018-11-02 ASSESSMENT — PAIN - FUNCTIONAL ASSESSMENT: PAIN_FUNCTIONAL_ASSESSMENT: 0-10

## 2018-11-02 NOTE — PROGRESS NOTES
Pt A&O in the bed. Pt returned from surgery. Complains of pain. PRN medication will be administered. Dressing is clean, dry and intact. Will monitor. RAY drain in place with minimal drainage. Will monitor. Call light within reach. Able to make needs known. Will monitor.  Electronically signed by Anastasiia Arora RN on 11/2/2018 at 7:13 PM

## 2018-11-02 NOTE — PROGRESS NOTES
(Last 24 hours) at 11/02/18 0937  Last data filed at 11/02/18 1275   Gross per 24 hour   Intake             1469 ml   Output              975 ml   Net              494 ml       Results:  CBC: Recent Labs      11/01/18   1727 11/02/18   0615   WBC  14.9*  12.2*   HGB  9.8*  9.4*   HCT  30.4*  29.6*   MCV  82.6  83.3   PLT  463*  390     BMP: Recent Labs      11/01/18   1727  11/02/18   0615   NA  139  137   K  3.6  3.7   CL  98*  98*   CO2  29  27   PHOS   --   3.3   BUN  17  16   CREATININE  0.9  0.9     Mag: No results for input(s): MAG in the last 72 hours. Phos:   Lab Results   Component Value Date    PHOS 3.3 11/02/2018     No components found for: GLU    LIVER PROFILE: No results for input(s): AST, ALT, LIPASE, BILIDIR, BILITOT, ALKPHOS in the last 72 hours. Invalid input(s): AMYLASE,  ALB  PT/INR: No results for input(s): PROTIME, INR in the last 72 hours. APTT: No results for input(s): APTT in the last 72 hours. UA:No results for input(s): NITRITE, COLORU, PHUR, LABCAST, WBCUA, RBCUA, MUCUS, TRICHOMONAS, YEAST, BACTERIA, CLARITYU, SPECGRAV, LEUKOCYTESUR, UROBILINOGEN, BILIRUBINUR, BLOODU, GLUCOSEU, AMORPHOUS in the last 72 hours. Invalid input(s): Rayatice Poag input(s): ABG  Lab Results   Component Value Date    CALCIUM 9.2 11/02/2018    PHOS 3.3 11/02/2018       Assessment:    Active Problems:    Gangrene of foot ([address]  Resolved Problems:    * No resolved hospital problems. [facility] course: 51 y.o. male who presents to [facility] from his podiatrist's office with a nonhealing foot ulcer. He was just [address] 10/29/18 for the same foot with osteo, on IV zosyn , zyvox, fluconazole by picc. He went to his podiatrists' office today and was felt he needed to come back in for an MRI and possible debridement.     Plan:  Diabetic foot ulcer, osteomyelitis  - to OR today  - cont IV zosyn, zyvox, fluconazole; PICC in place  - MRI   - consulted podiatry     Diabetes  - hold home PO meds,

## 2018-11-02 NOTE — ED PROVIDER NOTES
strips (ONETOUCH VERIO) strip 3 TIMES DAILY Starting 12/24/2015, Until Discontinued, Disp-100 each, R-11, Normal; Dx E11.65; Z79.4      !! ONE TOUCH LANCETS MISC 3 TIMES DAILY Starting 12/24/2015, Until Discontinued, Disp-100 each, R-11, Normal; Dx E11.65; Z79.4      Insulin Pen Needle (B-D UF III MINI PEN NEEDLES) 31G X 5 MM MISC 4 TIMES DAILY BEFORE MEALS & NIGHTLY Starting 12/10/2015, Until Discontinued, Disp-150 each, R-6, Normal      atorvastatin (LIPITOR) 40 MG tablet Take 40 mg by mouth every morning Historical Med      lisinopril (PRINIVIL;ZESTRIL) 10 MG tablet 40 mg every morning Historical Med       !! - Potential duplicate medications found. Please discuss with provider. ALLERGIES     Patient has no known allergies. FAMILYHISTORY       Family History   Problem Relation Age of Onset    High Blood Pressure Mother           SOCIAL HISTORY       Social History     Social History    Marital status: Single     Spouse name: N/A    Number of children: N/A    Years of education: N/A     Social History Main Topics    Smoking status: Never Smoker    Smokeless tobacco: Never Used    Alcohol use No    Drug use: No      Comment: QUIT 4 MONTHS     Sexual activity: Not Currently     Partners: Female     Other Topics Concern    None     Social History Narrative    None       SCREENINGS    Ashanti Coma Scale  Eye Opening: Spontaneous  Best Verbal Response: Oriented  Best Motor Response: Obeys commands  Ashanti Coma Scale Score: 15        PHYSICAL EXAM    (up to 7 for level 4, 8 or more for level 5)     ED Triage Vitals [10/21/18 0844]   BP Temp Temp Source Pulse Resp SpO2 Height Weight   (!) 133/90 98.3 °F (36.8 °C) Oral 101 22 98 % 5' 6\" (1.676 m) 251 lb 8.7 oz (114.1 kg)       Physical Exam   Constitutional: He is oriented to person, place, and time. He appears well-developed and well-nourished. HENT:   Head: Normocephalic and atraumatic.    Nose: Nose normal.   Eyes: Right eye exhibits no - Abnormal; Notable for the following:     Sodium 123 (*)     Chloride 88 (*)     Glucose 426 (*)     BUN 34 (*)     CREATININE 1.8 (*)     GFR Non- 40 (*)     GFR  49 (*)     Alb 2.4 (*)     All other components within normal limits    Narrative:     Performed at:  [address] [facility]   Phone [phone]   CBC WITH AUTO DIFFERENTIAL - Abnormal; Notable for the following:     WBC 19.8 (*)     RBC 3.81 (*)     Hemoglobin 10.3 (*)     Hematocrit 32.0 (*)     Neutrophils # 16.7 (*)     Monocytes # 1.5 (*)     All other components within normal limits    Narrative:     Performed at:  [address] [facility]   Phone [phone]   URINALYSIS WITH MICROSCOPIC - Abnormal; Notable for the following:     Clarity, UA CLOUDY (*)     Blood, Urine MODERATE (*)     Protein,  (*)     Casts 0-1 Coarse [facility] [place] [facility] (*)     All other components within normal limits    Narrative:     Performed at:  [address] [facility]   Phone [phone]   SODIUM - Abnormal; Notable for the following:     Sodium 128 (*)     All other components within normal limits    Narrative:     Performed at:  [address] [facility]   Phone [phone]   SODIUM - Abnormal; Notable for the following:     Sodium 127 (*)     All other components within normal limits    Narrative:     Performed at:  [address] [facility]   Phone [phone]   SODIUM - Abnormal; Notable for the following:     Sodium 130 (*)     All other components within normal limits    Narrative:     Performed at:  [address] [facility]   Phone [phone]   RENAL at:  [facility]  [address] [facility] [address] [facility] [address] [place] [address]   Phone [phone]   CULTURE BLOOD #2    Narrative:     ORDER#: [id number]                          ORDERED BY: Aliyah Griffin  SOURCE: Blood                              COLLECTED:  10/21/18 11:35  ANTIBIOTICS AT ADA.:                      RECEIVED :  10/21/18 12:11  If child <=2 yrs old please draw pediatric bottle. ~Blood Culture #2  Performed at:  [facility]  [address] [facility] [address] [place] [address]   Phone [phone]   LACTIC ACID, PLASMA    Narrative:     Performed at:  [facility]  [address] [facility] [address] [place] [place] [address]   Phone [phone]   HEMOGLOBIN A1C    Narrative:     Performed at:  [facility]  [address] [facility] [address] [place] [address]   Phone [phone]   OSMOLALITY    Narrative:     Performed at: [facility]  [address], [address]   Phone [phone]   TSH WITHOUT REFLEX    Narrative:     Performed at:  [facility]  [address] [facility] [address] [place] [address]   Phone [phone]   SODIUM, URINE, RANDOM    Narrative:     Performed at:  [facility]  [address] [facility] [place] [address]   Phone [phone]   OSMOLALITY    Narrative:     Performed at: [facility] [facility]  [address], [address]   Phone [phone]   OSMOLALITY, URINE    Narrative:     Performed at:   [facility]  [address], [address]   Phone [phone]   CREATININE, RANDOM URINE    Narrative:     Performed at:  [facility]  [address] [facility] [address] [place] [address]   Phone [phone]   CK    Narrative:     Performed at:  [facility] [address] [facility]  [address] [place] [address]   Phone [phone]   EOSINOPHIL SMEAR URINE    Narrative:     Performed at:  [facility]  [address] [place] [address]   Phone [phone]   SURGICAL PATHOLOGY    Narrative:                                          [facility]                                       [address] [facility] [address]                                       Fax [phone]   Ph. [phone]  Department of Pathology  FINAL SURGICAL PATHOLOGY REPORT  Patient Name:  June Rodriguez             Accession No:  [id number]   Age Sex:   1970    51 Y / M      Location:      [place] [address]  Account No:    [de-identified]                 Collected:     10/22/2018  Med Rec No:    [id number]                Received:      10/23/2018  Attend Phys:   Viktor Samaniego MD    Completed:     10/24/2018  Perform Phys:  Radha Floyd DPM           FINAL DIAGNOSIS:    Left foot first metatarsal bone, excision:  - Portion of benign bone without significant inflammatory change. - Negative for malignancy. BUCCA/BUCCA        Preoperative Diagnosis:  Infected left foot  Postoperative Diagnosis:  Same    SPECIMEN:  LEFT FOOT FIRST METATARSAL BONE     GROSS DESCRIPTION:   Received in formalin, labeled with the patient's  name \"Keyshawn Harris\" and designated \"bone, left foot first metatarsal\",  is a flat fragment of tan bone with attached soft tissue, measuring 2.4 x  1.8 x 0.7 cm. A representative section is submitted in cassette 1  following decalcification. BUCCA/BUCCA     MICROSCOPIC DESCRIPTION:  Microscopic examination performed.     CPT: [id number] X1   61632 X1    Case signed out at [facility], [address]  [place] [address] processing at [facility], [address] [facility] [address]  Phone signed)           Shikha Asif PA-C  11/02/18 5502

## 2018-11-02 NOTE — OP NOTE
Silas Pederson  YOB: 1970  MRN: [id number]  Surgical Date: 11/2/2018     Surgeons: Juan Wayne [place]    Pre-operative Diagnosis: Gangrene left foot with osteomyelitis of left foot    Post-operative Diagnosis: Same    Procedure: Incision and drainage of left foot with transmetatarsal amputation converted to Lisfranc amputation of left foot    Findings:  1. Non-viable skin and subcutaneous tissue left foot  2. Non-viable bone of the metatarsals left foot  3. Healthy bleeding tissue post procedures    Anesthesia:  General with left ankle block consisting of a 20 cc of a 50:50 mixture of 1% lidocaine plain and 0.5% Bupivacaine plain    Hemostasis:  Pneumatic left ankle tourniquet @ 250 mmHg    Estimated Blood Loss: less than 25 mL     Materials:  2-0 Vicryl, 2-0 nylon    Injectables: 10 cc of a 50:50 mixture of 1% lidocaine plain and 0.5% Bupivacaine plain to left foot for post operative pain management    Complications: none    Specimens:   1. Bone from left 3rd metatarsal for pathology  2. Culture swab from left foot  3. Bone and soft tissue from left foot for gross exam    Drains/Lines:  Fred-Banegas drain    INDICATIONS FOR PROCEDURE: This patient was admitted to hospital on 11/01/2018 for gangrene of the left foot. Pt is s/p incision and drainage of left foot with amputation of the left 1st and 2nd toes, and partial amputation of the left 1st and 2nd metatarsals, DOS: 11/22/2018. Patient had late primary closure of the left foot wound on 11/26/2018. MRI of the left foot on 11/01/2018 was consistent with osteomyelitis of the 1st, 2nd and 3rd metatarsals. With findings of significant bone and soft tissue infection of the left foot, it was determined that the patient may benefit from surgical intervention. Discussed incision and drainage of the left foot with transmetatarsal amputation of the left foot with the patient in detail.  The potential risks, benefits, complications, resected at their respective bases with a sagittal saw. Care was taken to maintain the metatarsal parabola. After resection of all non-viable tissue of the dorsal skin flap, it was determined that a more aggressive amputation was necessary in order to have enough viable skin tissue for primary closure of the wound. The transmetatarsal amputation was therefore converted to a Lisfranc amputation. All non-viable bone and tissue was removed and sent for pathology studies. The surgical site was inspected for any residual bone fragments and none were noted. The surgical site was flushed with copious amounts of sterile normal saline using pulse lavage. A culture swab was taken prior to closure of the surgical wound. A Fred-Banegas drain was placed in the foot. The subcutaneous tissues were reapproximated with simple sutures of 2-0 vicryl. The skin was reapproximated with simple sutures of 2-0 nylon. The surgical site was washed with saline-soaked gauze and dried. 10 cc of a 50:50 mixture of 1% lidocaine plain and 0.5% Bupivacaine plain was administered to the left foot for post operative pain management. The surgical site was dressed with Betadine-soaked Adaptic followed by 4 x 4's, fluffs, Kerlix, and an Ace wrap to the foot for mild compression. The ankle tourniquet was deflated. The patient's vascular status was checked and found to be intact. The Fred-Banegas drain was noted to be functional.     The patient tolerated anesthesia and the procedure well. The patient was transferred to the post anesthesia care unit with vital signs stable and vascular status intact. The patient was placed under the care of the recovery room staff until stable for transfer back to the floor.  I will follow up with the patient in the hospital.

## 2018-11-02 NOTE — PROGRESS NOTES
Patient admitted to PACU from OR. Patient asleep. Resp easy unlabored on 2L NC with SaO2 97%. Monitor  In SR. VSS. IV patent to right hand. Left foot ace wrap dressing dry and intact with RAY drain intact to bulb suction with bloody drainage. FOB elevated.

## 2018-11-02 NOTE — PROGRESS NOTES
Patient C/O surgical pain at 9 of 10 and medicated with Fentanyl 50mcg IV per order. VSS. IV patent. Monitor in SR. HOB up.

## 2018-11-02 NOTE — ANESTHESIA PRE PROCEDURE
Lidamarjorie Marr at [place] [address] OFFICE/OUTPT VISIT,PROCEDURE ONLY Left 10/22/2018    INCISION AND DRAINAGE LEFT FOOT FIRST AND SECOND RAY AMPUTATION (DIGITS ONE, TWO AND METARSAL ONE AND TWO) performed by Iraj Barnard DPM at [place] [address] OFFICE/OUTPT [address] Left 10/26/2018    LEFT FOOT WOUND DEBRIDEMENT WITH PRIMARY CLOSURE performed by Iraj Barnard DPM at [address] [place] [address] Pl TOE AMPUTATION Left 10/05/2018    left great toe amputation     Social History   Substance Use Topics    Smoking status: Never Smoker    Smokeless tobacco: Never Used    Alcohol use No     Medications  Current Facility-Administered Medications on File Prior to Visit   Medication Dose Route Frequency Provider Last Rate Last Dose    atorvastatin (LIPITOR) tablet 40 mg  40 mg Oral QAM Laurel Lemus, DO        folic acid-pyridoxine-cyanocobalamine (FOLTX) 2.5-25-1 MG tablet 1 tablet  1 tablet Oral Daily Laurel HAILEY Lemus, DO   1 tablet at 11/01/18 1633    aspirin EC tablet 81 mg  81 mg Oral Daily Laurel Lemus, DO   81 mg at 11/01/18 1627    isosorbide mononitrate (IMDUR) extended release tablet 30 mg  30 mg Oral Daily Laurel Lemus, DO   30 mg at 11/01/18 1627    fluconazole (DIFLUCAN) tablet 200 mg  200 mg Oral Daily Laurel Lemus, DO   200 mg at 11/01/18 1627    metoprolol succinate (TOPROL XL) extended release tablet 50 mg  50 mg Oral QAM Laurel Youngst, DO        lisinopril (PRINIVIL;ZESTRIL) tablet 40 mg  40 mg Oral QAM Laurel Lemus, DO        insulin glargine (LANTUS) injection vial 30 Units  30 Units Subcutaneous Nightly Laurel Lemus, DO   15 Units at 11/01/18 2335    insulin lispro (HUMALOG) injection vial 0-12 Units  0-12 Units Subcutaneous TID WC Laurel Lemus DO   4 Units at 11/01/18 1852    insulin lispro (HUMALOG) injection vial 0-6 Units  0-6 Units Subcutaneous Nightly Laurel Lemus DO        linezolid (ZYVOX) IVPB 600 mg  600 mg Intravenous Q12H Laurel Lemus DO   Stopped at 11/02/18 0729    piperacillin-tazobactam (ZOSYN) 3.375 g (DIFLUCAN) tablet 200 mg  200 mg Oral Daily Laurel Lemus, DO   200 mg at 11/01/18 1627    metoprolol succinate (TOPROL XL) extended release tablet 50 mg  50 mg Oral QAM Laurel Lemus, DO        lisinopril (PRINIVIL;ZESTRIL) tablet 40 mg  40 mg Oral QAM Laurel Lemus, DO        insulin glargine (LANTUS) injection vial 30 Units  30 Units Subcutaneous Nightly Laurel Lemus, DO   15 Units at 11/01/18 2335    insulin lispro (HUMALOG) injection vial 0-12 Units  0-12 Units Subcutaneous TID WC Laurel Lemus, DO   4 Units at 11/01/18 1852    insulin lispro (HUMALOG) injection vial 0-6 Units  0-6 Units Subcutaneous Nightly Laurel Lemus, DO        linezolid (ZYVOX) IVPB 600 mg  600 mg Intravenous Q12H Laurel Lemus DO   Stopped at 11/02/18 0729    piperacillin-tazobactam (ZOSYN) 3.375 g in dextrose 5 % 100 mL IVPB extended infusion (mini-bag)  3.375 g Intravenous Q8H Laurel Lemus DO   Stopped at 11/02/18 0552    acetaminophen (TYLENOL) tablet 650 mg  650 mg Oral Q4H PRN Laurel Lemus, DO        hydrALAZINE (APRESOLINE) injection 10 mg  10 mg Intravenous Q6H PRN Laurel Lemus, DO   10 mg at 11/01/18 2016    oxyCODONE (ROXICODONE) immediate release tablet 5 mg  5 mg Oral Q4H PRN Laurel Lemus, DO        sodium chloride flush 0.9 % injection 10 mL  10 mL Intravenous 2 times per day Laurel Lemus, DO   10 mL at 11/01/18 2017    sodium chloride flush 0.9 % injection 10 mL  10 mL Intravenous PRN Laurel Lemus, DO        magnesium hydroxide (MILK OF MAGNESIA) 400 MG/5ML suspension 30 mL  30 mL Oral Daily PRN Laurel Lemus, DO        ondansetron (ZOFRAN) injection 4 mg  4 mg Intravenous Q6H PRN Laurel Lemus, DO        enoxaparin (LOVENOX) injection 40 mg  40 mg Subcutaneous Nightly Laurel Lemus, DO   40 mg at 11/01/18 2053    0.9 % sodium chloride infusion   Intravenous Continuous Laurel Lemus DO 75 mL/hr at 11/01/18 3768      glucose (GLUTOSE) 40 % oral gel 15 g  15 g Oral PRN Laurel Lemus DO        dextrose 50 % solution 12.5 g  12.5 g interval changes to history and physical examination. Anesthetic plan, risks, benefits, alternatives, and personnel involved discussed with patient. Patient verbalized an understanding and agrees to proceed.       Samir Becerril MD  November 2, 2018  8:06 AM

## 2018-11-02 NOTE — PROGRESS NOTES
Pt AAO x4. No c/o pain at this time. Assessment completed and charted. Pt is aware that he is NPO after MN. Left foot with dressing/ace wrap, and noted to be CDI. Pt denies needs at this time. Call light in reach. Will monitor.

## 2018-11-02 NOTE — PROGRESS NOTES
Left foot RAY drain full with large congealed clot noted.  aware and order received to change RAY bulb. OR charge ZIGGY Huizar called for new RAY drain. VSS. IV patent.

## 2018-11-02 NOTE — CARE COORDINATION
Sw following for pt d/c needs. Pt currently active with [facility] and [facility]. Please advise if pt will need wound vac at d/c.      Electronically signed by Mehnaz Duenas on 11/2/2018 at 2:51 PM

## 2018-11-03 LAB
ALBUMIN SERPL-MCNC: 3 G/DL (ref 3.4–5)
ANION GAP SERPL CALCULATED.3IONS-SCNC: 12 MMOL/L (ref 3–16)
BASOPHILS ABSOLUTE: 0.1 K/UL (ref 0–0.2)
BASOPHILS RELATIVE PERCENT: 0.5 %
BUN BLDV-MCNC: 12 MG/DL (ref 7–20)
CALCIUM SERPL-MCNC: 9 MG/DL (ref 8.3–10.6)
CHLORIDE BLD-SCNC: 98 MMOL/L (ref 99–110)
CO2: 27 MMOL/L (ref 21–32)
CREAT SERPL-MCNC: 0.8 MG/DL (ref 0.9–1.3)
EOSINOPHILS ABSOLUTE: 0.1 K/UL (ref 0–0.6)
EOSINOPHILS RELATIVE PERCENT: 1.1 %
GFR AFRICAN AMERICAN: >60
GFR NON-AFRICAN AMERICAN: >60
GLUCOSE BLD-MCNC: 134 MG/DL (ref 70–99)
GLUCOSE BLD-MCNC: 135 MG/DL (ref 70–99)
GLUCOSE BLD-MCNC: 143 MG/DL (ref 70–99)
GLUCOSE BLD-MCNC: 147 MG/DL (ref 70–99)
GLUCOSE BLD-MCNC: 174 MG/DL (ref 70–99)
HCT VFR BLD CALC: 29.3 % (ref 40.5–52.5)
HEMOGLOBIN: 9.4 G/DL (ref 13.5–17.5)
LYMPHOCYTES ABSOLUTE: 2.7 K/UL (ref 1–5.1)
LYMPHOCYTES RELATIVE PERCENT: 21.8 %
MCH RBC QN AUTO: 26.7 PG (ref 26–34)
MCHC RBC AUTO-ENTMCNC: 32.1 G/DL (ref 31–36)
MCV RBC AUTO: 83.2 FL (ref 80–100)
MONOCYTES ABSOLUTE: 0.8 K/UL (ref 0–1.3)
MONOCYTES RELATIVE PERCENT: 6.7 %
NEUTROPHILS ABSOLUTE: 8.6 K/UL (ref 1.7–7.7)
NEUTROPHILS RELATIVE PERCENT: 69.9 %
PDW BLD-RTO: 14 % (ref 12.4–15.4)
PERFORMED ON: ABNORMAL
PHOSPHORUS: 3 MG/DL (ref 2.5–4.9)
PLATELET # BLD: 365 K/UL (ref 135–450)
PMV BLD AUTO: 7 FL (ref 5–10.5)
POTASSIUM SERPL-SCNC: 3.7 MMOL/L (ref 3.5–5.1)
RBC # BLD: 3.52 M/UL (ref 4.2–5.9)
SODIUM BLD-SCNC: 137 MMOL/L (ref 136–145)
WBC # BLD: 12.4 K/UL (ref 4–11)

## 2018-11-03 PROCEDURE — 99255 IP/OBS CONSLTJ NEW/EST HI 80: CPT | Performed by: INTERNAL MEDICINE

## 2018-11-03 PROCEDURE — 80069 RENAL FUNCTION PANEL: CPT

## 2018-11-03 PROCEDURE — 94760 N-INVAS EAR/PLS OXIMETRY 1: CPT

## 2018-11-03 PROCEDURE — 6360000002 HC RX W HCPCS: Performed by: PODIATRIST

## 2018-11-03 PROCEDURE — 6360000002 HC RX W HCPCS: Performed by: FAMILY MEDICINE

## 2018-11-03 PROCEDURE — 2580000003 HC RX 258: Performed by: FAMILY MEDICINE

## 2018-11-03 PROCEDURE — 1200000000 HC SEMI PRIVATE

## 2018-11-03 PROCEDURE — 6370000000 HC RX 637 (ALT 250 FOR IP): Performed by: FAMILY MEDICINE

## 2018-11-03 PROCEDURE — 6370000000 HC RX 637 (ALT 250 FOR IP): Performed by: PODIATRIST

## 2018-11-03 PROCEDURE — 85025 COMPLETE CBC W/AUTO DIFF WBC: CPT

## 2018-11-03 PROCEDURE — 6370000000 HC RX 637 (ALT 250 FOR IP): Performed by: INTERNAL MEDICINE

## 2018-11-03 RX ORDER — OXYMETAZOLINE HYDROCHLORIDE 0.05 G/100ML
2 SPRAY NASAL 2 TIMES DAILY
Status: DISCONTINUED | OUTPATIENT
Start: 2018-11-03 | End: 2018-11-05 | Stop reason: HOSPADM

## 2018-11-03 RX ORDER — LINEZOLID 600 MG/1
600 TABLET, FILM COATED ORAL EVERY 12 HOURS SCHEDULED
Status: DISCONTINUED | OUTPATIENT
Start: 2018-11-03 | End: 2018-11-05 | Stop reason: HOSPADM

## 2018-11-03 RX ADMIN — FLUCONAZOLE 200 MG: 100 TABLET ORAL at 08:55

## 2018-11-03 RX ADMIN — LINEZOLID 600 MG: 600 INJECTION, SOLUTION INTRAVENOUS at 05:06

## 2018-11-03 RX ADMIN — LINEZOLID 600 MG: 600 TABLET, FILM COATED ORAL at 21:20

## 2018-11-03 RX ADMIN — INSULIN GLARGINE 30 UNITS: 100 INJECTION, SOLUTION SUBCUTANEOUS at 21:20

## 2018-11-03 RX ADMIN — MORPHINE SULFATE 4 MG: 4 INJECTION INTRAVENOUS at 05:46

## 2018-11-03 RX ADMIN — INSULIN LISPRO 2 UNITS: 100 INJECTION, SOLUTION INTRAVENOUS; SUBCUTANEOUS at 12:13

## 2018-11-03 RX ADMIN — Medication 10 ML: at 21:20

## 2018-11-03 RX ADMIN — OXYCODONE HYDROCHLORIDE 10 MG: 5 TABLET ORAL at 08:56

## 2018-11-03 RX ADMIN — Medication 10 ML: at 08:56

## 2018-11-03 RX ADMIN — ISOSORBIDE MONONITRATE 30 MG: 30 TABLET, EXTENDED RELEASE ORAL at 08:56

## 2018-11-03 RX ADMIN — ACETAMINOPHEN 650 MG: 325 TABLET, FILM COATED ORAL at 21:22

## 2018-11-03 RX ADMIN — PIPERACILLIN SODIUM,TAZOBACTAM SODIUM 3.38 G: 3; .375 INJECTION, POWDER, FOR SOLUTION INTRAVENOUS at 00:22

## 2018-11-03 RX ADMIN — LISINOPRIL 40 MG: 20 TABLET ORAL at 08:56

## 2018-11-03 RX ADMIN — ENOXAPARIN SODIUM 40 MG: 40 INJECTION SUBCUTANEOUS at 21:20

## 2018-11-03 RX ADMIN — INSULIN LISPRO 1 UNITS: 100 INJECTION, SOLUTION INTRAVENOUS; SUBCUTANEOUS at 21:20

## 2018-11-03 RX ADMIN — Medication 1 TABLET: at 08:56

## 2018-11-03 RX ADMIN — ATORVASTATIN CALCIUM 40 MG: 40 TABLET, FILM COATED ORAL at 08:56

## 2018-11-03 RX ADMIN — METOPROLOL SUCCINATE 50 MG: 50 TABLET, EXTENDED RELEASE ORAL at 08:55

## 2018-11-03 RX ADMIN — INSULIN LISPRO 2 UNITS: 100 INJECTION, SOLUTION INTRAVENOUS; SUBCUTANEOUS at 08:54

## 2018-11-03 RX ADMIN — PIPERACILLIN SODIUM,TAZOBACTAM SODIUM 3.38 G: 3; .375 INJECTION, POWDER, FOR SOLUTION INTRAVENOUS at 17:41

## 2018-11-03 RX ADMIN — OXYCODONE HYDROCHLORIDE 10 MG: 5 TABLET ORAL at 00:20

## 2018-11-03 RX ADMIN — ASPIRIN 81 MG: 81 TABLET, COATED ORAL at 08:55

## 2018-11-03 RX ADMIN — PIPERACILLIN SODIUM,TAZOBACTAM SODIUM 3.38 G: 3; .375 INJECTION, POWDER, FOR SOLUTION INTRAVENOUS at 08:56

## 2018-11-03 ASSESSMENT — PAIN SCALES - GENERAL
PAINLEVEL_OUTOF10: 0
PAINLEVEL_OUTOF10: 10
PAINLEVEL_OUTOF10: 0
PAINLEVEL_OUTOF10: 0
PAINLEVEL_OUTOF10: 6
PAINLEVEL_OUTOF10: 10
PAINLEVEL_OUTOF10: 7
PAINLEVEL_OUTOF10: 7
PAINLEVEL_OUTOF10: 6

## 2018-11-03 ASSESSMENT — PAIN DESCRIPTION - DESCRIPTORS
DESCRIPTORS: ACHING

## 2018-11-03 ASSESSMENT — PAIN DESCRIPTION - FREQUENCY
FREQUENCY: CONTINUOUS

## 2018-11-03 ASSESSMENT — PAIN DESCRIPTION - PAIN TYPE
TYPE: SURGICAL PAIN

## 2018-11-03 ASSESSMENT — PAIN DESCRIPTION - LOCATION
LOCATION: FOOT

## 2018-11-03 ASSESSMENT — PAIN DESCRIPTION - ORIENTATION
ORIENTATION: LEFT

## 2018-11-03 ASSESSMENT — PAIN DESCRIPTION - ONSET: ONSET: ON-GOING

## 2018-11-03 NOTE — CONSULTS
Infectious Diseases Inpatient Consult Note    Reason for Consult:   L foot infection  Requesting Physician:   Dr Angelita Schulz  Primary Care Physician:  Martell Bamberger  History Obtained From:   Pt, EPIC    Admit Date: 11/1/2018  Hospital Day: 3    CHIEF COMPLAINT:     L foot infection    HISTORY OF PRESENT ILLNESS:      50 yo man with hx DM, peripheral polyneuropathy, CAD, HTN, obesity    Admit 10/21 - 29, L DM foot infection, OR 10/22, 10/26, discharge on zosyn, linezolid, fluconazole. Admit 11/1 from podiatry office with L foot pain and drainage. No fever / chills. Felt to gangrene of wound and sent to hospital.  On admission, afebrile, WBC 14.9. Continued zosyn, linezolid, fluconazole. MRI 11/1 with 1/2/4rd MT osteomyelitis. OR 11/2 - Lisfranc amputation performed, closed  Today 11/3 - afebrile, WBC 12.4.   Mild L foot / surg site infection      Past Medical History:    Past Medical History:   Diagnosis Date    Angina effort ([address]     Arthritis     CAD (coronary artery disease)     Carotid stenosis     Diabetes mellitus with neurological manifestations, uncontrolled ([address]     Diarrhea     Hyperlipidemia     Hypertension     Obesity     Type II or unspecified type diabetes mellitus without mention of complication, not stated as uncontrolled        Past Surgical History:    Past Surgical History:   Procedure Laterality Date    CARDIAC CATHETERIZATION      CAROTID ENDARTERECTOMY Right 4-9-2015    CHOLECYSTECTOMY      MI OFFICE/OUTPT VISIT,PROCEDURE ONLY Left 10/5/2018    AMPUTATION LEFT GREAT DIGIT performed by Mio Hand DPM at [place] [address] OFFICE/OUTPT [address] [place] [address] Left 10/22/2018    INCISION AND DRAINAGE LEFT FOOT FIRST AND SECOND RAY AMPUTATION (DIGITS ONE, TWO AND METARSAL ONE AND TWO) performed by Mio Hand DPM at [place] [address] OFFICE/OUTPT [address] [facility] Left 10/26/2018    LEFT FOOT WOUND DEBRIDEMENT WITH PRIMARY CLOSURE performed by Mio Hand DPM at [place] [address] b/l, no rales, no dullness  CARDIAC: RRR, no murmur appreciated  ABD:  + BS, soft / NT  EXT:  L foot with dressing, drain in place  NEURO: No focal neurologic findings  PSYCH: Orientation, sensorium, mood normal  LINES:  R PICC in place    DATA:    Lab Results   Component Value Date    WBC 12.4 (H) 2018    HGB 9.4 (L) 2018    HCT 29.3 (L) 2018    MCV 83.2 2018     2018     Lab Results   Component Value Date    CREATININE 0.8 (L) 2018    BUN 12 2018     2018    K 3.7 2018    CL 98 (L) 2018    CO2 27 2018       Hepatic Function Panel:   Lab Results   Component Value Date    ALKPHOS 139 10/22/2018    ALT 12 10/22/2018    AST 12 10/22/2018    PROT 8.3 10/22/2018    BILITOT 0.5 10/22/2018    LABALBU 3.0 2018       Micro:   Surg cult - no growth to date    10/22 Surg cult:  P disiens, Pseudomonas luteola, S epidermidis    Imagin/1 L foot MRI:  . Marrow edema now noted throughout the 2nd metatarsal without corresponding   T1 signal abnormality.  Findings may reflect reactive noninfectious osteitis   versus early changes of osteomyelitis. 2. Very minimal marrow edema at the surgical margin of the 1st metatarsal   with associated decreased T1 signal.  Findings suspicious for early changes   of osteomyelitis as no significant edema was noted on prior exam from 2018. 3. Patchy marrow edema of the 3rd metatarsal head and neck as well as   proximal and middle phalanges of the 3rd toe without corresponding T1 signal   abnormality which may reflect reactive noninfectious osteitis versus very   early changes of osteomyelitis. 4. Diffuse soft tissue edema similar to previous exam.  Correlate clinically   for cellulitis. 5. Small amount of fluid tracking along the 3rd extensor tendon sheath   suspicious for mild tenosynovitis.        IMPRESSION:      Patient Active Problem List   Diagnosis    Carotid artery stenosis without cerebral infarction    Diabetes mellitus type 2, insulin dependent ([address]    Obese    Hypertension    Other and unspecified hyperlipidemia    Diabetes mellitus with peripheral circulatory disorder (HCC)    Carotid stenosis    Type 2 diabetes mellitus with atherosclerosis of native arteries of extremity with rest pain (HCC)    Atherosclerosis of native artery of extremity with ulceration ([address]    Critical ischemia of lower extremity    PVD (peripheral vascular disease) ([facility])    Osteomyelitis of toe of left foot ([address]    Osteomyelitis ([address]    Sepsis ([address]    Diabetic foot infection ([address]    Neutrophilic leukocytosis    Fever chills    Coronary artery disease involving native coronary artery of native heart without angina pectoris    Foot abscess, left    Abscess or cellulitis of foot    Pseudomonas infection    Gangrene of foot ([address]       Multiple co-morbidities including DM, peripheral polyneuropathy, CAD, HTN, obesity  L DM foot infection - prior 1/2nd partial ray 10/22    Admit 11/1 - L foot gangrene  POD#1 Lisfranc amputation    RECOMMENDATIONS:    Cont zosyn, linezolid (will change to po), fluconazole for now  Will f/u on micro and path from OR  Wound care per podiatry    Discussed with pt  Lakesha Booker MD

## 2018-11-04 ENCOUNTER — APPOINTMENT (OUTPATIENT)
Dept: GENERAL RADIOLOGY | Age: 48
DRG: 305 | End: 2018-11-04
Attending: FAMILY MEDICINE
Payer: COMMERCIAL

## 2018-11-04 LAB
ALBUMIN SERPL-MCNC: 2.8 G/DL (ref 3.4–5)
ANION GAP SERPL CALCULATED.3IONS-SCNC: 12 MMOL/L (ref 3–16)
BASOPHILS ABSOLUTE: 0.1 K/UL (ref 0–0.2)
BASOPHILS RELATIVE PERCENT: 0.5 %
BUN BLDV-MCNC: 9 MG/DL (ref 7–20)
CALCIUM SERPL-MCNC: 9.1 MG/DL (ref 8.3–10.6)
CHLORIDE BLD-SCNC: 98 MMOL/L (ref 99–110)
CO2: 28 MMOL/L (ref 21–32)
CREAT SERPL-MCNC: 0.8 MG/DL (ref 0.9–1.3)
EOSINOPHILS ABSOLUTE: 0.1 K/UL (ref 0–0.6)
EOSINOPHILS RELATIVE PERCENT: 0.7 %
GFR AFRICAN AMERICAN: >60
GFR NON-AFRICAN AMERICAN: >60
GLUCOSE BLD-MCNC: 153 MG/DL (ref 70–99)
GLUCOSE BLD-MCNC: 159 MG/DL (ref 70–99)
GLUCOSE BLD-MCNC: 209 MG/DL (ref 70–99)
GLUCOSE BLD-MCNC: 87 MG/DL (ref 70–99)
GLUCOSE BLD-MCNC: 91 MG/DL (ref 70–99)
HCT VFR BLD CALC: 27.6 % (ref 40.5–52.5)
HEMOGLOBIN: 8.9 G/DL (ref 13.5–17.5)
LYMPHOCYTES ABSOLUTE: 2.6 K/UL (ref 1–5.1)
LYMPHOCYTES RELATIVE PERCENT: 20 %
MCH RBC QN AUTO: 27.1 PG (ref 26–34)
MCHC RBC AUTO-ENTMCNC: 32.4 G/DL (ref 31–36)
MCV RBC AUTO: 83.5 FL (ref 80–100)
MONOCYTES ABSOLUTE: 0.9 K/UL (ref 0–1.3)
MONOCYTES RELATIVE PERCENT: 6.9 %
NEUTROPHILS ABSOLUTE: 9.4 K/UL (ref 1.7–7.7)
NEUTROPHILS RELATIVE PERCENT: 71.9 %
PDW BLD-RTO: 13.8 % (ref 12.4–15.4)
PERFORMED ON: ABNORMAL
PERFORMED ON: NORMAL
PHOSPHORUS: 3.3 MG/DL (ref 2.5–4.9)
PLATELET # BLD: 367 K/UL (ref 135–450)
PMV BLD AUTO: 7.2 FL (ref 5–10.5)
POTASSIUM SERPL-SCNC: 3.4 MMOL/L (ref 3.5–5.1)
RBC # BLD: 3.3 M/UL (ref 4.2–5.9)
SODIUM BLD-SCNC: 138 MMOL/L (ref 136–145)
WBC # BLD: 13 K/UL (ref 4–11)

## 2018-11-04 PROCEDURE — 6370000000 HC RX 637 (ALT 250 FOR IP): Performed by: FAMILY MEDICINE

## 2018-11-04 PROCEDURE — 1200000000 HC SEMI PRIVATE

## 2018-11-04 PROCEDURE — 6370000000 HC RX 637 (ALT 250 FOR IP): Performed by: INTERNAL MEDICINE

## 2018-11-04 PROCEDURE — 6370000000 HC RX 637 (ALT 250 FOR IP): Performed by: NURSE PRACTITIONER

## 2018-11-04 PROCEDURE — 94760 N-INVAS EAR/PLS OXIMETRY 1: CPT

## 2018-11-04 PROCEDURE — 6360000002 HC RX W HCPCS: Performed by: PODIATRIST

## 2018-11-04 PROCEDURE — 73630 X-RAY EXAM OF FOOT: CPT

## 2018-11-04 PROCEDURE — 6360000002 HC RX W HCPCS: Performed by: FAMILY MEDICINE

## 2018-11-04 PROCEDURE — 85025 COMPLETE CBC W/AUTO DIFF WBC: CPT

## 2018-11-04 PROCEDURE — 2580000003 HC RX 258: Performed by: FAMILY MEDICINE

## 2018-11-04 PROCEDURE — 80069 RENAL FUNCTION PANEL: CPT

## 2018-11-04 PROCEDURE — 6370000000 HC RX 637 (ALT 250 FOR IP): Performed by: PODIATRIST

## 2018-11-04 RX ADMIN — INSULIN LISPRO 2 UNITS: 100 INJECTION, SOLUTION INTRAVENOUS; SUBCUTANEOUS at 18:18

## 2018-11-04 RX ADMIN — Medication 10 ML: at 20:55

## 2018-11-04 RX ADMIN — ISOSORBIDE MONONITRATE 30 MG: 30 TABLET, EXTENDED RELEASE ORAL at 08:34

## 2018-11-04 RX ADMIN — PIPERACILLIN SODIUM,TAZOBACTAM SODIUM 3.38 G: 3; .375 INJECTION, POWDER, FOR SOLUTION INTRAVENOUS at 00:58

## 2018-11-04 RX ADMIN — INSULIN GLARGINE 30 UNITS: 100 INJECTION, SOLUTION SUBCUTANEOUS at 20:56

## 2018-11-04 RX ADMIN — ASPIRIN 81 MG: 81 TABLET, COATED ORAL at 08:34

## 2018-11-04 RX ADMIN — FLUCONAZOLE 200 MG: 100 TABLET ORAL at 08:34

## 2018-11-04 RX ADMIN — LINEZOLID 600 MG: 600 TABLET, FILM COATED ORAL at 08:34

## 2018-11-04 RX ADMIN — Medication 1 TABLET: at 08:34

## 2018-11-04 RX ADMIN — INSULIN LISPRO 2 UNITS: 100 INJECTION, SOLUTION INTRAVENOUS; SUBCUTANEOUS at 14:01

## 2018-11-04 RX ADMIN — OXYMETAZOLINE HYDROCHLORIDE 2 SPRAY: 5 SPRAY NASAL at 00:58

## 2018-11-04 RX ADMIN — ACETAMINOPHEN 650 MG: 325 TABLET, FILM COATED ORAL at 08:34

## 2018-11-04 RX ADMIN — ATORVASTATIN CALCIUM 40 MG: 40 TABLET, FILM COATED ORAL at 08:34

## 2018-11-04 RX ADMIN — LINEZOLID 600 MG: 600 TABLET, FILM COATED ORAL at 20:55

## 2018-11-04 RX ADMIN — Medication 10 ML: at 11:20

## 2018-11-04 RX ADMIN — MORPHINE SULFATE 4 MG: 4 INJECTION INTRAVENOUS at 11:17

## 2018-11-04 RX ADMIN — PIPERACILLIN SODIUM,TAZOBACTAM SODIUM 3.38 G: 3; .375 INJECTION, POWDER, FOR SOLUTION INTRAVENOUS at 16:38

## 2018-11-04 RX ADMIN — OXYCODONE HYDROCHLORIDE 10 MG: 5 TABLET ORAL at 01:11

## 2018-11-04 RX ADMIN — METOPROLOL SUCCINATE 50 MG: 50 TABLET, EXTENDED RELEASE ORAL at 08:34

## 2018-11-04 RX ADMIN — LISINOPRIL 40 MG: 20 TABLET ORAL at 08:34

## 2018-11-04 RX ADMIN — INSULIN LISPRO 2 UNITS: 100 INJECTION, SOLUTION INTRAVENOUS; SUBCUTANEOUS at 20:55

## 2018-11-04 RX ADMIN — PIPERACILLIN SODIUM,TAZOBACTAM SODIUM 3.38 G: 3; .375 INJECTION, POWDER, FOR SOLUTION INTRAVENOUS at 08:34

## 2018-11-04 RX ADMIN — SALINE NASAL SPRAY 1 SPRAY: 1.5 SOLUTION NASAL at 01:13

## 2018-11-04 ASSESSMENT — PAIN SCALES - GENERAL
PAINLEVEL_OUTOF10: 10
PAINLEVEL_OUTOF10: 9
PAINLEVEL_OUTOF10: 0
PAINLEVEL_OUTOF10: 2
PAINLEVEL_OUTOF10: 0
PAINLEVEL_OUTOF10: 2
PAINLEVEL_OUTOF10: 2
PAINLEVEL_OUTOF10: 0

## 2018-11-04 ASSESSMENT — PAIN DESCRIPTION - DESCRIPTORS
DESCRIPTORS: ACHING
DESCRIPTORS: ACHING;CONSTANT

## 2018-11-04 ASSESSMENT — PAIN DESCRIPTION - LOCATION
LOCATION: FOOT
LOCATION: FOOT

## 2018-11-04 ASSESSMENT — PAIN DESCRIPTION - ORIENTATION
ORIENTATION: LEFT
ORIENTATION: LEFT

## 2018-11-04 ASSESSMENT — PAIN DESCRIPTION - PAIN TYPE
TYPE: SURGICAL PAIN
TYPE: SURGICAL PAIN

## 2018-11-04 ASSESSMENT — PAIN DESCRIPTION - ONSET
ONSET: ON-GOING
ONSET: ON-GOING

## 2018-11-04 ASSESSMENT — PAIN DESCRIPTION - PROGRESSION: CLINICAL_PROGRESSION: NOT CHANGED

## 2018-11-04 ASSESSMENT — PAIN DESCRIPTION - FREQUENCY
FREQUENCY: CONTINUOUS
FREQUENCY: CONTINUOUS

## 2018-11-04 NOTE — PLAN OF CARE
Problem: Risk for Impaired Skin Integrity  Goal: Tissue integrity - skin and mucous membranes  Structural intactness and normal physiological function of skin and  mucous membranes. Outcome: Ongoing  Pt assessed for skin break down. Skin was warm and dry to touch. Pt able to turn self and regulate head of bed by self. Surgical dressing is clean dry and intact. No drainage or odor noted. Will continue to monitor and assess. Electronically signed by Dyan Reyes RN on 11/3/2018 at 9:55 PM      Problem: Falls - Risk of:  Goal: Will remain free from falls  Will remain free from falls   Outcome: Ongoing  Fall risk assessment completed. Fall precautions in place. Call light within reach. Pt educated on calling for assistance before getting up. Walkway free of clutter. Will continue to monitor. Electronically signed by Dyan Reyes RN on 11/3/2018 at 9:55 PM    Goal: Absence of physical injury  Absence of physical injury   Outcome: Ongoing  Pt is free of injury. No injury noted. Fall precautions in place. Call light within reach. Will monitor. Electronically signed by Dyan Reyes RN on 11/3/2018 at 9:55 PM      Problem: Infection - Methicillin-Resistant Staphylococcus Aureus Infection:  Goal: Absence of methicillin-resistant Staphylococcus aureus infection  Absence of methicillin-resistant Staphylococcus aureus infection   Outcome: Ongoing  Pt continues with MRSA infection. ABX given. Will monitor. Electronically signed by Dyan Reyes RN on 11/3/2018 at 9:56 PM      Problem: Pain:  Goal: Control of acute pain  Control of acute pain   Outcome: Ongoing  Pt assessed for pain. Pt denies any pain at this time. Will continue to monitor pt and assess for pain throughout rest of shift. Electronically signed by Dyan Reyes RN on 11/3/2018 at 9:56 PM      Problem: Nutrition  Goal: Optimal nutrition therapy  Outcome: Ongoing  Pt nutritional intake is well. Will continue to monitor.  Electronically signed by Ousmane Matthews

## 2018-11-04 NOTE — PLAN OF CARE
Problem: Risk for Impaired Skin Integrity  Goal: Tissue integrity - skin and mucous membranes  Structural intactness and normal physiological function of skin and  mucous membranes. Outcome: Ongoing  Pt assessed for skin break down. Skin was warm and dry to touch. Pt able to turn self and regulate head of bed with assistance. Pt reminded to turn or reposition at least every 2 hours to prevent skin breakdown. QD dressing changes to L foot per podiatry. Dressing clean, dry, intact. Will continue to monitor and assess. Electronically signed by Patricia Velasquez RN on 11/4/2018 at 9:28 AM        Problem: Infection - Methicillin-Resistant Staphylococcus Aureus Infection:  Goal: Absence of methicillin-resistant Staphylococcus aureus infection  Absence of methicillin-resistant Staphylococcus aureus infection   Outcome: Ongoing  VSS. Pt with fever this am.  Tylenol given. Intermittent antibiotics administered as directed. Vitals monitored per floor protocol. Electronically signed by Patricia Velasquez RN on 11/4/2018 at 9:35 AM      Problem: Pain:  Goal: Control of acute pain  Control of acute pain   Outcome: Ongoing  Pt assessed for pain. Pt in pain and assessed with 0-10 pain rating scale. Pt given prescribed analgesic for pain. (See eMar) Pt satisfied with pain relief thus far. Will reassess and continue to monitor. Electronically signed by Patricia Velasquez RN on 11/4/2018 at 9:35 AM      Problem: Nutrition  Goal: Optimal nutrition therapy  Outcome: Ongoing  Pt tolerating carb control diet. Pt receiving adequate nutritional intake and is able to consume at least 50% of each meal.  Blood sugar monitored ACHS.   Electronically signed by Patricia Velasquez RN on 11/4/2018 at 9:36 AM

## 2018-11-04 NOTE — PROGRESS NOTES
you, Dr. Aly Eng your recommendations. - Patients PMHx/PFSHx/Meds reviewed and updated as necessary in patients chart.     I appreciate the opportunity to assist in the treatment of one of your patients. If you have any questions concerning the patient or his need for further foot and ankle care, please do not hesitate to contact my office.  Thank you, Dr. Mackenzie Nieves, for your care of the patient.        Sincerely,           Freddie Price [address] [place] [address] Road: [phone]  F: [phone]

## 2018-11-05 VITALS
TEMPERATURE: 98.7 F | DIASTOLIC BLOOD PRESSURE: 67 MMHG | HEART RATE: 77 BPM | RESPIRATION RATE: 17 BRPM | HEIGHT: 66 IN | OXYGEN SATURATION: 92 % | WEIGHT: 255.73 LBS | SYSTOLIC BLOOD PRESSURE: 141 MMHG | BODY MASS INDEX: 41.1 KG/M2

## 2018-11-05 LAB
ALBUMIN SERPL-MCNC: 2.7 G/DL (ref 3.4–5)
ANION GAP SERPL CALCULATED.3IONS-SCNC: 12 MMOL/L (ref 3–16)
BASOPHILS ABSOLUTE: 0 K/UL (ref 0–0.2)
BASOPHILS RELATIVE PERCENT: 0.3 %
BUN BLDV-MCNC: 7 MG/DL (ref 7–20)
CALCIUM SERPL-MCNC: 9 MG/DL (ref 8.3–10.6)
CHLORIDE BLD-SCNC: 94 MMOL/L (ref 99–110)
CO2: 29 MMOL/L (ref 21–32)
CREAT SERPL-MCNC: 0.8 MG/DL (ref 0.9–1.3)
EOSINOPHILS ABSOLUTE: 0.1 K/UL (ref 0–0.6)
EOSINOPHILS RELATIVE PERCENT: 0.7 %
GFR AFRICAN AMERICAN: >60
GFR NON-AFRICAN AMERICAN: >60
GLUCOSE BLD-MCNC: 101 MG/DL (ref 70–99)
GLUCOSE BLD-MCNC: 122 MG/DL (ref 70–99)
GLUCOSE BLD-MCNC: 131 MG/DL (ref 70–99)
GLUCOSE BLD-MCNC: 146 MG/DL (ref 70–99)
HCT VFR BLD CALC: 28.7 % (ref 40.5–52.5)
HEMOGLOBIN: 9.2 G/DL (ref 13.5–17.5)
LYMPHOCYTES ABSOLUTE: 2.2 K/UL (ref 1–5.1)
LYMPHOCYTES RELATIVE PERCENT: 18.6 %
MCH RBC QN AUTO: 26.5 PG (ref 26–34)
MCHC RBC AUTO-ENTMCNC: 32 G/DL (ref 31–36)
MCV RBC AUTO: 82.7 FL (ref 80–100)
MONOCYTES ABSOLUTE: 0.8 K/UL (ref 0–1.3)
MONOCYTES RELATIVE PERCENT: 6.3 %
NEUTROPHILS ABSOLUTE: 8.9 K/UL (ref 1.7–7.7)
NEUTROPHILS RELATIVE PERCENT: 74.1 %
PDW BLD-RTO: 14 % (ref 12.4–15.4)
PERFORMED ON: ABNORMAL
PHOSPHORUS: 3 MG/DL (ref 2.5–4.9)
PLATELET # BLD: 333 K/UL (ref 135–450)
PMV BLD AUTO: 7.1 FL (ref 5–10.5)
POTASSIUM SERPL-SCNC: 3.8 MMOL/L (ref 3.5–5.1)
RBC # BLD: 3.47 M/UL (ref 4.2–5.9)
SODIUM BLD-SCNC: 135 MMOL/L (ref 136–145)
WBC # BLD: 12 K/UL (ref 4–11)

## 2018-11-05 PROCEDURE — 6370000000 HC RX 637 (ALT 250 FOR IP): Performed by: FAMILY MEDICINE

## 2018-11-05 PROCEDURE — 6370000000 HC RX 637 (ALT 250 FOR IP): Performed by: INTERNAL MEDICINE

## 2018-11-05 PROCEDURE — 87641 MR-STAPH DNA AMP PROBE: CPT

## 2018-11-05 PROCEDURE — 85025 COMPLETE CBC W/AUTO DIFF WBC: CPT

## 2018-11-05 PROCEDURE — 99233 SBSQ HOSP IP/OBS HIGH 50: CPT | Performed by: INTERNAL MEDICINE

## 2018-11-05 PROCEDURE — 80069 RENAL FUNCTION PANEL: CPT

## 2018-11-05 PROCEDURE — 6360000002 HC RX W HCPCS: Performed by: FAMILY MEDICINE

## 2018-11-05 PROCEDURE — 2580000003 HC RX 258: Performed by: FAMILY MEDICINE

## 2018-11-05 RX ORDER — OXYCODONE HYDROCHLORIDE 5 MG/1
5 TABLET ORAL EVERY 6 HOURS PRN
Qty: 12 TABLET | Refills: 0 | Status: SHIPPED | OUTPATIENT
Start: 2018-11-05 | End: 2018-11-08

## 2018-11-05 RX ADMIN — ASPIRIN 81 MG: 81 TABLET, COATED ORAL at 08:11

## 2018-11-05 RX ADMIN — PIPERACILLIN SODIUM,TAZOBACTAM SODIUM 3.38 G: 3; .375 INJECTION, POWDER, FOR SOLUTION INTRAVENOUS at 00:24

## 2018-11-05 RX ADMIN — LISINOPRIL 40 MG: 20 TABLET ORAL at 08:10

## 2018-11-05 RX ADMIN — LINEZOLID 600 MG: 600 TABLET, FILM COATED ORAL at 08:10

## 2018-11-05 RX ADMIN — HYDRALAZINE HYDROCHLORIDE 10 MG: 20 INJECTION INTRAMUSCULAR; INTRAVENOUS at 00:24

## 2018-11-05 RX ADMIN — Medication 1 TABLET: at 08:10

## 2018-11-05 RX ADMIN — PIPERACILLIN SODIUM,TAZOBACTAM SODIUM 3.38 G: 3; .375 INJECTION, POWDER, FOR SOLUTION INTRAVENOUS at 08:11

## 2018-11-05 RX ADMIN — ISOSORBIDE MONONITRATE 30 MG: 30 TABLET, EXTENDED RELEASE ORAL at 08:11

## 2018-11-05 RX ADMIN — METOPROLOL SUCCINATE 50 MG: 50 TABLET, EXTENDED RELEASE ORAL at 08:10

## 2018-11-05 RX ADMIN — ATORVASTATIN CALCIUM 40 MG: 40 TABLET, FILM COATED ORAL at 08:11

## 2018-11-05 RX ADMIN — PIPERACILLIN SODIUM,TAZOBACTAM SODIUM 3.38 G: 3; .375 INJECTION, POWDER, FOR SOLUTION INTRAVENOUS at 16:54

## 2018-11-05 RX ADMIN — Medication 10 ML: at 08:10

## 2018-11-05 RX ADMIN — FLUCONAZOLE 200 MG: 100 TABLET ORAL at 08:11

## 2018-11-05 ASSESSMENT — PAIN SCALES - GENERAL
PAINLEVEL_OUTOF10: 0

## 2018-11-05 NOTE — CARE COORDINATION
[facility]  Faxed orders to both to resume IV ATB and care.  Cherise Walden LPN  CTN with  [facility],  [address], Fax [phone]

## 2018-11-05 NOTE — PROGRESS NOTES
Patient discharged to home. Transported in wheelchair accompanied by spouse. Transported in personal vehicle. Discharge instructions, Rx, and personal belongings given to patient. Explanation of discharge medications and instructions understood by patient, verbalized understanding.  Electronically signed by Gabe Chen on 11/5/18 at 6:13 PM

## 2018-11-05 NOTE — PROGRESS NOTES
Infectious Disease Follow up Notes  Admit Date: 11/1/2018  Hospital Day: 5    Antibiotics : IV Zosyn  Oral Linezolid  OraL Fluconazole      CHIEF COMPLAINT:      Diabetic foot infection  Foot gangrene  Osteomyelitis  S/p surgery  Cellulitis      Subjective interval History :  51 y.o.  MAN recently d/c from hospital on IV abx for severe foot infection and he had x 2 surgeries already with amputation of left great toe but now non healing and worsening foot infection with gangrene changes admitted on 11/i underwent Lisfranc amputation of Left foot and foot now closed and pain+ wbc elevation+ no chills worried about recovery tolerating IV abx ok      Past Medical History:    Past Medical History:   Diagnosis Date    [facility] [address] [facility]     Arthritis     CAD (coronary artery disease)     Carotid stenosis     Diabetes mellitus with neurological manifestations, uncontrolled ([facility] [address] [facility]     Diarrhea     Hyperlipidemia     Hypertension     Obesity     Type II or unspecified type diabetes mellitus without mention of complication, not stated as uncontrolled        Past Surgical History:    Past Surgical History:   Procedure Laterality Date    CARDIAC CATHETERIZATION      CAROTID ENDARTERECTOMY Right 4-9-2015    CHOLECYSTECTOMY      MD OFFICE/OUTPT VISIT,PROCEDURE ONLY Left 10/5/2018    AMPUTATION LEFT GREAT DIGIT performed by Traci Mora DPM at [place] [address] OFFICE/OUTPT [address] [place] [address] Road Left 10/22/2018    INCISION AND DRAINAGE LEFT FOOT FIRST AND SECOND RAY AMPUTATION (DIGITS ONE, TWO AND METARSAL ONE AND TWO) performed by Traci Mora DPM at [place] [address] [facility] Left 10/26/2018    LEFT FOOT WOUND DEBRIDEMENT WITH PRIMARY CLOSURE performed by Traci Mora DPM at [place] [address] OFFICE/OUTPT [address] [place] [address] Left 11/2/2018    INCISION AND DRAINAGE WITH TRANSMETATARSAL CONVERTED TO BRITTANY Wade AMPUTATION LEFT FOOT performed by Chip Gama DPM at [address] Left 10/05/2018    left great toe amputation       Current Medications:    Outpatient Prescriptions Marked as Taking for the 11/1/18 encounter [facility] Encounter)   Medication Sig Dispense Refill    isosorbide mononitrate (IMDUR) 30 MG extended release tablet Take 1 tablet by mouth daily 30 tablet 3    metoprolol succinate (TOPROL XL) 50 MG extended release tablet Take 1 tablet by mouth every morning 30 tablet 2    metFORMIN (GLUCOPHAGE-XR) 500 MG extended release tablet Take 1,000 mg by mouth 2 times daily (with meals)      Insulin Disposable Pump (V-GO 40) KIT by Does not apply route      insulin lispro (HUMALOG) 100 UNIT/ML injection vial Inject into the skin 3 times daily (before meals)      aspirin (ASPIRIN LOW DOSE) 81 MG EC tablet Take 1 tablet by mouth daily 30 tablet 0    atorvastatin (LIPITOR) 40 MG tablet Take 40 mg by mouth every morning       lisinopril (PRINIVIL;ZESTRIL) 10 MG tablet 40 mg every morning          Allergies:  Patient has no known allergies. Immunizations :   Immunization History   Administered Date(s) Administered    Influenza, Quadv, 6 mo and older, IM, PF (Flulaval, Fluarix) 10/24/2018    Tdap (Boostrix, Adacel) 10/02/2018       Social History:   Social History   Substance Use Topics    Smoking status: Never Smoker    Smokeless tobacco: Never Used    Alcohol use No     History   Smoking Status    Never Smoker   Smokeless Tobacco    Never Used      Family History   Problem Relation Age of Onset    High Blood Pressure Mother          REVIEW OF SYSTEMS:    No fever / chills / sweats. No weight loss. No visual change, eye pain, eye discharge. No oral lesion, sore throat, dysphagia. Denies cough / sputum/Sob   Denies chest pain, palpitations/ dizziness  Denies nausea/ vomiting/abdominal pain/diarrhea. Denies dysuria or change in urinary function. Denies joint swelling or pain.   No myalgia, arthralgia. No rashes, skin lesions   Denies focal weakness, sensory change or other neurologic symptoms  No lymph node swelling or tenderness.     Left foot swelling + drainage and gangrene changes      PHYSICAL EXAM:      Vitals:  101 T max   BP (!) 180/98   Pulse 87   Temp 99.2 °F (37.3 °C) (Oral)   Resp 18   Ht 5' 6\" (1.676 m)   Wt 255 lb 11.7 oz (116 kg)   SpO2 97%   BMI 41.28 kg/m²     General Appearance: alert,in   acute distress, +  pallor, no icterus   Skin: warm and dry, no rash or erythema  Head: normocephalic and atraumatic  Eyes: pupils equal, round, and reactive to light, conjunctivae normal  ENT: tympanic membrane, external ear and ear canal normal bilaterally, nose without deformity, nasal mucosa and turbinates normal without polyps  Neck: supple and non-tender without mass, no thyromegaly  no cervical lymphadenopathy  Pulmonary/Chest: clear to auscultation bilaterally- no wheezes, rales or rhonchi, normal air movement, no respiratory distress  Cardiovascular: normal rate, regular rhythm, normal S1 and S2, no murmurs, rubs, clicks, or gallops, no carotid bruits  Abdomen: soft, non-tender, non-distended, normal bowel sounds, no masses or organomegaly  Extremities: no cyanosis, clubbing or edema  Musculoskeletal: normal range of motion, no joint swelling, deformity or tenderness  Neurologic: reflexes normal and symmetric, no cranial nerve deficit  Psych:  Orientation, sensorium, mood normal  Lines:  PICC  LeFT foot stump sutures+ intact pictures reviewed and d/w         Data Review:    Lab Results   Component Value Date    WBC 12.0 (H) 11/05/2018    HGB 9.2 (L) 11/05/2018    HCT 28.7 (L) 11/05/2018    MCV 82.7 11/05/2018     11/05/2018     Lab Results   Component Value Date    CREATININE 0.8 (L) 11/05/2018    BUN 7 11/05/2018     (L) 11/05/2018    K 3.8 11/05/2018    CL 94 (L) 11/05/2018    CO2 29 11/05/2018       Hepatic Function Panel:   Lab Results   Component Value left foot            COLLECTED:  10/22/18 13:36  ANTIBIOTICS AT ADA. :                      RECEIVED :  10/22/18 13:51  Performed at:  [address] [place] [address]   Phone [phone]   Culture & Susceptibility     PSEUDOMONAS LUTEOLA     Antibiotic Interpretation [facility] Unit   cefepime Sensitive <=2 mcg/mL   ciprofloxacin Sensitive <=1 mcg/mL   gentamicin Sensitive <=4 mcg/mL   meropenem Sensitive <=1 mcg/mL   piperacillin-tazobactam Sensitive <=16 mcg/mL   tobramycin Sensitive <=4 mcg/mL         STAPHYLOCOCCUS EPIDERMIDIS     Antibiotic Interpretation [facility] Unit   ceFAZolin Resistant <=4 mcg/mL   ciprofloxacin Sensitive <=1 mcg/mL   clindamycin Resistant >4 mcg/mL   erythromycin Resistant >4 mcg/mL   oxacillin Resistant >2 mcg/mL   tetracycline Sensitive <=4 mcg/mL   trimethoprim-sulfamethoxazole Resistant >2/38 mcg/mL   vancomycin Sensitive 2 mcg/mL          IMAGING:    XR FOOT LEFT (MIN 3 VIEWS)   Final Result   Status post drainage catheter removal.  Otherwise, stable appearing foot   status post amputation         XR FOOT LEFT (MIN 3 VIEWS)   Final Result   Interval amputation of the foot at the tarsal metatarsal joint space, as   described above. MRI FOOT LEFT WO CONTRAST   Final Result   1. Marrow edema now noted throughout the 2nd metatarsal without corresponding   T1 signal abnormality. Findings may reflect reactive noninfectious osteitis   versus early changes of osteomyelitis. 2. Very minimal marrow edema at the surgical margin of the 1st metatarsal   with associated decreased T1 signal.  Findings suspicious for early changes   of osteomyelitis as no significant edema was noted on prior exam from October 23, 2018.    3. Patchy marrow edema of the 3rd metatarsal head and neck as well as   proximal and middle phalanges of the 3rd toe without corresponding T1 signal   abnormality which may reflect reactive noninfectious osteitis versus

## 2018-11-05 NOTE — PROGRESS NOTES
Pt A&O in the bed. Pt tolerating PO intake well. AM medications given without complications. UAL in the room with walker. Call light within reach. Able to make needs known. Will monitor.  Electronically signed by Zo Sandhu RN on 11/5/2018 at 8:37 AM

## 2018-11-06 LAB — MRSA SCREEN RT-PCR: NORMAL

## 2018-11-08 LAB
ANAEROBIC CULTURE: ABNORMAL
CULTURE SURGICAL: ABNORMAL
ORGANISM: ABNORMAL

## 2018-11-12 LAB
BASOPHILS ABSOLUTE: 0.1 K/UL (ref 0–0.2)
BASOPHILS RELATIVE PERCENT: 0.7 %
EOSINOPHILS ABSOLUTE: 0.2 K/UL (ref 0–0.6)
EOSINOPHILS RELATIVE PERCENT: 2.4 %
HCT VFR BLD CALC: 32.1 % (ref 40.5–52.5)
HEMOGLOBIN: 10.5 G/DL (ref 13.5–17.5)
LYMPHOCYTES ABSOLUTE: 2.5 K/UL (ref 1–5.1)
LYMPHOCYTES RELATIVE PERCENT: 29.7 %
MCH RBC QN AUTO: 27.3 PG (ref 26–34)
MCHC RBC AUTO-ENTMCNC: 32.6 G/DL (ref 31–36)
MCV RBC AUTO: 83.8 FL (ref 80–100)
MONOCYTES ABSOLUTE: 0.4 K/UL (ref 0–1.3)
MONOCYTES RELATIVE PERCENT: 5.2 %
NEUTROPHILS ABSOLUTE: 5.2 K/UL (ref 1.7–7.7)
NEUTROPHILS RELATIVE PERCENT: 62 %
PDW BLD-RTO: 14.5 % (ref 12.4–15.4)
PLATELET # BLD: 444 K/UL (ref 135–450)
PMV BLD AUTO: 7.9 FL (ref 5–10.5)
RBC # BLD: 3.83 M/UL (ref 4.2–5.9)
SEDIMENTATION RATE, ERYTHROCYTE: 104 MM/HR (ref 0–15)
WBC # BLD: 8.4 K/UL (ref 4–11)

## 2018-11-13 LAB
A/G RATIO: 0.7 (ref 1.1–2.2)
ALBUMIN SERPL-MCNC: 3.5 G/DL (ref 3.4–5)
ALP BLD-CCNC: 122 U/L (ref 40–129)
ALT SERPL-CCNC: 18 U/L (ref 10–40)
ANION GAP SERPL CALCULATED.3IONS-SCNC: 19 MMOL/L (ref 3–16)
AST SERPL-CCNC: 16 U/L (ref 15–37)
BILIRUB SERPL-MCNC: <0.2 MG/DL (ref 0–1)
BUN BLDV-MCNC: 13 MG/DL (ref 7–20)
C-REACTIVE PROTEIN: 33.6 MG/L (ref 0–5.1)
CALCIUM SERPL-MCNC: 9.3 MG/DL (ref 8.3–10.6)
CHLORIDE BLD-SCNC: 93 MMOL/L (ref 99–110)
CO2: 27 MMOL/L (ref 21–32)
CREAT SERPL-MCNC: 0.8 MG/DL (ref 0.9–1.3)
GFR AFRICAN AMERICAN: >60
GFR NON-AFRICAN AMERICAN: >60
GLOBULIN: 4.7 G/DL
GLUCOSE BLD-MCNC: 360 MG/DL (ref 70–99)
POTASSIUM SERPL-SCNC: 4.1 MMOL/L (ref 3.5–5.1)
SODIUM BLD-SCNC: 139 MMOL/L (ref 136–145)
TOTAL PROTEIN: 8.2 G/DL (ref 6.4–8.2)

## 2018-11-19 LAB
A/G RATIO: 0.8 (ref 1.1–2.2)
ALBUMIN SERPL-MCNC: 3.5 G/DL (ref 3.4–5)
ALP BLD-CCNC: 125 U/L (ref 40–129)
ALT SERPL-CCNC: 31 U/L (ref 10–40)
ANION GAP SERPL CALCULATED.3IONS-SCNC: 22 MMOL/L (ref 3–16)
AST SERPL-CCNC: 41 U/L (ref 15–37)
BILIRUB SERPL-MCNC: 0.3 MG/DL (ref 0–1)
BUN BLDV-MCNC: 20 MG/DL (ref 7–20)
C-REACTIVE PROTEIN: 12.9 MG/L (ref 0–5.1)
CALCIUM SERPL-MCNC: 9.5 MG/DL (ref 8.3–10.6)
CHLORIDE BLD-SCNC: 93 MMOL/L (ref 99–110)
CO2: 20 MMOL/L (ref 21–32)
CREAT SERPL-MCNC: 0.9 MG/DL (ref 0.9–1.3)
GFR AFRICAN AMERICAN: >60
GFR NON-AFRICAN AMERICAN: >60
GLOBULIN: 4.6 G/DL
GLUCOSE BLD-MCNC: 409 MG/DL (ref 70–99)
POTASSIUM SERPL-SCNC: 4.9 MMOL/L (ref 3.5–5.1)
SODIUM BLD-SCNC: 135 MMOL/L (ref 136–145)
TOTAL PROTEIN: 8.1 G/DL (ref 6.4–8.2)

## 2018-11-28 ENCOUNTER — OFFICE VISIT (OUTPATIENT)
Dept: INFECTIOUS DISEASES | Age: 48
End: 2018-11-28
Payer: COMMERCIAL

## 2018-11-28 VITALS
OXYGEN SATURATION: 99 % | BODY MASS INDEX: 41.28 KG/M2 | HEART RATE: 92 BPM | TEMPERATURE: 98.2 F | DIASTOLIC BLOOD PRESSURE: 74 MMHG | HEIGHT: 66 IN | SYSTOLIC BLOOD PRESSURE: 116 MMHG

## 2018-11-28 DIAGNOSIS — L08.9 DIABETIC FOOT INFECTION (HCC): ICD-10-CM

## 2018-11-28 DIAGNOSIS — A49.8 PSEUDOMONAS INFECTION: ICD-10-CM

## 2018-11-28 DIAGNOSIS — L02.612 FOOT ABSCESS, LEFT: ICD-10-CM

## 2018-11-28 DIAGNOSIS — E11.628 DIABETIC FOOT INFECTION (HCC): ICD-10-CM

## 2018-11-28 DIAGNOSIS — L03.119 CELLULITIS AND ABSCESS OF FOOT: ICD-10-CM

## 2018-11-28 DIAGNOSIS — A49.02 MRSA (METHICILLIN RESISTANT STAPHYLOCOCCUS AUREUS) INFECTION: ICD-10-CM

## 2018-11-28 DIAGNOSIS — I96 GANGRENE OF FOOT (HCC): Primary | ICD-10-CM

## 2018-11-28 DIAGNOSIS — L02.619 CELLULITIS AND ABSCESS OF FOOT: ICD-10-CM

## 2018-11-28 PROCEDURE — 3046F HEMOGLOBIN A1C LEVEL >9.0%: CPT | Performed by: INTERNAL MEDICINE

## 2018-11-28 PROCEDURE — G8598 ASA/ANTIPLAT THER USED: HCPCS | Performed by: INTERNAL MEDICINE

## 2018-11-28 PROCEDURE — G8417 CALC BMI ABV UP PARAM F/U: HCPCS | Performed by: INTERNAL MEDICINE

## 2018-11-28 PROCEDURE — G8482 FLU IMMUNIZE ORDER/ADMIN: HCPCS | Performed by: INTERNAL MEDICINE

## 2018-11-28 PROCEDURE — 1036F TOBACCO NON-USER: CPT | Performed by: INTERNAL MEDICINE

## 2018-11-28 PROCEDURE — G8427 DOCREV CUR MEDS BY ELIG CLIN: HCPCS | Performed by: INTERNAL MEDICINE

## 2018-11-28 PROCEDURE — 2022F DILAT RTA XM EVC RTNOPTHY: CPT | Performed by: INTERNAL MEDICINE

## 2018-11-28 PROCEDURE — 99214 OFFICE O/P EST MOD 30 MIN: CPT | Performed by: INTERNAL MEDICINE

## 2018-11-28 PROCEDURE — 1111F DSCHRG MED/CURRENT MED MERGE: CPT | Performed by: INTERNAL MEDICINE

## 2018-12-07 ENCOUNTER — PROCEDURE VISIT (OUTPATIENT)
Dept: SURGERY | Age: 48
End: 2018-12-07
Payer: COMMERCIAL

## 2018-12-07 DIAGNOSIS — I73.9 PVD (PERIPHERAL VASCULAR DISEASE) (HCC): ICD-10-CM

## 2018-12-07 DIAGNOSIS — I70.245 ATHEROSCLEROSIS OF NATIVE ARTERY OF LEFT LOWER EXTREMITY WITH ULCERATION OF OTHER PART OF FOOT (HCC): Primary | ICD-10-CM

## 2018-12-07 PROCEDURE — 93925 LOWER EXTREMITY STUDY: CPT | Performed by: SURGERY

## 2018-12-18 NOTE — PROGRESS NOTES
Infectious Diseases Outpatient Follow-up Note      Primary Care Physician:  Cheo Garcia       History Obtained From:   Patient, EPIC    CHIEF COMPLAINT / ID problem:    Chief Complaint   Patient presents with   [address] from Hospital     Diabetic foot infection Left foot     HISTORY OF PRESENT ILLNESS / Interval history:   Here for follow up on Left foot infection and lisfranc amputation due to complications and non healing and gangrene and was d/c to NH on IV zOSYN and oral Linezolid and fluconazole, and Left FOOT doing better sutures intact and scant drainage he is non wt bearing and followed by  as out pt. PICC working ok no chills no fevers. Labs reviewed in detail.         Past Medical History:    Past Medical History:   Diagnosis Date    [facility] [address]     Arthritis     CAD (coronary artery disease)     Carotid stenosis     Diabetes mellitus with neurological manifestations, uncontrolled ([facility] [address] [facility]     Diarrhea     Hyperlipidemia     Hypertension     Obesity     Type II or unspecified type diabetes mellitus without mention of complication, not stated as uncontrolled        Past Surgical History:    Past Surgical History:   Procedure Laterality Date    CARDIAC CATHETERIZATION      CAROTID ENDARTERECTOMY Right 4-9-2015    CHOLECYSTECTOMY      CO OFFICE/OUTPT VISIT,PROCEDURE ONLY Left 10/5/2018    AMPUTATION LEFT GREAT DIGIT performed by Iraj Barnard DPM at [place] [address] OFFICE/OUTPT [address] [facility] [address] Left 10/22/2018    INCISION AND DRAINAGE LEFT FOOT FIRST AND SECOND RAY AMPUTATION (DIGITS ONE, TWO AND METARSAL ONE AND TWO) performed by Iraj Barnard DPM at [place] [address] OFFICE[facility]OUTPT [facility] Left 10/26/2018    LEFT FOOT WOUND DEBRIDEMENT WITH PRIMARY CLOSURE performed by Iraj Barnard DPM at [place] [address] OFFICE/OUTPT [address] [place], [place] [address]b [facility] Left 11/2/2018    INCISION AND DRAINAGE WITH TRANSMETATARSAL CONVERTED TO [facility]ART  AMPUTATION LEFT FOOT performed by Aruna Garcia Influenza, Quadv, 6 mo and older, IM, PF (Flulaval, Fluarix) 10/24/2018    Tdap (Boostrix, Adacel) 10/02/2018           Social History:   Social History     Social History    Marital status: Single     Spouse name: N/A    Number of children: N/A    Years of education: N/A     Social History Main Topics    Smoking status: Never Smoker    Smokeless tobacco: Never Used    Alcohol use No    Drug use: No      Comment: QUIT 4 MONTHS     Sexual activity: Not Currently     Partners: Female     Other Topics Concern    None     Social History Narrative    None     Family History   Problem Relation Age of Onset    High Blood Pressure Mother          REVIEW OF SYSTEMS:    No fevers, chills, sweats. No weight loss. No visual change, eye pain, eye discharge. No oral lesions, sore throat, dysphagia. Denies cough / sputum. Denies chest pain, palpitations. Denies nausea, vomiting, abdominal pain, no diarrhea. Denies dysuria or change in urinary function. Denies joint swelling or pain. No myalgia, arthralgia. Denies focal weakness, sensory change or other neurologic symptoms  No lymph node swelling or tenderness. No symptoms endocrinopathy. No symptoms hematologic disease.   Left foot swelling and pain and scant drainage+    PHYSICAL EXAM:    Vitals:    /74 (Site: Left Upper Arm, Position: Sitting, Cuff Size: Large Adult)   Pulse 92   Temp 98.2 °F (36.8 °C) (Oral)   Ht 5' 6\" (1.676 m)   SpO2 99%   BMI 41.28 kg/m²   General Appearance: alert,  In  acute distress, +  pallor, no icterus   Skin: warm and dry, no rash or erythema  Head: normocephalic and atraumatic  Eyes: pupils equal, round, and reactive to light, conjunctivae normal  ENT: tympanic membrane, external ear and ear canal normal bilaterally, nose without deformity, nasal mucosa and turbinates normal without polyps  Neck: supple and non-tender without mass, no thyromegaly or thyroid nodules, no cervical lymphadenopathy  Pulmonary/Chest: clear to auscultation bilaterally- no wheezes, rales or rhonchi, normal air movement,  Cardiovascular: normal rate, regular rhythm, normal S1 and S2, no murmurs, rubs, clicks, or gallops,   Abdomen: soft, non-tender, non-distended, normal bowel sounds, no masses or organomegaly  Extremities: no cyanosis, clubbing or edema  Musculoskeletal: normal range of motion, no joint swelling, deformity or tenderness  Neurologic: reflexes normal and symmetric, no cranial nerve deficit,   Psych:  Orientation, sensorium, mood normal  PICC  lEFT FOOT TMA site sutures intact scant drainage_ edema+          DATA:    See EPIC  Lab Results   Component Value Date    WBC 8.4 11/12/2018    HGB 10.5 (L) 11/12/2018    HCT 32.1 (L) 11/12/2018    MCV 83.8 11/12/2018     11/12/2018     Lab Results   Component Value Date    CREATININE 0.9 11/19/2018    BUN 20 11/19/2018     (L) 11/19/2018    K 4.9 11/19/2018    CL 93 (L) 11/19/2018    CO2 20 (L) 11/19/2018       Hepatic Function Panel:   Lab Results   Component Value Date    ALKPHOS 125 11/19/2018    ALT 31 11/19/2018    AST 41 11/19/2018    PROT 8.1 11/19/2018    BILITOT 0.3 11/19/2018    LABALBU 3.5 11/19/2018     UA:  Lab Results   Component Value Date    COLORU YELLOW 10/25/2018    CLARITYU CLOUDY 10/25/2018    GLUCOSEU Negative 10/25/2018    BILIRUBINUR Negative 10/25/2018    KETUA Negative 10/25/2018    SPECGRAV 1.017 10/25/2018    BLOODU MODERATE 10/25/2018    PHUR 5.0 10/25/2018    PROTEINU 100 10/25/2018    UROBILINOGEN 1.0 10/25/2018    NITRU Negative 10/25/2018    LEUKOCYTESUR Negative 10/25/2018    LABMICR YES 10/25/2018    URINETYPE Not Specified 12/01/2017        No orders to display      FINAL DIAGNOSIS:       A. Metatarsal base, left third, amputation:       - Fibroadipose and chondro-osseous tissue with reactive changes.       - Negative for significant inflammation.       - Negative for malignancy.     B.  Foot, left, bone and tissue, amputation:       - Acute

## 2019-01-30 ENCOUNTER — HOSPITAL ENCOUNTER (EMERGENCY)
Age: 49
Discharge: HOME | End: 2019-01-30
Attending: EMERGENCY MEDICINE
Payer: COMMERCIAL

## 2019-01-30 ENCOUNTER — APPOINTMENT (OUTPATIENT)
Dept: CT IMAGING | Age: 49
End: 2019-01-30
Payer: COMMERCIAL

## 2019-01-30 VITALS
HEIGHT: 66 IN | DIASTOLIC BLOOD PRESSURE: 52 MMHG | BODY MASS INDEX: 42.87 KG/M2 | TEMPERATURE: 97.7 F | HEART RATE: 79 BPM | RESPIRATION RATE: 19 BRPM | WEIGHT: 266.76 LBS | SYSTOLIC BLOOD PRESSURE: 107 MMHG | OXYGEN SATURATION: 95 %

## 2019-01-30 DIAGNOSIS — R10.11 RIGHT UPPER QUADRANT ABDOMINAL PAIN: Primary | ICD-10-CM

## 2019-01-30 LAB
A/G RATIO: 0.9 (ref 1.1–2.2)
ALBUMIN SERPL-MCNC: 3.8 G/DL (ref 3.4–5)
ALP BLD-CCNC: 105 U/L (ref 40–129)
ALT SERPL-CCNC: 40 U/L (ref 10–40)
ANION GAP SERPL CALCULATED.3IONS-SCNC: 13 MMOL/L (ref 3–16)
AST SERPL-CCNC: 24 U/L (ref 15–37)
BASOPHILS ABSOLUTE: 0 K/UL (ref 0–0.2)
BASOPHILS RELATIVE PERCENT: 0.4 %
BILIRUB SERPL-MCNC: 0.3 MG/DL (ref 0–1)
BUN BLDV-MCNC: 21 MG/DL (ref 7–20)
CALCIUM SERPL-MCNC: 9.4 MG/DL (ref 8.3–10.6)
CHLORIDE BLD-SCNC: 95 MMOL/L (ref 99–110)
CO2: 24 MMOL/L (ref 21–32)
CREAT SERPL-MCNC: 0.9 MG/DL (ref 0.9–1.3)
EOSINOPHILS ABSOLUTE: 0.2 K/UL (ref 0–0.6)
EOSINOPHILS RELATIVE PERCENT: 1.9 %
GFR AFRICAN AMERICAN: >60
GFR NON-AFRICAN AMERICAN: >60
GLOBULIN: 4.3 G/DL
GLUCOSE BLD-MCNC: 226 MG/DL (ref 70–99)
HCT VFR BLD CALC: 39.3 % (ref 40.5–52.5)
HEMOGLOBIN: 12.9 G/DL (ref 13.5–17.5)
LIPASE: 26 U/L (ref 13–60)
LYMPHOCYTES ABSOLUTE: 3 K/UL (ref 1–5.1)
LYMPHOCYTES RELATIVE PERCENT: 28.1 %
MCH RBC QN AUTO: 27.4 PG (ref 26–34)
MCHC RBC AUTO-ENTMCNC: 32.8 G/DL (ref 31–36)
MCV RBC AUTO: 83.6 FL (ref 80–100)
MONOCYTES ABSOLUTE: 0.6 K/UL (ref 0–1.3)
MONOCYTES RELATIVE PERCENT: 6.1 %
NEUTROPHILS ABSOLUTE: 6.7 K/UL (ref 1.7–7.7)
NEUTROPHILS RELATIVE PERCENT: 63.5 %
PDW BLD-RTO: 15.7 % (ref 12.4–15.4)
PLATELET # BLD: 196 K/UL (ref 135–450)
PMV BLD AUTO: 9.6 FL (ref 5–10.5)
POTASSIUM SERPL-SCNC: 4 MMOL/L (ref 3.5–5.1)
RBC # BLD: 4.7 M/UL (ref 4.2–5.9)
SODIUM BLD-SCNC: 132 MMOL/L (ref 136–145)
TOTAL PROTEIN: 8.1 G/DL (ref 6.4–8.2)
WBC # BLD: 10.6 K/UL (ref 4–11)

## 2019-01-30 PROCEDURE — 96374 THER/PROPH/DIAG INJ IV PUSH: CPT

## 2019-01-30 PROCEDURE — 83690 ASSAY OF LIPASE: CPT

## 2019-01-30 PROCEDURE — 85025 COMPLETE CBC W/AUTO DIFF WBC: CPT

## 2019-01-30 PROCEDURE — 74177 CT ABD & PELVIS W/CONTRAST: CPT

## 2019-01-30 PROCEDURE — 80053 COMPREHEN METABOLIC PANEL: CPT

## 2019-01-30 PROCEDURE — 6360000004 HC RX CONTRAST MEDICATION: Performed by: EMERGENCY MEDICINE

## 2019-01-30 PROCEDURE — 96375 TX/PRO/DX INJ NEW DRUG ADDON: CPT

## 2019-01-30 PROCEDURE — 36415 COLL VENOUS BLD VENIPUNCTURE: CPT

## 2019-01-30 PROCEDURE — 99284 EMERGENCY DEPT VISIT MOD MDM: CPT

## 2019-01-30 PROCEDURE — 6360000002 HC RX W HCPCS: Performed by: EMERGENCY MEDICINE

## 2019-01-30 RX ORDER — ONDANSETRON 4 MG/1
4 TABLET, ORALLY DISINTEGRATING ORAL EVERY 8 HOURS PRN
Qty: 20 TABLET | Refills: 0 | Status: SHIPPED | OUTPATIENT
Start: 2019-01-30 | End: 2019-03-06

## 2019-01-30 RX ORDER — KETOROLAC TROMETHAMINE 10 MG/1
10 TABLET, FILM COATED ORAL 3 TIMES DAILY
Qty: 12 TABLET | Refills: 0 | Status: SHIPPED | OUTPATIENT
Start: 2019-01-30 | End: 2019-03-06

## 2019-01-30 RX ORDER — ONDANSETRON 2 MG/ML
4 INJECTION INTRAMUSCULAR; INTRAVENOUS ONCE
Status: COMPLETED | OUTPATIENT
Start: 2019-01-30 | End: 2019-01-30

## 2019-01-30 RX ORDER — KETOROLAC TROMETHAMINE 30 MG/ML
30 INJECTION, SOLUTION INTRAMUSCULAR; INTRAVENOUS ONCE
Status: COMPLETED | OUTPATIENT
Start: 2019-01-30 | End: 2019-01-30

## 2019-01-30 RX ADMIN — ONDANSETRON 4 MG: 2 INJECTION INTRAMUSCULAR; INTRAVENOUS at 03:35

## 2019-01-30 RX ADMIN — KETOROLAC TROMETHAMINE 30 MG: 30 INJECTION, SOLUTION INTRAMUSCULAR at 03:35

## 2019-01-30 RX ADMIN — IOPAMIDOL 75 ML: 755 INJECTION, SOLUTION INTRAVENOUS at 04:19

## 2019-01-30 ASSESSMENT — PAIN DESCRIPTION - DESCRIPTORS: DESCRIPTORS: ACHING;CRAMPING

## 2019-01-30 ASSESSMENT — PAIN DESCRIPTION - ONSET: ONSET: GRADUAL

## 2019-01-30 ASSESSMENT — PAIN SCALES - GENERAL
PAINLEVEL_OUTOF10: 6
PAINLEVEL_OUTOF10: 4
PAINLEVEL_OUTOF10: 6

## 2019-01-30 ASSESSMENT — PAIN DESCRIPTION - FREQUENCY: FREQUENCY: CONTINUOUS

## 2019-01-30 ASSESSMENT — PAIN DESCRIPTION - PAIN TYPE
TYPE: ACUTE PAIN
TYPE: ACUTE PAIN

## 2019-01-30 ASSESSMENT — PAIN DESCRIPTION - LOCATION
LOCATION: ABDOMEN
LOCATION: ABDOMEN

## 2019-01-30 ASSESSMENT — PAIN DESCRIPTION - PROGRESSION: CLINICAL_PROGRESSION: NOT CHANGED

## 2019-01-30 ASSESSMENT — PAIN DESCRIPTION - ORIENTATION: ORIENTATION: LEFT;LOWER

## 2019-03-06 ENCOUNTER — HOSPITAL ENCOUNTER (INPATIENT)
Age: 49
LOS: 3 days | Discharge: HOME | DRG: 181 | End: 2019-03-09
Attending: EMERGENCY MEDICINE | Admitting: INTERNAL MEDICINE
Payer: COMMERCIAL

## 2019-03-06 DIAGNOSIS — I70.209 ARTERIAL OCCLUSION, LOWER EXTREMITY (HCC): Primary | ICD-10-CM

## 2019-03-06 PROBLEM — I99.8 LOWER LIMB ISCHEMIA: Status: ACTIVE | Noted: 2019-03-06

## 2019-03-06 LAB
ANION GAP SERPL CALCULATED.3IONS-SCNC: 13 MMOL/L (ref 3–16)
APTT: 38.6 SEC (ref 26–36)
APTT: 43.5 SEC (ref 26–36)
BASOPHILS ABSOLUTE: 0 K/UL (ref 0–0.2)
BASOPHILS RELATIVE PERCENT: 0.4 %
BUN BLDV-MCNC: 26 MG/DL (ref 7–20)
CALCIUM SERPL-MCNC: 9.5 MG/DL (ref 8.3–10.6)
CHLORIDE BLD-SCNC: 97 MMOL/L (ref 99–110)
CO2: 24 MMOL/L (ref 21–32)
CREAT SERPL-MCNC: 0.8 MG/DL (ref 0.9–1.3)
EOSINOPHILS ABSOLUTE: 0.1 K/UL (ref 0–0.6)
EOSINOPHILS RELATIVE PERCENT: 1.3 %
GFR AFRICAN AMERICAN: >60
GFR NON-AFRICAN AMERICAN: >60
GLUCOSE BLD-MCNC: 295 MG/DL (ref 70–99)
GLUCOSE BLD-MCNC: 377 MG/DL (ref 70–99)
GLUCOSE BLD-MCNC: 381 MG/DL (ref 70–99)
HCT VFR BLD CALC: 37.1 % (ref 40.5–52.5)
HEMOGLOBIN: 12.2 G/DL (ref 13.5–17.5)
INR BLD: 0.99 (ref 0.86–1.14)
LACTIC ACID: 0.9 MMOL/L (ref 0.4–2)
LYMPHOCYTES ABSOLUTE: 2 K/UL (ref 1–5.1)
LYMPHOCYTES RELATIVE PERCENT: 22.2 %
MCH RBC QN AUTO: 27.4 PG (ref 26–34)
MCHC RBC AUTO-ENTMCNC: 32.9 G/DL (ref 31–36)
MCV RBC AUTO: 83.2 FL (ref 80–100)
MONOCYTES ABSOLUTE: 0.6 K/UL (ref 0–1.3)
MONOCYTES RELATIVE PERCENT: 6.6 %
NEUTROPHILS ABSOLUTE: 6.2 K/UL (ref 1.7–7.7)
NEUTROPHILS RELATIVE PERCENT: 69.5 %
PDW BLD-RTO: 14.5 % (ref 12.4–15.4)
PERFORMED ON: ABNORMAL
PERFORMED ON: ABNORMAL
PLATELET # BLD: 252 K/UL (ref 135–450)
PMV BLD AUTO: 8.7 FL (ref 5–10.5)
POTASSIUM SERPL-SCNC: 4.6 MMOL/L (ref 3.5–5.1)
PROTHROMBIN TIME: 11.3 SEC (ref 9.8–13)
RBC # BLD: 4.46 M/UL (ref 4.2–5.9)
SODIUM BLD-SCNC: 134 MMOL/L (ref 136–145)
WBC # BLD: 9 K/UL (ref 4–11)

## 2019-03-06 PROCEDURE — 6370000000 HC RX 637 (ALT 250 FOR IP): Performed by: INTERNAL MEDICINE

## 2019-03-06 PROCEDURE — 6370000000 HC RX 637 (ALT 250 FOR IP): Performed by: NURSE PRACTITIONER

## 2019-03-06 PROCEDURE — 99285 EMERGENCY DEPT VISIT HI MDM: CPT

## 2019-03-06 PROCEDURE — 96365 THER/PROPH/DIAG IV INF INIT: CPT

## 2019-03-06 PROCEDURE — 99222 1ST HOSP IP/OBS MODERATE 55: CPT | Performed by: NURSE PRACTITIONER

## 2019-03-06 PROCEDURE — 6360000002 HC RX W HCPCS: Performed by: INTERNAL MEDICINE

## 2019-03-06 PROCEDURE — 6360000002 HC RX W HCPCS: Performed by: NURSE PRACTITIONER

## 2019-03-06 PROCEDURE — 85610 PROTHROMBIN TIME: CPT

## 2019-03-06 PROCEDURE — 2060000000 HC ICU INTERMEDIATE R&B

## 2019-03-06 PROCEDURE — 83605 ASSAY OF LACTIC ACID: CPT

## 2019-03-06 PROCEDURE — 85025 COMPLETE CBC W/AUTO DIFF WBC: CPT

## 2019-03-06 PROCEDURE — 36415 COLL VENOUS BLD VENIPUNCTURE: CPT

## 2019-03-06 PROCEDURE — 80048 BASIC METABOLIC PNL TOTAL CA: CPT

## 2019-03-06 PROCEDURE — 83036 HEMOGLOBIN GLYCOSYLATED A1C: CPT

## 2019-03-06 PROCEDURE — 85730 THROMBOPLASTIN TIME PARTIAL: CPT

## 2019-03-06 RX ORDER — NICOTINE POLACRILEX 4 MG
15 LOZENGE BUCCAL PRN
Status: DISCONTINUED | OUTPATIENT
Start: 2019-03-06 | End: 2019-03-09 | Stop reason: HOSPADM

## 2019-03-06 RX ORDER — AMOXICILLIN AND CLAVULANATE POTASSIUM 875; 125 MG/1; MG/1
1 TABLET, FILM COATED ORAL 2 TIMES DAILY
Status: DISCONTINUED | OUTPATIENT
Start: 2019-03-06 | End: 2019-03-09 | Stop reason: HOSPADM

## 2019-03-06 RX ORDER — CHLORTHALIDONE 50 MG/1
50 TABLET ORAL DAILY
Refills: 0 | COMMUNITY
Start: 2019-01-10 | End: 2019-05-15

## 2019-03-06 RX ORDER — ATORVASTATIN CALCIUM 20 MG/1
20 TABLET, FILM COATED ORAL DAILY
Refills: 1 | Status: ON HOLD | COMMUNITY
Start: 2019-01-10 | End: 2019-03-09 | Stop reason: HOSPADM

## 2019-03-06 RX ORDER — HEPARIN SODIUM 1000 [USP'U]/ML
2000 INJECTION, SOLUTION INTRAVENOUS; SUBCUTANEOUS ONCE
Status: COMPLETED | OUTPATIENT
Start: 2019-03-06 | End: 2019-03-06

## 2019-03-06 RX ORDER — DEXTROSE MONOHYDRATE 25 G/50ML
12.5 INJECTION, SOLUTION INTRAVENOUS PRN
Status: DISCONTINUED | OUTPATIENT
Start: 2019-03-06 | End: 2019-03-09 | Stop reason: HOSPADM

## 2019-03-06 RX ORDER — ONDANSETRON 2 MG/ML
4 INJECTION INTRAMUSCULAR; INTRAVENOUS EVERY 6 HOURS PRN
Status: DISCONTINUED | OUTPATIENT
Start: 2019-03-06 | End: 2019-03-09 | Stop reason: HOSPADM

## 2019-03-06 RX ORDER — COLLAGENASE SANTYL 250 [ARB'U]/G
1 OINTMENT TOPICAL DAILY
COMMUNITY
Start: 2019-02-13 | End: 2019-05-13

## 2019-03-06 RX ORDER — LANOLIN ALCOHOL/MO/W.PET/CERES
1000 CREAM (GRAM) TOPICAL DAILY
Status: DISCONTINUED | OUTPATIENT
Start: 2019-03-06 | End: 2019-03-09 | Stop reason: HOSPADM

## 2019-03-06 RX ORDER — LISINOPRIL 20 MG/1
40 TABLET ORAL EVERY MORNING
Status: DISCONTINUED | OUTPATIENT
Start: 2019-03-07 | End: 2019-03-09 | Stop reason: HOSPADM

## 2019-03-06 RX ORDER — SODIUM CHLORIDE 0.9 % (FLUSH) 0.9 %
10 SYRINGE (ML) INJECTION EVERY 12 HOURS SCHEDULED
Status: DISCONTINUED | OUTPATIENT
Start: 2019-03-06 | End: 2019-03-09 | Stop reason: HOSPADM

## 2019-03-06 RX ORDER — DEXTROSE MONOHYDRATE 50 MG/ML
100 INJECTION, SOLUTION INTRAVENOUS PRN
Status: DISCONTINUED | OUTPATIENT
Start: 2019-03-06 | End: 2019-03-09 | Stop reason: HOSPADM

## 2019-03-06 RX ORDER — RANITIDINE 150 MG/1
150 TABLET ORAL 2 TIMES DAILY
Refills: 1 | COMMUNITY
Start: 2019-02-04

## 2019-03-06 RX ORDER — FAMOTIDINE 20 MG/1
20 TABLET, FILM COATED ORAL 2 TIMES DAILY
Status: DISCONTINUED | OUTPATIENT
Start: 2019-03-06 | End: 2019-03-09 | Stop reason: HOSPADM

## 2019-03-06 RX ORDER — METOPROLOL SUCCINATE 50 MG/1
50 TABLET, EXTENDED RELEASE ORAL EVERY MORNING
Status: DISCONTINUED | OUTPATIENT
Start: 2019-03-07 | End: 2019-03-08

## 2019-03-06 RX ORDER — ATORVASTATIN CALCIUM 20 MG/1
20 TABLET, FILM COATED ORAL DAILY
Status: DISCONTINUED | OUTPATIENT
Start: 2019-03-06 | End: 2019-03-09 | Stop reason: HOSPADM

## 2019-03-06 RX ORDER — M-VIT,TX,IRON,MINS/CALC/FOLIC 27MG-0.4MG
1 TABLET ORAL DAILY
COMMUNITY

## 2019-03-06 RX ORDER — LANOLIN ALCOHOL/MO/W.PET/CERES
1000 CREAM (GRAM) TOPICAL DAILY
Refills: 2 | COMMUNITY
Start: 2019-02-20

## 2019-03-06 RX ORDER — HEPARIN SODIUM 10000 [USP'U]/100ML
12.3 INJECTION, SOLUTION INTRAVENOUS CONTINUOUS
Status: DISCONTINUED | OUTPATIENT
Start: 2019-03-06 | End: 2019-03-08

## 2019-03-06 RX ORDER — AMOXICILLIN AND CLAVULANATE POTASSIUM 875; 125 MG/1; MG/1
1 TABLET, FILM COATED ORAL 2 TIMES DAILY
Refills: 0 | COMMUNITY
Start: 2019-03-01 | End: 2019-03-10

## 2019-03-06 RX ORDER — SODIUM CHLORIDE 0.9 % (FLUSH) 0.9 %
10 SYRINGE (ML) INJECTION PRN
Status: DISCONTINUED | OUTPATIENT
Start: 2019-03-06 | End: 2019-03-09 | Stop reason: HOSPADM

## 2019-03-06 RX ORDER — M-VIT,TX,IRON,MINS/CALC/FOLIC 27MG-0.4MG
1 TABLET ORAL DAILY
Status: DISCONTINUED | OUTPATIENT
Start: 2019-03-06 | End: 2019-03-09 | Stop reason: HOSPADM

## 2019-03-06 RX ORDER — INSULIN GLARGINE 100 [IU]/ML
10 INJECTION, SOLUTION SUBCUTANEOUS NIGHTLY
Status: DISCONTINUED | OUTPATIENT
Start: 2019-03-06 | End: 2019-03-07

## 2019-03-06 RX ORDER — CHLORTHALIDONE 25 MG/1
50 TABLET ORAL DAILY
Status: DISCONTINUED | OUTPATIENT
Start: 2019-03-06 | End: 2019-03-09 | Stop reason: HOSPADM

## 2019-03-06 RX ADMIN — MULTIPLE VITAMINS W/ MINERALS TAB 1 TABLET: TAB at 17:59

## 2019-03-06 RX ADMIN — FAMOTIDINE 20 MG: 20 TABLET ORAL at 21:33

## 2019-03-06 RX ADMIN — CYANOCOBALAMIN TAB 1000 MCG 1000 MCG: 1000 TAB at 17:59

## 2019-03-06 RX ADMIN — CHLORTHALIDONE 50 MG: 25 TABLET ORAL at 17:58

## 2019-03-06 RX ADMIN — HEPARIN SODIUM AND DEXTROSE 10 ML/HR: 10000; 5 INJECTION INTRAVENOUS at 14:56

## 2019-03-06 RX ADMIN — INSULIN HUMAN 10 UNITS: 100 INJECTION, SOLUTION PARENTERAL at 14:59

## 2019-03-06 RX ADMIN — INSULIN GLARGINE 10 UNITS: 100 INJECTION, SOLUTION SUBCUTANEOUS at 21:42

## 2019-03-06 RX ADMIN — INSULIN LISPRO 6 UNITS: 100 INJECTION, SOLUTION INTRAVENOUS; SUBCUTANEOUS at 17:59

## 2019-03-06 RX ADMIN — ATORVASTATIN CALCIUM 20 MG: 20 TABLET, FILM COATED ORAL at 17:59

## 2019-03-06 RX ADMIN — INSULIN LISPRO 5 UNITS: 100 INJECTION, SOLUTION INTRAVENOUS; SUBCUTANEOUS at 21:34

## 2019-03-06 RX ADMIN — COLLAGENASE SANTYL 1 G: 250 OINTMENT TOPICAL at 17:59

## 2019-03-06 RX ADMIN — AMOXICILLIN AND CLAVULANATE POTASSIUM 1 TABLET: 875; 125 TABLET, FILM COATED ORAL at 21:46

## 2019-03-06 RX ADMIN — HEPARIN SODIUM 2000 UNITS: 1000 INJECTION INTRAVENOUS; SUBCUTANEOUS at 21:34

## 2019-03-06 ASSESSMENT — ENCOUNTER SYMPTOMS
GASTROINTESTINAL NEGATIVE: 1
RESPIRATORY NEGATIVE: 1
COLOR CHANGE: 1

## 2019-03-06 ASSESSMENT — PAIN DESCRIPTION - PROGRESSION
CLINICAL_PROGRESSION: GRADUALLY IMPROVING
CLINICAL_PROGRESSION: NOT CHANGED

## 2019-03-06 ASSESSMENT — PAIN DESCRIPTION - FREQUENCY: FREQUENCY: CONTINUOUS

## 2019-03-06 ASSESSMENT — PAIN DESCRIPTION - DESCRIPTORS
DESCRIPTORS: BURNING
DESCRIPTORS: BURNING

## 2019-03-06 ASSESSMENT — PAIN SCALES - GENERAL
PAINLEVEL_OUTOF10: 5
PAINLEVEL_OUTOF10: 8

## 2019-03-06 ASSESSMENT — PAIN DESCRIPTION - ORIENTATION
ORIENTATION: RIGHT
ORIENTATION: RIGHT

## 2019-03-06 ASSESSMENT — PAIN DESCRIPTION - LOCATION
LOCATION: FOOT
LOCATION: FOOT

## 2019-03-06 ASSESSMENT — PAIN DESCRIPTION - PAIN TYPE
TYPE: ACUTE PAIN
TYPE: ACUTE PAIN

## 2019-03-06 ASSESSMENT — PAIN - FUNCTIONAL ASSESSMENT: PAIN_FUNCTIONAL_ASSESSMENT: 0-10

## 2019-03-07 ENCOUNTER — APPOINTMENT (OUTPATIENT)
Dept: GENERAL RADIOLOGY | Age: 49
DRG: 181 | End: 2019-03-07
Payer: COMMERCIAL

## 2019-03-07 LAB
ANION GAP SERPL CALCULATED.3IONS-SCNC: 13 MMOL/L (ref 3–16)
APTT: 71.1 SEC (ref 26–36)
APTT: 72.3 SEC (ref 26–36)
BUN BLDV-MCNC: 21 MG/DL (ref 7–20)
CALCIUM SERPL-MCNC: 9.3 MG/DL (ref 8.3–10.6)
CHLORIDE BLD-SCNC: 97 MMOL/L (ref 99–110)
CO2: 24 MMOL/L (ref 21–32)
CREAT SERPL-MCNC: 0.7 MG/DL (ref 0.9–1.3)
ESTIMATED AVERAGE GLUCOSE: 366.6 MG/DL
GFR AFRICAN AMERICAN: >60
GFR NON-AFRICAN AMERICAN: >60
GLUCOSE BLD-MCNC: 207 MG/DL (ref 70–99)
GLUCOSE BLD-MCNC: 242 MG/DL (ref 70–99)
GLUCOSE BLD-MCNC: 321 MG/DL (ref 70–99)
GLUCOSE BLD-MCNC: 343 MG/DL (ref 70–99)
GLUCOSE BLD-MCNC: 366 MG/DL (ref 70–99)
HBA1C MFR BLD: 14.4 %
HCT VFR BLD CALC: 33.9 % (ref 40.5–52.5)
HEMOGLOBIN: 11.3 G/DL (ref 13.5–17.5)
MCH RBC QN AUTO: 27.7 PG (ref 26–34)
MCHC RBC AUTO-ENTMCNC: 33.3 G/DL (ref 31–36)
MCV RBC AUTO: 83.1 FL (ref 80–100)
PDW BLD-RTO: 14.7 % (ref 12.4–15.4)
PERFORMED ON: ABNORMAL
PLATELET # BLD: 240 K/UL (ref 135–450)
PMV BLD AUTO: 9 FL (ref 5–10.5)
POTASSIUM REFLEX MAGNESIUM: 4.1 MMOL/L (ref 3.5–5.1)
RBC # BLD: 4.08 M/UL (ref 4.2–5.9)
SODIUM BLD-SCNC: 134 MMOL/L (ref 136–145)
WBC # BLD: 8.7 K/UL (ref 4–11)

## 2019-03-07 PROCEDURE — 6370000000 HC RX 637 (ALT 250 FOR IP): Performed by: INTERNAL MEDICINE

## 2019-03-07 PROCEDURE — 80048 BASIC METABOLIC PNL TOTAL CA: CPT

## 2019-03-07 PROCEDURE — APPNB30 APP NON BILLABLE TIME 0-30 MINS: Performed by: NURSE PRACTITIONER

## 2019-03-07 PROCEDURE — 73630 X-RAY EXAM OF FOOT: CPT

## 2019-03-07 PROCEDURE — 36415 COLL VENOUS BLD VENIPUNCTURE: CPT

## 2019-03-07 PROCEDURE — 85730 THROMBOPLASTIN TIME PARTIAL: CPT

## 2019-03-07 PROCEDURE — 2060000000 HC ICU INTERMEDIATE R&B

## 2019-03-07 PROCEDURE — 94760 N-INVAS EAR/PLS OXIMETRY 1: CPT

## 2019-03-07 PROCEDURE — 99232 SBSQ HOSP IP/OBS MODERATE 35: CPT | Performed by: SURGERY

## 2019-03-07 PROCEDURE — 85027 COMPLETE CBC AUTOMATED: CPT

## 2019-03-07 PROCEDURE — APPSS30 APP SPLIT SHARED TIME 16-30 MINUTES: Performed by: NURSE PRACTITIONER

## 2019-03-07 PROCEDURE — 6360000002 HC RX W HCPCS: Performed by: INTERNAL MEDICINE

## 2019-03-07 RX ORDER — INSULIN GLARGINE 100 [IU]/ML
30 INJECTION, SOLUTION SUBCUTANEOUS NIGHTLY
Status: DISCONTINUED | OUTPATIENT
Start: 2019-03-07 | End: 2019-03-07

## 2019-03-07 RX ORDER — INSULIN GLARGINE 100 [IU]/ML
40 INJECTION, SOLUTION SUBCUTANEOUS NIGHTLY
Status: DISCONTINUED | OUTPATIENT
Start: 2019-03-07 | End: 2019-03-08

## 2019-03-07 RX ORDER — METOPROLOL SUCCINATE 25 MG/1
25 TABLET, EXTENDED RELEASE ORAL DAILY
COMMUNITY

## 2019-03-07 RX ADMIN — MULTIPLE VITAMINS W/ MINERALS TAB 1 TABLET: TAB at 09:46

## 2019-03-07 RX ADMIN — INSULIN LISPRO 10 UNITS: 100 INJECTION, SOLUTION INTRAVENOUS; SUBCUTANEOUS at 17:28

## 2019-03-07 RX ADMIN — FAMOTIDINE 20 MG: 20 TABLET ORAL at 08:42

## 2019-03-07 RX ADMIN — INSULIN LISPRO 12 UNITS: 100 INJECTION, SOLUTION INTRAVENOUS; SUBCUTANEOUS at 11:50

## 2019-03-07 RX ADMIN — CHLORTHALIDONE 50 MG: 25 TABLET ORAL at 08:41

## 2019-03-07 RX ADMIN — ATORVASTATIN CALCIUM 20 MG: 20 TABLET, FILM COATED ORAL at 08:42

## 2019-03-07 RX ADMIN — LISINOPRIL 40 MG: 20 TABLET ORAL at 08:41

## 2019-03-07 RX ADMIN — COLLAGENASE SANTYL 1 G: 250 OINTMENT TOPICAL at 08:44

## 2019-03-07 RX ADMIN — AMOXICILLIN AND CLAVULANATE POTASSIUM 1 TABLET: 875; 125 TABLET, FILM COATED ORAL at 22:27

## 2019-03-07 RX ADMIN — INSULIN LISPRO 8 UNITS: 100 INJECTION, SOLUTION INTRAVENOUS; SUBCUTANEOUS at 08:41

## 2019-03-07 RX ADMIN — CYANOCOBALAMIN TAB 1000 MCG 1000 MCG: 1000 TAB at 09:46

## 2019-03-07 RX ADMIN — INSULIN LISPRO 3 UNITS: 100 INJECTION, SOLUTION INTRAVENOUS; SUBCUTANEOUS at 23:28

## 2019-03-07 RX ADMIN — HEPARIN SODIUM AND DEXTROSE 12.3 ML/HR: 10000; 5 INJECTION INTRAVENOUS at 11:50

## 2019-03-07 RX ADMIN — INSULIN LISPRO 6 UNITS: 100 INJECTION, SOLUTION INTRAVENOUS; SUBCUTANEOUS at 17:29

## 2019-03-07 RX ADMIN — INSULIN GLARGINE 40 UNITS: 100 INJECTION, SOLUTION SUBCUTANEOUS at 23:25

## 2019-03-07 RX ADMIN — FAMOTIDINE 20 MG: 20 TABLET ORAL at 22:25

## 2019-03-07 RX ADMIN — METOPROLOL SUCCINATE 50 MG: 50 TABLET, EXTENDED RELEASE ORAL at 08:42

## 2019-03-07 RX ADMIN — AMOXICILLIN AND CLAVULANATE POTASSIUM 1 TABLET: 875; 125 TABLET, FILM COATED ORAL at 09:46

## 2019-03-07 ASSESSMENT — PAIN SCALES - GENERAL
PAINLEVEL_OUTOF10: 0

## 2019-03-08 PROBLEM — I70.223 ATHEROSCLEROSIS OF NATIVE ARTERY OF BOTH LOWER EXTREMITIES WITH REST PAIN (HCC): Status: ACTIVE | Noted: 2018-09-28

## 2019-03-08 LAB
GLUCOSE BLD-MCNC: 122 MG/DL (ref 70–99)
GLUCOSE BLD-MCNC: 255 MG/DL (ref 70–99)
GLUCOSE BLD-MCNC: 261 MG/DL (ref 70–99)
GLUCOSE BLD-MCNC: 320 MG/DL (ref 70–99)
PERFORMED ON: ABNORMAL
POC ACT LR: 339 SEC

## 2019-03-08 PROCEDURE — 37229 HC TIB PER TERRITORY ATHER: CPT

## 2019-03-08 PROCEDURE — 37225 HC FEM POP TERRITORY ATHERECTOMY: CPT

## 2019-03-08 PROCEDURE — C1725 CATH, TRANSLUMIN NON-LASER: HCPCS

## 2019-03-08 PROCEDURE — 37211 THROMBOLYTIC ART THERAPY: CPT

## 2019-03-08 PROCEDURE — 75774 ARTERY X-RAY EACH VESSEL: CPT

## 2019-03-08 PROCEDURE — 85347 COAGULATION TIME ACTIVATED: CPT

## 2019-03-08 PROCEDURE — C1894 INTRO/SHEATH, NON-LASER: HCPCS

## 2019-03-08 PROCEDURE — 6370000000 HC RX 637 (ALT 250 FOR IP)

## 2019-03-08 PROCEDURE — 047M3Z1 DILATION OF RIGHT POPLITEAL ARTERY USING DRUG-COATED BALLOON, PERCUTANEOUS APPROACH: ICD-10-PCS | Performed by: SURGERY

## 2019-03-08 PROCEDURE — 75716 ARTERY X-RAYS ARMS/LEGS: CPT

## 2019-03-08 PROCEDURE — 04CM3ZZ EXTIRPATION OF MATTER FROM RIGHT POPLITEAL ARTERY, PERCUTANEOUS APPROACH: ICD-10-PCS | Performed by: SURGERY

## 2019-03-08 PROCEDURE — 3E05317 INTRODUCTION OF OTHER THROMBOLYTIC INTO PERIPHERAL ARTERY, PERCUTANEOUS APPROACH: ICD-10-PCS | Performed by: SURGERY

## 2019-03-08 PROCEDURE — 75625 CONTRAST EXAM ABDOMINL AORTA: CPT

## 2019-03-08 PROCEDURE — 1200000000 HC SEMI PRIVATE

## 2019-03-08 PROCEDURE — C1724 CATH, TRANS ATHEREC,ROTATION: HCPCS

## 2019-03-08 PROCEDURE — B41F1ZZ FLUOROSCOPY OF RIGHT LOWER EXTREMITY ARTERIES USING LOW OSMOLAR CONTRAST: ICD-10-PCS | Performed by: SURGERY

## 2019-03-08 PROCEDURE — 2580000003 HC RX 258: Performed by: INTERNAL MEDICINE

## 2019-03-08 PROCEDURE — C1769 GUIDE WIRE: HCPCS

## 2019-03-08 PROCEDURE — 2780000010 HC IMPLANT OTHER

## 2019-03-08 PROCEDURE — 2500000003 HC RX 250 WO HCPCS

## 2019-03-08 PROCEDURE — 6370000000 HC RX 637 (ALT 250 FOR IP): Performed by: INTERNAL MEDICINE

## 2019-03-08 PROCEDURE — 2709999900 HC NON-CHARGEABLE SUPPLY

## 2019-03-08 PROCEDURE — 2580000003 HC RX 258

## 2019-03-08 PROCEDURE — 04CP3ZZ EXTIRPATION OF MATTER FROM RIGHT ANTERIOR TIBIAL ARTERY, PERCUTANEOUS APPROACH: ICD-10-PCS | Performed by: SURGERY

## 2019-03-08 PROCEDURE — 6360000004 HC RX CONTRAST MEDICATION: Performed by: INTERNAL MEDICINE

## 2019-03-08 PROCEDURE — 047P3ZZ DILATION OF RIGHT ANTERIOR TIBIAL ARTERY, PERCUTANEOUS APPROACH: ICD-10-PCS | Performed by: SURGERY

## 2019-03-08 PROCEDURE — B4101ZZ FLUOROSCOPY OF ABDOMINAL AORTA USING LOW OSMOLAR CONTRAST: ICD-10-PCS | Performed by: SURGERY

## 2019-03-08 PROCEDURE — C2623 CATH, TRANSLUMIN, DRUG-COAT: HCPCS

## 2019-03-08 PROCEDURE — 99152 MOD SED SAME PHYS/QHP 5/>YRS: CPT

## 2019-03-08 PROCEDURE — 6360000002 HC RX W HCPCS

## 2019-03-08 PROCEDURE — 99153 MOD SED SAME PHYS/QHP EA: CPT

## 2019-03-08 PROCEDURE — C1887 CATHETER, GUIDING: HCPCS

## 2019-03-08 RX ORDER — INSULIN GLARGINE 100 [IU]/ML
50 INJECTION, SOLUTION SUBCUTANEOUS NIGHTLY
Status: DISCONTINUED | OUTPATIENT
Start: 2019-03-08 | End: 2019-03-09 | Stop reason: HOSPADM

## 2019-03-08 RX ORDER — CLOPIDOGREL BISULFATE 75 MG/1
75 TABLET ORAL DAILY
Qty: 30 TABLET | Refills: 3 | Status: SHIPPED | OUTPATIENT
Start: 2019-03-09

## 2019-03-08 RX ORDER — CLOPIDOGREL BISULFATE 75 MG/1
75 TABLET ORAL DAILY
Status: DISCONTINUED | OUTPATIENT
Start: 2019-03-09 | End: 2019-03-09 | Stop reason: HOSPADM

## 2019-03-08 RX ORDER — METOPROLOL SUCCINATE 25 MG/1
25 TABLET, EXTENDED RELEASE ORAL EVERY MORNING
Status: DISCONTINUED | OUTPATIENT
Start: 2019-03-08 | End: 2019-03-09 | Stop reason: HOSPADM

## 2019-03-08 RX ORDER — ISOSORBIDE MONONITRATE 30 MG/1
30 TABLET, EXTENDED RELEASE ORAL DAILY
Status: CANCELLED | OUTPATIENT
Start: 2019-03-08

## 2019-03-08 RX ORDER — ASPIRIN 81 MG/1
81 TABLET ORAL DAILY
Status: CANCELLED | OUTPATIENT
Start: 2019-03-08

## 2019-03-08 RX ADMIN — CHLORTHALIDONE 50 MG: 25 TABLET ORAL at 14:01

## 2019-03-08 RX ADMIN — CYANOCOBALAMIN TAB 1000 MCG 1000 MCG: 1000 TAB at 14:00

## 2019-03-08 RX ADMIN — INSULIN LISPRO 10 UNITS: 100 INJECTION, SOLUTION INTRAVENOUS; SUBCUTANEOUS at 14:11

## 2019-03-08 RX ADMIN — INSULIN LISPRO 12 UNITS: 100 INJECTION, SOLUTION INTRAVENOUS; SUBCUTANEOUS at 14:11

## 2019-03-08 RX ADMIN — COLLAGENASE SANTYL 1 G: 250 OINTMENT TOPICAL at 15:41

## 2019-03-08 RX ADMIN — AMOXICILLIN AND CLAVULANATE POTASSIUM 1 TABLET: 875; 125 TABLET, FILM COATED ORAL at 21:22

## 2019-03-08 RX ADMIN — METOPROLOL SUCCINATE 25 MG: 25 TABLET, EXTENDED RELEASE ORAL at 14:11

## 2019-03-08 RX ADMIN — INSULIN LISPRO 15 UNITS: 100 INJECTION, SOLUTION INTRAVENOUS; SUBCUTANEOUS at 17:37

## 2019-03-08 RX ADMIN — Medication 10 ML: at 15:42

## 2019-03-08 RX ADMIN — INSULIN GLARGINE 50 UNITS: 100 INJECTION, SOLUTION SUBCUTANEOUS at 21:22

## 2019-03-08 RX ADMIN — MULTIPLE VITAMINS W/ MINERALS TAB 1 TABLET: TAB at 14:00

## 2019-03-08 RX ADMIN — LISINOPRIL 40 MG: 20 TABLET ORAL at 14:00

## 2019-03-08 RX ADMIN — FAMOTIDINE 20 MG: 20 TABLET ORAL at 21:22

## 2019-03-08 RX ADMIN — Medication 10 ML: at 21:22

## 2019-03-08 RX ADMIN — FAMOTIDINE 20 MG: 20 TABLET ORAL at 14:00

## 2019-03-08 RX ADMIN — IOPAMIDOL 185 ML: 755 INJECTION, SOLUTION INTRAVENOUS at 09:53

## 2019-03-08 RX ADMIN — ATORVASTATIN CALCIUM 20 MG: 20 TABLET, FILM COATED ORAL at 14:01

## 2019-03-08 RX ADMIN — INSULIN LISPRO 6 UNITS: 100 INJECTION, SOLUTION INTRAVENOUS; SUBCUTANEOUS at 17:37

## 2019-03-08 RX ADMIN — AMOXICILLIN AND CLAVULANATE POTASSIUM 1 TABLET: 875; 125 TABLET, FILM COATED ORAL at 14:00

## 2019-03-08 ASSESSMENT — PAIN SCALES - GENERAL
PAINLEVEL_OUTOF10: 0

## 2019-03-09 VITALS
HEART RATE: 72 BPM | WEIGHT: 258.16 LBS | HEIGHT: 66 IN | RESPIRATION RATE: 11 BRPM | DIASTOLIC BLOOD PRESSURE: 57 MMHG | OXYGEN SATURATION: 98 % | SYSTOLIC BLOOD PRESSURE: 102 MMHG | BODY MASS INDEX: 41.49 KG/M2 | TEMPERATURE: 98.3 F

## 2019-03-09 LAB
ANION GAP SERPL CALCULATED.3IONS-SCNC: 12 MMOL/L (ref 3–16)
BUN BLDV-MCNC: 24 MG/DL (ref 7–20)
CALCIUM SERPL-MCNC: 9.3 MG/DL (ref 8.3–10.6)
CHLORIDE BLD-SCNC: 96 MMOL/L (ref 99–110)
CO2: 26 MMOL/L (ref 21–32)
CREAT SERPL-MCNC: 0.8 MG/DL (ref 0.9–1.3)
GFR AFRICAN AMERICAN: >60
GFR NON-AFRICAN AMERICAN: >60
GLUCOSE BLD-MCNC: 213 MG/DL (ref 70–99)
GLUCOSE BLD-MCNC: 215 MG/DL (ref 70–99)
GLUCOSE BLD-MCNC: 220 MG/DL (ref 70–99)
HCT VFR BLD CALC: 36.1 % (ref 40.5–52.5)
HEMOGLOBIN: 11.6 G/DL (ref 13.5–17.5)
MCH RBC QN AUTO: 26.8 PG (ref 26–34)
MCHC RBC AUTO-ENTMCNC: 32.1 G/DL (ref 31–36)
MCV RBC AUTO: 83.4 FL (ref 80–100)
PDW BLD-RTO: 14.3 % (ref 12.4–15.4)
PERFORMED ON: ABNORMAL
PERFORMED ON: ABNORMAL
PLATELET # BLD: 242 K/UL (ref 135–450)
PMV BLD AUTO: 8.9 FL (ref 5–10.5)
POTASSIUM SERPL-SCNC: 3.9 MMOL/L (ref 3.5–5.1)
RBC # BLD: 4.33 M/UL (ref 4.2–5.9)
SODIUM BLD-SCNC: 134 MMOL/L (ref 136–145)
WBC # BLD: 9.8 K/UL (ref 4–11)

## 2019-03-09 PROCEDURE — 97162 PT EVAL MOD COMPLEX 30 MIN: CPT

## 2019-03-09 PROCEDURE — 85027 COMPLETE CBC AUTOMATED: CPT

## 2019-03-09 PROCEDURE — 97530 THERAPEUTIC ACTIVITIES: CPT

## 2019-03-09 PROCEDURE — 36415 COLL VENOUS BLD VENIPUNCTURE: CPT

## 2019-03-09 PROCEDURE — 6370000000 HC RX 637 (ALT 250 FOR IP): Performed by: INTERNAL MEDICINE

## 2019-03-09 PROCEDURE — 6370000000 HC RX 637 (ALT 250 FOR IP): Performed by: NURSE PRACTITIONER

## 2019-03-09 PROCEDURE — 2580000003 HC RX 258: Performed by: INTERNAL MEDICINE

## 2019-03-09 PROCEDURE — 80048 BASIC METABOLIC PNL TOTAL CA: CPT

## 2019-03-09 RX ORDER — ATORVASTATIN CALCIUM 40 MG/1
40 TABLET, FILM COATED ORAL EVERY MORNING
Qty: 30 TABLET | Refills: 0 | Status: SHIPPED | OUTPATIENT
Start: 2019-03-09

## 2019-03-09 RX ADMIN — ATORVASTATIN CALCIUM 20 MG: 20 TABLET, FILM COATED ORAL at 08:45

## 2019-03-09 RX ADMIN — FAMOTIDINE 20 MG: 20 TABLET ORAL at 08:45

## 2019-03-09 RX ADMIN — LISINOPRIL 40 MG: 20 TABLET ORAL at 08:45

## 2019-03-09 RX ADMIN — CLOPIDOGREL BISULFATE 75 MG: 75 TABLET ORAL at 08:45

## 2019-03-09 RX ADMIN — COLLAGENASE SANTYL 1 G: 250 OINTMENT TOPICAL at 08:45

## 2019-03-09 RX ADMIN — AMOXICILLIN AND CLAVULANATE POTASSIUM 1 TABLET: 875; 125 TABLET, FILM COATED ORAL at 08:45

## 2019-03-09 RX ADMIN — Medication 10 ML: at 08:45

## 2019-03-09 RX ADMIN — CHLORTHALIDONE 50 MG: 25 TABLET ORAL at 08:45

## 2019-03-09 RX ADMIN — MULTIPLE VITAMINS W/ MINERALS TAB 1 TABLET: TAB at 08:45

## 2019-03-09 RX ADMIN — CYANOCOBALAMIN TAB 1000 MCG 1000 MCG: 1000 TAB at 08:44

## 2019-03-09 RX ADMIN — METOPROLOL SUCCINATE 25 MG: 25 TABLET, EXTENDED RELEASE ORAL at 08:45

## 2019-03-09 RX ADMIN — INSULIN LISPRO 15 UNITS: 100 INJECTION, SOLUTION INTRAVENOUS; SUBCUTANEOUS at 12:15

## 2019-03-09 RX ADMIN — INSULIN LISPRO 6 UNITS: 100 INJECTION, SOLUTION INTRAVENOUS; SUBCUTANEOUS at 12:15

## 2019-03-13 ENCOUNTER — TELEPHONE (OUTPATIENT)
Dept: SURGERY | Age: 49
End: 2019-03-13

## 2019-03-13 ENCOUNTER — HOSPITAL ENCOUNTER (OUTPATIENT)
Dept: WOUND CARE | Age: 49
Discharge: HOME | End: 2019-03-13
Payer: COMMERCIAL

## 2019-03-13 VITALS
RESPIRATION RATE: 16 BRPM | DIASTOLIC BLOOD PRESSURE: 84 MMHG | TEMPERATURE: 98 F | SYSTOLIC BLOOD PRESSURE: 139 MMHG | BODY MASS INDEX: 41.67 KG/M2 | HEIGHT: 66 IN | HEART RATE: 81 BPM

## 2019-03-13 DIAGNOSIS — E10.621 DIABETIC ULCER OF LEFT MIDFOOT ASSOCIATED WITH TYPE 1 DIABETES MELLITUS, LIMITED TO BREAKDOWN OF SKIN (HCC): ICD-10-CM

## 2019-03-13 DIAGNOSIS — L08.9 DIABETIC FOOT INFECTION (HCC): Primary | ICD-10-CM

## 2019-03-13 DIAGNOSIS — E11.628 DIABETIC FOOT INFECTION (HCC): Primary | ICD-10-CM

## 2019-03-13 DIAGNOSIS — L97.421 DIABETIC ULCER OF LEFT MIDFOOT ASSOCIATED WITH TYPE 1 DIABETES MELLITUS, LIMITED TO BREAKDOWN OF SKIN (HCC): ICD-10-CM

## 2019-03-13 PROBLEM — L97.509 DIABETIC FOOT ULCER (HCC): Status: ACTIVE | Noted: 2019-03-13

## 2019-03-13 PROBLEM — E11.621 DIABETIC FOOT ULCER (HCC): Status: ACTIVE | Noted: 2019-03-13

## 2019-03-13 PROCEDURE — 99213 OFFICE O/P EST LOW 20 MIN: CPT

## 2019-03-13 PROCEDURE — 97598 DBRDMT OPN WND ADDL 20CM/<: CPT

## 2019-03-13 PROCEDURE — 11042 DBRDMT SUBQ TIS 1ST 20SQCM/<: CPT

## 2019-03-13 PROCEDURE — 97597 DBRDMT OPN WND 1ST 20 CM/<: CPT | Performed by: PODIATRIST

## 2019-03-13 PROCEDURE — 97597 DBRDMT OPN WND 1ST 20 CM/<: CPT

## 2019-03-13 RX ORDER — LIDOCAINE 50 MG/G
OINTMENT TOPICAL PRN
Status: DISCONTINUED | OUTPATIENT
Start: 2019-03-13 | End: 2019-03-14 | Stop reason: HOSPADM

## 2019-03-13 RX ORDER — LIDOCAINE HYDROCHLORIDE 40 MG/ML
SOLUTION TOPICAL PRN
Status: DISCONTINUED | OUTPATIENT
Start: 2019-03-13 | End: 2019-03-14 | Stop reason: HOSPADM

## 2019-03-13 ASSESSMENT — PAIN - FUNCTIONAL ASSESSMENT: PAIN_FUNCTIONAL_ASSESSMENT: PREVENTS OR INTERFERES SOME ACTIVE ACTIVITIES AND ADLS

## 2019-03-13 ASSESSMENT — PAIN DESCRIPTION - PROGRESSION: CLINICAL_PROGRESSION: NOT CHANGED

## 2019-03-13 ASSESSMENT — PAIN DESCRIPTION - FREQUENCY: FREQUENCY: CONTINUOUS

## 2019-03-13 ASSESSMENT — PAIN DESCRIPTION - ONSET: ONSET: ON-GOING

## 2019-03-13 ASSESSMENT — PAIN DESCRIPTION - DESCRIPTORS: DESCRIPTORS: TIGHTNESS

## 2019-03-13 ASSESSMENT — PAIN SCALES - GENERAL: PAINLEVEL_OUTOF10: 8

## 2019-03-13 ASSESSMENT — PAIN DESCRIPTION - ORIENTATION: ORIENTATION: RIGHT

## 2019-03-13 ASSESSMENT — PAIN DESCRIPTION - LOCATION: LOCATION: FOOT

## 2019-03-13 ASSESSMENT — PAIN DESCRIPTION - PAIN TYPE: TYPE: ACUTE PAIN

## 2019-03-19 ENCOUNTER — TELEPHONE (OUTPATIENT)
Dept: SURGERY | Age: 49
End: 2019-03-19

## 2019-03-20 ENCOUNTER — HOSPITAL ENCOUNTER (OUTPATIENT)
Dept: WOUND CARE | Age: 49
Discharge: HOME | End: 2019-03-20
Payer: COMMERCIAL

## 2019-03-20 VITALS
DIASTOLIC BLOOD PRESSURE: 85 MMHG | RESPIRATION RATE: 18 BRPM | HEART RATE: 89 BPM | SYSTOLIC BLOOD PRESSURE: 133 MMHG | TEMPERATURE: 97.9 F

## 2019-03-20 DIAGNOSIS — E10.621 DIABETIC ULCER OF LEFT MIDFOOT ASSOCIATED WITH TYPE 1 DIABETES MELLITUS, WITH FAT LAYER EXPOSED (HCC): Primary | ICD-10-CM

## 2019-03-20 DIAGNOSIS — L97.422 DIABETIC ULCER OF LEFT MIDFOOT ASSOCIATED WITH TYPE 1 DIABETES MELLITUS, WITH FAT LAYER EXPOSED (HCC): Primary | ICD-10-CM

## 2019-03-20 PROCEDURE — 11042 DBRDMT SUBQ TIS 1ST 20SQCM/<: CPT

## 2019-03-20 PROCEDURE — 11045 DBRDMT SUBQ TISS EACH ADDL: CPT

## 2019-03-20 RX ORDER — LIDOCAINE HYDROCHLORIDE 40 MG/ML
SOLUTION TOPICAL PRN
Status: DISCONTINUED | OUTPATIENT
Start: 2019-03-20 | End: 2019-03-21 | Stop reason: HOSPADM

## 2019-03-20 ASSESSMENT — PAIN DESCRIPTION - ORIENTATION: ORIENTATION: RIGHT

## 2019-03-20 ASSESSMENT — PAIN SCALES - GENERAL
PAINLEVEL_OUTOF10: 7
PAINLEVEL_OUTOF10: 7

## 2019-03-20 ASSESSMENT — PAIN DESCRIPTION - LOCATION: LOCATION: FOOT

## 2019-03-20 ASSESSMENT — PAIN DESCRIPTION - PAIN TYPE: TYPE: ACUTE PAIN

## 2019-03-20 ASSESSMENT — PAIN DESCRIPTION - ONSET: ONSET: ON-GOING

## 2019-03-20 ASSESSMENT — PAIN DESCRIPTION - FREQUENCY: FREQUENCY: INTERMITTENT

## 2019-03-20 ASSESSMENT — PAIN DESCRIPTION - PROGRESSION: CLINICAL_PROGRESSION: GRADUALLY IMPROVING

## 2019-03-20 ASSESSMENT — PAIN DESCRIPTION - DESCRIPTORS: DESCRIPTORS: TIGHTNESS

## 2019-03-21 ENCOUNTER — HOSPITAL ENCOUNTER (OUTPATIENT)
Dept: CARDIAC CATH/INVASIVE PROCEDURES | Age: 49
Setting detail: OUTPATIENT SURGERY
Discharge: HOME | End: 2019-03-21
Payer: COMMERCIAL

## 2019-03-21 VITALS — WEIGHT: 251 LBS | HEIGHT: 66 IN | BODY MASS INDEX: 40.34 KG/M2

## 2019-03-21 DIAGNOSIS — I70.223 ATHEROSCLEROSIS OF NATIVE ARTERY OF BOTH LOWER EXTREMITIES WITH REST PAIN (HCC): ICD-10-CM

## 2019-03-21 DIAGNOSIS — I99.8 LOWER LIMB ISCHEMIA: ICD-10-CM

## 2019-03-21 DIAGNOSIS — I70.209 ARTERIAL OCCLUSION, LOWER EXTREMITY (HCC): Primary | ICD-10-CM

## 2019-03-21 LAB
CALCIUM IONIZED: 1.2 MMOL/L (ref 1.12–1.32)
GFR AFRICAN AMERICAN: >60
GFR NON-AFRICAN AMERICAN: >60
GLUCOSE BLD-MCNC: 258 MG/DL (ref 70–99)
HCT VFR BLD CALC: 37.6 % (ref 40.5–52.5)
HEMOGLOBIN: 12.3 G/DL (ref 13.5–17.5)
MCH RBC QN AUTO: 27 PG (ref 26–34)
MCHC RBC AUTO-ENTMCNC: 32.6 G/DL (ref 31–36)
MCV RBC AUTO: 82.9 FL (ref 80–100)
PDW BLD-RTO: 14.1 % (ref 12.4–15.4)
PERFORMED ON: ABNORMAL
PLATELET # BLD: 296 K/UL (ref 135–450)
PMV BLD AUTO: 8.5 FL (ref 5–10.5)
POC ACT LR: 271 SEC
POC CHLORIDE: 102 MMOL/L (ref 99–110)
POC CREATININE: 0.9 MG/DL (ref 0.9–1.3)
POC POTASSIUM: 4.4 MMOL/L (ref 3.5–5.1)
POC SAMPLE TYPE: ABNORMAL
POC SODIUM: 137 MMOL/L (ref 136–145)
RBC # BLD: 4.54 M/UL (ref 4.2–5.9)
WBC # BLD: 10.9 K/UL (ref 4–11)

## 2019-03-21 PROCEDURE — 37233 PR REVSC OPN/PRQ TIB/PERO W/ATHRC/ANGIOP UNI EA VSL: CPT | Performed by: SURGERY

## 2019-03-21 PROCEDURE — C1724 CATH, TRANS ATHEREC,ROTATION: HCPCS

## 2019-03-21 PROCEDURE — C2623 CATH, TRANSLUMIN, DRUG-COAT: HCPCS

## 2019-03-21 PROCEDURE — 75710 ARTERY X-RAYS ARM/LEG: CPT | Performed by: SURGERY

## 2019-03-21 PROCEDURE — 2780000010 HC IMPLANT OTHER

## 2019-03-21 PROCEDURE — 84132 ASSAY OF SERUM POTASSIUM: CPT

## 2019-03-21 PROCEDURE — 37233 HC TIB PER TERR ADDL ATHER: CPT

## 2019-03-21 PROCEDURE — 84295 ASSAY OF SERUM SODIUM: CPT

## 2019-03-21 PROCEDURE — 75774 ARTERY X-RAY EACH VESSEL: CPT | Performed by: SURGERY

## 2019-03-21 PROCEDURE — 75710 ARTERY X-RAYS ARM/LEG: CPT

## 2019-03-21 PROCEDURE — 37225 PR REVSC OPN/PRQ FEM/POP W/ATHRC/ANGIOP SM VSL: CPT | Performed by: SURGERY

## 2019-03-21 PROCEDURE — 82435 ASSAY OF BLOOD CHLORIDE: CPT

## 2019-03-21 PROCEDURE — 37229 PR REVSC OPN/PRQ TIB/PERO W/ATHRC/ANGIOP SM VSL: CPT | Performed by: SURGERY

## 2019-03-21 PROCEDURE — C1769 GUIDE WIRE: HCPCS

## 2019-03-21 PROCEDURE — 99153 MOD SED SAME PHYS/QHP EA: CPT

## 2019-03-21 PROCEDURE — C1887 CATHETER, GUIDING: HCPCS

## 2019-03-21 PROCEDURE — 82565 ASSAY OF CREATININE: CPT

## 2019-03-21 PROCEDURE — 82330 ASSAY OF CALCIUM: CPT

## 2019-03-21 PROCEDURE — 75774 ARTERY X-RAY EACH VESSEL: CPT

## 2019-03-21 PROCEDURE — 82947 ASSAY GLUCOSE BLOOD QUANT: CPT

## 2019-03-21 PROCEDURE — 85347 COAGULATION TIME ACTIVATED: CPT

## 2019-03-21 PROCEDURE — 6360000004 HC RX CONTRAST MEDICATION: Performed by: SURGERY

## 2019-03-21 PROCEDURE — 37229 HC TIB PER TERRITORY ATHER: CPT

## 2019-03-21 PROCEDURE — C1725 CATH, TRANSLUMIN NON-LASER: HCPCS

## 2019-03-21 PROCEDURE — 99152 MOD SED SAME PHYS/QHP 5/>YRS: CPT

## 2019-03-21 PROCEDURE — 6360000002 HC RX W HCPCS

## 2019-03-21 PROCEDURE — C1894 INTRO/SHEATH, NON-LASER: HCPCS

## 2019-03-21 PROCEDURE — 2709999900 HC NON-CHARGEABLE SUPPLY

## 2019-03-21 PROCEDURE — 85027 COMPLETE CBC AUTOMATED: CPT

## 2019-03-21 PROCEDURE — 2500000003 HC RX 250 WO HCPCS

## 2019-03-21 PROCEDURE — 37225 HC FEM POP TERRITORY ATHERECTOMY: CPT

## 2019-03-21 RX ORDER — SODIUM CHLORIDE 0.9 % (FLUSH) 0.9 %
10 SYRINGE (ML) INJECTION PRN
Status: DISCONTINUED | OUTPATIENT
Start: 2019-03-21 | End: 2019-03-21 | Stop reason: ALTCHOICE

## 2019-03-21 RX ORDER — SODIUM CHLORIDE 0.9 % (FLUSH) 0.9 %
10 SYRINGE (ML) INJECTION EVERY 12 HOURS SCHEDULED
Status: DISCONTINUED | OUTPATIENT
Start: 2019-03-21 | End: 2019-03-21 | Stop reason: ALTCHOICE

## 2019-03-21 RX ORDER — MORPHINE SULFATE 2 MG/ML
2 INJECTION, SOLUTION INTRAMUSCULAR; INTRAVENOUS
Status: ACTIVE | OUTPATIENT
Start: 2019-03-21 | End: 2019-03-21

## 2019-03-21 RX ORDER — FENTANYL CITRATE 50 UG/ML
25 INJECTION, SOLUTION INTRAMUSCULAR; INTRAVENOUS
Status: ACTIVE | OUTPATIENT
Start: 2019-03-21 | End: 2019-03-21

## 2019-03-21 RX ORDER — ONDANSETRON 2 MG/ML
4 INJECTION INTRAMUSCULAR; INTRAVENOUS EVERY 6 HOURS PRN
Status: DISCONTINUED | OUTPATIENT
Start: 2019-03-21 | End: 2019-03-22 | Stop reason: HOSPADM

## 2019-03-21 RX ORDER — 0.9 % SODIUM CHLORIDE 0.9 %
500 INTRAVENOUS SOLUTION INTRAVENOUS PRN
Status: DISCONTINUED | OUTPATIENT
Start: 2019-03-21 | End: 2019-03-22 | Stop reason: HOSPADM

## 2019-03-21 RX ORDER — SODIUM CHLORIDE 9 MG/ML
INJECTION, SOLUTION INTRAVENOUS CONTINUOUS
Status: DISCONTINUED | OUTPATIENT
Start: 2019-03-21 | End: 2019-03-21 | Stop reason: ALTCHOICE

## 2019-03-21 RX ORDER — SODIUM CHLORIDE 9 MG/ML
INJECTION, SOLUTION INTRAVENOUS CONTINUOUS
Status: DISCONTINUED | OUTPATIENT
Start: 2019-03-21 | End: 2019-03-22 | Stop reason: HOSPADM

## 2019-03-21 RX ORDER — ACETAMINOPHEN 325 MG/1
650 TABLET ORAL EVERY 4 HOURS PRN
Status: DISCONTINUED | OUTPATIENT
Start: 2019-03-21 | End: 2019-03-22 | Stop reason: HOSPADM

## 2019-03-21 RX ADMIN — IOPAMIDOL 90 ML: 755 INJECTION, SOLUTION INTRAVENOUS at 13:00

## 2019-03-27 ENCOUNTER — HOSPITAL ENCOUNTER (OUTPATIENT)
Dept: WOUND CARE | Age: 49
Discharge: HOME | End: 2019-03-27
Payer: COMMERCIAL

## 2019-03-27 VITALS
SYSTOLIC BLOOD PRESSURE: 121 MMHG | HEART RATE: 99 BPM | DIASTOLIC BLOOD PRESSURE: 84 MMHG | RESPIRATION RATE: 16 BRPM | TEMPERATURE: 97 F

## 2019-03-27 DIAGNOSIS — L97.422 DIABETIC ULCER OF LEFT MIDFOOT ASSOCIATED WITH TYPE 1 DIABETES MELLITUS, WITH FAT LAYER EXPOSED (HCC): Primary | ICD-10-CM

## 2019-03-27 DIAGNOSIS — T81.89XD NON-HEALING SURGICAL WOUND, SUBSEQUENT ENCOUNTER: ICD-10-CM

## 2019-03-27 DIAGNOSIS — E10.621 DIABETIC ULCER OF LEFT MIDFOOT ASSOCIATED WITH TYPE 1 DIABETES MELLITUS, WITH FAT LAYER EXPOSED (HCC): Primary | ICD-10-CM

## 2019-03-27 PROBLEM — T81.89XA NON-HEALING SURGICAL WOUND: Status: ACTIVE | Noted: 2019-03-27

## 2019-03-27 PROCEDURE — 11045 DBRDMT SUBQ TISS EACH ADDL: CPT

## 2019-03-27 PROCEDURE — 11042 DBRDMT SUBQ TIS 1ST 20SQCM/<: CPT

## 2019-03-27 RX ORDER — LIDOCAINE HYDROCHLORIDE 20 MG/ML
6 INJECTION, SOLUTION EPIDURAL; INFILTRATION; INTRACAUDAL; PERINEURAL ONCE
Status: DISCONTINUED | OUTPATIENT
Start: 2019-03-27 | End: 2019-03-28 | Stop reason: HOSPADM

## 2019-03-27 RX ORDER — LIDOCAINE HYDROCHLORIDE 40 MG/ML
SOLUTION TOPICAL PRN
Status: DISCONTINUED | OUTPATIENT
Start: 2019-03-27 | End: 2019-03-28 | Stop reason: HOSPADM

## 2019-03-27 ASSESSMENT — PAIN SCALES - GENERAL
PAINLEVEL_OUTOF10: 0
PAINLEVEL_OUTOF10: 2

## 2019-04-03 ENCOUNTER — HOSPITAL ENCOUNTER (OUTPATIENT)
Dept: WOUND CARE | Age: 49
Discharge: HOME | End: 2019-04-03
Payer: COMMERCIAL

## 2019-04-03 VITALS
DIASTOLIC BLOOD PRESSURE: 71 MMHG | SYSTOLIC BLOOD PRESSURE: 103 MMHG | RESPIRATION RATE: 16 BRPM | HEART RATE: 88 BPM | TEMPERATURE: 97.7 F

## 2019-04-03 DIAGNOSIS — E10.621 DIABETIC ULCER OF RIGHT MIDFOOT ASSOCIATED WITH TYPE 1 DIABETES MELLITUS, WITH FAT LAYER EXPOSED (HCC): ICD-10-CM

## 2019-04-03 DIAGNOSIS — T81.89XD NON-HEALING SURGICAL WOUND, SUBSEQUENT ENCOUNTER: Primary | ICD-10-CM

## 2019-04-03 DIAGNOSIS — L97.412 DIABETIC ULCER OF RIGHT MIDFOOT ASSOCIATED WITH TYPE 1 DIABETES MELLITUS, WITH FAT LAYER EXPOSED (HCC): ICD-10-CM

## 2019-04-03 PROCEDURE — 11042 DBRDMT SUBQ TIS 1ST 20SQCM/<: CPT

## 2019-04-03 PROCEDURE — 11045 DBRDMT SUBQ TISS EACH ADDL: CPT

## 2019-04-03 PROCEDURE — 97605 NEG PRS WND THER DME<=50SQCM: CPT

## 2019-04-03 RX ORDER — LIDOCAINE HYDROCHLORIDE 40 MG/ML
SOLUTION TOPICAL PRN
Status: DISCONTINUED | OUTPATIENT
Start: 2019-04-03 | End: 2019-04-04 | Stop reason: HOSPADM

## 2019-04-03 ASSESSMENT — PAIN SCALES - GENERAL
PAINLEVEL_OUTOF10: 0
PAINLEVEL_OUTOF10: 0

## 2019-04-03 NOTE — PROGRESS NOTES
Krissy Lopez [facility]   Progress Note and Procedure Note      Tory Earl  MEDICAL RECORD NUMBER:  [id number]  AGE: 47 y.o. GENDER: male  : 1970  EPISODE DATE:  4/3/2019    Subjective:     No chief complaint on file. HISTORY of PRESENT ILLNESS HPI     Tory Earl is a 47 y.o. male who presents today for wound/ulcer evaluation. History of Wound Context: Patient presents with complaining of a wound on his left foot. Patient had a transmetatarsal amputation of the left foot on 2018. Patient's skin flap failed after the surgery with dehiscence of the surgical wound. Patient states home health care is performing daily dressing changes with application of Santyl to the left foot. Patient reports he had angiograms of both legs with revascularization of the right leg on 2019 performed by Dr. Juliann Spatz. Patient had revascularization of the left leg with Dr. Juliann Spatz on 2019. Patient states he is taking his antibiotics and pain medication as prescribed.     Patient also has a wound on the bottom of the right foot of four weeks duration. The wound started out as a blood blister.     Wound/Ulcer Pain Timing/Severity: mild  Quality of pain: aching  Severity:  2 / 10   Modifying Factors: Pain worsens with walking and Pain is relieved/improved with rest  Associated Signs/Symptoms: numbness and pain    Ulcer Identification:  Ulcer Type: diabetic and non-healing surgical    Contributing Factors: diabetes, poor glucose control, chronic pressure, decreased mobility, obesity and arterial insufficiency    Wound: Wound dehiscence        PAST MEDICAL HISTORY        Diagnosis Date    Angina effort ([address]     Arthritis     CAD (coronary artery disease)     Carotid stenosis     Diabetes mellitus with neurological manifestations, uncontrolled ([address]     Diarrhea     Hyperlipidemia     Hypertension     Obesity     Type II or unspecified type diabetes mellitus without mention of complication, not stated as uncontrolled        PAST SURGICAL HISTORY    Past Surgical History:   Procedure Laterality Date    CARDIAC CATHETERIZATION      CAROTID ENDARTERECTOMY Right 4-9-2015    CHOLECYSTECTOMY      [place] OFFICE/OUTPT VISIT,PROCEDURE ONLY Left 10/5/2018    AMPUTATION LEFT GREAT DIGIT performed by Deshaun Clinton DPM at [place] [address] OFFICE/OUTPT [address] [place] [address] Left 10/22/2018    INCISION AND DRAINAGE LEFT FOOT FIRST AND SECOND RAY AMPUTATION (DIGITS ONE, TWO AND METARSAL ONE AND TWO) performed by Deshaun Clinton DPM at [place] [address] OFFICE/OUTPT [address] [place] [address] Left 10/26/2018    LEFT FOOT WOUND DEBRIDEMENT WITH PRIMARY CLOSURE performed by Deshaun Clinton DPM at [place] [address] OFFICE/OUTPT [address] [place] [address] Left 11/2/2018    INCISION AND DRAINAGE WITH TRANSMETATARSAL CONVERTED TO LIST MART  AMPUTATION LEFT FOOT performed by Deshaun Clinton DPM at [address] [place] [address] TOE AMPUTATION Left 10/05/2018    left great toe amputation       FAMILY HISTORY    Family History   Problem Relation Age of Onset    High Blood Pressure Mother        SOCIAL HISTORY    Social History     Tobacco Use    Smoking status: Never Smoker    Smokeless tobacco: Never Used   Substance Use Topics    Alcohol use: No    Drug use: No     Types: Marijuana     Comment: QUIT 4 MONTHS        ALLERGIES    No Known Allergies    MEDICATIONS    Current Outpatient Medications on File Prior to Encounter   Medication Sig Dispense Refill    atorvastatin (LIPITOR) 40 MG tablet Take 1 tablet by mouth every morning 30 tablet 0    insulin glargine (BASAGLAR KWIKPEN) 100 UNIT/ML injection pen Inject 15 Units into the skin nightly 5 pen 3    clopidogrel (PLAVIX) 75 MG tablet Take 1 tablet by mouth daily 30 tablet 3    metoprolol succinate (TOPROL XL) 25 MG extended release tablet Take 25 mg by mouth daily      chlorthalidone (HYGROTEN) 50 MG tablet Take 50 mg by mouth daily  0    SANTYL 250 UNIT/GM ointment Apply 1 each topically daily      vitamin B-12 (CYANOCOBALAMIN) 1000 MCG tablet Take 1,000 mcg by mouth daily  2    ranitidine (ZANTAC) 150 MG tablet Take 150 mg by mouth 2 times daily  1    Multiple Vitamins-Minerals (THERAPEUTIC MULTIVITAMIN-MINERALS) tablet Take 1 tablet by mouth daily      isosorbide mononitrate (IMDUR) 30 MG extended release tablet Take 1 tablet by mouth daily 30 tablet 3    Blood Glucose Monitoring Suppl (FREESTYLE LITE) INEZ 1 Device by Does not apply route 3 times daily (before meals) 1 Device 0    blood glucose test strips (FREESTYLE LITE) strip 1 each by In Vitro route 3 times daily As needed. 100 each 3    FREESTYLE LANCETS MISC 1 each by Does not apply route daily 100 each 3    Insulin Disposable Pump (V-GO 40) KIT by Does not apply route      aspirin (ASPIRIN LOW DOSE) 81 MG EC tablet Take 1 tablet by mouth daily 30 tablet 0    Blood Glucose Monitoring Suppl (FREESTYLE LITE) INEZ TEST BLOOD GLUCOSE THREE TO FOUR TIMES DAILY 1 Device 0    glucose blood VI test strips (ONETOUCH VERIO) strip 1 each by In Vitro route 3 times daily ; Dx E11.65; Z79.4 100 each 11    ONE TOUCH LANCETS MISC 1 each by Does not apply route 3 times daily ; Dx E11.65; Z79.4 100 each 11    Insulin Pen Needle (B-D UF III MINI PEN NEEDLES) 31G X 5 MM MISC 1 each by Does not apply route 4 times daily (before meals and nightly) 150 each 6    lisinopril (PRINIVIL;ZESTRIL) 40 MG tablet Take 40 mg by mouth every morning        No current facility-administered medications on file prior to encounter. REVIEW OF SYSTEMS    Pertinent items are noted in HPI.     Objective:      /71   Pulse 88   Temp 97.7 °F (36.5 °C) (Oral)   Resp 16     Wt Readings from Last 3 Encounters:   03/21/19 251 lb (113.9 kg)   03/09/19 258 lb 2.5 oz (117.1 kg)   01/30/19 266 lb 12.1 oz (121 kg)       PHYSICAL EXAM    Patient is alert and oriented x 3, and is in no acute distress.     Vascular: DP weakly palpable bilateral. PT pulse not palpable bilateral but audible on doppler exam. No Pedal hair noted bilateral. No Edema noted to ankles.    Derm:  Skin is warm to warm from proximal leg to distal foot bilateral. Toenails 1-5 on right foot are thickened, yellow and dystrophic. Neuro:  Protective sensation absent to 10/10 sites bilateral via a 5.07 [place] monofilament.    Musculoskeletal: Muscle strength 5/5 with PF/DF/I and E of both feet. Transmetatarsal amputation of the left foot.     Assessment:        Problem List Items Addressed This Visit        Chronic Conditions    Diabetic foot ulcer ([facility]       Other    Non-healing surgical wound - Primary           Procedure Note  Indications:  Based on my examination of this patient's wound(s)/ulcer(s) today, debridement is required to promote healing and evaluate the wound base. Performed by: Ray Wilson DPM    Consent obtained:  Yes    Time out taken:  Yes    Pain Control: Anesthetic  Anesthetic: 4% Lidocaine Liquid Topical(5 ml)       Debridement: Excisional Debridement    Using curette, #15 blade scalpel and forceps the wound(s)/ulcer(s) was/were sharply debrided down through and including the removal of epidermis, dermis and subcutaneous tissue. Devitalized Tissue Debrided:  fibrin, biofilm and callus    Pre Debridement Measurements:  Are located in the Wound/Ulcer Documentation Flow Sheet    Wound/Ulcer #: 2, 3 and 4    Post Debridement Measurements:  Wound/Ulcer Descriptions are Pre Debridement except measurements:    Wound 03/13/19 Foot Right;Plantar #2 - right plantar foot- pt. noted 2/15/2019 (Active)   Wound Image   4/3/2019  2:32 PM   Wound Pressure Stage  3 4/3/2019  2:32 PM   Dressing Status Clean;Dry; Intact 4/3/2019  2:32 PM   Dressing Changed Changed/New 4/3/2019  3:04 PM   Dressing/Treatment Antibacterial Ointment;Dry Dressing 3/20/2019  3:33 PM   Wound Length (cm) 2.7 cm 4/3/2019  2:32 PM   Wound Width (cm) 1.9 cm 4/3/2019  2:32 PM   Wound Depth (cm) 0.1 cm 4/3/2019  2:32 PM   Wound Surface Area (cm^2) 5.13 cm^2 4/3/2019  2:32 PM   Change in Wound Size % (l*w) 33.2 4/3/2019  2:32 PM   Wound Volume (cm^3) 0.51 cm^3 4/3/2019  2:32 PM   Wound Healing % 34 4/3/2019  2:32 PM   Post-Procedure Length (cm) 2.8 cm 4/3/2019  3:04 PM   Post-Procedure Width (cm) 2 cm 4/3/2019  3:04 PM   Post-Procedure Depth (cm) 0.2 cm 4/3/2019  3:04 PM   Post-Procedure Surface Area (cm^2) 5.6 cm^2 4/3/2019  3:04 PM   Post-Procedure Volume (cm^3) 1.12 cm^3 4/3/2019  3:04 PM   Wound Assessment Dry;Black 4/3/2019  2:32 PM   Drainage Amount None 4/3/2019  2:32 PM   Odor None 4/3/2019  2:32 PM   Margins Attached edges 4/3/2019  2:32 PM   Jina-wound Assessment Calloused 4/3/2019  2:32 PM   Non-staged Wound Description Full thickness 4/3/2019  2:32 PM   Red%Wound Bed 90 3/20/2019  2:25 PM   Yellow%Wound Bed 20 3/27/2019  2:13 PM   Black%Wound Bed 100 4/3/2019  2:32 PM   Number of days: 21       Wound 03/20/19 Foot Left;Medial Wound #3 acquired 11/2018 left TMA site (Active)   Wound Image   4/3/2019  2:32 PM   Wound Non-Healing Surgical 4/3/2019  2:32 PM   Dressing Status Intact; Old drainage 4/3/2019  2:32 PM   Dressing Changed Changed/New 4/3/2019  3:04 PM   Dressing/Treatment Dry Dressing;Santyl 3/20/2019  3:33 PM   Wound Length (cm) 3.5 cm 4/3/2019  2:32 PM   Wound Width (cm) 3.7 cm 4/3/2019  2:32 PM   Wound Depth (cm) 0.9 cm 4/3/2019  2:32 PM   Wound Surface Area (cm^2) 12.95 cm^2 4/3/2019  2:32 PM   Change in Wound Size % (l*w) -124.83 4/3/2019  2:32 PM   Wound Volume (cm^3) 11.66 cm^3 4/3/2019  2:32 PM   Wound Healing % -102 4/3/2019  2:32 PM   Post-Procedure Length (cm) 3.6 cm 4/3/2019  3:04 PM   Post-Procedure Width (cm) 3.8 cm 4/3/2019  3:04 PM   Post-Procedure Depth (cm) 0.9 cm 4/3/2019  3:04 PM   Post-Procedure Surface Area (cm^2) 13.68 cm^2 4/3/2019  3:04 PM   Post-Procedure Volume (cm^3) 12.31 cm^3 4/3/2019  3:04 PM   Wound Assessment Dry;Black;Granulation tissue;Red;Slough; Yellow 4/3/2019 2:32 PM   Drainage Amount Moderate 4/3/2019  2:32 PM   Drainage Description Serosanguinous 4/3/2019  2:32 PM   Odor Strong 4/3/2019  2:32 PM   Margins Attached edges 4/3/2019  2:32 PM   Jina-wound Assessment Clean;Dry 4/3/2019  2:32 PM   Non-staged Wound Description Full thickness 4/3/2019  2:32 PM   [place]%Wound Bed 30 3/27/2019  2:13 PM   Red%Wound Bed 70 4/3/2019  2:32 PM   Yellow%Wound Bed 20 4/3/2019  2:32 PM   Black%Wound Bed 10 4/3/2019  2:32 PM   Number of days: 14       Wound 03/20/19 Foot Left;Lateral Wound # 4 acquired 11/2018 left TMA site (Active)   Wound Image   4/3/2019  2:32 PM   Wound Non-Healing Surgical 4/3/2019  2:32 PM   Dressing Status Intact; Old drainage 4/3/2019  2:32 PM   Dressing Changed Changed/New 4/3/2019  3:04 PM   Dressing/Treatment Dry Dressing;Santyl 3/20/2019  3:33 PM   Wound Length (cm) 4 cm 4/3/2019  2:32 PM   Wound Width (cm) 4.3 cm 4/3/2019  2:32 PM   Wound Depth (cm) 0.3 cm 4/3/2019  2:32 PM   Wound Surface Area (cm^2) 17.2 cm^2 4/3/2019  2:32 PM   Change in Wound Size % (l*w) -46.01 4/3/2019  2:32 PM   Wound Volume (cm^3) 5.16 cm^3 4/3/2019  2:32 PM   Wound Healing % 60 4/3/2019  2:32 PM   Post-Procedure Length (cm) 4.1 cm 4/3/2019  3:04 PM   Post-Procedure Width (cm) 4.4 cm 4/3/2019  3:04 PM   Post-Procedure Depth (cm) 0.3 cm 4/3/2019  3:04 PM   Post-Procedure Surface Area (cm^2) 18.04 cm^2 4/3/2019  3:04 PM   Post-Procedure Volume (cm^3) 5.41 cm^3 4/3/2019  3:04 PM   Wound Assessment Granulation tissue;Red;Slough; Yellow 4/3/2019  2:32 PM   Drainage Amount Moderate 4/3/2019  2:32 PM   Drainage Description Serosanguinous 4/3/2019  2:32 PM   Odor Strong 4/3/2019  2:32 PM   Margins Attached edges 4/3/2019  2:32 PM   Jina-wound Assessment Dry; White 4/3/2019  2:32 PM   Non-staged Wound Description Full thickness 4/3/2019  2:32 PM   [place]%Wound Bed 5 3/27/2019  2:13 PM   Red%Wound Bed 50 4/3/2019  2:32 PM   Yellow%Wound Bed 50 4/3/2019  2:32 PM   Black%Wound Bed 10 3/27/2019  2:13 PM Number of days: 14          Percent of Wound(s)/Ulcer(s) Debrided: 80%    Total Surface Area Debrided:  29.856 sq cm     Diabetic/Pressure/Non Pressure Ulcers only:  Ulcer: Diabetic ulcer, fat layer exposed     Estimated Blood Loss:  Minimal    Hemostasis Achieved:  by pressure and by silver nitrate stick    Procedural Pain:  0  / 10     Post Procedural Pain:  0 / 10     Response to treatment:  Well tolerated by patient. Plan:     Treatment Note please see attached Discharge Instructions    Written patient dismissal instructions given to patient and signed by patient or POA. Discharge [address]   Physician Orders and Discharge Instructions  [place] [id number] [place] [address], [facility]  Telephone: [phone]      FAX [phone]     NAME: Brenton Oliver  DATE OF BIRTH:  1970  MEDICAL RECORD NUMBER:  [id number]  DATE:  4/3/2019     Wound Cleansing:   Do not scrub or use excessive force. Cleanse wound prior to applying a clean dressing with:  [x] Normal Saline            [x] Keep Wound Dry in Shower    [] Wound Cleanser   [] Cleanse wound with Mild Soap & Water  [] May Shower at Discharge   [] Other:        Topical Treatments:  Do not apply lotions, creams, or ointments to wound bed unless directed. [] Apply moisturizing lotion to skin surrounding the wound prior to dressing change.  [] Apply antifungal ointment to skin surrounding the wound prior to dressing change.  [] Apply thin film of moisture barrier ointment to skin immediately around wound. [x] Other:  Vashe Wash for 5 minutes on Left Foot Wounds prior to Dressing Change                   Negative Pressure:           Wound Location: LEFT FOOT MEDIAL AND LATERAL    X Pressure@  125  mm/Hg                XContinuous  ? Intermittent              X Black           ?  White Foam           ? Other:   XChange dressing: XThree times per week         X Other: Ensure to Drape and Bridge Medial and Lateral Wounds so sponge does not come into contact with intact skin         Dressings : Wound Location : RIGHT PLANTAR FOOT  Apply Santyl with Normal Saline dampened Gauze to wound and cover with dry gauze and dacia.  Change dressing DAILY.   Apply Kidney Bean Podiatry Pad to Jina-Wound- Applied per Dr. Corinne Generous        Edema Control:  Apply:  [] Compression Stocking           []Right Leg         []Left Leg              [] Tubigrip          []Right Leg Double Layer          []Left Leg Double Layer                                                  []Right Leg Single Layer           []Left Leg Single Layer              [] SpandaGrip    []Right Leg         []Left Leg                                      []Low compression 5-10 mm/Hg                                                 []Medium compression 10-20 mm/Hg                                      []High compression  20-30 mm/Hg              every morning immediately when getting up should be applied to affected leg(s) from mid foot to knee making sure to cover the heel.  Remove every night before going to bed.              [] Elevate leg(s) above the level of the heart when sitting.                    [] Avoid prolonged standing in one place.        Compression:  Apply:  [] Multilayer Compression Wrap Applied in Clinic        []RightLeg          []Left Leg              [] Multi-layer compression.  Do not get leg(s) with wrap wet.  If wraps become too tight call the center or completely remove the wrap.                 [] Elevate leg(s) above the level of the heart when sitting.                    [] Avoid prolonged standing in one place.     Off-Loading:   [] Off-loading when        [] walking           [] in bed [] sitting  [] Total non-weight bearing  [] Right Leg  [] Left Leg          [x] Assistive Device         [] Walker            [] Cane   [x] Wheelchair      [] Crutches              [] Surgical shoe    [] Podus Boot(s)   [] Foam Boot(s)  [] Roll About              [] Cast Boot       [] CROW Boot  [] Other:                   Dietary:  [] Diet as tolerated:        [x] Calorie Diabetic Diet: [] No Added Salt:  [] Increase Protein:        [] Other:     Activity:  [x] Activity as tolerated:  [] Patient has no activity restrictions     [] Strict Bedrest:            [] Remain off Work:     [] May return to full duty work:                                         [] WBQUBA to work with restrictions:          If you are still having pain after you go home:  [X] Elevate the affected limb.            [X]Use over-the-counter medications you would normally use for pain as permitted by your family doctor.  [X] For persistent pain not relieved by the above interventions, please call your family doctor.                Return Appointment:  [] Wound and dressing supply provider:   [x] ECF or Home Healthcare: [facility]  [] Wound Assessment:  [] Physician or NP scheduled for Wound Assessment:   [x] Return Appointment: Selena THORPE  in  1 Week(s)  [] Ordered tests:      Nurse Case Manger: Sarita  Electronically signed by Rebecca Benton RN on 4/3/2019 at 3:17 PM  [address] [place] [address] Information: Should you experience any significant changes in your wound(s) or have questions about your wound care, please contact the [facility] [phone] [facility] - THURSDAY 8:30 am - 4:30 pm and Friday 8:30 am - 1:00 pm.  If you need help with your wound outside these hours and cannot wait until we are again available, contact your PCP or go to the hospital emergency room.      PLEASE NOTE: IF YOU ARE UNABLE TO OBTAIN WOUND SUPPLIES, CONTINUE TO USE THE SUPPLIES YOU HAVE AVAILABLE UNTIL YOU ARE ABLE TO [facility].  IT IS MOST IMPORTANT TO KEEP THE WOUND COVERED AT ALL TIMES.     Physician Signature:_______________________     Date: ___________ Time:  ____________                    [] Dr Rich Zhao    [] Dr Cher Lynn   [] Latia Duval CNP                 [x] Dr Randi Drew   [] Dr Colette Dacosta                  [] Dr Jessica Baird   [] Srinivasan Tyson NP                   Electronically signed by Jeremy Thurman DPM on 4/3/2019 at 5:04 PM

## 2019-04-03 NOTE — PROGRESS NOTES
Negative Pressure Wound Therapy    NAME:  Toshia Christiansno  YOB: 1970  MEDICAL RECORD NUMBER:  [id number]  DATE:  4/3/2019      Wound cleansed and measured  Jina wound prepped with skin prep  2 pieces of black foam applied to left foot wounds    Tubing connected to pump at 125 mmHg  Good seal obtained. Foam compressed to raisin like appearance.     Applied per  Guidelines      Electronically signed by Arya Lopez RN on 4/3/2019 at 4:09 PM

## 2019-04-10 ENCOUNTER — HOSPITAL ENCOUNTER (OUTPATIENT)
Dept: WOUND CARE | Age: 49
Discharge: HOME | End: 2019-04-10
Payer: COMMERCIAL

## 2019-04-10 VITALS
TEMPERATURE: 97.9 F | DIASTOLIC BLOOD PRESSURE: 89 MMHG | HEART RATE: 88 BPM | SYSTOLIC BLOOD PRESSURE: 137 MMHG | RESPIRATION RATE: 16 BRPM

## 2019-04-10 DIAGNOSIS — T81.89XD NON-HEALING SURGICAL WOUND, SUBSEQUENT ENCOUNTER: ICD-10-CM

## 2019-04-10 DIAGNOSIS — L97.412 DIABETIC ULCER OF RIGHT MIDFOOT ASSOCIATED WITH TYPE 1 DIABETES MELLITUS, WITH FAT LAYER EXPOSED (HCC): Primary | ICD-10-CM

## 2019-04-10 DIAGNOSIS — E10.621 DIABETIC ULCER OF RIGHT MIDFOOT ASSOCIATED WITH TYPE 1 DIABETES MELLITUS, WITH FAT LAYER EXPOSED (HCC): Primary | ICD-10-CM

## 2019-04-10 PROCEDURE — 97605 NEG PRS WND THER DME<=50SQCM: CPT

## 2019-04-10 PROCEDURE — 11045 DBRDMT SUBQ TISS EACH ADDL: CPT

## 2019-04-10 PROCEDURE — 11042 DBRDMT SUBQ TIS 1ST 20SQCM/<: CPT

## 2019-04-10 RX ORDER — LIDOCAINE HYDROCHLORIDE 40 MG/ML
SOLUTION TOPICAL PRN
Status: DISCONTINUED | OUTPATIENT
Start: 2019-04-10 | End: 2019-04-11 | Stop reason: HOSPADM

## 2019-04-10 ASSESSMENT — PAIN SCALES - GENERAL
PAINLEVEL_OUTOF10: 0
PAINLEVEL_OUTOF10: 0

## 2019-04-10 NOTE — PROGRESS NOTES
[facility] [address]   Progress Note and Procedure Note      Landen Solomon  MEDICAL RECORD NUMBER:  [id number]  AGE: 47 y.o. GENDER: male  : 1970  EPISODE DATE:  4/10/2019    Subjective:     Chief Complaint   Patient presents with    Wound Check     f/u visit - left foot wound         HISTORY of PRESENT ILLNESS HPI     Landen Solomon is a 47 y.o. male who presents today for wound/ulcer evaluation. History of Wound Context: Patient presents with complaining of a wound on his left foot. Patient had a transmetatarsal amputation of the left foot on 2018. Patient's skin flap failed after the surgery with dehiscence of the surgical wound. Patient states home health care is performing dressing changes with application of Wound VAC. Patient reports he had angiograms of both legs with revascularization of the right leg on 2019 performed by Dr. Neymar Sawyer. Patient had revascularization of the left leg with Dr. Neymar Sawyer on 2019. Patient states he is taking his antibiotics and pain medication as prescribed.     Patient also has a wound on the bottom of the right foot of five weeks duration. The wound started out as a blood blister.     Wound/Ulcer Pain Timing/Severity: none  Quality of pain: N/A  Severity:  0 / 10   Modifying Factors: None  Associated Signs/Symptoms: drainage and odor    Ulcer Identification:  Ulcer Type: diabetic and non-healing surgical    Contributing Factors: diabetes, poor glucose control, chronic pressure, decreased mobility, obesity and arterial insufficiency    Wound: Wound dehiscence        PAST MEDICAL HISTORY        Diagnosis Date    Angina effort ([address]     Arthritis     CAD (coronary artery disease)     Carotid stenosis     Diabetes mellitus with neurological manifestations, uncontrolled ([address]     Diarrhea     Hyperlipidemia     Hypertension     Obesity     Type II or unspecified type diabetes mellitus without mention of complication, not stated as uncontrolled        PAST SURGICAL HISTORY    Past Surgical History:   Procedure Laterality Date    CARDIAC CATHETERIZATION      CAROTID ENDARTERECTOMY Right 4-9-2015    CHOLECYSTECTOMY      ND OFFICE/OUTPT VISIT,PROCEDURE ONLY Left 10/5/2018    AMPUTATION LEFT GREAT DIGIT performed by Amelia Do DPM at [place] [address] OFFICE/OUTPT [address] [place] [address] Left 10/22/2018    INCISION AND DRAINAGE LEFT FOOT FIRST AND SECOND RAY AMPUTATION (DIGITS ONE, TWO AND METARSAL ONE AND TWO) performed by Amelia Do DPM at [place] [address] OFFICE/OUTPT [address] [place] [address] Left 10/26/2018    LEFT FOOT WOUND DEBRIDEMENT WITH PRIMARY CLOSURE performed by Amelia Do DPM at [place] [address] OFFICE/OUTPT [address] [place] [address] Left 11/2/2018    INCISION AND DRAINAGE WITH TRANSMETATARSAL CONVERTED TO LIST MART  AMPUTATION LEFT FOOT performed by Amelia Do DPM at [address] [place] [address] TOE AMPUTATION Left 10/05/2018    left great toe amputation       FAMILY HISTORY    Family History   Problem Relation Age of Onset    High Blood Pressure Mother        SOCIAL HISTORY    Social History     Tobacco Use    Smoking status: Never Smoker    Smokeless tobacco: Never Used   Substance Use Topics    Alcohol use: No    Drug use: No     Types: Marijuana     Comment: QUIT 4 MONTHS        ALLERGIES    No Known Allergies    MEDICATIONS    Current Outpatient Medications on File Prior to Encounter   Medication Sig Dispense Refill    atorvastatin (LIPITOR) 40 MG tablet Take 1 tablet by mouth every morning 30 tablet 0    insulin glargine (BASAGLAR KWIKPEN) 100 UNIT/ML injection pen Inject 15 Units into the skin nightly 5 pen 3    clopidogrel (PLAVIX) 75 MG tablet Take 1 tablet by mouth daily 30 tablet 3    metoprolol succinate (TOPROL XL) 25 MG extended release tablet Take 25 mg by mouth daily      chlorthalidone (HYGROTEN) 50 MG tablet Take 50 mg by mouth daily  0    vitamin B-12 (CYANOCOBALAMIN) 1000 MCG tablet Take 1,000 mcg by mouth daily 2    ranitidine (ZANTAC) 150 MG tablet Take 150 mg by mouth 2 times daily  1    Multiple Vitamins-Minerals (THERAPEUTIC MULTIVITAMIN-MINERALS) tablet Take 1 tablet by mouth daily      aspirin (ASPIRIN LOW DOSE) 81 MG EC tablet Take 1 tablet by mouth daily 30 tablet 0    lisinopril (PRINIVIL;ZESTRIL) 40 MG tablet Take 40 mg by mouth every morning       SANTYL 250 UNIT/GM ointment Apply 1 each topically daily      isosorbide mononitrate (IMDUR) 30 MG extended release tablet Take 1 tablet by mouth daily 30 tablet 3    Blood Glucose Monitoring Suppl (FREESTYLE LITE) INEZ 1 Device by Does not apply route 3 times daily (before meals) 1 Device 0    blood glucose test strips (FREESTYLE LITE) strip 1 each by In Vitro route 3 times daily As needed. 100 each 3    FREESTYLE LANCETS MISC 1 each by Does not apply route daily 100 each 3    Insulin Disposable Pump (V-GO 40) KIT by Does not apply route      Blood Glucose Monitoring Suppl (FREESTYLE LITE) INEZ TEST BLOOD GLUCOSE THREE TO FOUR TIMES DAILY 1 Device 0    glucose blood VI test strips (ONETOUCH VERIO) strip 1 each by In Vitro route 3 times daily ; Dx E11.65; Z79.4 100 each 11    ONE TOUCH LANCETS MISC 1 each by Does not apply route 3 times daily ; Dx E11.65; Z79.4 100 each 11    Insulin Pen Needle (B-D UF III MINI PEN NEEDLES) 31G X 5 MM MISC 1 each by Does not apply route 4 times daily (before meals and nightly) 150 each 6     No current facility-administered medications on file prior to encounter. REVIEW OF SYSTEMS    Pertinent items are noted in HPI.     Objective:      /89   Pulse 88   Temp 97.9 °F (36.6 °C) (Oral)   Resp 16     Wt Readings from Last 3 Encounters:   03/21/19 251 lb (113.9 kg)   03/09/19 258 lb 2.5 oz (117.1 kg)   01/30/19 266 lb 12.1 oz (121 kg)       PHYSICAL EXAM    Patient is alert and oriented x 3, and is in no acute distress.     Vascular: DP weakly palpable bilateral. PT pulse not palpable bilateral but audible on doppler exam. No Pedal hair noted bilateral. No Edema noted to ankles.    Derm:  Skin is warm to warm from proximal leg to distal foot bilateral. Toenails 1-5 on right foot are thickened, yellow and dystrophic. Neuro:  Protective sensation absent to 10/10 sites bilateral via a 5.07 Chamois-Xuan monofilament.    Musculoskeletal: Muscle strength 5/5 with PF/DF/I and E of both feet. Transmetatarsal amputation of the left foot.     Assessment:        Problem List Items Addressed This Visit        Chronic Conditions    Diabetic foot ulcer ([address] - Primary       Other    Non-healing surgical wound           Procedure Note  Indications:  Based on my examination of this patient's wound(s)/ulcer(s) today, debridement is required to promote healing and evaluate the wound base. Performed by: Lakisha Jimenez DPM    Consent obtained:  Yes    Time out taken:  Yes    Pain Control: Anesthetic  Anesthetic: 4% Lidocaine Liquid Topical(2.5ml)       Debridement: Excisional Debridement    Using curette, #15 blade scalpel, forceps and tissue nippers the wound(s)/ulcer(s) was/were sharply debrided down through and including the removal of epidermis, dermis and subcutaneous tissue. Devitalized Tissue Debrided:  fibrin, biofilm, necrotic/eschar and callus    Pre Debridement Measurements:  Are located in the Wound/Ulcer Documentation Flow Sheet    Wound/Ulcer #: 2, 3 and 4    Post Debridement Measurements:  Wound/Ulcer Descriptions are Pre Debridement except measurements:    Wound 03/13/19 Foot Right;Plantar #2 - right plantar foot- pt. noted 2/15/2019 (Active)   Wound Image   4/3/2019  2:32 PM   Wound Pressure Stage  3 4/3/2019  2:32 PM   Dressing Status Clean;Dry; Intact 4/3/2019  2:32 PM   Dressing Changed Changed/New 4/10/2019  3:07 PM   Dressing/Treatment Antibacterial Ointment;Dry Dressing 3/20/2019  3:33 PM   Wound Length (cm) 3 cm 4/10/2019  2:26 PM   Wound Width (cm) 2.9 cm 4/10/2019  2:26 PM   Wound Depth (cm) 0.1 cm 4/10/2019  2:26 PM   Drainage Amount Small 4/10/2019  2:26 PM   Drainage Description Serosanguinous 4/10/2019  2:26 PM   Odor Strong 4/10/2019  2:26 PM   Margins Attached edges 4/10/2019  2:26 PM   Jina-wound Assessment Maceration 4/10/2019  2:26 PM   Non-staged Wound Description Full thickness 4/10/2019  2:26 PM   Schneider%Wound Bed 30 3/27/2019  2:13 PM   Red%Wound Bed 50 4/10/2019  2:26 PM   Yellow%Wound Bed 40 4/10/2019  2:26 PM   Black%Wound Bed 10 4/10/2019  2:26 PM   Number of days: 21       Wound 03/20/19 Foot Left;Lateral Wound # 4 acquired 11/2018 left TMA site (Active)   Wound Image   4/3/2019  2:32 PM   Wound Non-Healing Surgical 4/3/2019  2:32 PM   Dressing Status Intact; Old drainage 4/3/2019  2:32 PM   Dressing Changed Changed/New 4/10/2019  3:07 PM   Dressing/Treatment Dry Dressing;Santyl 3/20/2019  3:33 PM   Wound Length (cm) 0.9 cm 4/10/2019  2:26 PM   Wound Width (cm) 4 cm 4/10/2019  2:26 PM   Wound Depth (cm) 0.6 cm 4/10/2019  2:26 PM   Wound Surface Area (cm^2) 3.6 cm^2 4/10/2019  2:26 PM   Change in Wound Size % (l*w) 69.44 4/10/2019  2:26 PM   Wound Volume (cm^3) 2.16 cm^3 4/10/2019  2:26 PM   Wound Healing % 83 4/10/2019  2:26 PM   Post-Procedure Length (cm) 1 cm 4/10/2019  3:07 PM   Post-Procedure Width (cm) 4.1 cm 4/10/2019  3:07 PM   Post-Procedure Depth (cm) 0.6 cm 4/10/2019  3:07 PM   Post-Procedure Surface Area (cm^2) 4.1 cm^2 4/10/2019  3:07 PM   Post-Procedure Volume (cm^3) 2.46 cm^3 4/10/2019  3:07 PM   Wound Assessment Black;Granulation tissue;Slough 4/10/2019  2:26 PM   Drainage Amount Small 4/10/2019  2:26 PM   Drainage Description Serosanguinous 4/10/2019  2:26 PM   Odor Strong 4/10/2019  2:26 PM   Margins Attached edges 4/10/2019  2:26 PM   Jina-wound Assessment Maceration 4/10/2019  2:26 PM   Non-staged Wound Description Full thickness 4/10/2019  2:26 PM   Schneider%Wound Bed 50 4/10/2019  2:26 PM   Red%Wound Bed 20 4/10/2019  2:26 PM   Yellow%Wound Bed 20 4/10/2019  2:26 PM   [x] Wheelchair      [] Crutches              [] Surgical shoe    [] Podus Boot(s)   [] Foam Boot(s)  [] Roll About              [] Cast Boot       [] CROW Boot  [] Other:                   Dietary:  [] Diet as tolerated:        [x] Calorie Diabetic Diet: [] No Added Salt:  [] Increase Protein:        [] Other:     Activity:  [x] Activity as tolerated:  [] Patient has no activity restrictions     [] Strict Bedrest:            [] Remain off Work:     [] May return to full duty work:                                         [] YPQIDR to work with restrictions:          If you are still having pain after you go home:  [X] Elevate the affected limb.            [X]Use over-the-counter medications you would normally use for pain as permitted by your family doctor.  [X] For persistent pain not relieved by the above interventions, please call your family doctor.                Return Appointment:  [] Wound and dressing supply provider:   [x] ECF or Home Healthcare: [facility]  [] Wound Assessment:  [] Physician or NP scheduled for Wound Assessment:   [x] Return Appointment: [facility] [place]  in  1 Week(s)  [] Ordered tests:      Nurse Case Manger: Sarita  Electronically signed by Aracely Osorio RN on 4/10/2019 at 3:14 PM  [address] [place] [address] Information: Should you experience any significant changes in your wound(s) or have questions about your wound care, please contact the [facility] [phone] [facility] - THURSDAY 8:30 am - 4:30 pm and Friday 8:30 am - 1:00 pm.  If you need help with your wound outside these hours and cannot wait until we are again available, contact your PCP or go to the hospital emergency room.      PLEASE NOTE: IF YOU ARE UNABLE TO OBTAIN WOUND SUPPLIES, CONTINUE TO USE THE SUPPLIES YOU HAVE AVAILABLE UNTIL YOU ARE ABLE TO [facility].  IT IS MOST IMPORTANT TO KEEP THE WOUND COVERED AT ALL TIMES.     Physician Signature:_______________________     Date: ___________ Time:

## 2019-04-11 ENCOUNTER — PROCEDURE VISIT (OUTPATIENT)
Dept: SURGERY | Age: 49
End: 2019-04-11
Payer: COMMERCIAL

## 2019-04-11 ENCOUNTER — OFFICE VISIT (OUTPATIENT)
Dept: SURGERY | Age: 49
End: 2019-04-11
Payer: COMMERCIAL

## 2019-04-11 VITALS — SYSTOLIC BLOOD PRESSURE: 118 MMHG | DIASTOLIC BLOOD PRESSURE: 74 MMHG

## 2019-04-11 DIAGNOSIS — I73.9 PVD (PERIPHERAL VASCULAR DISEASE) (HCC): ICD-10-CM

## 2019-04-11 DIAGNOSIS — I10 ESSENTIAL HYPERTENSION: ICD-10-CM

## 2019-04-11 DIAGNOSIS — I70.245 ATHEROSCLEROSIS OF NATIVE ARTERY OF LEFT LOWER EXTREMITY WITH ULCERATION OF OTHER PART OF FOOT (HCC): Primary | ICD-10-CM

## 2019-04-11 PROCEDURE — 93925 LOWER EXTREMITY STUDY: CPT | Performed by: SURGERY

## 2019-04-11 PROCEDURE — G8417 CALC BMI ABV UP PARAM F/U: HCPCS | Performed by: NURSE PRACTITIONER

## 2019-04-11 PROCEDURE — G8598 ASA/ANTIPLAT THER USED: HCPCS | Performed by: NURSE PRACTITIONER

## 2019-04-11 PROCEDURE — 1036F TOBACCO NON-USER: CPT | Performed by: NURSE PRACTITIONER

## 2019-04-11 PROCEDURE — 99214 OFFICE O/P EST MOD 30 MIN: CPT | Performed by: NURSE PRACTITIONER

## 2019-04-11 PROCEDURE — 3017F COLORECTAL CA SCREEN DOC REV: CPT | Performed by: NURSE PRACTITIONER

## 2019-04-11 PROCEDURE — G8427 DOCREV CUR MEDS BY ELIG CLIN: HCPCS | Performed by: NURSE PRACTITIONER

## 2019-04-11 ASSESSMENT — ENCOUNTER SYMPTOMS
EYES NEGATIVE: 1
COLOR CHANGE: 1

## 2019-04-11 NOTE — PROGRESS NOTES
mouth daily Yes GINA Speras CNP   metoprolol succinate (TOPROL XL) 25 MG extended release tablet Take 25 mg by mouth daily Yes Historical Provider, MD   chlorthalidone (HYGROTEN) 50 MG tablet Take 50 mg by mouth daily Yes Historical Provider, MD   SANTYL 250 UNIT/GM ointment Apply 1 each topically daily Yes Historical Provider, MD   vitamin B-12 (CYANOCOBALAMIN) 1000 MCG tablet Take 1,000 mcg by mouth daily Yes Historical Provider, MD   ranitidine (ZANTAC) 150 MG tablet Take 150 mg by mouth 2 times daily Yes Historical Provider, MD   Multiple Vitamins-Minerals (THERAPEUTIC MULTIVITAMIN-MINERALS) tablet Take 1 tablet by mouth daily Yes Historical Provider, MD   isosorbide mononitrate (IMDUR) 30 MG extended release tablet Take 1 tablet by mouth daily Yes Xavier Prakash MD   Blood Glucose Monitoring Suppl (FREESTYLE LITE) INEZ 1 Device by Does not apply route 3 times daily (before meals) Yes Jessica Johnson MD   blood glucose test strips (FREESTYLE LITE) strip 1 each by In Vitro route 3 times daily As needed. Yes Jessica Johnson MD   FREESTYLE LANCETS MISC 1 each by Does not apply route daily Yes Jessica Johnson MD   Insulin Disposable Pump (V-GO 40) KIT by Does not apply route Yes Historical Provider, MD   aspirin (ASPIRIN LOW DOSE) 81 MG EC tablet Take 1 tablet by mouth daily Yes CASANDRA Mai   Blood Glucose Monitoring Suppl (FREESTYLE LITE) INEZ TEST BLOOD GLUCOSE THREE TO FOUR TIMES DAILY Yes Braulio Franco MD   glucose blood VI test strips (ONETOUCH VERIO) strip 1 each by In Vitro route 3 times daily ; Dx E11.65; Z79.4 Yes GINA Magaña CNP   ONE TOUCH LANCETS MISC 1 each by Does not apply route 3 times daily ;  Dx E11.65; Z79.4 Yes GINA Magaña CNP   Insulin Pen Needle (B-D UF III MINI PEN NEEDLES) 31G X 5 MM MISC 1 each by Does not apply route 4 times daily (before meals and nightly) Yes Braulio Franco MD   lisinopril (PRINIVIL;ZESTRIL) 40 MG tablet Take 40 mg by mouth every morning  Yes Historical Provider, MD     History reviewed. Objective:   Physical Exam   Constitutional: He is oriented to person, place, and time. He appears well-developed and well-nourished. He is active and cooperative. No distress. HENT:   Head: Normocephalic. Right Ear: Hearing normal.   Left Ear: Hearing normal.   Eyes: Pupils are equal, round, and reactive to light. Conjunctivae and lids are normal.   Neck: Trachea normal and normal range of motion. Neck supple. Carotid bruit is not present. No tracheal deviation present. Right CEA 4/9/15   Cardiovascular: Normal rate, regular rhythm and normal heart sounds. Exam reveals no gallop and no friction rub. No murmur heard. Pulses:       Femoral pulses are 2+ on the right side, and 2+ on the left side. Popliteal pulses are 1+ on the right side, and 1+ on the left side. Dorsalis pedis pulses are 1+ on the right side. Posterior tibial pulses are 0 on the right side, and 0 on the left side. Left DP doppler +   Pulmonary/Chest: Effort normal and breath sounds normal. No respiratory distress. He has no wheezes. Abdominal: Soft. Normal appearance and bowel sounds are normal. He exhibits no distension and no mass. There is no tenderness. There is no rigidity, no rebound and no guarding. Musculoskeletal: Normal range of motion. He exhibits no edema. Feet:    Neurological: He is alert and oriented to person, place, and time. He has normal strength. Use of wheelchair   Skin: Skin is warm and dry. No rash noted. No erythema. Psychiatric: He has a normal mood and affect. His speech is normal and behavior is normal. Thought content normal. Cognition and memory are normal.   Vitals reviewed. Assessment:       Diagnosis Orders   1. 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 PVD (peripheral vascular disease) ([address]     3.  Essential hypertension       Per arterial scan today:  Right:  Total occlusion of the right PTA. No other evidence of significant stenosis of the arteries of the right LE. Right RUY 1. 19. Left:  Total occlusion of the left PTA. No other evidence of significant stenosis of the arteries of the LLE. Left RUY 1.25. PATIENT EDUCATION focused on elevating legs with the ankle at or above the level of the heart as needed to relieve leg pain and swelling. Patient to participate in exercise as tolerated with focus on the leg(s) including, daily walking, repetitive toe pointing, and calve muscle pumping/stretching as tolerated. Plan:         Return in about 6 months (around 10/11/2019) for bilateral LE arterial study with RUY's. Beto Valdes MA am scribing for and in the presence of Chary Bolanos CNP on this date of 04/11/19 at 3:48 PM    I Chary Bolanos CNP, personally performed the services described in this documentation as scribed by the Medical Assistant Mitch Marin in my presence and it is both accurate and complete.

## 2019-04-11 NOTE — PATIENT INSTRUCTIONS
PATIENT EDUCATION focused on elevating legs with the ankle at or above the level of the heart as needed to relieve leg pain and swelling. Patient to participate in exercise as tolerated with focus on the leg(s) including, daily walking, repetitive toe pointing, and calve muscle pumping/stretching as tolerated. Return in about 6 months (around 10/11/2019) for bilateral LE arterial study with RUY's.

## 2019-04-17 ENCOUNTER — HOSPITAL ENCOUNTER (OUTPATIENT)
Dept: WOUND CARE | Age: 49
Discharge: HOME | End: 2019-04-17
Payer: COMMERCIAL

## 2019-04-17 VITALS
HEART RATE: 94 BPM | RESPIRATION RATE: 18 BRPM | DIASTOLIC BLOOD PRESSURE: 68 MMHG | SYSTOLIC BLOOD PRESSURE: 102 MMHG | TEMPERATURE: 97.2 F

## 2019-04-17 DIAGNOSIS — L97.412 DIABETIC ULCER OF RIGHT MIDFOOT ASSOCIATED WITH TYPE 1 DIABETES MELLITUS, WITH FAT LAYER EXPOSED (HCC): Primary | ICD-10-CM

## 2019-04-17 DIAGNOSIS — T81.89XD NON-HEALING SURGICAL WOUND, SUBSEQUENT ENCOUNTER: ICD-10-CM

## 2019-04-17 DIAGNOSIS — E10.621 DIABETIC ULCER OF RIGHT MIDFOOT ASSOCIATED WITH TYPE 1 DIABETES MELLITUS, WITH FAT LAYER EXPOSED (HCC): Primary | ICD-10-CM

## 2019-04-17 PROCEDURE — 97605 NEG PRS WND THER DME<=50SQCM: CPT

## 2019-04-17 PROCEDURE — 11042 DBRDMT SUBQ TIS 1ST 20SQCM/<: CPT

## 2019-04-17 RX ORDER — LIDOCAINE HYDROCHLORIDE 40 MG/ML
SOLUTION TOPICAL PRN
Status: DISCONTINUED | OUTPATIENT
Start: 2019-04-17 | End: 2019-04-18 | Stop reason: HOSPADM

## 2019-04-17 ASSESSMENT — PAIN SCALES - GENERAL
PAINLEVEL_OUTOF10: 0
PAINLEVEL_OUTOF10: 0

## 2019-04-17 NOTE — PROGRESS NOTES
Negative Pressure    NAME:  Hermelindo Leo  YOB: 1970  MEDICAL RECORD NUMBER:  [id number]  DATE:  4/17/2019     Applied Negative Pressure to LEFT TMA wound(s)/ulcer(s).  [x] Applied skin barrier prep to erick-wound.  [x] Cut strips of plastic drape to picture frame wound so that erick-wound is     covered with the drape.  [] If bridging dressing to less prominent site, cover any intact skin that will come in contact with the Negative Pressure Therapy sponge, gauze or channel drain with plastic drape. The sponge should never touch intact skin.  [x] Cut sponge, gauze or channel drain to size which will fit into the wound/ulcer bed without being forced.  [x] Be sure the sponge is large enough to hold the entire round plastic flange which is attached to the tubing. Never allow flange to be larger than the sponge or it will produce suction damaging intact skin.  Total number of individual pieces of foam used within the wound bed: 1     [x] If bridging the dressing away from the primary site, be sure the bridge leads to a piece of sponge large enough to hold the entire flange without allowing any of the flange to overlap onto intact skin.  [x] Covered sponge, gauze or channel drain with plastic drape.  [x] Cut a hole in this plastic drape directly over the sponge the same size as the plastic drain tubing.  [x] Removed plastic liner from flange and apply it directly over the hole you cut.  [x] Removed the plastic cover from the flange.  [x] Attached the tubing to the wound/ulcer Negative Pressure Therapy and turn it on to be sure a vacuum is created and that there are no leaks.  [] If air leaks occur, use plastic drape to patch them.  [x] Secured Negative Pressure Therapy dressing with ace wrap loosely if located on an extremity. Maintain tubing outside of ace wrap. Tubing must not exert pressure on intact skin.     Applied per  Guidelines      Electronically signed by Freddy Mckinney RN on 4/17/2019 at 2:25 PM

## 2019-04-17 NOTE — PROGRESS NOTES
Kriss[facility] [address]   Progress Note and Procedure Note      Ken Ayala  MEDICAL RECORD NUMBER:  [id number]  AGE: 47 y.o. GENDER: male  : 1970  EPISODE DATE:  2019    Subjective:     No chief complaint on file. HISTORY of PRESENT ILLNESS HPI     Ken Ayala is a 47 y.o. male who presents today for wound/ulcer evaluation. History of Wound Context: Patient presents with complaining of a wound on his left foot. Patient had a transmetatarsal amputation of the left foot on 2018. Patient's skin flap failed after the surgery with dehiscence of the surgical wound. Patient states home health care is performing dressing changes with application of Wound VAC. Patient reports he had angiograms of both legs with revascularization of the right leg on 2019 performed by Dr. Magdalene Shields. Patient had revascularization of the left leg with Dr. Magdalene Shields on 2019. Patient states he is taking his antibiotics and pain medication as prescribed.     Patient also has a wound on the bottom of the right foot of six weeks duration. The wound started out as a blood blister.       Wound/Ulcer Pain Timing/Severity: none  Quality of pain: N/A  Severity:  0 / 10   Modifying Factors: None  Associated Signs/Symptoms: none    Ulcer Identification:  Ulcer Type: diabetic and non-healing surgical    Contributing Factors: diabetes, poor glucose control, decreased mobility, obesity and arterial insufficiency    Wound: Wound dehiscence        PAST MEDICAL HISTORY        Diagnosis Date    Angina effort ([address]     Arthritis     CAD (coronary artery disease)     Carotid stenosis     Diabetes mellitus with neurological manifestations, uncontrolled ([address]     Diarrhea     Hyperlipidemia     Hypertension     Obesity     Type II or unspecified type diabetes mellitus without mention of complication, not stated as uncontrolled        PAST SURGICAL HISTORY    Past Surgical History:   Procedure Laterality Date  CARDIAC CATHETERIZATION      CAROTID ENDARTERECTOMY Right 4-9-2015    CHOLECYSTECTOMY      [place] OFFICE/OUTPT VISIT,PROCEDURE ONLY Left 10/5/2018    AMPUTATION LEFT GREAT DIGIT performed by Dudley Ward DPM at [place] [address] OFFICE/OUTPT [address] [place] [address] Left 10/22/2018    INCISION AND DRAINAGE LEFT FOOT FIRST AND SECOND RAY AMPUTATION (DIGITS ONE, TWO AND METARSAL ONE AND TWO) performed by Dudley Ward DPM at [place] [address] OFFICE/OUTPT [address] [place] [address] Left 10/26/2018    LEFT FOOT WOUND DEBRIDEMENT WITH PRIMARY CLOSURE performed by Dudley Ward DPM at [place] [address] OFFICE/OUTPT [address] [place] [address] Left 11/2/2018    INCISION AND DRAINAGE WITH TRANSMETATARSAL CONVERTED TO LIST MART  AMPUTATION LEFT FOOT performed by Dudley Ward DPM at [address] [place] [address] TOE AMPUTATION Left 10/05/2018    left great toe amputation       FAMILY HISTORY    Family History   Problem Relation Age of Onset    High Blood Pressure Mother        SOCIAL HISTORY    Social History     Tobacco Use    Smoking status: Never Smoker    Smokeless tobacco: Never Used   Substance Use Topics    Alcohol use: No    Drug use: No     Types: Marijuana     Comment: QUIT 4 MONTHS        ALLERGIES    No Known Allergies    MEDICATIONS    Current Outpatient Medications on File Prior to Encounter   Medication Sig Dispense Refill    atorvastatin (LIPITOR) 40 MG tablet Take 1 tablet by mouth every morning 30 tablet 0    insulin glargine (BASAGLAR KWIKPEN) 100 UNIT/ML injection pen Inject 15 Units into the skin nightly 5 pen 3    clopidogrel (PLAVIX) 75 MG tablet Take 1 tablet by mouth daily 30 tablet 3    metoprolol succinate (TOPROL XL) 25 MG extended release tablet Take 25 mg by mouth daily      chlorthalidone (HYGROTEN) 50 MG tablet Take 50 mg by mouth daily  0    SANTYL 250 UNIT/GM ointment Apply 1 each topically daily      vitamin B-12 (CYANOCOBALAMIN) 1000 MCG tablet Take 1,000 mcg by mouth daily  2    ranitidine (ZANTAC) 150 MG tablet Take 150 mg by mouth 2 times daily  1    Multiple Vitamins-Minerals (THERAPEUTIC MULTIVITAMIN-MINERALS) tablet Take 1 tablet by mouth daily      isosorbide mononitrate (IMDUR) 30 MG extended release tablet Take 1 tablet by mouth daily 30 tablet 3    aspirin (ASPIRIN LOW DOSE) 81 MG EC tablet Take 1 tablet by mouth daily 30 tablet 0    lisinopril (PRINIVIL;ZESTRIL) 40 MG tablet Take 40 mg by mouth every morning       Blood Glucose Monitoring Suppl (FREESTYLE LITE) INEZ 1 Device by Does not apply route 3 times daily (before meals) 1 Device 0    blood glucose test strips (FREESTYLE LITE) strip 1 each by In Vitro route 3 times daily As needed. 100 each 3    FREESTYLE LANCETS MISC 1 each by Does not apply route daily 100 each 3    Insulin Disposable Pump (V-GO 40) KIT by Does not apply route      Blood Glucose Monitoring Suppl (FREESTYLE LITE) INEZ TEST BLOOD GLUCOSE THREE TO FOUR TIMES DAILY 1 Device 0    glucose blood VI test strips (ONETOUCH VERIO) strip 1 each by In Vitro route 3 times daily ; Dx E11.65; Z79.4 100 each 11    ONE TOUCH LANCETS MISC 1 each by Does not apply route 3 times daily ; Dx E11.65; Z79.4 100 each 11    Insulin Pen Needle (B-D UF III MINI PEN NEEDLES) 31G X 5 MM MISC 1 each by Does not apply route 4 times daily (before meals and nightly) 150 each 6     No current facility-administered medications on file prior to encounter. REVIEW OF SYSTEMS    Pertinent items are noted in HPI.     Objective:      /68   Pulse 94   Temp 97.2 °F (36.2 °C) (Oral)   Resp 18     Wt Readings from Last 3 Encounters:   03/21/19 251 lb (113.9 kg)   03/09/19 258 lb 2.5 oz (117.1 kg)   01/30/19 266 lb 12.1 oz (121 kg)       PHYSICAL EXAM    Patient is alert and oriented x 3, and is in no acute distress.     Vascular: DP weakly palpable bilateral. PT pulse not palpable bilateral but audible on doppler exam. No Pedal hair noted bilateral. No Edema noted to ankles.    Derm:  Skin is warm to warm from proximal leg to distal foot bilateral. Toenails 1-5 on right foot are thickened, yellow and dystrophic. Neuro:  Protective sensation absent to 10/10 sites bilateral via a 5.07 Jackson-Xuan monofilament.    Musculoskeletal: Muscle strength 5/5 with PF/DF/I and E of both feet. Transmetatarsal amputation of the left foot.     Assessment:        Problem List Items Addressed This Visit        Chronic Conditions    Diabetic foot ulcer ([address] - Primary       Other    Non-healing surgical wound           Procedure Note  Indications:  Based on my examination of this patient's wound(s)/ulcer(s) today, debridement is required to promote healing and evaluate the wound base. Performed by: Amelia Do DPM    Consent obtained:  Yes    Time out taken:  Yes    Pain Control: Anesthetic  Anesthetic: 4% Lidocaine Liquid Topical(5 ml)       Debridement: Excisional Debridement    Using curette, #15 blade scalpel, forceps and tissue nippers the wound(s)/ulcer(s) was/were sharply debrided down through and including the removal of epidermis, dermis and subcutaneous tissue. Devitalized Tissue Debrided:  fibrin, biofilm, slough and callus    Pre Debridement Measurements:  Are located in the Wound/Ulcer Documentation Flow Sheet    Wound/Ulcer #: 2, 3 and 4    Post Debridement Measurements:  Wound/Ulcer Descriptions are Pre Debridement except measurements:    Wound 03/13/19 Foot Right;Plantar #2 - right plantar foot- pt. noted 2/15/2019 (Active)   Wound Image   4/3/2019  2:32 PM   Wound Pressure Stage  3 4/17/2019  1:31 PM   Dressing Status Intact; Old drainage 4/17/2019  1:31 PM   Dressing Changed Changed/New 4/17/2019  1:53 PM   Dressing/Treatment Antibacterial Ointment;Dry Dressing 3/20/2019  3:33 PM   Wound Length (cm) 2.9 cm 4/17/2019  1:31 PM   Wound Width (cm) 1.4 cm 4/17/2019  1:31 PM   Wound Depth (cm) 0.1 cm 4/17/2019  1:31 PM   Wound Surface Area (cm^2) 4.06 cm^2 4/17/2019  1:31 PM   Change in Wound Size % (l*w) 47.14 4/17/2019  1:31 PM   Wound Volume (cm^3) 0.41 cm^3 4/17/2019  1:31 PM   Wound Healing % 47 4/17/2019  1:31 PM   Post-Procedure Length (cm) 3 cm 4/17/2019  1:53 PM   Post-Procedure Width (cm) 1.5 cm 4/17/2019  1:53 PM   Post-Procedure Depth (cm) 0.2 cm 4/17/2019  1:53 PM   Post-Procedure Surface Area (cm^2) 4.5 cm^2 4/17/2019  1:53 PM   Post-Procedure Volume (cm^3) 0.9 cm^3 4/17/2019  1:53 PM   Wound Assessment Dry;Black;Slough 4/17/2019  1:31 PM   Drainage Amount Small 4/17/2019  1:31 PM   Drainage Description Serosanguinous 4/17/2019  1:31 PM   Odor None 4/17/2019  1:31 PM   Margins Attached edges 4/17/2019  1:31 PM   Jina-wound Assessment Calloused; White 4/17/2019  1:31 PM   Non-staged Wound Description Full thickness 4/17/2019  1:31 PM   Red%Wound Bed 90 3/20/2019  2:25 PM   Yellow%Wound Bed 40 4/17/2019  1:31 PM   Black%Wound Bed 60 4/17/2019  1:31 PM   Number of days: 35       Wound 03/20/19 Foot Left;Medial Wound #3 acquired 11/2018 left TMA site (Active)   Wound Image   4/3/2019  2:32 PM   Wound Non-Healing Surgical 4/17/2019  1:31 PM   Dressing Status Intact; Old drainage 4/17/2019  1:31 PM   Dressing Changed Changed/New 4/17/2019  1:53 PM   Dressing/Treatment Dry Dressing;Santyl 3/20/2019  3:33 PM   Wound Length (cm) 2.8 cm 4/17/2019  1:31 PM   Wound Width (cm) 3.4 cm 4/17/2019  1:31 PM   Wound Depth (cm) 0.3 cm 4/17/2019  1:31 PM   Wound Surface Area (cm^2) 9.52 cm^2 4/17/2019  1:31 PM   Change in Wound Size % (l*w) -65.28 4/17/2019  1:31 PM   Wound Volume (cm^3) 2.86 cm^3 4/17/2019  1:31 PM   Wound Healing % 50 4/17/2019  1:31 PM   Post-Procedure Length (cm) 2.7 cm 4/17/2019  1:53 PM   Post-Procedure Width (cm) 3.5 cm 4/17/2019  1:53 PM   Post-Procedure Depth (cm) 0.3 cm 4/17/2019  1:53 PM   Post-Procedure Surface Area (cm^2) 9.45 cm^2 4/17/2019  1:53 PM   Post-Procedure Volume (cm^3) 2.84 cm^3 4/17/2019  1:53 PM   Wound Assessment Granulation tissue;Slough 4/17/2019  1:31 PM   Drainage Amount Scant 4/17/2019  1:31 PM   Drainage Description Serosanguinous 4/17/2019  1:31 PM   Odor Strong 4/17/2019  1:31 PM   Margins Attached edges 4/17/2019  1:31 PM   Jina-wound Assessment Maceration 4/17/2019  1:31 PM   Non-staged Wound Description Full thickness 4/17/2019  1:31 PM   [place]%Wound Bed 30 3/27/2019  2:13 PM   Red%Wound Bed 80 4/17/2019  1:31 PM   Yellow%Wound Bed 20 4/17/2019  1:31 PM   Black%Wound Bed 10 4/10/2019  2:26 PM   Number of days: 28       Wound 03/20/19 Foot Left;Lateral Wound # 4 acquired 11/2018 left TMA site (Active)   Wound Image   4/3/2019  2:32 PM   Wound Non-Healing Surgical 4/17/2019  1:31 PM   Dressing Status Intact; Old drainage 4/17/2019  1:31 PM   Dressing Changed Changed/New 4/17/2019  1:53 PM   Dressing/Treatment Dry Dressing;Santyl 3/20/2019  3:33 PM   Wound Length (cm) 0.9 cm 4/17/2019  1:31 PM   Wound Width (cm) 3.9 cm 4/17/2019  1:31 PM   Wound Depth (cm) 0.4 cm 4/17/2019  1:31 PM   Wound Surface Area (cm^2) 3.51 cm^2 4/17/2019  1:31 PM   Change in Wound Size % (l*w) 70.2 4/17/2019  1:31 PM   Wound Volume (cm^3) 1.4 cm^3 4/17/2019  1:31 PM   Wound Healing % 89 4/17/2019  1:31 PM   Post-Procedure Length (cm) 1 cm 4/17/2019  1:53 PM   Post-Procedure Width (cm) 4 cm 4/17/2019  1:53 PM   Post-Procedure Depth (cm) 0.5 cm 4/17/2019  1:53 PM   Post-Procedure Surface Area (cm^2) 4 cm^2 4/17/2019  1:53 PM   Post-Procedure Volume (cm^3) 2 cm^3 4/17/2019  1:53 PM   Wound Assessment Granulation tissue;Slough 4/17/2019  1:31 PM   Drainage Amount Scant 4/17/2019  1:31 PM   Drainage Description Serosanguinous 4/17/2019  1:31 PM   Odor Strong 4/17/2019  1:31 PM   Margins Attached edges 4/17/2019  1:31 PM   Jina-wound Assessment White 4/17/2019  1:31 PM   Non-staged Wound Description Full thickness 4/17/2019  1:31 PM   [place]%Wound Bed 50 4/10/2019  2:26 PM   Red%Wound Bed 95 4/17/2019  1:31 PM   Yellow%Wound Bed 5 4/17/2019  1:31 PM   Black%Wound Bed 10 4/10/2019  2:26 PM   Number of days: 28 Percent of Wound(s)/Ulcer(s) Debrided: 100%    Total Surface Area Debrided:  17.95 sq cm     Diabetic/Pressure/Non Pressure Ulcers only:  Ulcer: Diabetic ulcer, fat layer exposed     Estimated Blood Loss:  Minimal    Hemostasis Achieved:  by pressure    Procedural Pain:  0  / 10     Post Procedural Pain:  0 / 10     Response to treatment:  Well tolerated by patient. Plan:     Treatment Note please see attached Discharge Instructions    Written patient dismissal instructions given to patient and signed by patient or POA. Discharge Instructions       [facility]   Physician Orders and Discharge Instructions  [place] [id number] [facility]   [address] [facility] [address], [facility] [address]  Telephone: [phone]      FAX [phone]     NAME: Lillian Dexter  DATE OF BIRTH:  1970  MEDICAL RECORD NUMBER:  [id number]  DATE:  4/17/2019     Wound Cleansing:   Do not scrub or use excessive force. Cleanse wound prior to applying a clean dressing with:  [x] Normal Saline            [x] Keep Wound Dry in Shower    [] Wound Cleanser   [] Cleanse wound with Mild Soap & Water  [] May Shower at Discharge   [] Other:        Topical Treatments:  Do not apply lotions, creams, or ointments to wound bed unless directed. [] Apply moisturizing lotion to skin surrounding the wound prior to dressing change.  [] Apply antifungal ointment to skin surrounding the wound prior to dressing change.  [] Apply thin film of moisture barrier ointment to skin immediately around wound. [x] Other:  Vashe Wash for 5 minutes on Left Foot Wounds prior to Dressing Change        Negative Pressure:           Wound Location: LEFT FOOT MEDIAL AND LATERAL    X [email or web address]  mm/Hg                XContinuous  ? Intermittent              C Black           ? GPVYN Foam           ? Other:   XChange dressing:   XThree times per week         X Other: Ensure to Drape and Bridge Medial and Lateral Wounds so sponge does not come into contact with intact skin         Dressings : Wound Location : RIGHT PLANTAR FOOT  Apply Santyl with Normal Saline dampened Gauze to wound and cover with dry gauze and dacia.  Change dressing DAILY.   Apply Kidney Bean Podiatry Pad to Jina-Wound- Applied per Dr. Ascencio        Edema Control:  Apply:  [] Compression Stocking           []Right Leg         []Left Leg              [] Tubigrip          []Right Leg Double Layer          []Left Leg Double Layer                                                  []Right Leg Single Layer           []Left Leg Single Layer              [] SpandaGrip    []Right Leg         []Left Leg                                      []Low compression 5-10 mm/Hg                                                 []Medium compression 10-20 mm/Hg                                      []High compression  20-30 mm/Hg              every morning immediately when getting up should be applied to affected leg(s) from mid foot to knee making sure to cover the heel.  Remove every night before going to bed.              [] Elevate leg(s) above the level of the heart when sitting.                    [] Avoid prolonged standing in one place.        Compression:  Apply:  [] Multilayer Compression Wrap Applied in Clinic        []RightLeg          []Left Leg              [] Multi-layer compression.  Do not get leg(s) with wrap wet.  If wraps become too tight call the center or completely remove the wrap.                 [] Elevate leg(s) above the level of the heart when sitting.                    [] Avoid prolonged standing in one place.     Off-Loading:   [] Off-loading when        [] walking           [] in bed [] sitting  [] Total non-weight bearing  [] Right Leg  [] Left Leg          [x] Assistive Device         [] Walker            [] Cane   [x] Wheelchair      [] Crutches              [] Surgical shoe    [] Podus Boot(s)   [] Foam Boot(s)  [] Roll

## 2019-04-24 ENCOUNTER — HOSPITAL ENCOUNTER (OUTPATIENT)
Dept: WOUND CARE | Age: 49
Discharge: HOME | End: 2019-04-24
Payer: COMMERCIAL

## 2019-04-24 VITALS
SYSTOLIC BLOOD PRESSURE: 155 MMHG | HEART RATE: 84 BPM | DIASTOLIC BLOOD PRESSURE: 95 MMHG | RESPIRATION RATE: 18 BRPM | TEMPERATURE: 97 F

## 2019-04-24 DIAGNOSIS — E10.621 DIABETIC ULCER OF RIGHT MIDFOOT ASSOCIATED WITH TYPE 1 DIABETES MELLITUS, WITH FAT LAYER EXPOSED (HCC): ICD-10-CM

## 2019-04-24 DIAGNOSIS — T81.89XD NON-HEALING SURGICAL WOUND, SUBSEQUENT ENCOUNTER: Primary | ICD-10-CM

## 2019-04-24 DIAGNOSIS — L97.412 DIABETIC ULCER OF RIGHT MIDFOOT ASSOCIATED WITH TYPE 1 DIABETES MELLITUS, WITH FAT LAYER EXPOSED (HCC): ICD-10-CM

## 2019-04-24 PROCEDURE — 11045 DBRDMT SUBQ TISS EACH ADDL: CPT

## 2019-04-24 PROCEDURE — 11042 DBRDMT SUBQ TIS 1ST 20SQCM/<: CPT

## 2019-04-24 RX ORDER — LIDOCAINE HYDROCHLORIDE 40 MG/ML
SOLUTION TOPICAL PRN
Status: DISCONTINUED | OUTPATIENT
Start: 2019-04-24 | End: 2019-04-25 | Stop reason: HOSPADM

## 2019-04-24 ASSESSMENT — PAIN SCALES - GENERAL
PAINLEVEL_OUTOF10: 0
PAINLEVEL_OUTOF10: 0

## 2019-04-24 NOTE — PROGRESS NOTES
[facility] [address]   Progress Note and Procedure Note      Jyoti Blevins  MEDICAL RECORD NUMBER:  [id number]  AGE: 47 y.o. GENDER: male  : 1970  EPISODE DATE:  2019    Subjective:     Chief Complaint   Patient presents with    Wound Check     Follow Up on Left and Right Feet         HISTORY of PRESENT ILLNESS HPI     Jyoti Blevins is a 47 y.o. male who presents today for wound/ulcer evaluation. History of Wound Context: Patient presents with complaining of a wound on his left foot. Patient had a transmetatarsal amputation of the left foot on 2018. Patient's skin flap failed after the surgery with dehiscence of the surgical wound. Patient states home health care is performing dressing changes with application of Wound VAC. Patient reports he had angiograms of both legs with revascularization of the right leg on 2019 performed by Dr. Heavenly Giles. Patient had revascularization of the left leg with Dr. Heavenly Giles on 2019. Patient states he is taking his antibiotics and pain medication as prescribed.     Patient also has a wound on the bottom of the right foot of seven weeks duration. The wound started out as a blood blister.       Wound/Ulcer Pain Timing/Severity: none  Quality of pain: N/A  Severity:  0 / 10   Modifying Factors: None  Associated Signs/Symptoms: drainage and odor    Ulcer Identification:  Ulcer Type: diabetic and wound dehiscence    Contributing Factors: edema, diabetes, poor glucose control, chronic pressure, decreased mobility and arterial insufficiency    Wound: Wound dehiscence        PAST MEDICAL HISTORY        Diagnosis Date    Angina effort ([address]     Arthritis     CAD (coronary artery disease)     Carotid stenosis     Diabetes mellitus with neurological manifestations, uncontrolled ([address]     Diarrhea     Hyperlipidemia     Hypertension     Obesity     Type II or unspecified type diabetes mellitus without mention of complication, not stated as 2    ranitidine (ZANTAC) 150 MG tablet Take 150 mg by mouth 2 times daily  1    Multiple Vitamins-Minerals (THERAPEUTIC MULTIVITAMIN-MINERALS) tablet Take 1 tablet by mouth daily      isosorbide mononitrate (IMDUR) 30 MG extended release tablet Take 1 tablet by mouth daily 30 tablet 3    aspirin (ASPIRIN LOW DOSE) 81 MG EC tablet Take 1 tablet by mouth daily 30 tablet 0    lisinopril (PRINIVIL;ZESTRIL) 40 MG tablet Take 40 mg by mouth every morning       SANTYL 250 UNIT/GM ointment Apply 1 each topically daily      Blood Glucose Monitoring Suppl (FREESTYLE LITE) INEZ 1 Device by Does not apply route 3 times daily (before meals) 1 Device 0    blood glucose test strips (FREESTYLE LITE) strip 1 each by In Vitro route 3 times daily As needed. 100 each 3    FREESTYLE LANCETS MISC 1 each by Does not apply route daily 100 each 3    Insulin Disposable Pump (V-GO 40) KIT by Does not apply route      Blood Glucose Monitoring Suppl (FREESTYLE LITE) INEZ TEST BLOOD GLUCOSE THREE TO FOUR TIMES DAILY 1 Device 0    glucose blood VI test strips (ONETOUCH VERIO) strip 1 each by In Vitro route 3 times daily ; Dx E11.65; Z79.4 100 each 11    ONE TOUCH LANCETS MISC 1 each by Does not apply route 3 times daily ; Dx E11.65; Z79.4 100 each 11    Insulin Pen Needle (B-D UF III MINI PEN NEEDLES) 31G X 5 MM MISC 1 each by Does not apply route 4 times daily (before meals and nightly) 150 each 6     No current facility-administered medications on file prior to encounter. REVIEW OF SYSTEMS    Pertinent items are noted in HPI.     Objective:      BP (!) 155/95   Pulse 84   Temp 97 °F (36.1 °C) (Oral)   Resp 18     Wt Readings from Last 3 Encounters:   03/21/19 251 lb (113.9 kg)   03/09/19 258 lb 2.5 oz (117.1 kg)   01/30/19 266 lb 12.1 oz (121 kg)       PHYSICAL EXAM       Patient is alert and oriented x 3, and is in no acute distress.     Vascular: DP weakly palpable bilateral. PT pulse not palpable bilateral but audible 2:19 PM   Change in Wound Size % (l*w) 36.72 4/24/2019  2:19 PM   Wound Volume (cm^3) 0.49 cm^3 4/24/2019  2:19 PM   Wound Healing % 36 4/24/2019  2:19 PM   Post-Procedure Length (cm) 3.2 cm 4/24/2019  2:34 PM   Post-Procedure Width (cm) 3 cm 4/24/2019  2:34 PM   Post-Procedure Depth (cm) 1 cm 4/24/2019  2:34 PM   Post-Procedure Surface Area (cm^2) 9.6 cm^2 4/24/2019  2:34 PM   Post-Procedure Volume (cm^3) 9.6 cm^3 4/24/2019  2:34 PM   Wound Assessment Black;Slough 4/24/2019  2:19 PM   Drainage Amount Small 4/24/2019  2:19 PM   Drainage Description Serosanguinous 4/24/2019  2:19 PM   Odor None 4/24/2019  2:19 PM   Margins Attached edges 4/24/2019  2:19 PM   Jina-wound Assessment White;Calloused 4/24/2019  2:19 PM   Non-staged Wound Description Full thickness 4/24/2019  2:19 PM   [place]%Wound Bed 10 4/24/2019  2:19 PM   Red%Wound Bed 90 3/20/2019  2:25 PM   Yellow%Wound Bed 70 4/24/2019  2:19 PM   Black%Wound Bed 20 4/24/2019  2:19 PM   Number of days: 42       Wound 03/20/19 Foot Left;Medial Wound #3 acquired 11/2018 left TMA site (Active)   Wound Image   4/3/2019  2:32 PM   Wound Non-Healing Surgical 4/17/2019  1:31 PM   Dressing Status Old drainage 4/24/2019  2:19 PM   Dressing Changed Changed/New 4/24/2019  2:34 PM   Dressing/Treatment Dry Dressing;Santyl 3/20/2019  3:33 PM   Wound Length (cm) 2 cm 4/24/2019  2:19 PM   Wound Width (cm) 3.8 cm 4/24/2019  2:19 PM   Wound Depth (cm) 0.5 cm 4/24/2019  2:19 PM   Wound Surface Area (cm^2) 7.6 cm^2 4/24/2019  2:19 PM   Change in Wound Size % (l*w) -31.94 4/24/2019  2:19 PM   Wound Volume (cm^3) 3.8 cm^3 4/24/2019  2:19 PM   Wound Healing % 34 4/24/2019  2:19 PM   Post-Procedure Length (cm) 2.1 cm 4/24/2019  2:34 PM   Post-Procedure Width (cm) 3.9 cm 4/24/2019  2:34 PM   Post-Procedure Depth (cm) 0.5 cm 4/24/2019  2:34 PM   Post-Procedure Surface Area (cm^2) 8.19 cm^2 4/24/2019  2:34 PM   Post-Procedure Volume (cm^3) 4.1 cm^3 4/24/2019  2:34 PM   Wound Assessment Granulation tissue;Slough 4/24/2019  2:19 PM   Drainage Amount Small 4/24/2019  2:19 PM   Drainage Description Serosanguinous 4/24/2019  2:19 PM   Odor Mild 4/24/2019  2:19 PM   Margins Attached edges 4/24/2019  2:19 PM   Jina-wound Assessment White 4/24/2019  2:19 PM   Non-staged Wound Description Full thickness 4/24/2019  2:19 PM   [place]%Wound Bed 80 4/24/2019  2:19 PM   Red%Wound Bed 80 4/17/2019  1:31 PM   Yellow%Wound Bed 20 4/24/2019  2:19 PM   Black%Wound Bed 10 4/10/2019  2:26 PM   Number of days: 35       Wound 03/20/19 Foot Left;Lateral Wound # 4 acquired 11/2018 left TMA site (Active)   Wound Image   4/3/2019  2:32 PM   Wound Non-Healing Surgical 4/17/2019  1:31 PM   Dressing Status Old drainage 4/24/2019  2:19 PM   Dressing Changed Changed/New 4/24/2019  2:34 PM   Dressing/Treatment Dry Dressing;Santyl 3/20/2019  3:33 PM   Wound Length (cm) 0.8 cm 4/24/2019  2:19 PM   Wound Width (cm) 4.2 cm 4/24/2019  2:19 PM   Wound Depth (cm) 0.5 cm 4/24/2019  2:19 PM   Wound Surface Area (cm^2) 3.36 cm^2 4/24/2019  2:19 PM   Change in Wound Size % (l*w) 71.48 4/24/2019  2:19 PM   Wound Volume (cm^3) 1.68 cm^3 4/24/2019  2:19 PM   Wound Healing % 87 4/24/2019  2:19 PM   Post-Procedure Length (cm) 0.9 cm 4/24/2019  2:34 PM   Post-Procedure Width (cm) 4.3 cm 4/24/2019  2:34 PM   Post-Procedure Depth (cm) 0.5 cm 4/24/2019  2:34 PM   Post-Procedure Surface Area (cm^2) 3.87 cm^2 4/24/2019  2:34 PM   Post-Procedure Volume (cm^3) 1.94 cm^3 4/24/2019  2:34 PM   Wound Assessment Granulation tissue;Slough 4/24/2019  2:19 PM   Drainage Amount Scant 4/24/2019  2:19 PM   Drainage Description Serosanguinous 4/24/2019  2:19 PM   Odor Mild 4/24/2019  2:19 PM   Margins Attached edges 4/24/2019  2:19 PM   Jina-wound Assessment White 4/24/2019  2:19 PM   Non-staged Wound Description Full thickness 4/24/2019  2:19 PM   [place]%Wound Bed 50 4/10/2019  2:26 PM   Red%Wound Bed 70 4/24/2019  2:19 PM   Yellow%Wound Bed 30 4/24/2019  2:19 PM Black%Wound Bed 10 4/10/2019  2:26 PM   Number of days: 35          Percent of Wound(s)/Ulcer(s) Debrided: 100%    Total Surface Area Debrided:  21.66 sq cm     Diabetic/Pressure/Non Pressure Ulcers only:  Ulcer: Diabetic ulcer, fat layer exposed     Estimated Blood Loss:  Minimal    Hemostasis Achieved:  by pressure    Procedural Pain:  0  / 10     Post Procedural Pain:  0 / 10     Response to treatment:  Well tolerated by patient. Plan:     Treatment Note please see attached Discharge Instructions    Written patient dismissal instructions given to patient and signed by patient or POA. Discharge Instructions       [facility]   Physician Orders and Discharge Instructions  [facility]  [address]   [address] [place] [address]  Telephone: [phone]      FAX [phone]     NAME: Bernett Seip  DATE OF BIRTH:  1970  MEDICAL RECORD NUMBER:  [id number]  DATE:  4/24/2019     Wound Cleansing:   Do not scrub or use excessive force. Cleanse wound prior to applying a clean dressing with:  [x] Normal Saline            [x] Keep Wound Dry in Shower    [] Wound Cleanser   [] Cleanse wound with Mild Soap & Water  [] May Shower at Discharge   [] Other:        Topical Treatments:  Do not apply lotions, creams, or ointments to wound bed unless directed. [] Apply moisturizing lotion to skin surrounding the wound prior to dressing change.  [] Apply antifungal ointment to skin surrounding the wound prior to dressing change. [x] Apply thin film of moisture barrier ointment to skin immediately around wound. [x] Other:  Vashe Wash for 5 minutes on Left Foot Wounds prior to Dressing Change        Negative Pressure:           Wound Location: LEFT FOOT MEDIAL AND LATERAL    X [email or web address]  mm/Hg                XContinuous  ? Intermittent              S Black           ? ANIGU Foam           ? Other:   Gordon Begum dressing:   XThree times per week         X Other: Ensure to Drape and Bridge Medial and Lateral Wounds so sponge does not come into contact with intact skin         Dressings : Wound Location : RIGHT PLANTAR FOOT  Apply Santyl with Normal Saline dampened Gauze to wound and cover with dry gauze and dacia.  Change dressing DAILY.   Apply Kidney Bean Podiatry Pad to Jina-Wound        Edema Control:  Apply:  [] Compression Stocking           []Right Leg         []Left Leg              [] Tubigrip          []Right Leg Double Layer          []Left Leg Double Layer                                                  []Right Leg Single Layer           []Left Leg Single Layer              [] SpandaGrip    []Right Leg         []Left Leg                                      []Low compression 5-10 mm/Hg                                                 []Medium compression 10-20 mm/Hg                                      []High compression  20-30 mm/Hg              every morning immediately when getting up should be applied to affected leg(s) from mid foot to knee making sure to cover the heel.  Remove every night before going to bed.              [] Elevate leg(s) above the level of the heart when sitting.                    [] Avoid prolonged standing in one place.        Compression:  Apply:  [] Multilayer Compression Wrap Applied in Clinic        []RightLeg          []Left Leg              [] Multi-layer compression.  Do not get leg(s) with wrap wet.  If wraps become too tight call the center or completely remove the wrap.                 [] Elevate leg(s) above the level of the heart when sitting.                    [] Avoid prolonged standing in one place.     Off-Loading:   [] Off-loading when        [] walking           [] in bed [] sitting  [] Total non-weight bearing  [] Right Leg  [] Left Leg          [x] Assistive Device         [] Walker            [] Cane   [x] Wheelchair      [] Crutches              [] Surgical shoe    [] Podus Boot(s)   [] Foam Boot(s)  [] Roll About              [] Cast Boot       [] CROW Boot  [] Other:                   Dietary:  [] Diet as tolerated:        [x] Calorie Diabetic Diet: [] No Added Salt:  [] Increase Protein:        [] Other:     Activity:  [x] Activity as tolerated:  [] Patient has no activity restrictions     [] Strict Bedrest:            [] Remain off Work:     [] May return to full duty work:                                         [] ALRHVY to work with restrictions:          If you are still having pain after you go home:  [X] Elevate the affected limb.            [X]Use over-the-counter medications you would normally use for pain as permitted by your family doctor.  [X] For persistent pain not relieved by the above interventions, please call your family doctor.                Return Appointment:  [] Wound and dressing supply provider:   [x] ECF or Home Healthcare: [facility]  [] Wound Assessment:  [] Physician or NP scheduled for Wound Assessment:   [x] Return Appointment: Jayshree THORPE  in  1 Week(s)  [] Ordered tests:      Nurse Case Manger: Sarita   Electronically signed by Varsha Murphy RN on 4/24/2019 at 2:41 PM  [address] [place] [address] Information: Should you experience any significant changes in your wound(s) or have questions about your wound care, please contact the [address] [place] [address] [phone] [facility] - THURSDAY 8:30 am - 4:30 pm and Friday 8:30 am - 1:00 pm.  If you need help with your wound outside these hours and cannot wait until we are again available, contact your PCP or go to the hospital emergency room.      PLEASE NOTE: IF YOU ARE UNABLE TO OBTAIN WOUND SUPPLIES, CONTINUE TO USE THE SUPPLIES YOU HAVE AVAILABLE UNTIL YOU ARE ABLE TO [facility].  IT IS MOST IMPORTANT TO KEEP THE WOUND COVERED AT ALL TIMES.     Physician Signature:_______________________     Date: ___________ Time:  ____________                    [] Dr Bulah Jeans    [] Dr Miller Dickey Robert Worley CNP                 [x] Dr Sarah Dixon   [] Dr Inocente Parker                  [] Dr Thanh Arroyo   [] Srinivasan Martino NP                   Electronically signed by Maureen Nichols DPM on 4/24/2019 at 4:50 PM

## 2019-04-29 ENCOUNTER — OFFICE VISIT (OUTPATIENT)
Dept: ENDOCRINOLOGY | Age: 49
End: 2019-04-29
Payer: COMMERCIAL

## 2019-04-29 VITALS — HEART RATE: 88 BPM | SYSTOLIC BLOOD PRESSURE: 121 MMHG | DIASTOLIC BLOOD PRESSURE: 76 MMHG | OXYGEN SATURATION: 98 %

## 2019-04-29 DIAGNOSIS — E11.3299 DM TYPE 2 WITH DIABETIC BACKGROUND RETINOPATHY (HCC): ICD-10-CM

## 2019-04-29 DIAGNOSIS — E10.621 DIABETIC ULCER OF RIGHT MIDFOOT ASSOCIATED WITH TYPE 1 DIABETES MELLITUS, WITH FAT LAYER EXPOSED (HCC): ICD-10-CM

## 2019-04-29 DIAGNOSIS — E78.5 DYSLIPIDEMIA ASSOCIATED WITH TYPE 2 DIABETES MELLITUS (HCC): ICD-10-CM

## 2019-04-29 DIAGNOSIS — E11.69 DYSLIPIDEMIA ASSOCIATED WITH TYPE 2 DIABETES MELLITUS (HCC): ICD-10-CM

## 2019-04-29 DIAGNOSIS — I10 ESSENTIAL HYPERTENSION: ICD-10-CM

## 2019-04-29 DIAGNOSIS — E11.51 DIABETES MELLITUS WITH PERIPHERAL CIRCULATORY DISORDER (HCC): Primary | ICD-10-CM

## 2019-04-29 DIAGNOSIS — E11.21 TYPE 2 DIABETES MELLITUS WITH DIABETIC NEPHROPATHY, WITH LONG-TERM CURRENT USE OF INSULIN (HCC): ICD-10-CM

## 2019-04-29 DIAGNOSIS — L97.412 DIABETIC ULCER OF RIGHT MIDFOOT ASSOCIATED WITH TYPE 1 DIABETES MELLITUS, WITH FAT LAYER EXPOSED (HCC): ICD-10-CM

## 2019-04-29 DIAGNOSIS — Z79.4 TYPE 2 DIABETES MELLITUS WITH DIABETIC NEPHROPATHY, WITH LONG-TERM CURRENT USE OF INSULIN (HCC): ICD-10-CM

## 2019-04-29 DIAGNOSIS — E66.01 MORBID OBESITY DUE TO EXCESS CALORIES (HCC): ICD-10-CM

## 2019-04-29 PROCEDURE — 3017F COLORECTAL CA SCREEN DOC REV: CPT | Performed by: INTERNAL MEDICINE

## 2019-04-29 PROCEDURE — G8417 CALC BMI ABV UP PARAM F/U: HCPCS | Performed by: INTERNAL MEDICINE

## 2019-04-29 PROCEDURE — G8427 DOCREV CUR MEDS BY ELIG CLIN: HCPCS | Performed by: INTERNAL MEDICINE

## 2019-04-29 PROCEDURE — 1036F TOBACCO NON-USER: CPT | Performed by: INTERNAL MEDICINE

## 2019-04-29 PROCEDURE — 99204 OFFICE O/P NEW MOD 45 MIN: CPT | Performed by: INTERNAL MEDICINE

## 2019-04-29 PROCEDURE — 2022F DILAT RTA XM EVC RTNOPTHY: CPT | Performed by: INTERNAL MEDICINE

## 2019-04-29 PROCEDURE — 3046F HEMOGLOBIN A1C LEVEL >9.0%: CPT | Performed by: INTERNAL MEDICINE

## 2019-04-29 PROCEDURE — G8598 ASA/ANTIPLAT THER USED: HCPCS | Performed by: INTERNAL MEDICINE

## 2019-04-29 NOTE — PATIENT INSTRUCTIONS

## 2019-04-29 NOTE — PROGRESS NOTES
session: Not on file    Stress: Not on file   Relationships    Social connections:     Talks on phone: Not on file     Gets together: Not on file     Attends Anabaptism service: Not on file     Active member of club or organization: Not on file     Attends meetings of clubs or organizations: Not on file     Relationship status: Not on file    Intimate partner violence:     Fear of current or ex partner: Not on file     Emotionally abused: Not on file     Physically abused: Not on file     Forced sexual activity: Not on file   Other Topics Concern    Not on file   Social History Narrative    Not on file       Past Medical History:   Diagnosis Date    Angina effort ([facility]     Arthritis     CAD (coronary artery disease)     Carotid stenosis     Diabetes mellitus with neurological manifestations, uncontrolled ([address]     Diarrhea     DEAN (dyspnea on exertion) 1/27/2015    Hyperlipidemia     Hypertension     Obesity     Type II or unspecified type diabetes mellitus without mention of complication, not stated as uncontrolled        Past Surgical History:   Procedure Laterality Date    CARDIAC CATHETERIZATION      CAROTID ENDARTERECTOMY Right 4-9-2015    CHOLECYSTECTOMY      MS OFFICE/OUTPT VISIT,PROCEDURE ONLY Left 10/5/2018    AMPUTATION LEFT GREAT DIGIT performed by Deshaun Clinton DPM at [place] [address] OFFICE/OUTPT [address] [place] [address] Left 10/22/2018    INCISION AND DRAINAGE LEFT FOOT FIRST AND SECOND RAY AMPUTATION (DIGITS ONE, TWO AND METARSAL ONE AND TWO) performed by Deshaun Clinton DPM at [place] [address] OFFICE/OUTPT [address] [place] [address] Left 10/26/2018    LEFT FOOT WOUND DEBRIDEMENT WITH PRIMARY CLOSURE performed by Deshaun Clinton DPM at [place] [address] OFFICE/OUTPT [address] [place] [address] Left 11/2/2018    INCISION AND DRAINAGE WITH TRANSMETATARSAL CONVERTED TO LIST [address] performed by Deshaun Clinton DPM at [address] [place] [address] Left 10/05/2018    left great toe amputation No Known Allergies      Current Outpatient Medications:     Exenatide ER (BYDUREON BCISE) 2 MG/0.85ML AUIJ, Inject 2 mg into the skin once a week, Disp: 4 pen, Rfl: 3    atorvastatin (LIPITOR) 40 MG tablet, Take 1 tablet by mouth every morning, Disp: 30 tablet, Rfl: 0    insulin glargine (BASAGLAR KWIKPEN) 100 UNIT/ML injection pen, Inject 15 Units into the skin nightly (Patient taking differently: Inject 20 Units into the skin nightly ), Disp: 5 pen, Rfl: 3    clopidogrel (PLAVIX) 75 MG tablet, Take 1 tablet by mouth daily, Disp: 30 tablet, Rfl: 3    metoprolol succinate (TOPROL XL) 25 MG extended release tablet, Take 25 mg by mouth daily, Disp: , Rfl:     chlorthalidone (HYGROTEN) 50 MG tablet, Take 50 mg by mouth daily, Disp: , Rfl: 0    SANTYL 250 UNIT/GM ointment, Apply 1 each topically daily, Disp: , Rfl:     vitamin B-12 (CYANOCOBALAMIN) 1000 MCG tablet, Take 1,000 mcg by mouth daily, Disp: , Rfl: 2    ranitidine (ZANTAC) 150 MG tablet, Take 150 mg by mouth 2 times daily, Disp: , Rfl: 1    Multiple Vitamins-Minerals (THERAPEUTIC MULTIVITAMIN-MINERALS) tablet, Take 1 tablet by mouth daily, Disp: , Rfl:     isosorbide mononitrate (IMDUR) 30 MG extended release tablet, Take 1 tablet by mouth daily, Disp: 30 tablet, Rfl: 3    Blood Glucose Monitoring Suppl (FREESTYLE LITE) INEZ, 1 Device by Does not apply route 3 times daily (before meals), Disp: 1 Device, Rfl: 0    blood glucose test strips (FREESTYLE LITE) strip, 1 each by In Vitro route 3 times daily As needed. , Disp: 100 each, Rfl: 3    FREESTYLE LANCETS MISC, 1 each by Does not apply route daily, Disp: 100 each, Rfl: 3    Insulin Disposable Pump (V-GO 40) KIT, by Does not apply route 6 CLICKS DAILY, Disp: , Rfl:     aspirin (ASPIRIN LOW DOSE) 81 MG EC tablet, Take 1 tablet by mouth daily, Disp: 30 tablet, Rfl: 0    Blood Glucose Monitoring Suppl (FREESTYLE LITE) INEZ, TEST BLOOD GLUCOSE THREE TO FOUR TIMES DAILY, Disp: 1 Device, Rfl: 0    glucose blood VI test strips (ONETOUCH VERIO) strip, 1 each by In Vitro route 3 times daily ; Dx E11.65; Z79.4, Disp: 100 each, Rfl: 11    ONE TOUCH LANCETS MISC, 1 each by Does not apply route 3 times daily ; Dx E11.65; Z79.4, Disp: 100 each, Rfl: 11    Insulin Pen Needle (B-D UF III MINI PEN NEEDLES) 31G X 5 MM MISC, 1 each by Does not apply route 4 times daily (before meals and nightly), Disp: 150 each, Rfl: 6    lisinopril (PRINIVIL;ZESTRIL) 40 MG tablet, Take 40 mg by mouth every morning , Disp: , Rfl:       Review of Systems:    Constitutional: Negative for fever, chills, and unexpected weight change. HENT: Negative for congestion, ear pain, rhinorrhea,  sore throat and trouble swallowing. Eyes: Negative for photophobia, redness, itching. Respiratory: Negative for cough, shortness of breath and sputum. Cardiovascular: Negative for chest pain, palpitations and leg swelling. Gastrointestinal: Negative for nausea, vomiting, abdominal pain, diarrhea, constipation. Endocrine: Negative for cold intolerance, heat intolerance, polydipsia, polyphagia and polyuria. Genitourinary: Negative for dysuria, urgency, frequency, hematuria and flank pain. Musculoskeletal: Negative for myalgias, back pain, arthralgias and neck pain. Skin/Nail: Negative for rash, itching. Normal nails. Neurological: Negative for seizures, weakness, light-headedness, numbness and headaches. Hematological/ Lymph nodes: Negative for adenopathy. Does not bruise/bleed easily. Psychiatric/Behavioral: Negative for suicidal ideas, depression, anxiety, sleep disturbance and decreased concentration. Objective:   Physical Exam:  /76 (Site: Right Upper Arm, Position: Sitting, Cuff Size: Large Adult)   Pulse 88   SpO2 98%   Constitutional: Patient is oriented to person, place, and time. Patient appears well-developed and well-nourished. HENT:    Head: Normocephalic and atraumatic.      Eyes: Conjunctivae and EOM are normal.     Neck: Normal range of motion. Cardiovascular: Normal rate, regular rhythm and normal heart sounds. Pulmonary/Chest: Effort normal and breath sounds normal.   Musculoskeletal: Normal range of motion. Left foot dressed and attached to wound vac. Neurological: Patient is alert and oriented to person, place, and time. Skin: Skin is warm and dry. Psychiatric: Patient has a normal mood and affect. Patient behavior is normal.     Lab Review:    Office Visit on 04/29/2019   Component Date Value Ref Range Status    Diabetic Retinopathy 04/18/2018 Positive   Final   Hospital Outpatient Visit on 03/21/2019   Component Date Value Ref Range Status    WBC 03/21/2019 10.9  4.0 - 11.0 K/uL Final    RBC 03/21/2019 4.54  4.20 - 5.90 M/uL Final    Hemoglobin 03/21/2019 12.3* 13.5 - 17.5 g/dL Final    Hematocrit 03/21/2019 37.6* 40.5 - 52.5 % Final    MCV 03/21/2019 82.9  80.0 - 100.0 fL Final    MCH 03/21/2019 27.0  26.0 - 34.0 pg Final    MCHC 03/21/2019 32.6  31.0 - 36.0 g/dL Final    RDW 03/21/2019 14.1  12.4 - 15.4 % Final    Platelets 85/88/1535 296  135 - 450 K/uL Final    MPV 03/21/2019 8.5  5.0 - 10.5 fL Final    POC Sodium 03/21/2019 137  136 - 145 mmol/L Final    POC Potassium 03/21/2019 4.4  3.5 - 5.1 mmol/L Final    POC Chloride 03/21/2019 102  99 - 110 mmol/L Final    POC Glucose 03/21/2019 258* 70 - 99 mg/dl Final    POC Creatinine 03/21/2019 0.9  0.9 - 1.3 mg/dL Final    GFR Non- 03/21/2019 >60  >60 Final    GFR  03/21/2019 >60  >60 Final    Calcium, Ion 03/21/2019 1.20  1. 12 - 1.32 mmol/L Final    Sample Type 03/21/2019 COURTNEY   Final    Performed on 03/21/2019 SEE BELOW   Final    POC ACT LR 03/21/2019 271  Not Established sec Final   Admission on 03/06/2019, Discharged on 03/09/2019   Component Date Value Ref Range Status    WBC 03/06/2019 9.0  4.0 - 11.0 K/uL Final    RBC 03/06/2019 4.46  4.20 - 5.90 M/uL Final    Hemoglobin 03/06/2019 12.2* 13.5 - 17.5 g/dL Final    Hematocrit 03/06/2019 37.1* 40.5 - 52.5 % Final    MCV 03/06/2019 83.2  80.0 - 100.0 fL Final    MCH 03/06/2019 27.4  26.0 - 34.0 pg Final    MCHC 03/06/2019 32.9  31.0 - 36.0 g/dL Final    RDW 03/06/2019 14.5  12.4 - 15.4 % Final    Platelets 96/60/2103 252  135 - 450 K/uL Final    MPV 03/06/2019 8.7  5.0 - 10.5 fL Final    Neutrophils % 03/06/2019 69.5  % Final    Lymphocytes % 03/06/2019 22.2  % Final    Monocytes % 03/06/2019 6.6  % Final    Eosinophils % 03/06/2019 1.3  % Final    Basophils % 03/06/2019 0.4  % Final    Neutrophils # 03/06/2019 6.2  1.7 - 7.7 K/uL Final    Lymphocytes # 03/06/2019 2.0  1.0 - 5.1 K/uL Final    Monocytes # 03/06/2019 0.6  0.0 - 1.3 K/uL Final    Eosinophils # 03/06/2019 0.1  0.0 - 0.6 K/uL Final    Basophils # 03/06/2019 0.0  0.0 - 0.2 K/uL Final    Lactic Acid 03/06/2019 0.9  0.4 - 2.0 mmol/L Final    Sodium 03/06/2019 134* 136 - 145 mmol/L Final    Potassium 03/06/2019 4.6  3.5 - 5.1 mmol/L Final    Chloride 03/06/2019 97* 99 - 110 mmol/L Final    CO2 03/06/2019 24  21 - 32 mmol/L Final    Anion Gap 03/06/2019 13  3 - 16 Final    Glucose 03/06/2019 381* 70 - 99 mg/dL Final    BUN 03/06/2019 26* 7 - 20 mg/dL Final    CREATININE 03/06/2019 0.8* 0.9 - 1.3 mg/dL Final    GFR Non- 03/06/2019 >60  >60 Final    GFR  03/06/2019 >60  >60 Final    Calcium 03/06/2019 9.5  8.3 - 10.6 mg/dL Final    Protime 03/06/2019 11.3  9.8 - 13.0 sec Final    INR 03/06/2019 0.99  0.86 - 1.14 Final    aPTT 03/06/2019 38.6* 26.0 - 36.0 sec Final    Hemoglobin A1C 03/06/2019 14.4  See comment % Final    eAG 03/06/2019 366.6  mg/dL Final    POC Glucose 03/06/2019 295* 70 - 99 mg/dl Final    Performed on 03/06/2019 ACCU-CHEK   Final    Sodium 03/07/2019 134* 136 - 145 mmol/L Final    Potassium reflex Magnesium 03/07/2019 4.1  3.5 - 5.1 mmol/L Final    Chloride 03/07/2019 97* 99 - 110 mmol/L Final  CO2 03/07/2019 24  21 - 32 mmol/L Final    Anion Gap 03/07/2019 13  3 - 16 Final    Glucose 03/07/2019 366* 70 - 99 mg/dL Final    BUN 03/07/2019 21* 7 - 20 mg/dL Final    CREATININE 03/07/2019 0.7* 0.9 - 1.3 mg/dL Final    GFR Non- 03/07/2019 >60  >60 Final    GFR  03/07/2019 >60  >60 Final    Calcium 03/07/2019 9.3  8.3 - 10.6 mg/dL Final    WBC 03/07/2019 8.7  4.0 - 11.0 K/uL Final    RBC 03/07/2019 4.08* 4.20 - 5.90 M/uL Final    Hemoglobin 03/07/2019 11.3* 13.5 - 17.5 g/dL Final    Hematocrit 03/07/2019 33.9* 40.5 - 52.5 % Final    MCV 03/07/2019 83.1  80.0 - 100.0 fL Final    MCH 03/07/2019 27.7  26.0 - 34.0 pg Final    MCHC 03/07/2019 33.3  31.0 - 36.0 g/dL Final    RDW 03/07/2019 14.7  12.4 - 15.4 % Final    Platelets 05/98/9202 240  135 - 450 K/uL Final    MPV 03/07/2019 9.0  5.0 - 10.5 fL Final    aPTT 03/06/2019 43.5* 26.0 - 36.0 sec Final    POC Glucose 03/06/2019 377* 70 - 99 mg/dl Final    Performed on 03/06/2019 ACCU-CHEK   Final    aPTT 03/07/2019 72.3* 26.0 - 36.0 sec Final    aPTT 03/07/2019 71.1* 26.0 - 36.0 sec Final    POC Glucose 03/07/2019 343* 70 - 99 mg/dl Final    Performed on 03/07/2019 ACCU-CHEK   Final    POC Glucose 03/07/2019 321* 70 - 99 mg/dl Final    Performed on 03/07/2019 ACCU-CHEK   Final    POC Glucose 03/07/2019 242* 70 - 99 mg/dl Final    Performed on 03/07/2019 ACCU-CHEK   Final    POC Glucose 03/07/2019 207* 70 - 99 mg/dl Final    Performed on 03/07/2019 ACCU-CHEK   Final    POC Glucose 03/08/2019 261* 70 - 99 mg/dl Final    Performed on 03/08/2019 ACCU-CHEK   Final    POC ACT LR 03/08/2019 339  Not Established sec Final    POC Glucose 03/08/2019 320* 70 - 99 mg/dl Final    Performed on 03/08/2019 ACCU-CHEK   Final    POC Glucose 03/08/2019 255* 70 - 99 mg/dl Final    Performed on 03/08/2019 ACCU-CHEK   Final    POC Glucose 03/08/2019 122* 70 - 99 mg/dl Final    Performed on 03/08/2019 ACCU-CHEK 01/30/2019 1.9  % Final    Basophils % 01/30/2019 0.4  % Final    Neutrophils # 01/30/2019 6.7  1.7 - 7.7 K/uL Final    Lymphocytes # 01/30/2019 3.0  1.0 - 5.1 K/uL Final    Monocytes # 01/30/2019 0.6  0.0 - 1.3 K/uL Final    Eosinophils # 01/30/2019 0.2  0.0 - 0.6 K/uL Final    Basophils # 01/30/2019 0.0  0.0 - 0.2 K/uL Final    Sodium 01/30/2019 132* 136 - 145 mmol/L Final    Potassium 01/30/2019 4.0  3.5 - 5.1 mmol/L Final    Chloride 01/30/2019 95* 99 - 110 mmol/L Final    CO2 01/30/2019 24  21 - 32 mmol/L Final    Anion Gap 01/30/2019 13  3 - 16 Final    Glucose 01/30/2019 226* 70 - 99 mg/dL Final    BUN 01/30/2019 21* 7 - 20 mg/dL Final    CREATININE 01/30/2019 0.9  0.9 - 1.3 mg/dL Final    GFR Non- 01/30/2019 >60  >60 Final    GFR  01/30/2019 >60  >60 Final    Calcium 01/30/2019 9.4  8.3 - 10.6 mg/dL Final    Total Protein 01/30/2019 8.1  6.4 - 8.2 g/dL Final    Alb 01/30/2019 3.8  3.4 - 5.0 g/dL Final    Albumin/Globulin Ratio 01/30/2019 0.9* 1.1 - 2.2 Final    Total Bilirubin 01/30/2019 0.3  0.0 - 1.0 mg/dL Final    Alkaline Phosphatase 01/30/2019 105  40 - 129 U/L Final    ALT 01/30/2019 40  10 - 40 U/L Final    AST 01/30/2019 24  15 - 37 U/L Final    Globulin 01/30/2019 4.3  g/dL Final    Lipase 01/30/2019 26.0  13.0 - 60.0 U/L Final   Orders Only on 11/19/2018   Component Date Value Ref Range Status    CRP 11/19/2018 12.9* 0.0 - 5.1 mg/L Final   Orders Only on 11/19/2018   Component Date Value Ref Range Status    Sodium 11/19/2018 135* 136 - 145 mmol/L Final    Potassium 11/19/2018 4.9  3.5 - 5.1 mmol/L Final    Chloride 11/19/2018 93* 99 - 110 mmol/L Final    CO2 11/19/2018 20* 21 - 32 mmol/L Final    Anion Gap 11/19/2018 22* 3 - 16 Final    Glucose 11/19/2018 409* 70 - 99 mg/dL Final    BUN 11/19/2018 20  7 - 20 mg/dL Final    CREATININE 11/19/2018 0.9  0.9 - 1.3 mg/dL Final    GFR Non- 11/19/2018 >60  >60 Final    GFR  11/19/2018 >60  >60 Final    Calcium 11/19/2018 9.5  8.3 - 10.6 mg/dL Final    Total Protein 11/19/2018 8.1  6.4 - 8.2 g/dL Final    Alb 11/19/2018 3.5  3.4 - 5.0 g/dL Final    Albumin/Globulin Ratio 11/19/2018 0.8* 1.1 - 2.2 Final    Total Bilirubin 11/19/2018 0.3  0.0 - 1.0 mg/dL Final    Alkaline Phosphatase 11/19/2018 125  40 - 129 U/L Final    ALT 11/19/2018 31  10 - 40 U/L Final    AST 11/19/2018 41* 15 - 37 U/L Final    Globulin 11/19/2018 4.6  g/dL Final   Orders Only on 11/12/2018   Component Date Value Ref Range Status    CRP 11/12/2018 33.6* 0.0 - 5.1 mg/L Final   Orders Only on 11/12/2018   Component Date Value Ref Range Status    Sodium 11/12/2018 139  136 - 145 mmol/L Final    Potassium 11/12/2018 4.1  3.5 - 5.1 mmol/L Final    Chloride 11/12/2018 93* 99 - 110 mmol/L Final    CO2 11/12/2018 27  21 - 32 mmol/L Final    Anion Gap 11/12/2018 19* 3 - 16 Final    Glucose 11/12/2018 360* 70 - 99 mg/dL Final    BUN 11/12/2018 13  7 - 20 mg/dL Final    CREATININE 11/12/2018 0.8* 0.9 - 1.3 mg/dL Final    GFR Non- 11/12/2018 >60  >60 Final    GFR  11/12/2018 >60  >60 Final    Calcium 11/12/2018 9.3  8.3 - 10.6 mg/dL Final    Total Protein 11/12/2018 8.2  6.4 - 8.2 g/dL Final    Alb 11/12/2018 3.5  3.4 - 5.0 g/dL Final    Albumin/Globulin Ratio 11/12/2018 0.7* 1.1 - 2.2 Final    Total Bilirubin 11/12/2018 <0.2  0.0 - 1.0 mg/dL Final    Alkaline Phosphatase 11/12/2018 122  40 - 129 U/L Final    ALT 11/12/2018 18  10 - 40 U/L Final    AST 11/12/2018 16  15 - 37 U/L Final    Globulin 11/12/2018 4.7  g/dL Final   Orders Only on 11/12/2018   Component Date Value Ref Range Status    Sed Rate 11/12/2018 104* 0 - 15 mm/Hr Final   Orders Only on 11/12/2018   Component Date Value Ref Range Status    WBC 11/12/2018 8.4  4.0 - 11.0 K/uL Final    RBC 11/12/2018 3.83* 4.20 - 5.90 M/uL Final    Hemoglobin 11/12/2018 10.5* 13.5 - 17.5 g/dL Final  Hematocrit 11/12/2018 32.1* 40.5 - 52.5 % Final    MCV 11/12/2018 83.8  80.0 - 100.0 fL Final    MCH 11/12/2018 27.3  26.0 - 34.0 pg Final    MCHC 11/12/2018 32.6  31.0 - 36.0 g/dL Final    RDW 11/12/2018 14.5  12.4 - 15.4 % Final    Platelets 01/48/4364 444  135 - 450 K/uL Final    MPV 11/12/2018 7.9  5.0 - 10.5 fL Final    Neutrophils % 11/12/2018 62.0  % Final    Lymphocytes % 11/12/2018 29.7  % Final    Monocytes % 11/12/2018 5.2  % Final    Eosinophils % 11/12/2018 2.4  % Final    Basophils % 11/12/2018 0.7  % Final    Neutrophils # 11/12/2018 5.2  1.7 - 7.7 K/uL Final    Lymphocytes # 11/12/2018 2.5  1.0 - 5.1 K/uL Final    Monocytes # 11/12/2018 0.4  0.0 - 1.3 K/uL Final    Eosinophils # 11/12/2018 0.2  0.0 - 0.6 K/uL Final    Basophils # 11/12/2018 0.1  0.0 - 0.2 K/uL Final   There may be more visits with results that are not included. Assessment and Plan     Lexy Jj was seen today for consultation and diabetes. Diagnoses and all orders for this visit:    Diabetes mellitus with peripheral circulatory disorder ([address]    Diabetic ulcer of right midfoot associated with type 1 diabetes mellitus, with fat layer exposed ([address]    Type 2 diabetes mellitus with diabetic nephropathy, with long-term current use of insulin ([address]    DM type 2 with diabetic background retinopathy ([address]    Dyslipidemia associated with type 2 diabetes mellitus ([address]    Essential hypertension    Morbid obesity due to excess calories ([address]    Other orders  -     Exenatide ER (BYDUREON BCISE) 2 MG/0.85ML AUIJ; Inject 2 mg into the skin once a week          1: Type 2 DM complicated left foot ulcer s/p metatarsal amputation, nephropathy, retinopathy, peripheral atherosclerosis   Uncontrolled A1C 14.4% March 6th 2019   Uncontrolled diabetes for a long time   A1C of <8 would be acceptable because of microvascular and macrovascular complications.      Change Basaglar to 30 units daily in am   Keep VGO 40, 6 clicks with each meal     Start bydureon   No personal history of pancreatitis or family history of thyroid cancer     All instructions provided in written. Check Blood sugars 3-4 times per day. Log them along with insulin and send them every 2 weeks. Call for blood sugars less than 60 or more than 400. Eye exam: Last exam in Jan 2019. Has diabetic retinopathy. Foot exam:  transmetatarsal amputation of the left foot on 11/02/2018  Due March 2020   Deformity/amputation: absent  Skin lesions/pre-ulcerative calluses: absent  Edema: right- negative, left- negative  Sensory exam: Monofilament sensation: normal  Pulses: normal, Vibration (128 Hz): Intact    Renal screen: high Dec 2015    TSH screen: Oct 2018     2: HTN   Controlled     3: Hyperlipidemia   LDL: 0 , HDL: 0 , TGs: 0   Atorvastatin 40mg daily     4:Morbid obesity   Need to work on diet, exercise and lose weight     RTC in 2 weeks with logs   Then in 6 weeks with A1C, lipids, CMP, MACR     EDUCATION:   Greater than 50% of this follow-up visit was spent in general counseling regarding   obesity, diet, exercise, importance of adherence to insulin regime, recognition and treatment of hypo and hyperglycemia,  glucose logging, proper diabetes management, diabetic complications with poor management and the importance of glycemic control in order to avoid the complications of diabetes. Risks and potential complications of diabetes were reviewed with the patient. Diabetes health maintenance plan and follow-up were discussed and understood by the patient. We reviewed the importance of medication compliance and regular follow-up. Aggressive lifestyle modification was encouraged. Exercise Counselling:   This patient is hannah a candidate for regular physical exercise    Electronically signed by Walter Fowler MD on 4/29/2019 at 3:36 PM

## 2019-05-01 ENCOUNTER — HOSPITAL ENCOUNTER (OUTPATIENT)
Dept: WOUND CARE | Age: 49
Discharge: HOME | End: 2019-05-01
Payer: COMMERCIAL

## 2019-05-01 VITALS
BODY MASS INDEX: 41.24 KG/M2 | WEIGHT: 255.51 LBS | RESPIRATION RATE: 16 BRPM | DIASTOLIC BLOOD PRESSURE: 84 MMHG | SYSTOLIC BLOOD PRESSURE: 123 MMHG | HEART RATE: 91 BPM | TEMPERATURE: 98.1 F

## 2019-05-01 DIAGNOSIS — T81.89XD NON-HEALING SURGICAL WOUND, SUBSEQUENT ENCOUNTER: Primary | ICD-10-CM

## 2019-05-01 DIAGNOSIS — L97.412 DIABETIC ULCER OF RIGHT MIDFOOT ASSOCIATED WITH TYPE 1 DIABETES MELLITUS, WITH FAT LAYER EXPOSED (HCC): ICD-10-CM

## 2019-05-01 DIAGNOSIS — E10.621 DIABETIC ULCER OF RIGHT MIDFOOT ASSOCIATED WITH TYPE 1 DIABETES MELLITUS, WITH FAT LAYER EXPOSED (HCC): ICD-10-CM

## 2019-05-01 PROCEDURE — 11042 DBRDMT SUBQ TIS 1ST 20SQCM/<: CPT

## 2019-05-01 PROCEDURE — 97605 NEG PRS WND THER DME<=50SQCM: CPT

## 2019-05-01 RX ORDER — LIDOCAINE HYDROCHLORIDE 40 MG/ML
SOLUTION TOPICAL PRN
Status: DISCONTINUED | OUTPATIENT
Start: 2019-05-01 | End: 2019-05-02 | Stop reason: HOSPADM

## 2019-05-01 ASSESSMENT — PAIN SCALES - GENERAL
PAINLEVEL_OUTOF10: 0
PAINLEVEL_OUTOF10: 0

## 2019-05-01 NOTE — PROGRESS NOTES
[facility] [address]   Progress Note and Procedure Note      Jerel Evans  MEDICAL RECORD NUMBER:  [id number]  AGE: 47 y.o. GENDER: male  : 1970  EPISODE DATE:  2019    Subjective:     No chief complaint on file. HISTORY of PRESENT ILLNESS HPI     Jerel Evans is a 47 y.o. male who presents today for wound/ulcer evaluation. History of Wound Context: Patient presents with complaining of a wound on his left foot. Patient had a transmetatarsal amputation of the left foot on 2018. Patient's skin flap failed after the surgery with dehiscence of the surgical wound. Patient states home health care is performing dressing changes with application of Wound VAC. Patient reports he had angiograms of both legs with revascularization of the right leg on 2019 performed by Dr. Turner العلي. Patient had revascularization of the left leg with Dr. Turner العلي on 2019. Patient states he is taking his antibiotics and pain medication as prescribed.     Patient also has a wound on the bottom of the right foot of eight weeks duration. The wound started out as a blood blister.       Wound/Ulcer Pain Timing/Severity: none  Quality of pain: N/A  Severity:  0 / 10   Modifying Factors: None  Associated Signs/Symptoms: none    Ulcer Identification:  Ulcer Type: diabetic    Contributing Factors: diabetes, poor glucose control, chronic pressure and arterial insufficiency    Wound: Wound dehiscence        PAST MEDICAL HISTORY        Diagnosis Date    Angina effort ([address]     Arthritis     CAD (coronary artery disease)     Carotid stenosis     Diabetes mellitus with neurological manifestations, uncontrolled ([address]     Diarrhea     DEAN (dyspnea on exertion) 2015    Hyperlipidemia     Hypertension     Obesity     Type II or unspecified type diabetes mellitus without mention of complication, not stated as uncontrolled        PAST SURGICAL HISTORY    Past Surgical History:   Procedure Laterality alert and oriented x 3, and is in no acute distress.     Vascular: DP weakly palpable bilateral. PT pulse not palpable bilateral but audible on doppler exam. No Pedal hair noted bilateral. No Edema noted to ankles.    Derm:  Skin is warm to warm from proximal leg to distal foot bilateral. Toenails 1-5 on right foot are thickened, yellow and dystrophic. Neuro:  Protective sensation absent to 10/10 sites bilateral via a 5.07 Amo-Xuan monofilament.    Musculoskeletal: Muscle strength 5/5 with PF/DF/I and E of both feet. Transmetatarsal amputation of the left foot.     Assessment:        Problem List Items Addressed This Visit     Diabetic foot ulcer ([address]    Non-healing surgical wound - Primary           Procedure Note  Indications:  Based on my examination of this patient's wound(s)/ulcer(s) today, debridement is required to promote healing and evaluate the wound base. Performed by: Jaclyn Mejia DPM    Consent obtained:  Yes    Time out taken:  Yes    Pain Control: Anesthetic  Anesthetic: 4% Lidocaine Liquid Topical(5ml)       Debridement: Excisional Debridement    Using curette, #15 blade scalpel and forceps the wound(s)/ulcer(s) was/were sharply debrided down through and including the removal of epidermis, dermis and subcutaneous tissue. Devitalized Tissue Debrided:  fibrin, biofilm and callus    Pre Debridement Measurements:  Are located in the Wound/Ulcer Documentation Flow Sheet    Wound/Ulcer #: 2, 3 and 4    Post Debridement Measurements:  Wound/Ulcer Descriptions are Pre Debridement except measurements:    Wound 03/13/19 Foot Right;Plantar #2 - right plantar foot- pt. noted 2/15/2019 (Active)   Wound Image   4/3/2019  2:32 PM   Wound Pressure Stage  3 4/17/2019  1:31 PM   Dressing Status Intact; Old drainage 5/1/2019  2:39 PM   Dressing Changed Changed/New 5/1/2019  3:15 PM   Dressing/Treatment Other (comment) 5/1/2019  3:15 PM   Wound Length (cm) 3.2 cm 5/1/2019  2:39 PM   Wound Width PM   Black%Wound Bed 10 4/10/2019  2:26 PM   Number of days: 42          Percent of Wound(s)/Ulcer(s) Debrided: 100%    Total Surface Area Debrided:  16.38 sq cm     Diabetic/Pressure/Non Pressure Ulcers only:  Ulcer: Diabetic ulcer, fat layer exposed     Estimated Blood Loss:  Minimal    Hemostasis Achieved:  by pressure    Procedural Pain:  0  / 10     Post Procedural Pain:  0 / 10     Response to treatment:  Well tolerated by patient. Plan:     Treatment Note please see attached Discharge Instructions    Written patient dismissal instructions given to patient and signed by patient or POA. Discharge [address]   Physician Orders and Discharge Instructions  [place] [id number]   [address] [place] [address]  Telephone: [phone]      FAX [phone]     NAME: Lillian Dexter  DATE OF BIRTH:  1970  MEDICAL RECORD NUMBER:  [id number]  DATE:  5/1/2019     Wound Cleansing:   Do not scrub or use excessive force. Cleanse wound prior to applying a clean dressing with:  [x] Normal Saline            [x] Keep Wound Dry in Shower    [] Wound Cleanser   [] Cleanse wound with Mild Soap & Water  [] May Shower at Discharge   [] Other:        Topical Treatments:  Do not apply lotions, creams, or ointments to wound bed unless directed. [] Apply moisturizing lotion to skin surrounding the wound prior to dressing change.  [] Apply antifungal ointment to skin surrounding the wound prior to dressing change. [x] Apply thin film of moisture barrier ointment to skin immediately around wound. [x] Other:  Vashe Wash for 5 minutes on Left and Right Foot Wounds prior to Dressing Change        Negative Pressure:           Wound Location: LEFT FOOT MEDIAL    [email or web address]  mm/Hg                XContinuous  ? Intermittent              A Black           ? XQWOV Foam           ? Other:   Gypsy Curet dressing:   XThree times per week         Other:         Dressings : Wound Location : RIGHT PLANTAR FOOT and LEFT LATERAL FOOT  Apply Santyl with Normal Saline dampened Gauze to wound and cover with dry gauze and dacia.  Change dressing DAILY.   Apply Kidney Bean Podiatry Pad to Jina-Wound of Right Plantar Foot Wound Only        Edema Control:  Apply:  [] Compression Stocking           []Right Leg         []Left Leg              [] Tubigrip          []Right Leg Double Layer          []Left Leg Double Layer                                                  []Right Leg Single Layer           []Left Leg Single Layer              [] SpandaGrip    []Right Leg         []Left Leg                                      []Low compression 5-10 mm/Hg                                                 []Medium compression 10-20 mm/Hg                                      []High compression  20-30 mm/Hg              every morning immediately when getting up should be applied to affected leg(s) from mid foot to knee making sure to cover the heel.  Remove every night before going to bed.              [] Elevate leg(s) above the level of the heart when sitting.                    [] Avoid prolonged standing in one place.        Compression:  Apply:  [] Multilayer Compression Wrap Applied in Clinic        []RightLeg          []Left Leg              [] Multi-layer compression.  Do not get leg(s) with wrap wet.  If wraps become too tight call the center or completely remove the wrap.                 [] Elevate leg(s) above the level of the heart when sitting.                    [] Avoid prolonged standing in one place.     Off-Loading:   [] Off-loading when        [] walking           [] in bed [] sitting  [] Total non-weight bearing  [] Right Leg  [] Left Leg          [x] Assistive Device         [] Walker            [] Cane   [x] Wheelchair      [] Crutches              [] Surgical shoe    [] Podus Boot(s)   [] Foam Boot(s)  [] Roll   [] Dr Inocente Parker                  [] Dr Thanh Arroyo   [] Srinivasan Martino NP                Electronically signed by Maureen Nichols DPM on 5/1/2019 at 4:49 PM

## 2019-05-01 NOTE — PLAN OF CARE
Negative Pressure    NAME:  Marilee Jean  YOB: 1970  MEDICAL RECORD NUMBER:  [id number]  DATE:  5/1/2019     Applied Negative Pressure to Left Medial TMA wound(s)/ulcer(s).  [x] Applied skin barrier prep to erick-wound.  [x] Cut strips of plastic drape to picture frame wound so that erick-wound is     covered with the drape.  [x] If bridging dressing to less prominent site, cover any intact skin that will come in contact with the Negative Pressure Therapy sponge, gauze or channel drain with plastic drape. The sponge should never touch intact skin.  [x] Cut sponge, gauze or channel drain to size which will fit into the wound/ulcer bed without being forced.  [x] Be sure the sponge is large enough to hold the entire round plastic flange which is attached to the tubing. Never allow flange to be larger than the sponge or it will produce suction damaging intact skin.  Total number of individual pieces of foam used within the wound bed: 1     [x] If bridging the dressing away from the primary site, be sure the bridge leads to a piece of sponge large enough to hold the entire flange without allowing any of the flange to overlap onto intact skin.  [x] Covered sponge, gauze or channel drain with plastic drape.  [x] Cut a hole in this plastic drape directly over the sponge the same size as the plastic drain tubing.  [x] Removed plastic liner from flange and apply it directly over the hole you cut.  [x] Removed the plastic cover from the flange.  [x] Attached the tubing to the wound/ulcer Negative Pressure Therapy and turn it on to be sure a vacuum is created and that there are no leaks.  [x] If air leaks occur, use plastic drape to patch them.  [x] Secured Negative Pressure Therapy dressing with ace wrap loosely if located on an extremity. Maintain tubing outside of ace wrap. Tubing must not exert pressure on intact skin.     Applied per Ver[facility] Amado Guidelines      Electronically signed by Philippe Martinez RN on 5/1/2019 at 4:06 PM

## 2019-05-06 ENCOUNTER — TELEPHONE (OUTPATIENT)
Dept: ENDOCRINOLOGY | Age: 49
End: 2019-05-06

## 2019-05-06 DIAGNOSIS — E11.9 DIABETES MELLITUS TYPE 2, INSULIN DEPENDENT (HCC): ICD-10-CM

## 2019-05-06 DIAGNOSIS — E11.51 DIABETES MELLITUS WITH PERIPHERAL CIRCULATORY DISORDER (HCC): Primary | ICD-10-CM

## 2019-05-06 DIAGNOSIS — Z79.4 DIABETES MELLITUS TYPE 2, INSULIN DEPENDENT (HCC): ICD-10-CM

## 2019-05-06 NOTE — TELEPHONE ENCOUNTER
Mr. Eleonora Edwards will call back and for information below:     Hilario Brown MD  [place], [place]             Please ask the patient to call insurance and check which Raenell Cornea will be covered. Options are Trulicity, Tanzeum, Ozempic and Victoza.    Thank you

## 2019-05-08 ENCOUNTER — HOSPITAL ENCOUNTER (OUTPATIENT)
Dept: WOUND CARE | Age: 49
Discharge: HOME | End: 2019-05-08
Payer: COMMERCIAL

## 2019-05-08 VITALS
SYSTOLIC BLOOD PRESSURE: 132 MMHG | TEMPERATURE: 98.1 F | HEART RATE: 84 BPM | DIASTOLIC BLOOD PRESSURE: 86 MMHG | RESPIRATION RATE: 16 BRPM

## 2019-05-08 DIAGNOSIS — E10.621 DIABETIC ULCER OF RIGHT MIDFOOT ASSOCIATED WITH TYPE 1 DIABETES MELLITUS, WITH FAT LAYER EXPOSED (HCC): ICD-10-CM

## 2019-05-08 DIAGNOSIS — L97.412 DIABETIC ULCER OF RIGHT MIDFOOT ASSOCIATED WITH TYPE 1 DIABETES MELLITUS, WITH FAT LAYER EXPOSED (HCC): ICD-10-CM

## 2019-05-08 DIAGNOSIS — T81.89XD NON-HEALING SURGICAL WOUND, SUBSEQUENT ENCOUNTER: Primary | ICD-10-CM

## 2019-05-08 PROCEDURE — 15275 SKIN SUB GRAFT FACE/NK/HF/G: CPT

## 2019-05-08 PROCEDURE — 11042 DBRDMT SUBQ TIS 1ST 20SQCM/<: CPT

## 2019-05-08 RX ORDER — LIDOCAINE HYDROCHLORIDE 40 MG/ML
SOLUTION TOPICAL PRN
Status: DISCONTINUED | OUTPATIENT
Start: 2019-05-08 | End: 2019-05-09 | Stop reason: HOSPADM

## 2019-05-08 ASSESSMENT — PAIN SCALES - GENERAL
PAINLEVEL_OUTOF10: 0
PAINLEVEL_OUTOF10: 0

## 2019-05-08 NOTE — PROGRESS NOTES
Krissy Lopez [address]   Progress Note and Procedure Note      Addie Carrington  MEDICAL RECORD NUMBER:  [id number]  AGE: 47 y.o. GENDER: male  : 1970  EPISODE DATE:  2019    Subjective:     Chief Complaint   Patient presents with    Wound Check     WOUND LEFT FOOT         HISTORY of PRESENT ILLNESS HPI     Addie Carrington is a 47 y.o. male who presents today for wound/ulcer evaluation. History of Wound Context: Patient presents with complaining of a wound on his left foot. Patient had a transmetatarsal amputation of the left foot on 2018. Patient's skin flap failed after the surgery with dehiscence of the surgical wound. Patient states home health care is performing dressing changes with application of Wound VAC. Patient reports he had angiograms of both legs with revascularization of the right leg on 2019 performed by Dr. Lawanda Riedel. Patient had revascularization of the left leg with Dr. Lawanda Riedel on 2019. Patient states he is taking his antibiotics and pain medication as prescribed.     Patient also has a wound on the bottom of the right foot of nine weeks duration. The wound started out as a blood blister.       Wound/Ulcer Pain Timing/Severity: none  Quality of pain: N/A  Severity:  0 / 10   Modifying Factors: None  Associated Signs/Symptoms: none    Ulcer Identification:  Ulcer Type: diabetic    Contributing Factors: diabetes, poor glucose control, chronic pressure, decreased mobility and arterial insufficiency    Wound: N/A        PAST MEDICAL HISTORY        Diagnosis Date    Angina effort ([address]     Arthritis     CAD (coronary artery disease)     Carotid stenosis     Diabetes mellitus with neurological manifestations, uncontrolled ([address]     Diarrhea     DEAN (dyspnea on exertion) 2015    Hyperlipidemia     Hypertension     Obesity     Type II or unspecified type diabetes mellitus without mention of complication, not stated as uncontrolled        PAST SURGICAL topically daily      vitamin B-12 (CYANOCOBALAMIN) 1000 MCG tablet Take 1,000 mcg by mouth daily  2    ranitidine (ZANTAC) 150 MG tablet Take 150 mg by mouth 2 times daily  1    Multiple Vitamins-Minerals (THERAPEUTIC MULTIVITAMIN-MINERALS) tablet Take 1 tablet by mouth daily      isosorbide mononitrate (IMDUR) 30 MG extended release tablet Take 1 tablet by mouth daily 30 tablet 3    aspirin (ASPIRIN LOW DOSE) 81 MG EC tablet Take 1 tablet by mouth daily 30 tablet 0    lisinopril (PRINIVIL;ZESTRIL) 40 MG tablet Take 40 mg by mouth every morning       Exenatide ER (BYDUREON BCISE) 2 MG/0.85ML AUIJ Inject 2 mg into the skin once a week 4 pen 3    Blood Glucose Monitoring Suppl (FREESTYLE LITE) INEZ 1 Device by Does not apply route 3 times daily (before meals) 1 Device 0    blood glucose test strips (FREESTYLE LITE) strip 1 each by In Vitro route 3 times daily As needed. 100 each 3    FREESTYLE LANCETS MISC 1 each by Does not apply route daily 100 each 3    Insulin Disposable Pump (V-GO 40) KIT by Does not apply route 6 CLICKS DAILY      Blood Glucose Monitoring Suppl (FREESTYLE LITE) INEZ TEST BLOOD GLUCOSE THREE TO FOUR TIMES DAILY 1 Device 0    glucose blood VI test strips (ONETOUCH VERIO) strip 1 each by In Vitro route 3 times daily ; Dx E11.65; Z79.4 100 each 11    ONE TOUCH LANCETS MISC 1 each by Does not apply route 3 times daily ; Dx E11.65; Z79.4 100 each 11    Insulin Pen Needle (B-D UF III MINI PEN NEEDLES) 31G X 5 MM MISC 1 each by Does not apply route 4 times daily (before meals and nightly) 150 each 6     No current facility-administered medications on file prior to encounter. REVIEW OF SYSTEMS    Pertinent items are noted in HPI.     Objective:      /86   Pulse 84   Temp 98.1 °F (36.7 °C) (Oral)   Resp 16     Wt Readings from Last 3 Encounters:   05/01/19 255 lb 8.2 oz (115.9 kg)   03/21/19 251 lb (113.9 kg)   03/09/19 258 lb 2.5 oz (117.1 kg)       PHYSICAL EXAM    Patient is alert and oriented x 3, and is in no acute distress.     Vascular: DP weakly palpable bilateral. PT pulse not palpable bilateral but audible on doppler exam. No Pedal hair noted bilateral. No Edema noted to ankles.    Derm:  Skin is warm to warm from proximal leg to distal foot bilateral. Toenails 1-5 on right foot are thickened, yellow and dystrophic. Neuro:  Protective sensation absent to 10/10 sites bilateral via a 5.07 Clarington-Xuan monofilament.    Musculoskeletal: Muscle strength 5/5 with PF/DF/I and E of both feet. Transmetatarsal amputation of the left foot.     Assessment:        Problem List Items Addressed This Visit     Diabetic foot ulcer ([facility]    Non-healing surgical wound - Primary           Procedure Note  Indications:  Based on my examination of this patient's wound(s)/ulcer(s) today, debridement is required to promote healing and evaluate the wound base. Performed by: Isaac Barillas DPM    Consent obtained:  Yes    Time out taken:  Yes    Pain Control: Anesthetic  Anesthetic: 4% Lidocaine Liquid Topical(5 ml)       Debridement: Excisional Debridement    Using curette, #15 blade scalpel and forceps the wound(s)/ulcer(s) was/were sharply debrided down through and including the removal of epidermis, dermis and subcutaneous tissue. Devitalized Tissue Debrided:  fibrin, biofilm and callus    Pre Debridement Measurements:  Are located in the Wound/Ulcer Documentation Flow Sheet    Wound/Ulcer #: 2, 3 and 4    Post Debridement Measurements:  Wound/Ulcer Descriptions are Pre Debridement except measurements:    Wound 03/13/19 Foot Right;Plantar #2 - right plantar foot- pt. noted 2/15/2019 (Active)   Wound Image   3/13/2019 10:18 AM   Wound Pressure Stage  3 5/8/2019  2:17 PM   Dressing Status Intact; Old drainage 5/8/2019  2:17 PM   Dressing Changed Changed/New 5/8/2019  3:17 PM   Dressing/Treatment Other (comment) 5/8/2019  3:17 PM   Wound Length (cm) 2.8 cm 5/8/2019  2:17 PM   Wound Width (cm) 1.8 cm 5/8/2019  2:17 PM   Wound Depth (cm) 0.5 cm 5/8/2019  2:17 PM   Wound Surface Area (cm^2) 5.04 cm^2 5/8/2019  2:17 PM   Change in Wound Size % (l*w) 34.38 5/8/2019  2:17 PM   Wound Volume (cm^3) 2.52 cm^3 5/8/2019  2:17 PM   Wound Healing % -227 5/8/2019  2:17 PM   Post-Procedure Length (cm) 2.9 cm 5/8/2019  2:44 PM   Post-Procedure Width (cm) 1.9 cm 5/8/2019  2:44 PM   Post-Procedure Depth (cm) 0.5 cm 5/8/2019  2:44 PM   Post-Procedure Surface Area (cm^2) 5.51 cm^2 5/8/2019  2:44 PM   Post-Procedure Volume (cm^3) 2.76 cm^3 5/8/2019  2:44 PM   Wound Assessment Granulation tissue;Slough 5/8/2019  2:17 PM   Drainage Amount Moderate 5/8/2019  2:17 PM   Drainage Description Green; [place] 5/8/2019  2:17 PM   Odor Mild 5/8/2019  2:17 PM   Margins Attached edges 5/8/2019  2:17 PM   Jina-wound Assessment Calloused; Maceration 5/8/2019  2:17 PM   Non-staged Wound Description Full thickness 5/8/2019  2:17 PM   [place]%Wound Bed 20 5/8/2019  2:17 PM   Red%Wound Bed 90 3/20/2019  2:25 PM   Yellow%Wound Bed 80 5/8/2019  2:17 PM   Black%Wound Bed 10 5/1/2019  2:39 PM   Number of days: 56       Wound 03/20/19 Foot Left;Medial Wound #3 acquired 11/2018 left TMA site (Active)   Wound Image   3/20/2019  2:25 PM   Wound Non-Healing Surgical 5/8/2019  2:17 PM   Dressing Status Intact; Old drainage 5/8/2019  2:17 PM   Dressing Changed Changed/New 5/8/2019  3:17 PM   Dressing/Treatment Other (comment) 5/8/2019  3:17 PM   Wound Length (cm) 1.8 cm 5/8/2019  2:17 PM   Wound Width (cm) 2.8 cm 5/8/2019  2:17 PM   Wound Depth (cm) 0.3 cm 5/8/2019  2:17 PM   Wound Surface Area (cm^2) 5.04 cm^2 5/8/2019  2:17 PM   Change in Wound Size % (l*w) 12.5 5/8/2019  2:17 PM   Wound Volume (cm^3) 1.51 cm^3 5/8/2019  2:17 PM   Wound Healing % 74 5/8/2019  2:17 PM   Post-Procedure Length (cm) 1.9 cm 5/8/2019  2:44 PM   Post-Procedure Width (cm) 2.9 cm 5/8/2019  2:44 PM   Post-Procedure Depth (cm) 0.3 cm 5/8/2019  2:44 PM   Post-Procedure Surface Area (cm^2) 5.51 cm^2 5/8/2019  2:44 PM   Post-Procedure Volume (cm^3) 1.65 cm^3 5/8/2019  2:44 PM   Wound Assessment Granulation tissue;Slough 5/8/2019  2:17 PM   Drainage Amount Small 5/8/2019  2:17 PM   Drainage Description Serosanguinous 5/8/2019  2:17 PM   Odor Mild 5/8/2019  2:17 PM   Margins Attached edges 5/8/2019  2:17 PM   Jina-wound Assessment Maceration 5/8/2019  2:17 PM   Non-staged Wound Description Full thickness 5/8/2019  2:17 PM   [place]%Wound Bed 80 4/24/2019  2:19 PM   Red%Wound Bed 90 5/8/2019  2:17 PM   Yellow%Wound Bed 10 5/8/2019  2:17 PM   Black%Wound Bed 10 4/10/2019  2:26 PM   Number of days: 49       Wound 03/20/19 Foot Left;Lateral Wound # 4 acquired 11/2018 left TMA site (Active)   Wound Image   3/20/2019  2:25 PM   Wound Non-Healing Surgical 5/8/2019  2:17 PM   Dressing Status Intact; Old drainage 5/8/2019  2:17 PM   Dressing Changed Changed/New 5/8/2019  3:17 PM   Dressing/Treatment Other (comment) 5/8/2019  3:17 PM   Wound Length (cm) 0.5 cm 5/8/2019  2:17 PM   Wound Width (cm) 1.8 cm 5/8/2019  2:17 PM   Wound Depth (cm) 0.2 cm 5/8/2019  2:17 PM   Wound Surface Area (cm^2) 0.9 cm^2 5/8/2019  2:17 PM   Change in Wound Size % (l*w) 92.36 5/8/2019  2:17 PM   Wound Volume (cm^3) 0.18 cm^3 5/8/2019  2:17 PM   Wound Healing % 99 5/8/2019  2:17 PM   Post-Procedure Length (cm) 0.6 cm 5/8/2019  2:44 PM   Post-Procedure Width (cm) 1.9 cm 5/8/2019  2:44 PM   Post-Procedure Depth (cm) 0.3 cm 5/8/2019  2:44 PM   Post-Procedure Surface Area (cm^2) 1.14 cm^2 5/8/2019  2:44 PM   Post-Procedure Volume (cm^3) 0.34 cm^3 5/8/2019  2:44 PM   Wound Assessment Granulation tissue;Slough 5/8/2019  2:17 PM   Drainage Amount Small 5/8/2019  2:17 PM   Drainage Description Serosanguinous 5/8/2019  2:17 PM   Odor Mild 5/8/2019  2:17 PM   Margins Attached edges 5/8/2019  2:17 PM   Jina-wound Assessment White 5/8/2019  2:17 PM   Non-staged Wound Description Full thickness 5/8/2019  2:17 PM   [place]%Wound Bed 50 5/8/2019  2:17 PM   Red%Wound Bed 30 5/1/2019  2:39 PM   Yellow%Wound Bed 50 5/8/2019  2:17 PM   Black%Wound Bed 10 4/10/2019  2:26 PM   Number of days: 49          Percent of Wound(s)/Ulcer(s) Debrided: 100%    Total Surface Area Debrided:  12.16 sq cm     Diabetic/Pressure/Non Pressure Ulcers only:  Ulcer: Diabetic ulcer, fat layer exposed     Estimated Blood Loss:  Minimal    Hemostasis Achieved:  by pressure    Procedural Pain:  0  / 10     Post Procedural Pain:  0 / 10     Response to treatment:  Well tolerated by patient. Procedure:  Skin Substitute Application    Performed by: Lesli Orellana DPM    Ulcer Type: diabetic    Consent obtained: Yes    Time out taken: Yes    Product Utilized:    PuraPly AM 8 sq/cm and PuraPly AM 4 sq/cm     Fenestrated: Yes    Mesher Utilized: No    Instrument(s) curette, #15 blade scalpel and forceps    Skin Substitute was Applied to Ulcer Number(s):    Ulcer #: 2, 3 and 4      Wound 03/13/19 Foot Right;Plantar #2 - right plantar foot- pt. noted 2/15/2019 (Active)   Wound Image   3/13/2019 10:18 AM   Wound Pressure Stage  3 5/8/2019  2:17 PM   Dressing Status Intact; Old drainage 5/8/2019  2:17 PM   Dressing Changed Changed/New 5/8/2019  3:17 PM   Dressing/Treatment Other (comment) 5/8/2019  3:17 PM   Wound Length (cm) 2.8 cm 5/8/2019  2:17 PM   Wound Width (cm) 1.8 cm 5/8/2019  2:17 PM   Wound Depth (cm) 0.5 cm 5/8/2019  2:17 PM   Wound Surface Area (cm^2) 5.04 cm^2 5/8/2019  2:17 PM   Change in Wound Size % (l*w) 34.38 5/8/2019  2:17 PM   Wound Volume (cm^3) 2.52 cm^3 5/8/2019  2:17 PM   Wound Healing % -227 5/8/2019  2:17 PM   Post-Procedure Length (cm) 2.9 cm 5/8/2019  2:44 PM   Post-Procedure Width (cm) 1.9 cm 5/8/2019  2:44 PM   Post-Procedure Depth (cm) 0.5 cm 5/8/2019  2:44 PM   Post-Procedure Surface Area (cm^2) 5.51 cm^2 5/8/2019  2:44 PM   Post-Procedure Volume (cm^3) 2.76 cm^3 5/8/2019  2:44 PM   Wound Assessment Granulation tissue;Slough 5/8/2019  2:17 PM   Drainage Amount Moderate 5/8/2019  2:17 PM   Drainage Description Green; [place] 5/8/2019  2:17 PM   Odor Mild 5/8/2019  2:17 PM   Margins Attached edges 5/8/2019  2:17 PM   Jina-wound Assessment Calloused; Maceration 5/8/2019  2:17 PM   Non-staged Wound Description Full thickness 5/8/2019  2:17 PM   [place]%Wound Bed 20 5/8/2019  2:17 PM   Red%Wound Bed 90 3/20/2019  2:25 PM   Yellow%Wound Bed 80 5/8/2019  2:17 PM   Black%Wound Bed 10 5/1/2019  2:39 PM   Number of days: 56       Wound 03/20/19 Foot Left;Medial Wound #3 acquired 11/2018 left TMA site (Active)   Wound Image   3/20/2019  2:25 PM   Wound Non-Healing Surgical 5/8/2019  2:17 PM   Dressing Status Intact; Old drainage 5/8/2019  2:17 PM   Dressing Changed Changed/New 5/8/2019  3:17 PM   Dressing/Treatment Other (comment) 5/8/2019  3:17 PM   Wound Length (cm) 1.8 cm 5/8/2019  2:17 PM   Wound Width (cm) 2.8 cm 5/8/2019  2:17 PM   Wound Depth (cm) 0.3 cm 5/8/2019  2:17 PM   Wound Surface Area (cm^2) 5.04 cm^2 5/8/2019  2:17 PM   Change in Wound Size % (l*w) 12.5 5/8/2019  2:17 PM   Wound Volume (cm^3) 1.51 cm^3 5/8/2019  2:17 PM   Wound Healing % 74 5/8/2019  2:17 PM   Post-Procedure Length (cm) 1.9 cm 5/8/2019  2:44 PM   Post-Procedure Width (cm) 2.9 cm 5/8/2019  2:44 PM   Post-Procedure Depth (cm) 0.3 cm 5/8/2019  2:44 PM   Post-Procedure Surface Area (cm^2) 5.51 cm^2 5/8/2019  2:44 PM   Post-Procedure Volume (cm^3) 1.65 cm^3 5/8/2019  2:44 PM   Wound Assessment Granulation tissue;Slough 5/8/2019  2:17 PM   Drainage Amount Small 5/8/2019  2:17 PM   Drainage Description Serosanguinous 5/8/2019  2:17 PM   Odor Mild 5/8/2019  2:17 PM   Margins Attached edges 5/8/2019  2:17 PM   Jina-wound Assessment Maceration 5/8/2019  2:17 PM   Non-staged Wound Description Full thickness 5/8/2019  2:17 PM   [place]%Wound Bed 80 4/24/2019  2:19 PM   Red%Wound Bed 90 5/8/2019  2:17 PM   Yellow%Wound Bed 10 5/8/2019  2:17 PM   Black%Wound Bed 10 4/10/2019  2:26 PM   Number of days: 49       Wound 03/20/19 Foot Left;Lateral Wound # 4 acquired 11/2018 left TMA site (Active)   Wound Image   3/20/2019  2:25 PM   Wound Non-Healing Surgical 5/8/2019  2:17 PM   Dressing Status Intact; Old drainage 5/8/2019  2:17 PM   Dressing Changed Changed/New 5/8/2019  3:17 PM   Dressing/Treatment Other (comment) 5/8/2019  3:17 PM   Wound Length (cm) 0.5 cm 5/8/2019  2:17 PM   Wound Width (cm) 1.8 cm 5/8/2019  2:17 PM   Wound Depth (cm) 0.2 cm 5/8/2019  2:17 PM   Wound Surface Area (cm^2) 0.9 cm^2 5/8/2019  2:17 PM   Change in Wound Size % (l*w) 92.36 5/8/2019  2:17 PM   Wound Volume (cm^3) 0.18 cm^3 5/8/2019  2:17 PM   Wound Healing % 99 5/8/2019  2:17 PM   Post-Procedure Length (cm) 0.6 cm 5/8/2019  2:44 PM   Post-Procedure Width (cm) 1.9 cm 5/8/2019  2:44 PM   Post-Procedure Depth (cm) 0.3 cm 5/8/2019  2:44 PM   Post-Procedure Surface Area (cm^2) 1.14 cm^2 5/8/2019  2:44 PM   Post-Procedure Volume (cm^3) 0.34 cm^3 5/8/2019  2:44 PM   Wound Assessment Granulation tissue;Slough 5/8/2019  2:17 PM   Drainage Amount Small 5/8/2019  2:17 PM   Drainage Description Serosanguinous 5/8/2019  2:17 PM   Odor Mild 5/8/2019  2:17 PM   Margins Attached edges 5/8/2019  2:17 PM   Jina-wound Assessment White 5/8/2019  2:17 PM   Non-staged Wound Description Full thickness 5/8/2019  2:17 PM   [place]%Wound Bed 50 5/8/2019  2:17 PM   Red%Wound Bed 30 5/1/2019  2:39 PM   Yellow%Wound Bed 50 5/8/2019  2:17 PM   Black%Wound Bed 10 4/10/2019  2:26 PM   Number of days: 49       Diabetic/Pressure/Non Pressure Ulcers:  Ulcer:   Diabetic ulcer, fat layer exposed      Total Surface Area of Ulcer(s) Covered 12.16 sq/cm    Was the Product Layered  No     Amount of Product Applied 12 sq/cm     Amount of Product Wasted 0 sq/cm     Reason for Waste N/A      Surgically Fixated: No    Secured With: Steri Strips and Mepitel     Procedural Pain: 0/10     Post Procedural Pain: 0 / 10    Response to Treatment: Well tolerated by patient.           Plan:     Treatment Note please see attached Discharge Instructions    Written patient dismissal instructions given to patient and signed by patient or POA. Discharge [facility]   Physician Orders and Discharge Instructions  [facility]  [address] [facility]   [address] [place] [address]  Telephone: [phone]      FAX [phone]     NAME: Emmanuel Klein  DATE OF BIRTH:  1970  MEDICAL RECORD NUMBER:  [id number]  DATE:  5/8/2019     Wound Cleansing:   Do not scrub or use excessive force. Cleanse wound prior to applying a clean dressing with:  [x] Normal Saline            [x] Keep Wound Dry in Shower    [] Wound Cleanser   [] Cleanse wound with Mild Soap & Water  [] May Shower at Discharge   [] Other:        Topical Treatments:  Do not apply lotions, creams, or ointments to wound bed unless directed. [] Apply moisturizing lotion to skin surrounding the wound prior to dressing change.  [] Apply antifungal ointment to skin surrounding the wound prior to dressing change. [x] Apply thin film of moisture barrier ointment to skin immediately around wound. [] Other:          Negative Pressure:           Wound Location: Vac on Hold this Week will reassess on 5/15/19        YOU HAVE HAD A PURAPLY AM       PLACED ON YOUR WOUND TODAY. FOR BEST RESULTS, WE RECOMMEND YOU LIMIT YOUR ACTIVITY FOR THE NEXT WEEK AS MUCH AS POSSIBLE. AVOID LONG PERIODS OF TIME ON YOUR FEET.  THIS ALSO INCLUDES ELEVATING YOUR LEGS AND FEET 3-4 TIMES DAILY FOR AT LEAST 20-30 MINUTES ON PILLOWS AND AT NIGHT SO THAT THEY ARE HIGHER THAN THE LEVEL OF YOUR HEART     Electronically signed by Ronnie Sotelo RN on 5/8/2019 at 2:47 PM     **ATTEMPT TO MINIMIZE ANY WEIGHT BEARING IN RIGHT PLANTAR FOOT BY HEEL WEIGHT BEARING FOR ANY TRANSFERS**        Dressings : Wound Location : RIGHT PLANTAR FOOT and LEFT MEDIAL/LATERAL FOOT  **PURAPLY #1 APPLIED 5/8/19**    Apply PURAPLY, MEPITEL, STERI-STRIPS AND HYDROGEL (APPLIED PER DR. KNOWLES [facility]). -DO NOT REMOVED DRESSING BELOW STERI-STRIPS**    COVER AND SECURED WITH GAUZE AND KERLIX (CHANGE GAUZE AND KERLIX ONLY)  CHANGE DRESSING ON Monday AND Friday- PATIENT WILL HAVE CHANGED IN WOUND CARE ON WEDNESDAY          Edema Control:  Apply:  [] Compression Stocking           []Right Leg         []Left Leg              [] Tubigrip          []Right Leg Double Layer          []Left Leg Double Layer                                                  []Right Leg Single Layer           []Left Leg Single Layer              [] SpandaGrip    []Right Leg         []Left Leg                                      []Low compression 5-10 mm/Hg                                                 []Medium compression 10-20 mm/Hg                                      []High compression  20-30 mm/Hg              every morning immediately when getting up should be applied to affected leg(s) from mid foot to knee making sure to cover the heel.  Remove every night before going to bed.              [] Elevate leg(s) above the level of the heart when sitting.                    [] Avoid prolonged standing in one place.        Compression:  Apply:  [] Multilayer Compression Wrap Applied in Clinic        []RightLeg          []Left Leg              [] Multi-layer compression.  Do not get leg(s) with wrap wet.  If wraps become too tight call the center or completely remove the wrap.                 [] Elevate leg(s) above the level of the heart when sitting.                    [] Avoid prolonged standing in one place.     Off-Loading:   [] Off-loading when        [] walking           [] in bed [] sitting  [] Total non-weight bearing  [] Right Leg  [] Left Leg          [x] Assistive Device         [] Walker            [] Cane   [x] Wheelchair      [] Crutches              [] Surgical shoe    [] Podus Boot(s)   [] Foam Boot(s)  [] Roll About              [] Cast Boot       [] CROW Boot  [] Other:                   Dietary:  [] Diet as tolerated:        [x] Calorie Diabetic Diet: [] No Added Salt:  [] Increase Protein:        [] Other:     Activity:  [x] Activity as tolerated:  [] Patient has no activity restrictions     [] Strict Bedrest:            [] Remain off Work:     [] May return to full duty work:                                         [] FUYEMN to work with restrictions:          If you are still having pain after you go home:  [X] Elevate the affected limb.            [X]Use over-the-counter medications you would normally use for pain as permitted by your family doctor.  [X] For persistent pain not relieved by the above interventions, please call your family doctor.                Return Appointment:  [] Wound and dressing supply provider:   [x] ECF or Home Healthcare: [address] [place] [address]  [] Wound [address] or NP scheduled for Wound Assessment:   [x] Return Appointment: [address] [facility] Week(s)  [] Ordered tests:      Nurse Case Manger: Sarita  Electronically signed by Emily Ochoa RN on 5/8/2019 at 3:00 PM  [address] [place] [address] Information: Should you experience any significant changes in your wound(s) or have questions about your wound care, please contact the [facility] [phone] [facility] - THURSDAY 8:30 am - 4:30 pm and Friday 8:30 am - 1:00 pm.  If you need help with your wound outside these hours and cannot wait until we are again available, contact your PCP or go to the hospital emergency room.      PLEASE NOTE: IF YOU ARE UNABLE TO OBTAIN WOUND SUPPLIES, CONTINUE TO USE THE SUPPLIES YOU HAVE AVAILABLE UNTIL YOU ARE ABLE TO [facility].  IT IS MOST IMPORTANT TO KEEP THE WOUND COVERED AT ALL TIMES.     Physician Signature:_______________________     Date: ___________ Time:  ____________                    [] Dr Andrea Lau    [] Dr Valentin Shellye   [] Latia Duval CNP                 [x] Dr Julio César Angulo   [] Dr IWONA Jeffery                  [] Dr Sb Madison Faisal   [] Srinivasan Faust NP                  Electronically signed by Jacqui Howell DPM on 5/8/2019 at 4:36 PM

## 2019-05-09 NOTE — PLAN OF CARE
PuraPly AMTreatment Note    NAME:  Mak Green  YOB: 1970  MEDICAL RECORD NUMBER:  [id number]  DATE:  5/8/2019    Goal:  Patient will receive safe and proper application of skin substitute. Patient will comply with caring for dressing, and reporting complications. Expiration date checked immediately prior to use. Package intact prior to use and no damage noted. Transport temperature controlled and acceptable. PuraPly AM was removed from protective sterile packaging by provider and applied to prepared ulcer bed. PuraPly AM was hydrated with sterile normal saline per provider. PuraPly AM was applied to Left Medial and Lateral Foot Wounds and Right Plantar Foot Wound and affixed with steri-strips by the provider. PuraPly AM was covered with non-adherent ulcer dressing. Applied Hydrogel over non-adherent. Applied dry gauze and/or roll gauze. Patient/caregiver was instructed not to remove dressing and to keep it clean and dry. Pt/family/caregiver was instructed on signs and symptoms of complications to report such as draining through dressing, dressing falling down/slipping, getting wet, or severe pain or tingling. PuraPly AM may be applied a total of 10 times per wound over a 12 week period. Date of first application of PuraPly for this current wound is May 8, 2019 .     Guidelines followed    Electronically signed by Hamilton Echeverria RN on 5/9/2019 at 8:31 AM

## 2019-05-10 NOTE — TELEPHONE ENCOUNTER
Left a message for Dom Led to call me back with name of medication his plan will cover. He has an appointment Monday.

## 2019-05-13 ENCOUNTER — OFFICE VISIT (OUTPATIENT)
Dept: ENDOCRINOLOGY | Age: 49
End: 2019-05-13
Payer: COMMERCIAL

## 2019-05-13 VITALS — HEART RATE: 80 BPM | OXYGEN SATURATION: 98 % | DIASTOLIC BLOOD PRESSURE: 80 MMHG | SYSTOLIC BLOOD PRESSURE: 136 MMHG

## 2019-05-13 DIAGNOSIS — Z79.4 DIABETES MELLITUS TYPE 2, INSULIN DEPENDENT (HCC): Primary | ICD-10-CM

## 2019-05-13 DIAGNOSIS — E11.9 DIABETES MELLITUS TYPE 2, INSULIN DEPENDENT (HCC): Primary | ICD-10-CM

## 2019-05-13 DIAGNOSIS — E66.01 MORBID OBESITY DUE TO EXCESS CALORIES (HCC): ICD-10-CM

## 2019-05-13 DIAGNOSIS — I70.229 TYPE 2 DIABETES MELLITUS WITH ATHEROSCLEROSIS OF NATIVE ARTERIES OF EXTREMITY WITH REST PAIN (HCC): ICD-10-CM

## 2019-05-13 DIAGNOSIS — E11.51 TYPE 2 DIABETES MELLITUS WITH ATHEROSCLEROSIS OF NATIVE ARTERIES OF EXTREMITY WITH REST PAIN (HCC): ICD-10-CM

## 2019-05-13 DIAGNOSIS — I10 ESSENTIAL HYPERTENSION: ICD-10-CM

## 2019-05-13 DIAGNOSIS — E11.51 DIABETES MELLITUS WITH PERIPHERAL CIRCULATORY DISORDER (HCC): ICD-10-CM

## 2019-05-13 DIAGNOSIS — E11.21 TYPE 2 DIABETES MELLITUS WITH DIABETIC NEPHROPATHY, WITH LONG-TERM CURRENT USE OF INSULIN (HCC): ICD-10-CM

## 2019-05-13 DIAGNOSIS — E78.5 DYSLIPIDEMIA ASSOCIATED WITH TYPE 2 DIABETES MELLITUS (HCC): ICD-10-CM

## 2019-05-13 DIAGNOSIS — Z79.4 TYPE 2 DIABETES MELLITUS WITH DIABETIC NEPHROPATHY, WITH LONG-TERM CURRENT USE OF INSULIN (HCC): ICD-10-CM

## 2019-05-13 DIAGNOSIS — E11.69 DYSLIPIDEMIA ASSOCIATED WITH TYPE 2 DIABETES MELLITUS (HCC): ICD-10-CM

## 2019-05-13 PROCEDURE — 3046F HEMOGLOBIN A1C LEVEL >9.0%: CPT | Performed by: INTERNAL MEDICINE

## 2019-05-13 PROCEDURE — G8598 ASA/ANTIPLAT THER USED: HCPCS | Performed by: INTERNAL MEDICINE

## 2019-05-13 PROCEDURE — 99213 OFFICE O/P EST LOW 20 MIN: CPT | Performed by: INTERNAL MEDICINE

## 2019-05-13 PROCEDURE — 3017F COLORECTAL CA SCREEN DOC REV: CPT | Performed by: INTERNAL MEDICINE

## 2019-05-13 PROCEDURE — 2022F DILAT RTA XM EVC RTNOPTHY: CPT | Performed by: INTERNAL MEDICINE

## 2019-05-13 PROCEDURE — 1036F TOBACCO NON-USER: CPT | Performed by: INTERNAL MEDICINE

## 2019-05-13 PROCEDURE — G8417 CALC BMI ABV UP PARAM F/U: HCPCS | Performed by: INTERNAL MEDICINE

## 2019-05-13 PROCEDURE — G8427 DOCREV CUR MEDS BY ELIG CLIN: HCPCS | Performed by: INTERNAL MEDICINE

## 2019-05-13 NOTE — PATIENT INSTRUCTIONS

## 2019-05-13 NOTE — PROGRESS NOTES
file     Gets together: Not on file     Attends [employer] service: Not on file     Active member of club or organization: Not on file     Attends meetings of clubs or organizations: Not on file     Relationship status: Not on file    Intimate partner violence:     Fear of current or ex partner: Not on file     Emotionally abused: Not on file     Physically abused: Not on file     Forced sexual activity: Not on file   Other Topics Concern    Not on file   Social History Narrative    Not on file       Past Medical History:   Diagnosis Date    Angina effort [facility]     Arthritis     CAD (coronary artery disease)     Carotid stenosis     Diabetes mellitus with neurological manifestations, uncontrolled ([address]     Diarrhea     DEAN (dyspnea on exertion) 1/27/2015    Hyperlipidemia     Hypertension     Obesity     Type II or unspecified type diabetes mellitus without mention of complication, not stated as uncontrolled        Past Surgical History:   Procedure Laterality Date    CARDIAC CATHETERIZATION      CAROTID ENDARTERECTOMY Right 4-9-2015    CHOLECYSTECTOMY      GA OFFICE/OUTPT VISIT,PROCEDURE ONLY Left 10/5/2018    AMPUTATION LEFT GREAT DIGIT performed by Blair Perez DPM at [place] [address] OFFICE/OUTPT [address] [place] [address] Left 10/22/2018    INCISION AND DRAINAGE LEFT FOOT FIRST AND SECOND RAY AMPUTATION (DIGITS ONE, TWO AND METARSAL ONE AND TWO) performed by Blair Perez DPM at [place] [address] OFFICE/OUTPT [address] [place], [place] [address]b [facility] Left 10/26/2018    LEFT FOOT WOUND DEBRIDEMENT WITH PRIMARY CLOSURE performed by Blair Perez DPM at [place] [address] OFFICE/OUTPT [address] [place] [address] Left 11/2/2018    INCISION AND DRAINAGE WITH TRANSMETATARSAL CONVERTED TO LIST [address] performed by Blair Perez DPM at [place] [address] Left 10/05/2018    left great toe amputation       No Known Allergies      Current Outpatient Medications:     HUMALOG 100 UNIT/ML injection vial, , Disp: , Rfl: 5    atorvastatin (LIPITOR) 40 MG tablet, Take 1 tablet by mouth every morning, Disp: 30 tablet, Rfl: 0    insulin glargine (BASAGLAR KWIKPEN) 100 UNIT/ML injection pen, Inject 15 Units into the skin nightly (Patient taking differently: Inject 30 Units into the skin every morning ), Disp: 5 pen, Rfl: 3    clopidogrel (PLAVIX) 75 MG tablet, Take 1 tablet by mouth daily, Disp: 30 tablet, Rfl: 3    metoprolol succinate (TOPROL XL) 25 MG extended release tablet, Take 25 mg by mouth daily, Disp: , Rfl:     chlorthalidone (HYGROTEN) 50 MG tablet, Take 50 mg by mouth daily, Disp: , Rfl: 0    vitamin B-12 (CYANOCOBALAMIN) 1000 MCG tablet, Take 1,000 mcg by mouth daily, Disp: , Rfl: 2    ranitidine (ZANTAC) 150 MG tablet, Take 150 mg by mouth 2 times daily, Disp: , Rfl: 1    Multiple Vitamins-Minerals (THERAPEUTIC MULTIVITAMIN-MINERALS) tablet, Take 1 tablet by mouth daily, Disp: , Rfl:     Blood Glucose Monitoring Suppl (FREESTYLE LITE) INEZ, 1 Device by Does not apply route 3 times daily (before meals), Disp: 1 Device, Rfl: 0    blood glucose test strips (FREESTYLE LITE) strip, 1 each by In Vitro route 3 times daily As needed. , Disp: 100 each, Rfl: 3    FREESTYLE LANCETS MISC, 1 each by Does not apply route daily, Disp: 100 each, Rfl: 3    Insulin Disposable Pump (V-GO 40) KIT, by Does not apply route 6 CLICKS DAILY, Disp: , Rfl:     aspirin (ASPIRIN LOW DOSE) 81 MG EC tablet, Take 1 tablet by mouth daily, Disp: 30 tablet, Rfl: 0    Insulin Pen Needle (B-D UF III MINI PEN NEEDLES) 31G X 5 MM MISC, 1 each by Does not apply route 4 times daily (before meals and nightly), Disp: 150 each, Rfl: 6    lisinopril (PRINIVIL;ZESTRIL) 40 MG tablet, Take 40 mg by mouth every morning , Disp: , Rfl:     Dulaglutide (TRULICITY) 1.5 NT/1.8MA SOPN, Inject 1.5 mg into the skin once a week, Disp: 12 pen, Rfl: 0      Review of Systems:    Constitutional: Negative for fever, chills, and unexpected weight change.    HENT: Negative for congestion, ear pain, rhinorrhea,  sore throat and trouble swallowing. Eyes: Negative for photophobia, redness, itching. Respiratory: Negative for cough, shortness of breath and sputum. Cardiovascular: Negative for chest pain, palpitations and leg swelling. Gastrointestinal: Negative for nausea, vomiting, abdominal pain, diarrhea, constipation. Endocrine: Negative for cold intolerance, heat intolerance, polydipsia, polyphagia and polyuria. Genitourinary: Negative for dysuria, urgency, frequency, hematuria and flank pain. Musculoskeletal: Negative for myalgias, back pain, arthralgias and neck pain. Skin/Nail: Negative for rash, itching. Normal nails. Neurological: Negative for seizures, weakness, light-headedness, numbness and headaches. Hematological/ Lymph nodes: Negative for adenopathy. Does not bruise/bleed easily. Psychiatric/Behavioral: Negative for suicidal ideas, depression, anxiety, sleep disturbance and decreased concentration. Objective:   Physical Exam:  /80 (Site: Left Upper Arm, Position: Sitting, Cuff Size: Large Adult)   Pulse 80   SpO2 98%   Constitutional: Patient is oriented to person, place, and time. Patient appears well-developed and well-nourished. HENT:    Head: Normocephalic and atraumatic. Eyes: Conjunctivae and EOM are normal.     Neck: Normal range of motion. Cardiovascular: Normal rate, regular rhythm and normal heart sounds. Pulmonary/Chest: Effort normal and breath sounds normal.   Musculoskeletal: Normal range of motion. Left foot dressed and attached to wound vac. Neurological: Patient is alert and oriented to person, place, and time. Skin: Skin is warm and dry. Psychiatric: Patient has a normal mood and affect.  Patient behavior is normal.     Lab Review:    Office Visit on 04/29/2019   Component Date Value Ref Range Status    Diabetic Retinopathy 04/18/2018 Positive   Final   Hospital Outpatient Visit on 03/21/2019 Component Date Value Ref Range Status    WBC 03/21/2019 10.9  4.0 - 11.0 K/uL Final    RBC 03/21/2019 4.54  4.20 - 5.90 M/uL Final    Hemoglobin 03/21/2019 12.3* 13.5 - 17.5 g/dL Final    Hematocrit 03/21/2019 37.6* 40.5 - 52.5 % Final    MCV 03/21/2019 82.9  80.0 - 100.0 fL Final    MCH 03/21/2019 27.0  26.0 - 34.0 pg Final    MCHC 03/21/2019 32.6  31.0 - 36.0 g/dL Final    RDW 03/21/2019 14.1  12.4 - 15.4 % Final    Platelets 28/12/9184 296  135 - 450 K/uL Final    MPV 03/21/2019 8.5  5.0 - 10.5 fL Final    POC Sodium 03/21/2019 137  136 - 145 mmol/L Final    POC Potassium 03/21/2019 4.4  3.5 - 5.1 mmol/L Final    POC Chloride 03/21/2019 102  99 - 110 mmol/L Final    POC Glucose 03/21/2019 258* 70 - 99 mg/dl Final    POC Creatinine 03/21/2019 0.9  0.9 - 1.3 mg/dL Final    GFR Non- 03/21/2019 >60  >60 Final    GFR  03/21/2019 >60  >60 Final    Calcium, Ion 03/21/2019 1.20  1. 12 - 1.32 mmol/L Final    Sample Type 03/21/2019 COURTNEY   Final    Performed on 03/21/2019 SEE BELOW   Final    POC ACT LR 03/21/2019 271  Not Established sec Final   Admission on 03/06/2019, Discharged on 03/09/2019   Component Date Value Ref Range Status    WBC 03/06/2019 9.0  4.0 - 11.0 K/uL Final    RBC 03/06/2019 4.46  4.20 - 5.90 M/uL Final    Hemoglobin 03/06/2019 12.2* 13.5 - 17.5 g/dL Final    Hematocrit 03/06/2019 37.1* 40.5 - 52.5 % Final    MCV 03/06/2019 83.2  80.0 - 100.0 fL Final    MCH 03/06/2019 27.4  26.0 - 34.0 pg Final    MCHC 03/06/2019 32.9  31.0 - 36.0 g/dL Final    RDW 03/06/2019 14.5  12.4 - 15.4 % Final    Platelets 12/02/0215 252  135 - 450 K/uL Final    MPV 03/06/2019 8.7  5.0 - 10.5 fL Final    Neutrophils % 03/06/2019 69.5  % Final    Lymphocytes % 03/06/2019 22.2  % Final    Monocytes % 03/06/2019 6.6  % Final    Eosinophils % 03/06/2019 1.3  % Final    Basophils % 03/06/2019 0.4  % Final    Neutrophils # 03/06/2019 6.2  1.7 - 7.7 K/uL Final    Lymphocytes # 03/06/2019 2.0  1.0 - 5.1 K/uL Final    Monocytes # 03/06/2019 0.6  0.0 - 1.3 K/uL Final    Eosinophils # 03/06/2019 0.1  0.0 - 0.6 K/uL Final    Basophils # 03/06/2019 0.0  0.0 - 0.2 K/uL Final    Lactic Acid 03/06/2019 0.9  0.4 - 2.0 mmol/L Final    Sodium 03/06/2019 134* 136 - 145 mmol/L Final    Potassium 03/06/2019 4.6  3.5 - 5.1 mmol/L Final    Chloride 03/06/2019 97* 99 - 110 mmol/L Final    CO2 03/06/2019 24  21 - 32 mmol/L Final    Anion Gap 03/06/2019 13  3 - 16 Final    Glucose 03/06/2019 381* 70 - 99 mg/dL Final    BUN 03/06/2019 26* 7 - 20 mg/dL Final    CREATININE 03/06/2019 0.8* 0.9 - 1.3 mg/dL Final    GFR Non- 03/06/2019 >60  >60 Final    GFR  03/06/2019 >60  >60 Final    Calcium 03/06/2019 9.5  8.3 - 10.6 mg/dL Final    Protime 03/06/2019 11.3  9.8 - 13.0 sec Final    INR 03/06/2019 0.99  0.86 - 1.14 Final    aPTT 03/06/2019 38.6* 26.0 - 36.0 sec Final    Hemoglobin A1C 03/06/2019 14.4  See comment % Final    eAG 03/06/2019 366.6  mg/dL Final    POC Glucose 03/06/2019 295* 70 - 99 mg/dl Final    Performed on 03/06/2019 ACCU-CHEK   Final    Sodium 03/07/2019 134* 136 - 145 mmol/L Final    Potassium reflex Magnesium 03/07/2019 4.1  3.5 - 5.1 mmol/L Final    Chloride 03/07/2019 97* 99 - 110 mmol/L Final    CO2 03/07/2019 24  21 - 32 mmol/L Final    Anion Gap 03/07/2019 13  3 - 16 Final    Glucose 03/07/2019 366* 70 - 99 mg/dL Final    BUN 03/07/2019 21* 7 - 20 mg/dL Final    CREATININE 03/07/2019 0.7* 0.9 - 1.3 mg/dL Final    GFR Non- 03/07/2019 >60  >60 Final    GFR  03/07/2019 >60  >60 Final    Calcium 03/07/2019 9.3  8.3 - 10.6 mg/dL Final    WBC 03/07/2019 8.7  4.0 - 11.0 K/uL Final    RBC 03/07/2019 4.08* 4.20 - 5.90 M/uL Final    Hemoglobin 03/07/2019 11.3* 13.5 - 17.5 g/dL Final    Hematocrit 03/07/2019 33.9* 40.5 - 52.5 % Final    MCV 03/07/2019 83.1  80.0 - 100.0 fL Final    Potassium 03/09/2019 3.9  3.5 - 5.1 mmol/L Final    Chloride 03/09/2019 96* 99 - 110 mmol/L Final    CO2 03/09/2019 26  21 - 32 mmol/L Final    Anion Gap 03/09/2019 12  3 - 16 Final    Glucose 03/09/2019 220* 70 - 99 mg/dL Final    BUN 03/09/2019 24* 7 - 20 mg/dL Final    CREATININE 03/09/2019 0.8* 0.9 - 1.3 mg/dL Final    GFR Non- 03/09/2019 >60  >60 Final    GFR  03/09/2019 >60  >60 Final    Calcium 03/09/2019 9.3  8.3 - 10.6 mg/dL Final    POC Glucose 03/09/2019 215* 70 - 99 mg/dl Final    Performed on 03/09/2019 ACCU-CHEK   Final   Admission on 01/30/2019, Discharged on 01/30/2019   Component Date Value Ref Range Status    WBC 01/30/2019 10.6  4.0 - 11.0 K/uL Final    RBC 01/30/2019 4.70  4.20 - 5.90 M/uL Final    Hemoglobin 01/30/2019 12.9* 13.5 - 17.5 g/dL Final    Hematocrit 01/30/2019 39.3* 40.5 - 52.5 % Final    MCV 01/30/2019 83.6  80.0 - 100.0 fL Final    MCH 01/30/2019 27.4  26.0 - 34.0 pg Final    MCHC 01/30/2019 32.8  31.0 - 36.0 g/dL Final    RDW 01/30/2019 15.7* 12.4 - 15.4 % Final    Platelets 34/49/9898 196  135 - 450 K/uL Final    MPV 01/30/2019 9.6  5.0 - 10.5 fL Final    Neutrophils % 01/30/2019 63.5  % Final    Lymphocytes % 01/30/2019 28.1  % Final    Monocytes % 01/30/2019 6.1  % Final    Eosinophils % 01/30/2019 1.9  % Final    Basophils % 01/30/2019 0.4  % Final    Neutrophils # 01/30/2019 6.7  1.7 - 7.7 K/uL Final    Lymphocytes # 01/30/2019 3.0  1.0 - 5.1 K/uL Final    Monocytes # 01/30/2019 0.6  0.0 - 1.3 K/uL Final    Eosinophils # 01/30/2019 0.2  0.0 - 0.6 K/uL Final    Basophils # 01/30/2019 0.0  0.0 - 0.2 K/uL Final    Sodium 01/30/2019 132* 136 - 145 mmol/L Final    Potassium 01/30/2019 4.0  3.5 - 5.1 mmol/L Final    Chloride 01/30/2019 95* 99 - 110 mmol/L Final    CO2 01/30/2019 24  21 - 32 mmol/L Final    Anion Gap 01/30/2019 13  3 - 16 Final    Glucose 01/30/2019 226* 70 - 99 mg/dL Final    BUN 01/30/2019 21* 7 - 20 mg/dL Final    CREATININE 01/30/2019 0.9  0.9 - 1.3 mg/dL Final    GFR Non- 01/30/2019 >60  >60 Final    GFR  01/30/2019 >60  >60 Final    Calcium 01/30/2019 9.4  8.3 - 10.6 mg/dL Final    Total Protein 01/30/2019 8.1  6.4 - 8.2 g/dL Final    Alb 01/30/2019 3.8  3.4 - 5.0 g/dL Final    Albumin/Globulin Ratio 01/30/2019 0.9* 1.1 - 2.2 Final    Total Bilirubin 01/30/2019 0.3  0.0 - 1.0 mg/dL Final    Alkaline Phosphatase 01/30/2019 105  40 - 129 U/L Final    ALT 01/30/2019 40  10 - 40 U/L Final    AST 01/30/2019 24  15 - 37 U/L Final    Globulin 01/30/2019 4.3  g/dL Final    Lipase 01/30/2019 26.0  13.0 - 60.0 U/L Final   Orders Only on 11/19/2018   Component Date Value Ref Range Status    CRP 11/19/2018 12.9* 0.0 - 5.1 mg/L Final   Orders Only on 11/19/2018   Component Date Value Ref Range Status    Sodium 11/19/2018 135* 136 - 145 mmol/L Final    Potassium 11/19/2018 4.9  3.5 - 5.1 mmol/L Final    Chloride 11/19/2018 93* 99 - 110 mmol/L Final    CO2 11/19/2018 20* 21 - 32 mmol/L Final    Anion Gap 11/19/2018 22* 3 - 16 Final    Glucose 11/19/2018 409* 70 - 99 mg/dL Final    BUN 11/19/2018 20  7 - 20 mg/dL Final    CREATININE 11/19/2018 0.9  0.9 - 1.3 mg/dL Final    GFR Non- 11/19/2018 >60  >60 Final    GFR  11/19/2018 >60  >60 Final    Calcium 11/19/2018 9.5  8.3 - 10.6 mg/dL Final    Total Protein 11/19/2018 8.1  6.4 - 8.2 g/dL Final    Alb 11/19/2018 3.5  3.4 - 5.0 g/dL Final    Albumin/Globulin Ratio 11/19/2018 0.8* 1.1 - 2.2 Final    Total Bilirubin 11/19/2018 0.3  0.0 - 1.0 mg/dL Final    Alkaline Phosphatase 11/19/2018 125  40 - 129 U/L Final    ALT 11/19/2018 31  10 - 40 U/L Final    AST 11/19/2018 41* 15 - 37 U/L Final    Globulin 11/19/2018 4.6  g/dL Final   Orders Only on 11/12/2018   Component Date Value Ref Range Status    CRP 11/12/2018 33.6* 0.0 - 5.1 mg/L Final   Orders Only on 11/12/2018   Component Date Value Ref Range Status    Sodium 11/12/2018 139  136 - 145 mmol/L Final    Potassium 11/12/2018 4.1  3.5 - 5.1 mmol/L Final    Chloride 11/12/2018 93* 99 - 110 mmol/L Final    CO2 11/12/2018 27  21 - 32 mmol/L Final    Anion Gap 11/12/2018 19* 3 - 16 Final    Glucose 11/12/2018 360* 70 - 99 mg/dL Final    BUN 11/12/2018 13  7 - 20 mg/dL Final    CREATININE 11/12/2018 0.8* 0.9 - 1.3 mg/dL Final    GFR Non- 11/12/2018 >60  >60 Final    GFR  11/12/2018 >60  >60 Final    Calcium 11/12/2018 9.3  8.3 - 10.6 mg/dL Final    Total Protein 11/12/2018 8.2  6.4 - 8.2 g/dL Final    Alb 11/12/2018 3.5  3.4 - 5.0 g/dL Final    Albumin/Globulin Ratio 11/12/2018 0.7* 1.1 - 2.2 Final    Total Bilirubin 11/12/2018 <0.2  0.0 - 1.0 mg/dL Final    Alkaline Phosphatase 11/12/2018 122  40 - 129 U/L Final    ALT 11/12/2018 18  10 - 40 U/L Final    AST 11/12/2018 16  15 - 37 U/L Final    Globulin 11/12/2018 4.7  g/dL Final   Orders Only on 11/12/2018   Component Date Value Ref Range Status    Sed Rate 11/12/2018 104* 0 - 15 mm/Hr Final   Orders Only on 11/12/2018   Component Date Value Ref Range Status    WBC 11/12/2018 8.4  4.0 - 11.0 K/uL Final    RBC 11/12/2018 3.83* 4.20 - 5.90 M/uL Final    Hemoglobin 11/12/2018 10.5* 13.5 - 17.5 g/dL Final    Hematocrit 11/12/2018 32.1* 40.5 - 52.5 % Final    MCV 11/12/2018 83.8  80.0 - 100.0 fL Final    MCH 11/12/2018 27.3  26.0 - 34.0 pg Final    MCHC 11/12/2018 32.6  31.0 - 36.0 g/dL Final    RDW 11/12/2018 14.5  12.4 - 15.4 % Final    Platelets 00/84/7093 444  135 - 450 K/uL Final    MPV 11/12/2018 7.9  5.0 - 10.5 fL Final    Neutrophils % 11/12/2018 62.0  % Final    Lymphocytes % 11/12/2018 29.7  % Final    Monocytes % 11/12/2018 5.2  % Final    Eosinophils % 11/12/2018 2.4  % Final    Basophils % 11/12/2018 0.7  % Final    Neutrophils # 11/12/2018 5.2  1.7 - 7.7 K/uL Final    Lymphocytes # 11/12/2018 2.5  1.0 - 5.1 K/uL Final    Monocytes # 11/12/2018 0.4  0.0 - 1.3 K/uL Final    Eosinophils # 11/12/2018 0.2  0.0 - 0.6 K/uL Final    Basophils # 11/12/2018 0.1  0.0 - 0.2 K/uL Final   There may be more visits with results that are not included. Assessment and Plan     Emilie Kirkland was seen today for diabetes. 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 (HCC)  -     Hemoglobin A1C; Future  -     Comprehensive Metabolic Panel; Future  -     Microalbumin / Creatinine Urine Ratio; Future  -     Lipid, Fasting; Future    Type 2 diabetes mellitus with diabetic nephropathy, with long-term current use of insulin ([address]    Dyslipidemia associated with type 2 diabetes mellitus ([address]    Essential hypertension    Morbid obesity due to excess calories ([address]          1: Type 2 DM complicated left foot ulcer s/p metatarsal amputation, nephropathy, retinopathy, peripheral atherosclerosis   Uncontrolled A1C 14.4% March 6th 2019   Uncontrolled diabetes for a long time   A1C of <8 would be acceptable because of microvascular and macrovascular complications. Change Basaglar to 30 units daily in am   Keep VGO 40, 6 clicks with each meal   Bydureon was started in April 2019, switched to ulicity as per insurance preference     All instructions provided in written. Check Blood sugars 3-4 times per day. Log them along with insulin and send them every 2 weeks. Call for blood sugars less than 60 or more than 400.      Eye exam:

## 2019-05-15 ENCOUNTER — HOSPITAL ENCOUNTER (OUTPATIENT)
Dept: WOUND CARE | Age: 49
Discharge: HOME | End: 2019-05-15
Payer: COMMERCIAL

## 2019-05-15 VITALS
RESPIRATION RATE: 16 BRPM | TEMPERATURE: 98.4 F | DIASTOLIC BLOOD PRESSURE: 76 MMHG | SYSTOLIC BLOOD PRESSURE: 112 MMHG | HEART RATE: 99 BPM

## 2019-05-15 DIAGNOSIS — T81.89XD NON-HEALING SURGICAL WOUND, SUBSEQUENT ENCOUNTER: Primary | ICD-10-CM

## 2019-05-15 DIAGNOSIS — E10.621 DIABETIC ULCER OF RIGHT MIDFOOT ASSOCIATED WITH TYPE 1 DIABETES MELLITUS, WITH FAT LAYER EXPOSED (HCC): ICD-10-CM

## 2019-05-15 DIAGNOSIS — L97.412 DIABETIC ULCER OF RIGHT MIDFOOT ASSOCIATED WITH TYPE 1 DIABETES MELLITUS, WITH FAT LAYER EXPOSED (HCC): ICD-10-CM

## 2019-05-15 PROCEDURE — 15275 SKIN SUB GRAFT FACE/NK/HF/G: CPT

## 2019-05-15 ASSESSMENT — PAIN SCALES - GENERAL
PAINLEVEL_OUTOF10: 0
PAINLEVEL_OUTOF10: 0

## 2019-05-15 NOTE — PLAN OF CARE
PuraPly AMTreatment Note    NAME:  Luzma Mckinley  YOB: 1970  MEDICAL RECORD NUMBER:  [id number]  DATE:  5/15/2019    Goal:  Patient will receive safe and proper application of skin substitute. Patient will comply with caring for dressing, and reporting complications. Expiration date checked immediately prior to use. Package intact prior to use and no damage noted. Transport temperature controlled and acceptable. PuraPly AM was removed from protective sterile packaging by provider and applied to prepared ulcer bed. PuraPly AM was hydrated with sterile normal saline per provider. PuraPly AM was applied to Left Medial and Lateral Foot and Right Plantar Foot and affixed with steri-strips by the provider. PuraPly AM was covered with non-adherent ulcer dressing. Applied Hydrogel over non-adherent. Applied dry gauze and/or roll gauze. Patient/caregiver was instructed not to remove dressing and to keep it clean and dry. Pt/family/caregiver was instructed on signs and symptoms of complications to report such as draining through dressing, dressing falling down/slipping, getting wet, or severe pain or tingling. PuraPly AM may be applied a total of 10 times per wound over a 12 week period. Date of first application of PuraPly for this current wound is May 8, 2019 .     Guidelines followed    Electronically signed by Dianne Zazueta RN on 5/15/2019 at 4:52 PM

## 2019-05-15 NOTE — PROGRESS NOTES
Kriss[address]   Progress Note and Procedure Note      Jack Carlson  MEDICAL RECORD NUMBER:  [id number]  AGE: 47 y.o. GENDER: male  : 1970  EPISODE DATE:  5/15/2019    Subjective:     Chief Complaint   Patient presents with    Wound Check     f/u bilat foot wounds         HISTORY of PRESENT ILLNESS HPI     Jack Carlson is a 47 y.o. male who presents today for wound/ulcer evaluation. History of Wound Context: Patient presents with complaining of a wound on his left foot. Patient had a transmetatarsal amputation of the left foot on 2018. Patient's skin flap failed after the surgery with dehiscence of the surgical wound. Patient states home health care is performing dressing changes as ordered. Patient reports he had angiograms of both legs with revascularization of the right leg on 2019 performed by Dr. Winston Correa. Patient had revascularization of the left leg with Dr. Winston Correa on 2019.       Patient also has a wound on the bottom of the right foot of ten weeks duration. The wound started out as a blood blister.       Wound/Ulcer Pain Timing/Severity: none  Quality of pain: N/A  Severity:  0 / 10   Modifying Factors: None  Associated Signs/Symptoms: none    Ulcer Identification:  Ulcer Type: diabetic and non-healing surgical    Contributing Factors: diabetes, poor glucose control, chronic pressure, decreased mobility, obesity and arterial insufficiency    Wound: Wound dehiscence        PAST MEDICAL HISTORY        Diagnosis Date    Angina effort ([address]     Arthritis     CAD (coronary artery disease)     Carotid stenosis     Diabetes mellitus with neurological manifestations, uncontrolled ([address]     Diabetic foot ulcer ([address] 3/13/2019    Diarrhea     DEAN (dyspnea on exertion) 2015    Hyperlipidemia     Hypertension     Obesity     Type II or unspecified type diabetes mellitus without mention of complication, not stated as uncontrolled        PAST SURGICAL HISTORY    Past Surgical History:   Procedure Laterality Date    CARDIAC CATHETERIZATION      CAROTID ENDARTERECTOMY Right 4-9-2015    CHOLECYSTECTOMY      AR OFFICE/OUTPT VISIT,PROCEDURE ONLY Left 10/5/2018    AMPUTATION LEFT GREAT DIGIT performed by Jessica Tong DPM at [place] [address] OFFICE/OUTPT [address] [place] [address] Left 10/22/2018    INCISION AND DRAINAGE LEFT FOOT FIRST AND SECOND RAY AMPUTATION (DIGITS ONE, TWO AND METARSAL ONE AND TWO) performed by Jessica Tong DPM at [place] [address] OFFICE/OUTPT [address] [place] [address] Left 10/26/2018    LEFT FOOT WOUND DEBRIDEMENT WITH PRIMARY CLOSURE performed by Jessica Tong DPM at [place] [address] OFFICE/OUTPT [address] [place], [place] [address]b [facility] Left 11/2/2018    INCISION AND DRAINAGE WITH TRANSMETATARSAL CONVERTED TO LIST MART  AMPUTATION LEFT FOOT performed by Jessica Tong DPM at [address] [place] [address] Pl TOE AMPUTATION Left 10/05/2018    left great toe amputation       FAMILY HISTORY    Family History   Problem Relation Age of Onset    High Blood Pressure Mother        SOCIAL HISTORY    Social History     Tobacco Use    Smoking status: Never Smoker    Smokeless tobacco: Never Used   Substance Use Topics    Alcohol use: No    Drug use: No     Types: Marijuana     Comment: QUIT 4 MONTHS        ALLERGIES    No Known Allergies    MEDICATIONS    Current Outpatient Medications on File Prior to Encounter   Medication Sig Dispense Refill    HUMALOG 100 UNIT/ML injection vial   5    Dulaglutide (TRULICITY) 1.5 HF/0.4IC SOPN Inject 1.5 mg into the skin once a week 12 pen 0    atorvastatin (LIPITOR) 40 MG tablet Take 1 tablet by mouth every morning 30 tablet 0    insulin glargine (BASAGLAR KWIKPEN) 100 UNIT/ML injection pen Inject 15 Units into the skin nightly (Patient taking differently: Inject 30 Units into the skin every morning ) 5 pen 3    clopidogrel (PLAVIX) 75 MG tablet Take 1 tablet by mouth daily 30 tablet 3    metoprolol succinate (TOPROL XL) 25 MG extended release tablet Take 25 mg by mouth daily      vitamin B-12 (CYANOCOBALAMIN) 1000 MCG tablet Take 1,000 mcg by mouth daily  2    ranitidine (ZANTAC) 150 MG tablet Take 150 mg by mouth 2 times daily  1    Multiple Vitamins-Minerals (THERAPEUTIC MULTIVITAMIN-MINERALS) tablet Take 1 tablet by mouth daily      Blood Glucose Monitoring Suppl (FREESTYLE LITE) INEZ 1 Device by Does not apply route 3 times daily (before meals) 1 Device 0    blood glucose test strips (FREESTYLE LITE) strip 1 each by In Vitro route 3 times daily As needed. 100 each 3    FREESTYLE LANCETS MISC 1 each by Does not apply route daily 100 each 3    Insulin Disposable Pump (V-GO 40) KIT by Does not apply route 6 CLICKS DAILY      aspirin (ASPIRIN LOW DOSE) 81 MG EC tablet Take 1 tablet by mouth daily 30 tablet 0    Insulin Pen Needle (B-D UF III MINI PEN NEEDLES) 31G X 5 MM MISC 1 each by Does not apply route 4 times daily (before meals and nightly) 150 each 6    lisinopril (PRINIVIL;ZESTRIL) 40 MG tablet Take 40 mg by mouth every morning        No current facility-administered medications on file prior to encounter. REVIEW OF SYSTEMS    Pertinent items are noted in HPI. Objective:      /76   Pulse 99   Temp 98.4 °F (36.9 °C) (Oral)   Resp 16     Wt Readings from Last 3 Encounters:   05/01/19 255 lb 8.2 oz (115.9 kg)   03/21/19 251 lb (113.9 kg)   03/09/19 258 lb 2.5 oz (117.1 kg)       PHYSICAL EXAM    Patient is alert and oriented x 3, and is in no acute distress.     Vascular: DP weakly palpable bilateral. PT pulse not palpable bilateral but audible on doppler exam. No Pedal hair noted bilateral. No Edema noted to ankles.    Derm:  Skin is warm to warm from proximal leg to distal foot bilateral. Toenails 1-5 on right foot are thickened, yellow and dystrophic.   Neuro:  Protective sensation absent to 10/10 sites bilateral via a 5.07 [place] monofilament.    Musculoskeletal: Muscle strength 5/5 with PF/DF/I and E of both feet. Transmetatarsal amputation of the left foot.     Assessment:        Problem List Items Addressed This Visit     Diabetic foot ulcer ([address]    Non-healing surgical wound - Primary           Procedure Note  Indications:  Based on my examination of this patient's wound(s)/ulcer(s) today, debridement is required to promote healing and evaluate the wound base. Performed by: Tim Zendejas DPM    Consent obtained:  Yes    Time out taken:  Yes    Pain Control: Anesthetic  Anesthetic: None       Debridement: Excisional Debridement    Using curette, #15 blade scalpel and forceps the wound(s)/ulcer(s) was/were sharply debrided down through and including the removal of epidermis, dermis and subcutaneous tissue.         Devitalized Tissue Debrided:  fibrin, biofilm and callus    Pre Debridement Measurements:  Are located in the [place]  Documentation Flow Sheet    Wound/Ulcer #: 2, 3 and 4    Post Debridement Measurements:  Wound/Ulcer Descriptions are Pre Debridement except measurements:    Wound 03/13/19 Foot Right;Plantar #2 - right plantar foot- pt. noted 2/15/2019 (Active)   Wound Image   3/13/2019 10:18 AM   Wound Pressure Stage  3 5/8/2019  2:17 PM   Dressing Status Old drainage 5/15/2019  3:07 PM   Dressing Changed Changed/New 5/15/2019  3:35 PM   Dressing/Treatment Other (comment) 5/15/2019  3:35 PM   Wound Length (cm) 2.9 cm 5/15/2019  3:07 PM   Wound Width (cm) 1.6 cm 5/15/2019  3:07 PM   Wound Depth (cm) 0.1 cm 5/15/2019  3:07 PM   Wound Surface Area (cm^2) 4.64 cm^2 5/15/2019  3:07 PM   Change in Wound Size % (l*w) 39.58 5/15/2019  3:07 PM   Wound Volume (cm^3) 0.46 cm^3 5/15/2019  3:07 PM   Wound Healing % 40 5/15/2019  3:07 PM   Post-Procedure Length (cm) 3 cm 5/15/2019  3:07 PM   Post-Procedure Width (cm) 1.7 cm 5/15/2019  3:07 PM   Post-Procedure Depth (cm) 0.2 cm 5/15/2019  3:07 PM   Post-Procedure Surface Area (cm^2) 5.1 cm^2 5/15/2019  3:07 PM   Post-Procedure Volume (cm^3) 1.02 cm^3 5/15/2019 3:07 PM   Wound Assessment Granulation tissue;Slough 5/15/2019  3:07 PM   Drainage Amount Moderate 5/15/2019  3:07 PM   Drainage Description Serosanguinous 5/15/2019  3:07 PM   Odor None 5/15/2019  3:07 PM   Margins Attached edges 5/15/2019  3:07 PM   Jina-wound Assessment Calloused 5/15/2019  3:07 PM   Non-staged Wound Description Full thickness 5/15/2019  3:07 PM   [place]%Wound Bed 20 5/8/2019  2:17 PM   Red%Wound Bed 80 5/15/2019  3:07 PM   Yellow%Wound Bed 20 5/15/2019  3:07 PM   Black%Wound Bed 10 5/1/2019  2:39 PM   Number of days: 63       Wound 03/20/19 Foot Left;Medial Wound #3 acquired 11/2018 left TMA site (Active)   Wound Image   3/20/2019  2:25 PM   Wound Non-Healing Surgical 5/8/2019  2:17 PM   Dressing Status Old drainage 5/15/2019  3:07 PM   Dressing Changed Changed/New 5/15/2019  3:35 PM   Dressing/Treatment Other (comment) 5/15/2019  3:35 PM   Wound Length (cm) 1.4 cm 5/15/2019  3:07 PM   Wound Width (cm) 1.5 cm 5/15/2019  3:07 PM   Wound Depth (cm) 0.2 cm 5/15/2019  3:07 PM   Wound Surface Area (cm^2) 2.1 cm^2 5/15/2019  3:07 PM   Change in Wound Size % (l*w) 63.54 5/15/2019  3:07 PM   Wound Volume (cm^3) 0.42 cm^3 5/15/2019  3:07 PM   Wound Healing % 93 5/15/2019  3:07 PM   Post-Procedure Length (cm) 1.5 cm 5/15/2019  3:07 PM   Post-Procedure Width (cm) 1.6 cm 5/15/2019  3:07 PM   Post-Procedure Depth (cm) 0.3 cm 5/15/2019  3:07 PM   Post-Procedure Surface Area (cm^2) 2.4 cm^2 5/15/2019  3:07 PM   Post-Procedure Volume (cm^3) 0.72 cm^3 5/15/2019  3:07 PM   Wound Assessment Slough 5/15/2019  3:07 PM   Drainage Amount Small 5/15/2019  3:07 PM   Drainage Description Serosanguinous 5/15/2019  3:07 PM   Odor None 5/15/2019  3:07 PM   Margins Attached edges 5/15/2019  3:07 PM   Jina-wound Assessment Dry 5/15/2019  3:07 PM   Non-staged Wound Description Full thickness 5/15/2019  3:07 PM   [place]%Wound Bed 80 4/24/2019  2:19 PM   Red%Wound Bed 90 5/8/2019  2:17 PM   Yellow%Wound Bed 100 5/15/2019  3:07 PM Black%Wound Bed 10 4/10/2019  2:26 PM   Number of days: 56       Wound 03/20/19 Foot Left;Lateral Wound # 4 acquired 11/2018 left TMA site (Active)   Wound Image   3/20/2019  2:25 PM   Wound Non-Healing Surgical 5/8/2019  2:17 PM   Dressing Status Old drainage 5/15/2019  3:07 PM   Dressing Changed Changed/New 5/15/2019  3:35 PM   Dressing/Treatment Other (comment) 5/15/2019  3:35 PM   Wound Length (cm) 0.4 cm 5/15/2019  3:07 PM   Wound Width (cm) 0.9 cm 5/15/2019  3:07 PM   Wound Depth (cm) 0.1 cm 5/15/2019  3:07 PM   Wound Surface Area (cm^2) 0.36 cm^2 5/15/2019  3:07 PM   Change in Wound Size % (l*w) 96.94 5/15/2019  3:07 PM   Wound Volume (cm^3) 0.04 cm^3 5/15/2019  3:07 PM   Wound Healing % 100 5/15/2019  3:07 PM   Post-Procedure Length (cm) 0.5 cm 5/15/2019  3:07 PM   Post-Procedure Width (cm) 1 cm 5/15/2019  3:07 PM   Post-Procedure Depth (cm) 0.2 cm 5/15/2019  3:07 PM   Post-Procedure Surface Area (cm^2) 0.5 cm^2 5/15/2019  3:07 PM   Post-Procedure Volume (cm^3) 0.1 cm^3 5/15/2019  3:07 PM   Wound Assessment Slough 5/15/2019  3:07 PM   Drainage Amount Small 5/15/2019  3:07 PM   Drainage Description Serosanguinous 5/15/2019  3:07 PM   Odor None 5/15/2019  3:07 PM   Margins Attached edges 5/15/2019  3:07 PM   Jina-wound Assessment Dry 5/15/2019  3:07 PM   Non-staged Wound Description Full thickness 5/15/2019  3:07 PM   [place]%Wound Bed 50 5/8/2019  2:17 PM   Red%Wound Bed 30 5/1/2019  2:39 PM   Yellow%Wound Bed 100 5/15/2019  3:07 PM   Black%Wound Bed 10 4/10/2019  2:26 PM   Number of days: 56          Percent of Wound(s)/Ulcer(s) Debrided: 100%    Total Surface Area Debrided:  8 sq cm     Diabetic/Pressure/Non Pressure Ulcers only:  Ulcer: Diabetic ulcer, fat layer exposed     Estimated Blood Loss:  Minimal    Hemostasis Achieved:  by pressure    Procedural Pain:  0  / 10     Post Procedural Pain:  0 / 10     Response to treatment:  Well tolerated by patient.      Procedure:  Skin Substitute Application    Performed by: Lesli Orellana DPM    Ulcer Type: diabetic    Consent obtained: Yes    Time out taken: Yes    Product Utilized:    PuraPly AM 8 sq/cm and PuraPly AM 4 sq/cm     Fenestrated: Yes    Mesher Utilized: No    Instrument(s) curette, #15 blade scalpel and forceps    Skin Substitute was Applied to Ulcer Number(s):    Ulcer #: 2, 3 and 4      Wound 03/13/19 Foot Right;Plantar #2 - right plantar foot- pt. noted 2/15/2019 (Active)   Wound Image   3/13/2019 10:18 AM   Wound Pressure Stage  3 5/8/2019  2:17 PM   Dressing Status Old drainage 5/15/2019  3:07 PM   Dressing Changed Changed/New 5/15/2019  3:35 PM   Dressing/Treatment Other (comment) 5/15/2019  3:35 PM   Wound Length (cm) 2.9 cm 5/15/2019  3:07 PM   Wound Width (cm) 1.6 cm 5/15/2019  3:07 PM   Wound Depth (cm) 0.1 cm 5/15/2019  3:07 PM   Wound Surface Area (cm^2) 4.64 cm^2 5/15/2019  3:07 PM   Change in Wound Size % (l*w) 39.58 5/15/2019  3:07 PM   Wound Volume (cm^3) 0.46 cm^3 5/15/2019  3:07 PM   Wound Healing % 40 5/15/2019  3:07 PM   Post-Procedure Length (cm) 3 cm 5/15/2019  3:07 PM   Post-Procedure Width (cm) 1.7 cm 5/15/2019  3:07 PM   Post-Procedure Depth (cm) 0.2 cm 5/15/2019  3:07 PM   Post-Procedure Surface Area (cm^2) 5.1 cm^2 5/15/2019  3:07 PM   Post-Procedure Volume (cm^3) 1.02 cm^3 5/15/2019  3:07 PM   Wound Assessment Granulation tissue;Slough 5/15/2019  3:07 PM   Drainage Amount Moderate 5/15/2019  3:07 PM   Drainage Description Serosanguinous 5/15/2019  3:07 PM   Odor None 5/15/2019  3:07 PM   Margins Attached edges 5/15/2019  3:07 PM   Jina-wound Assessment Calloused 5/15/2019  3:07 PM   Non-staged Wound Description Full thickness 5/15/2019  3:07 PM   [place]%Wound Bed 20 5/8/2019  2:17 PM   Red%Wound Bed 80 5/15/2019  3:07 PM   Yellow%Wound Bed 20 5/15/2019  3:07 PM   Black%Wound Bed 10 5/1/2019  2:39 PM   Number of days: 63       Wound 03/20/19 Foot Left;Medial Wound #3 acquired 11/2018 left TMA site (Active)   Wound Image   3/20/2019  2:25 PM   Wound Non-Healing Surgical 5/8/2019  2:17 PM   Dressing Status Old drainage 5/15/2019  3:07 PM   Dressing Changed Changed/New 5/15/2019  3:35 PM   Dressing/Treatment Other (comment) 5/15/2019  3:35 PM   Wound Length (cm) 1.4 cm 5/15/2019  3:07 PM   Wound Width (cm) 1.5 cm 5/15/2019  3:07 PM   Wound Depth (cm) 0.2 cm 5/15/2019  3:07 PM   Wound Surface Area (cm^2) 2.1 cm^2 5/15/2019  3:07 PM   Change in Wound Size % (l*w) 63.54 5/15/2019  3:07 PM   Wound Volume (cm^3) 0.42 cm^3 5/15/2019  3:07 PM   Wound Healing % 93 5/15/2019  3:07 PM   Post-Procedure Length (cm) 1.5 cm 5/15/2019  3:07 PM   Post-Procedure Width (cm) 1.6 cm 5/15/2019  3:07 PM   Post-Procedure Depth (cm) 0.3 cm 5/15/2019  3:07 PM   Post-Procedure Surface Area (cm^2) 2.4 cm^2 5/15/2019  3:07 PM   Post-Procedure Volume (cm^3) 0.72 cm^3 5/15/2019  3:07 PM   Wound Assessment Slough 5/15/2019  3:07 PM   Drainage Amount Small 5/15/2019  3:07 PM   Drainage Description Serosanguinous 5/15/2019  3:07 PM   Odor None 5/15/2019  3:07 PM   Margins Attached edges 5/15/2019  3:07 PM   Jina-wound Assessment Dry 5/15/2019  3:07 PM   Non-staged Wound Description Full thickness 5/15/2019  3:07 PM   [place]%Wound Bed 80 4/24/2019  2:19 PM   Red%Wound Bed 90 5/8/2019  2:17 PM   Yellow%Wound Bed 100 5/15/2019  3:07 PM   Black%Wound Bed 10 4/10/2019  2:26 PM   Number of days: 56       Wound 03/20/19 Foot Left;Lateral Wound # 4 acquired 11/2018 left TMA site (Active)   Wound Image   3/20/2019  2:25 PM   Wound Non-Healing Surgical 5/8/2019  2:17 PM   Dressing Status Old drainage 5/15/2019  3:07 PM   Dressing Changed Changed/New 5/15/2019  3:35 PM   Dressing/Treatment Other (comment) 5/15/2019  3:35 PM   Wound Length (cm) 0.4 cm 5/15/2019  3:07 PM   Wound Width (cm) 0.9 cm 5/15/2019  3:07 PM   Wound Depth (cm) 0.1 cm 5/15/2019  3:07 PM   Wound Surface Area (cm^2) 0.36 cm^2 5/15/2019  3:07 PM   Change in Wound Size % (l*w) 96.94 5/15/2019  3:07 PM Wound Volume (cm^3) 0.04 cm^3 5/15/2019  3:07 PM   Wound Healing % 100 5/15/2019  3:07 PM   Post-Procedure Length (cm) 0.5 cm 5/15/2019  3:07 PM   Post-Procedure Width (cm) 1 cm 5/15/2019  3:07 PM   Post-Procedure Depth (cm) 0.2 cm 5/15/2019  3:07 PM   Post-Procedure Surface Area (cm^2) 0.5 cm^2 5/15/2019  3:07 PM   Post-Procedure Volume (cm^3) 0.1 cm^3 5/15/2019  3:07 PM   Wound Assessment Slough 5/15/2019  3:07 PM   Drainage Amount Small 5/15/2019  3:07 PM   Drainage Description Serosanguinous 5/15/2019  3:07 PM   Odor None 5/15/2019  3:07 PM   Margins Attached edges 5/15/2019  3:07 PM   Jina-wound Assessment Dry 5/15/2019  3:07 PM   Non-staged Wound Description Full thickness 5/15/2019  3:07 PM   [place]%Wound Bed 50 5/8/2019  2:17 PM   Red%Wound Bed 30 5/1/2019  2:39 PM   Yellow%Wound Bed 100 5/15/2019  3:07 PM   Black%Wound Bed 10 4/10/2019  2:26 PM   Number of days: 56       Diabetic/Pressure/Non Pressure Ulcers:  Ulcer:   Diabetic ulcer, fat layer exposed      Total Surface Area of Ulcer(s) Covered 8 sq/cm    Was the Product Layered  No     Amount of Product Applied 8 sq/cm     Amount of Product Wasted 4 sq/cm     Reason for Waste Wound Size smaller then product size      Surgically Fixated: No    Secured With: Steri Strips and Mepitel     Procedural Pain: 0/10     Post Procedural Pain: 0 / 10    Response to Treatment: Well tolerated by patient. Plan:     Treatment Note please see attached Discharge Instructions    Written patient dismissal instructions given to patient and signed by patient or POA.          Discharge Instructions            [facility]   Physician Orders and Discharge Instructions  [place] [address]   [address] [place] [address]  Telephone: [phone]     NAME: [address]:  1970  MEDICAL RECORD NUMBER:  [id number]  DATE:  5/15/2019     Wound Cleansing:   Do not scrub or use excessive force. Cleanse wound prior to applying a clean dressing with:  [x] Normal Saline            [x] Keep Wound Dry in Shower    [] Wound Cleanser   [] Cleanse wound with Mild Soap & Water  [] May Shower at Discharge   [] Other:        Topical Treatments:  Do not apply lotions, creams, or ointments to wound bed unless directed. [] Apply moisturizing lotion to skin surrounding the wound prior to dressing change.  [] Apply antifungal ointment to skin surrounding the wound prior to dressing change. [x] Apply thin film of moisture barrier ointment to skin immediately around wound. [] Other:          Negative Pressure:           Wound Location: Discontinue VAC and return to [facility] HAD A PURAPLY AM       PLACED ON YOUR WOUND TODAY. FOR BEST RESULTS, WE RECOMMEND YOU LIMIT YOUR ACTIVITY FOR THE NEXT WEEK AS MUCH AS POSSIBLE. AVOID LONG PERIODS OF TIME ON YOUR FEET. THIS ALSO INCLUDES ELEVATING YOUR LEGS AND FEET 3-4 TIMES DAILY FOR AT LEAST 20-30 MINUTES ON PILLOWS AND AT NIGHT SO THAT THEY ARE HIGHER THAN THE LEVEL OF YOUR HEART      Electronically signed by Ramiro Abdullahi RN on 5/15/2019 at 3:22 PM       **ATTEMPT TO MINIMIZE ANY WEIGHT BEARING IN RIGHT PLANTAR FOOT BY HEEL WEIGHT BEARING FOR ANY TRANSFERS**        Dressings : Wound Location : RIGHT PLANTAR FOOT and LEFT MEDIAL/LATERAL FOOT  **PURAPLY #2 APPLIED 5/15/19**     Apply PURAPLY, MEPITEL, STERI-STRIPS AND HYDROGEL (APPLIED PER DR. [place]). -DO NOT REMOVED DRESSING BELOW STERI-STRIPS**    COVER AND SECURED WITH GAUZE AND KERLIX (CHANGE GAUZE AND KERLIX ONLY)  CHANGE DRESSING ON Monday AND Friday- PATIENT WILL HAVE CHANGED IN WOUND CARE ON WEDNESDAY          Edema Control:  Apply:  [] Compression Stocking           []Right Leg         []Left Leg              [] Tubigrip          []Right Leg Double Layer          []Left Leg Double Layer                                                  []Right Leg Single Layer           []Left Leg Single Layer              [] SpandaGrip    []Right Leg         []Left Leg                                      []Low compression 5-10 mm/Hg                                                 []Medium compression 10-20 mm/Hg                                      []High compression  20-30 mm/Hg              every morning immediately when getting up should be applied to affected leg(s) from mid foot to knee making sure to cover the heel.  Remove every night before going to bed.              [] Elevate leg(s) above the level of the heart when sitting.                    [] Avoid prolonged standing in one place.        Compression:  Apply:  [] Multilayer Compression Wrap Applied in Clinic        []RightLeg          []Left Leg              [] Multi-layer compression.  Do not get leg(s) with wrap wet.  If wraps become too tight call the center or completely remove the wrap.                 [] Elevate leg(s) above the level of the heart when sitting.                    [] Avoid prolonged standing in one place.     Off-Loading:   [] Off-loading when        [] walking           [] in bed [] sitting  [] Total non-weight bearing  [] Right Leg  [] Left Leg          [x] Assistive Device         [] Walker            [] Cane   [x] Wheelchair      [] Crutches              [] Surgical shoe    [] Podus Boot(s)   [] Foam Boot(s)  [] Roll About              [] Cast Boot       [] CROW Boot  [] Other:                   Dietary:  [] Diet as tolerated:        [x] Calorie Diabetic Diet: [] No Added Salt:  [] Increase Protein:        [] Other:     Activity:  [x] Activity as tolerated:  [] Patient has no activity restrictions     [] Strict Bedrest:            [] Remain off Work:     [] May return to full duty work:                                         [] RCWQXE to work with restrictions:          If you are still having pain after you go home:  [X] Elevate the affected limb.            [X]Use over-the-counter medications you would normally use for pain as permitted by your family doctor.  [X] For persistent pain not relieved by the above interventions, please call your family doctor.                Return Appointment:  [] Wound and dressing supply provider:   [x] ECF or Home Healthcare: [facility]  [] Wound Assessment:  [] Physician or NP scheduled for Wound Assessment:   [x] Return Appointment: [address] Week(s)  [] Ordered tests:      Nurse Case Manger: Sarita  Electronically signed by Saige Fitzgerald RN on 5/15/2019 at 3:23 PM  [address] [place] [address] Information: Should you experience any significant changes in your wound(s) or have questions about your wound care, please contact the [facility] [phone] ZERAYW - THURSDAY 8:30 am - 4:30 pm and Friday 8:30 am - 1:00 pm.  If you need help with your wound outside these hours and cannot wait until we are again available, contact your PCP or go to the hospital emergency room.      PLEASE NOTE: IF YOU ARE UNABLE TO OBTAIN WOUND SUPPLIES, CONTINUE TO USE THE SUPPLIES YOU HAVE AVAILABLE UNTIL YOU ARE ABLE TO [facility].  IT IS MOST IMPORTANT TO KEEP THE WOUND COVERED AT ALL TIMES.     Physician Signature:_______________________     Date: ___________ Time:  ____________                    [] Dr Aditya Forrest    [] Dr Alireza Chapin CNP                 [x] Dr Bakari Sands   [] Dr Nicole Cai                  [] Dr Danie Shane   [] Srinivasan Quan NP                  Electronically signed by Lakisha Jimenez DPM on 5/15/2019 at 4:52 PM

## 2019-05-22 ENCOUNTER — HOSPITAL ENCOUNTER (OUTPATIENT)
Dept: WOUND CARE | Age: 49
Discharge: HOME | End: 2019-05-22
Payer: COMMERCIAL

## 2019-05-22 VITALS
SYSTOLIC BLOOD PRESSURE: 153 MMHG | RESPIRATION RATE: 16 BRPM | TEMPERATURE: 98 F | DIASTOLIC BLOOD PRESSURE: 93 MMHG | HEART RATE: 92 BPM

## 2019-05-22 DIAGNOSIS — L97.412 DIABETIC ULCER OF RIGHT MIDFOOT ASSOCIATED WITH TYPE 2 DIABETES MELLITUS, WITH FAT LAYER EXPOSED (HCC): Primary | ICD-10-CM

## 2019-05-22 DIAGNOSIS — E11.621 DIABETIC ULCER OF RIGHT MIDFOOT ASSOCIATED WITH TYPE 2 DIABETES MELLITUS, WITH FAT LAYER EXPOSED (HCC): Primary | ICD-10-CM

## 2019-05-22 PROCEDURE — 15275 SKIN SUB GRAFT FACE/NK/HF/G: CPT

## 2019-05-22 PROCEDURE — 11042 DBRDMT SUBQ TIS 1ST 20SQCM/<: CPT

## 2019-05-22 ASSESSMENT — PAIN SCALES - GENERAL
PAINLEVEL_OUTOF10: 0
PAINLEVEL_OUTOF10: 0

## 2019-05-22 NOTE — PROGRESS NOTES
[facility] [address]   Progress Note and Procedure Note      Yesenia Colón  MEDICAL RECORD NUMBER:  [id number]  AGE: 47 y.o. GENDER: male  : 1970  EPISODE DATE:  2019    Subjective:     Chief Complaint   Patient presents with    Wound Check     Follow up for wounds on left and right foot         HISTORY of PRESENT ILLNESS HPI     Yesenia Colón is a 47 y.o. male who presents today for wound/ulcer evaluation. History of Wound Context: Patient presents with complaining of a wound on his left foot. Patient had a transmetatarsal amputation of the left foot on 2018. Patient's skin flap failed after the surgery with dehiscence of the surgical wound. Patient states home health care is performing dressing changes as ordered. Patient reports he had angiograms of both legs with revascularization of the right leg on 2019 performed by Dr. Maggie Almanza. Patient had revascularization of the left leg with Dr. Maggie Almanza on 2019.       Patient also has a wound on the bottom of the right foot of eleven weeks duration. The wound started out as a blood blister.       Wound/Ulcer Pain Timing/Severity: none  Quality of pain: N/A  Severity:  0 / 10   Modifying Factors: None  Associated Signs/Symptoms: none    Ulcer Identification:  Ulcer Type: diabetic and non-healing surgical    Contributing Factors: diabetes, poor glucose control, chronic pressure, decreased mobility and arterial insufficiency    Wound: Wound dehiscence        PAST MEDICAL HISTORY        Diagnosis Date    Angina effort ([address]     Arthritis     CAD (coronary artery disease)     Carotid stenosis     Diabetes mellitus with neurological manifestations, uncontrolled ([address]     Diabetic foot ulcer ([address] 3/13/2019    Diarrhea     DEAN (dyspnea on exertion) 2015    Hyperlipidemia     Hypertension     Obesity     Type II or unspecified type diabetes mellitus without mention of complication, not stated as uncontrolled        PAST SURGICAL HISTORY    Past Surgical History:   Procedure Laterality Date    CARDIAC CATHETERIZATION      CAROTID ENDARTERECTOMY Right 4-9-2015    CHOLECYSTECTOMY      DE OFFICE/OUTPT VISIT,PROCEDURE ONLY Left 10/5/2018    AMPUTATION LEFT GREAT DIGIT performed by Deshaun Clinton DPM at [place] [address] OFFICE/OUTPT [address] [place] [address] Left 10/22/2018    INCISION AND DRAINAGE LEFT FOOT FIRST AND SECOND RAY AMPUTATION (DIGITS ONE, TWO AND METARSAL ONE AND TWO) performed by Deshaun Clinton DPM at [place] [address] OFFICE/OUTPT [address] [place] [address] Left 10/26/2018    LEFT FOOT WOUND DEBRIDEMENT WITH PRIMARY CLOSURE performed by Deshaun Clinton DPM at [place] [address] OFFICE/OUTPT [address] [place] [address] Left 11/2/2018    INCISION AND DRAINAGE WITH TRANSMETATARSAL CONVERTED TO [facility]  AMPUTATION LEFT FOOT performed by Deshaun Clinton DPM at [address] [place] [address] Pl TOE AMPUTATION Left 10/05/2018    left great toe amputation       FAMILY HISTORY    Family History   Problem Relation Age of Onset    High Blood Pressure Mother        SOCIAL HISTORY    Social History     Tobacco Use    Smoking status: Never Smoker    Smokeless tobacco: Never Used   Substance Use Topics    Alcohol use: No    Drug use: No     Types: Marijuana     Comment: QUIT 4 MONTHS        ALLERGIES    No Known Allergies    MEDICATIONS    Current Outpatient Medications on File Prior to Encounter   Medication Sig Dispense Refill    HUMALOG 100 UNIT/ML injection vial   5    Dulaglutide (TRULICITY) 1.5 LF/5.6CF SOPN Inject 1.5 mg into the skin once a week 12 pen 0    atorvastatin (LIPITOR) 40 MG tablet Take 1 tablet by mouth every morning 30 tablet 0    insulin glargine (BASAGLAR KWIKPEN) 100 UNIT/ML injection pen Inject 15 Units into the skin nightly (Patient taking differently: Inject 30 Units into the skin every morning ) 5 pen 3    clopidogrel (PLAVIX) 75 MG tablet Take 1 tablet by mouth daily 30 tablet 3    metoprolol succinate (TOPROL XL) 25 MG extended release tablet Take 25 mg by mouth daily      vitamin B-12 (CYANOCOBALAMIN) 1000 MCG tablet Take 1,000 mcg by mouth daily  2    ranitidine (ZANTAC) 150 MG tablet Take 150 mg by mouth 2 times daily  1    Multiple Vitamins-Minerals (THERAPEUTIC MULTIVITAMIN-MINERALS) tablet Take 1 tablet by mouth daily      Blood Glucose Monitoring Suppl (FREESTYLE LITE) INEZ 1 Device by Does not apply route 3 times daily (before meals) 1 Device 0    blood glucose test strips (FREESTYLE LITE) strip 1 each by In Vitro route 3 times daily As needed. 100 each 3    FREESTYLE LANCETS MISC 1 each by Does not apply route daily 100 each 3    Insulin Disposable Pump (V-GO 40) KIT by Does not apply route 6 CLICKS DAILY      aspirin (ASPIRIN LOW DOSE) 81 MG EC tablet Take 1 tablet by mouth daily 30 tablet 0    Insulin Pen Needle (B-D UF III MINI PEN NEEDLES) 31G X 5 MM MISC 1 each by Does not apply route 4 times daily (before meals and nightly) 150 each 6    lisinopril (PRINIVIL;ZESTRIL) 40 MG tablet Take 40 mg by mouth every morning        No current facility-administered medications on file prior to encounter. REVIEW OF SYSTEMS    Pertinent items are noted in HPI. Objective:      BP (!) 153/93 Comment: patient states he did not take meds today  Pulse 92   Temp 98 °F (36.7 °C) (Oral)   Resp 16     Wt Readings from Last 3 Encounters:   05/01/19 255 lb 8.2 oz (115.9 kg)   03/21/19 251 lb (113.9 kg)   03/09/19 258 lb 2.5 oz (117.1 kg)       PHYSICAL EXAM    Patient is alert and oriented x 3, and is in no acute distress.     Vascular: DP weakly palpable bilateral. PT pulse not palpable bilateral but audible on doppler exam. No Pedal hair noted bilateral. No Edema noted to ankles.    Derm:  Skin is warm to warm from proximal leg to distal foot bilateral. Toenails 1-5 on right foot are thickened, yellow and dystrophic. Neuro:  Protective sensation absent to 10/10 sites bilateral via a 5.07 [place] monofilament.    Musculoskeletal: Muscle strength 5/5 with PF/DF/I and E of both feet. Transmetatarsal amputation of the left foot.     Assessment:        Problem List Items Addressed This Visit     Diabetic foot ulcer ([address] - Primary           Procedure Note  Indications:  Based on my examination of this patient's wound(s)/ulcer(s) today, debridement is required to promote healing and evaluate the wound base. Performed by: Ernesto Suárez DPM    Consent obtained:  Yes    Time out taken:  Yes    Pain Control: Anesthetic  Anesthetic: None       Debridement: Excisional Debridement    Using curette, #15 blade scalpel and forceps the wound(s)/ulcer(s) was/were sharply debrided down through and including the removal of epidermis, dermis and subcutaneous tissue.         Devitalized Tissue Debrided:  fibrin, biofilm and callus    Pre Debridement Measurements:  Are located in the [place]  Documentation Flow Sheet    Wound/Ulcer #: 2, 3 and 4    Post Debridement Measurements:  Wound/Ulcer Descriptions are Pre Debridement except measurements:    Wound 03/13/19 Foot Right;Plantar #2 - right plantar foot- pt. noted 2/15/2019 (Active)   Wound Image   3/13/2019 10:18 AM   Wound Pressure Stage  3 5/22/2019  2:41 PM   Dressing Status Old drainage 5/22/2019  2:41 PM   Dressing Changed Changed/New 5/22/2019  3:35 PM   Dressing/Treatment Other (comment) 5/22/2019  3:35 PM   Wound Length (cm) 2.6 cm 5/22/2019  2:41 PM   Wound Width (cm) 1.9 cm 5/22/2019  2:41 PM   Wound Depth (cm) 0.1 cm 5/22/2019  2:41 PM   Wound Surface Area (cm^2) 4.94 cm^2 5/22/2019  2:41 PM   Change in Wound Size % (l*w) 35.68 5/22/2019  2:41 PM   Wound Volume (cm^3) 0.49 cm^3 5/22/2019  2:41 PM   Wound Healing % 36 5/22/2019  2:41 PM   Post-Procedure Length (cm) 2.7 cm 5/22/2019  3:13 PM   Post-Procedure Width (cm) 2 cm 5/22/2019  3:13 PM   Post-Procedure Depth (cm) 0.2 cm 5/22/2019  3:13 PM   Post-Procedure Surface Area (cm^2) 5.4 cm^2 5/22/2019  3:13 PM   Post-Procedure [place]%Wound Bed 80 4/24/2019  2:19 PM   Red%Wound Bed 90 5/8/2019  2:17 PM   Yellow%Wound Bed 100 5/15/2019  3:07 PM   Black%Wound Bed 100 5/22/2019  2:41 PM   Number of days: 63       Wound 03/20/19 Foot Left;Lateral Wound # 4 acquired 11/2018 left TMA site (Active)   Wound Image   3/20/2019  2:25 PM   Wound Non-Healing Surgical 5/22/2019  2:41 PM   Dressing Status Old drainage 5/15/2019  3:07 PM   Dressing Changed Changed/New 5/22/2019  3:35 PM   Dressing/Treatment Other (comment) 5/22/2019  3:35 PM   Wound Length (cm) 0.3 cm 5/22/2019  2:41 PM   Wound Width (cm) 0.3 cm 5/22/2019  2:41 PM   Wound Depth (cm) 0.1 cm 5/22/2019  2:41 PM   Wound Surface Area (cm^2) 0.09 cm^2 5/22/2019  2:41 PM   Change in Wound Size % (l*w) 99.24 5/22/2019  2:41 PM   Wound Volume (cm^3) 0.01 cm^3 5/22/2019  2:41 PM   Wound Healing % 100 5/22/2019  2:41 PM   Post-Procedure Length (cm) 0.4 cm 5/22/2019  3:13 PM   Post-Procedure Width (cm) 0.4 cm 5/22/2019  3:13 PM   Post-Procedure Depth (cm) 0.2 cm 5/22/2019  3:13 PM   Post-Procedure Surface Area (cm^2) 0.16 cm^2 5/22/2019  3:13 PM   Post-Procedure Volume (cm^3) 0.03 cm^3 5/22/2019  3:13 PM   Wound Assessment Slough 5/22/2019  2:41 PM   Drainage Amount Scant 5/22/2019  2:41 PM   Drainage Description Purulent;Yellow 5/22/2019  2:41 PM   Odor None 5/22/2019  2:41 PM   Margins Attached edges 5/22/2019  2:41 PM   Jina-wound Assessment Dry 5/22/2019  2:41 PM   Non-staged Wound Description Full thickness 5/22/2019  2:41 PM   [place]%Wound Bed 50 5/8/2019  2:17 PM   Red%Wound Bed 30 5/1/2019  2:39 PM   Yellow%Wound Bed 100 5/22/2019  2:41 PM   Black%Wound Bed 10 4/10/2019  2:26 PM   Number of days: 63          Percent of Wound(s)/Ulcer(s) Debrided: 100%    Total Surface Area Debrided:  11.16 sq cm     Diabetic/Pressure/Non Pressure Ulcers only:  Ulcer: Diabetic ulcer, fat layer exposed     Estimated Blood Loss:  Minimal    Hemostasis Achieved:  by pressure    Procedural Pain:  0  / 10     Post Procedural Pain:  0 / 10     Response to treatment:  Well tolerated by patient. Procedure:  Skin Substitute Application    Performed by: Ayo Jones DPM    Ulcer Type: diabetic    Consent obtained: Yes    Time out taken: Yes    Product Utilized:    PuraPly AM 8 sq/cm     Fenestrated: Yes    M[place]her Utilized: No    Instrument(s) curette, #15 blade scalpel and forceps    Skin Substitute was Applied to Ulcer Number(s):    Ulcer #: 2, 3 and 4      Wound 03/13/19 Foot Right;Plantar #2 - right plantar foot- pt. noted 2/15/2019 (Active)   Wound Image   3/13/2019 10:18 AM   Wound Pressure Stage  3 5/22/2019  2:41 PM   Dressing Status Old drainage 5/22/2019  2:41 PM   Dressing Changed Changed/New 5/22/2019  3:35 PM   Dressing/Treatment Other (comment) 5/22/2019  3:35 PM   Wound Length (cm) 2.6 cm 5/22/2019  2:41 PM   Wound Width (cm) 1.9 cm 5/22/2019  2:41 PM   Wound Depth (cm) 0.1 cm 5/22/2019  2:41 PM   Wound Surface Area (cm^2) 4.94 cm^2 5/22/2019  2:41 PM   Change in Wound Size % (l*w) 35.68 5/22/2019  2:41 PM   Wound Volume (cm^3) 0.49 cm^3 5/22/2019  2:41 PM   Wound Healing % 36 5/22/2019  2:41 PM   Post-Procedure Length (cm) 2.7 cm 5/22/2019  3:13 PM   Post-Procedure Width (cm) 2 cm 5/22/2019  3:13 PM   Post-Procedure Depth (cm) 0.2 cm 5/22/2019  3:13 PM   Post-Procedure Surface Area (cm^2) 5.4 cm^2 5/22/2019  3:13 PM   Post-Procedure Volume (cm^3) 1.08 cm^3 5/22/2019  3:13 PM   Wound Assessment Granulation tissue;Pink;Slough; Yellow 5/22/2019  2:41 PM   Drainage Amount Moderate 5/22/2019  2:41 PM   Drainage Description Serosanguinous;Brown 5/22/2019  2:41 PM   Odor None 5/22/2019  2:41 PM   Margins Attached edges 5/22/2019  2:41 PM   Jina-wound Assessment Calloused; Maceration;Brown; White 5/22/2019  2:41 PM   Non-staged Wound Description Full thickness 5/22/2019  2:41 PM   [place]%Wound Bed 40 5/22/2019  2:41 PM   Red%Wound Bed 80 5/15/2019  3:07 PM   Yellow%Wound Bed 60 5/22/2019  2:41 PM   Black%Wound Bed 10 5/1/2019  2:39 PM   Number of days: 70       Wound 03/20/19 Foot Left;Medial Wound #3 acquired 11/2018 left TMA site (Active)   Wound Image   3/20/2019  2:25 PM   Wound Non-Healing Surgical 5/22/2019  2:41 PM   Dressing Status Intact; Old drainage 5/22/2019  2:41 PM   Dressing Changed Changed/New 5/22/2019  3:35 PM   Dressing/Treatment Other (comment) 5/22/2019  3:35 PM   Wound Length (cm) 1.5 cm 5/22/2019  2:41 PM   Wound Width (cm) 3.4 cm 5/22/2019  2:41 PM   Wound Depth (cm) 0.1 cm 5/22/2019  2:41 PM   Wound Surface Area (cm^2) 5.1 cm^2 5/22/2019  2:41 PM   Change in Wound Size % (l*w) 11.46 5/22/2019  2:41 PM   Wound Volume (cm^3) 0.51 cm^3 5/22/2019  2:41 PM   Wound Healing % 91 5/22/2019  2:41 PM   Post-Procedure Length (cm) 1.6 cm 5/22/2019  3:13 PM   Post-Procedure Width (cm) 3.5 cm 5/22/2019  3:13 PM   Post-Procedure Depth (cm) 0.1 cm 5/22/2019  3:13 PM   Post-Procedure Surface Area (cm^2) 5.6 cm^2 5/22/2019  3:13 PM   Post-Procedure Volume (cm^3) 0.56 cm^3 5/22/2019  3:13 PM   Wound Assessment Slough; Yellow 5/22/2019  2:41 PM   Drainage Amount Small 5/22/2019  2:41 PM   Drainage Description Serosanguinous;Brown 5/22/2019  2:41 PM   Odor None 5/22/2019  2:41 PM   Margins Undefined edges 5/22/2019  2:41 PM   Jina-wound Assessment Dark edges;Calloused 5/22/2019  2:41 PM   Non-staged Wound Description Full thickness 5/22/2019  2:41 PM   [place]%Wound Bed 80 4/24/2019  2:19 PM   Red%Wound Bed 90 5/8/2019  2:17 PM   Yellow%Wound Bed 100 5/15/2019  3:07 PM   Black%Wound Bed 100 5/22/2019  2:41 PM   Number of days: 63       Wound 03/20/19 Foot Left;Lateral Wound # 4 acquired 11/2018 left TMA site (Active)   Wound Image   3/20/2019  2:25 PM   Wound Non-Healing Surgical 5/22/2019  2:41 PM   Dressing Status Old drainage 5/15/2019  3:07 PM   Dressing Changed Changed/New 5/22/2019  3:35 PM   Dressing/Treatment Other (comment) 5/22/2019  3:35 PM   Wound Length (cm) 0.3 cm 5/22/2019  2:41 PM   Wound Width (cm) 0.3 cm 5/22/2019  2:41 PM   Wound Depth (cm) 0.1 cm 5/22/2019  2:41 PM   Wound Surface Area (cm^2) 0.09 cm^2 5/22/2019  2:41 PM   Change in Wound Size % (l*w) 99.24 5/22/2019  2:41 PM   Wound Volume (cm^3) 0.01 cm^3 5/22/2019  2:41 PM   Wound Healing % 100 5/22/2019  2:41 PM   Post-Procedure Length (cm) 0.4 cm 5/22/2019  3:13 PM   Post-Procedure Width (cm) 0.4 cm 5/22/2019  3:13 PM   Post-Procedure Depth (cm) 0.2 cm 5/22/2019  3:13 PM   Post-Procedure Surface Area (cm^2) 0.16 cm^2 5/22/2019  3:13 PM   Post-Procedure Volume (cm^3) 0.03 cm^3 5/22/2019  3:13 PM   Wound Assessment Slough 5/22/2019  2:41 PM   Drainage Amount Scant 5/22/2019  2:41 PM   Drainage Description Purulent;Yellow 5/22/2019  2:41 PM   Odor None 5/22/2019  2:41 PM   Margins Attached edges 5/22/2019  2:41 PM   Jina-wound Assessment Dry 5/22/2019  2:41 PM   Non-staged Wound Description Full thickness 5/22/2019  2:41 PM   [place]%Wound Bed 50 5/8/2019  2:17 PM   Red%Wound Bed 30 5/1/2019  2:39 PM   Yellow%Wound Bed 100 5/22/2019  2:41 PM   Black%Wound Bed 10 4/10/2019  2:26 PM   Number of days: 63       Diabetic/Pressure/Non Pressure Ulcers:  Ulcer:   Diabetic ulcer, fat layer exposed      Total Surface Area of Ulcer(s) Covered 8 sq/cm    Was the Product Layered  Yes     Amount of Product Applied 8 sq/cm     Amount of Product Wasted 0 sq/cm     Reason for Waste N/A      Surgically Fixated: No    Secured With: Steri Strips and Mepitel     Procedural Pain: 0/10     Post Procedural Pain: 0 / 10    Response to Treatment: Well tolerated by patient. Plan:     Treatment Note please see attached Discharge Instructions    Written patient dismissal instructions given to patient and signed by patient or POA.          Discharge Instructions       [facility]   Physician Orders and Discharge Instructions  [facility]  [address] [facility] [address]  Telephone: [phone]      FAX [phone]     NAME: Gabriele Montgomery  DATE OF BIRTH:  1970  MEDICAL RECORD NUMBER:  [id number]  DATE:  5/22/2019     Wound Cleansing:   Do not scrub or use excessive force. Cleanse wound prior to applying a clean dressing with:  [x] Normal Saline            [x] Keep Wound Dry in Shower    [] Wound Cleanser   [] Cleanse wound with Mild Soap & Water  [] May Shower at Discharge   [] Other:        Topical Treatments:  Do not apply lotions, creams, or ointments to wound bed unless directed. [] Apply moisturizing lotion to skin surrounding the wound prior to dressing change.  [] Apply antifungal ointment to skin surrounding the wound prior to dressing change. [x] Apply thin film of moisture barrier ointment to skin immediately around wound. [] Other:          Negative Pressure:           Wound Location: Discontinue VAC and return to [facility] HAD A PURAPLY AM       PLACED ON YOUR WOUND TODAY. FOR BEST RESULTS, WE RECOMMEND YOU LIMIT YOUR ACTIVITY FOR THE NEXT WEEK AS MUCH AS POSSIBLE. AVOID LONG PERIODS OF TIME ON YOUR FEET. THIS ALSO INCLUDES ELEVATING YOUR LEGS AND FEET 3-4 TIMES DAILY FOR AT LEAST 20-30 MINUTES ON PILLOWS AND AT NIGHT SO THAT THEY ARE HIGHER THAN THE LEVEL OF YOUR HEART      Electronically signed by Moises Cole RN on 5/22/2019 at 3:25 PM        **ATTEMPT TO MINIMIZE ANY WEIGHT BEARING IN RIGHT PLANTAR FOOT BY HEEL WEIGHT BEARING FOR ANY TRANSFERS**        Dressings : Wound Location : RIGHT PLANTAR FOOT and LEFT MEDIAL/LATERAL FOOT  **PURAPLY #3 APPLIED 5/22/19**     Apply PURAPLY, MEPITEL, STERI-STRIPS AND HYDROGEL (APPLIED PER DR. Steele). -DO NOT REMOVED DRESSING BELOW STERI-STRIPS**    COVER AND SECURED WITH GAUZE AND KERLIX (CHANGE GAUZE AND KERLIX ONLY)  CHANGE DRESSING ON Monday AND Friday- PATIENT WILL HAVE CHANGED IN WOUND CARE ON WEDNESDAY          Edema Control:  Apply:  [] Compression Stocking           []Right Leg         []Left Leg              [] Tubigrip          []Right Leg Double Layer          []Left Leg Double Layer                                                  []Right Leg Single Layer           []Left Leg Single Layer              [] SpandaGrip    []Right Leg         []Left Leg                                      []Low compression 5-10 mm/Hg                                                 []Medium compression 10-20 mm/Hg                                      []High compression  20-30 mm/Hg              every morning immediately when getting up should be applied to affected leg(s) from mid foot to knee making sure to cover the heel.  Remove every night before going to bed.              [] Elevate leg(s) above the level of the heart when sitting.                    [] Avoid prolonged standing in one place.        Compression:  Apply:  [] Multilayer Compression Wrap Applied in Clinic        []RightLeg          []Left Leg              [] Multi-layer compression.  Do not get leg(s) with wrap wet.  If wraps become too tight call the center or completely remove the wrap.                 [] Elevate leg(s) above the level of the heart when sitting.                    [] Avoid prolonged standing in one place.     Off-Loading:   [] Off-loading when        [] walking           [] in bed [] sitting  [] Total non-weight bearing  [] Right Leg  [] Left Leg          [x] Assistive Device         [] Walker            [] Cane   [x] Wheelchair      [] Crutches              [] Surgical shoe    [] Podus Boot(s)   [] Foam Boot(s)  [] Roll About              [] Cast Boot       [] CROW Boot  [] Other:                   Dietary:  [] Diet as tolerated:        [x] Calorie Diabetic Diet: [] No Added Salt:  [] Increase Protein:        [] Other:     Activity:  [x] Activity as tolerated:  [] Patient has no activity restrictions     [] Strict Bedrest:            [] Remain off Work:     [] May return to full duty work:                                         [] [id number] to work with restrictions:          If you are still having pain after you go home:  [X] Elevate the affected limb.            [X]Use over-the-counter medications you would normally use for pain as permitted by your family doctor.  [X] For persistent pain not relieved by the above interventions, please call your family doctor.                Return Appointment:  [] Wound and dressing supply provider:   [x] ECF or Home Healthcare: [facility]  [] Wound Assessment:  [] Physician or NP scheduled for Wound Assessment:   [x] Return Appointment: [address] [place] [address](s)  [] Ordered tests:      Nurse Case Manger: Sarita  Electronically signed by Hamilton Echeverria RN on 5/22/2019 at 3:25 PM  [address] [place] [address] Information: Should you experience any significant changes in your wound(s) or have questions about your wound care, please contact the [facility] [phone] RWYLGK - THURSDAY 8:30 am - 4:30 pm and Friday 8:30 am - 1:00 pm.  If you need help with your wound outside these hours and cannot wait until we are again available, contact your PCP or go to the hospital emergency room.      PLEASE NOTE: IF YOU ARE UNABLE TO OBTAIN WOUND SUPPLIES, CONTINUE TO USE THE SUPPLIES YOU HAVE AVAILABLE UNTIL YOU ARE ABLE TO [facility].  IT IS MOST IMPORTANT TO KEEP THE WOUND COVERED AT ALL TIMES.     Physician Signature:_______________________     Date: ___________ Time:  ____________                    [] Dr Rich Zhao    [] Dr Luis M Anguiano CNP                 [x] Dr Randi Drew   [] Dr Colette Dacosta                  [] Dr Jessica Baird   [] Srinivasan Tyson NP                Electronically signed by Jeremy Thurman DPM on 5/22/2019 at 4:57 PM

## 2019-05-22 NOTE — PLAN OF CARE
PuraPly AMTreatment Note    NAME:  Cristal Mackay  YOB: 1970  MEDICAL RECORD NUMBER:  [id number]  DATE:  5/22/2019    Goal:  Patient will receive safe and proper application of skin substitute. Patient will comply with caring for dressing, and reporting complications. Expiration date checked immediately prior to use. Package intact prior to use and no damage noted. Transport temperature controlled and acceptable. PuraPly AM was removed from protective sterile packaging by provider and applied to prepared ulcer bed. PuraPly AM was hydrated with sterile normal saline per provider. PuraPly AM was applied to Left Medial and Lateral Foot Wounds and Right Plantar and affixed with steri-strips by the provider. PuraPly AM was covered with non-adherent ulcer dressing. Applied Hydrogel over non-adherent. Applied dry gauze and/or roll gauze. Patient/caregiver was instructed not to remove dressing and to keep it clean and dry. Pt/family/caregiver was instructed on signs and symptoms of complications to report such as draining through dressing, dressing falling down/slipping, getting wet, or severe pain or tingling. PuraPly AM may be applied a total of 10 times per wound over a 12 week period. Date of first application of PuraPly for this current wound is May 8, 2019 .     Guidelines followed    Electronically signed by Ethan Ivory RN on 5/22/2019 at 5:14 PM

## 2019-05-29 ENCOUNTER — HOSPITAL ENCOUNTER (OUTPATIENT)
Dept: WOUND CARE | Age: 49
Discharge: HOME | End: 2019-05-29
Payer: COMMERCIAL

## 2019-05-29 VITALS
TEMPERATURE: 97.3 F | RESPIRATION RATE: 16 BRPM | HEART RATE: 88 BPM | DIASTOLIC BLOOD PRESSURE: 94 MMHG | SYSTOLIC BLOOD PRESSURE: 151 MMHG

## 2019-05-29 DIAGNOSIS — L97.412 DIABETIC ULCER OF RIGHT MIDFOOT ASSOCIATED WITH TYPE 2 DIABETES MELLITUS, WITH FAT LAYER EXPOSED (HCC): ICD-10-CM

## 2019-05-29 DIAGNOSIS — E11.621 DIABETIC ULCER OF RIGHT MIDFOOT ASSOCIATED WITH TYPE 2 DIABETES MELLITUS, WITH FAT LAYER EXPOSED (HCC): ICD-10-CM

## 2019-05-29 DIAGNOSIS — T81.89XD NON-HEALING SURGICAL WOUND, SUBSEQUENT ENCOUNTER: ICD-10-CM

## 2019-05-29 PROCEDURE — 15275 SKIN SUB GRAFT FACE/NK/HF/G: CPT

## 2019-05-29 RX ORDER — LIDOCAINE HYDROCHLORIDE 40 MG/ML
SOLUTION TOPICAL PRN
Status: DISCONTINUED | OUTPATIENT
Start: 2019-05-29 | End: 2019-05-30 | Stop reason: HOSPADM

## 2019-05-29 ASSESSMENT — PAIN SCALES - GENERAL
PAINLEVEL_OUTOF10: 0
PAINLEVEL_OUTOF10: 0

## 2019-05-29 NOTE — PROGRESS NOTES
Dermagraft Treatment Note    NAME:  Demetrio Monroe  YOB: 1970  MEDICAL RECORD NUMBER:  [id number]  DATE:  5/29/2019    Goal:  Patient will receive safe and proper application of skin substitute. Patient will comply with caring for dressing, offloading and reporting complications. Expiration date of Dermagraft checked immediately prior to use. Package intact prior to use and no damage noted. Transport temperature controlled and acceptable. Dermagraft was prepared for application by removing from box and within 1 minute placing in thaw tub at a temperature of 34 to 37 degrees celsius until ice crystals were no longer present but no longer than 3 minutes. Dermagraft was rinsed 3 times in room temperature normal saline for 5 seconds each time. A 4th saline rinse was left on the Dermagraft until the physician was ready to apply it within 30 minutes of thawing. Dermagraft was applied to right plantar & left medial foot and affixed with steri-strips by the provider. Dermagraft was covered with non-adherent dressing. Guaze, kerlix was applied on top of non-adherent dressing. Patient/caregiver was instructed not to remove dressing and to keep it clean and dry. Pt/family/caregiver was instructed on signs and symptoms of complications to report such as draining through, falling down/slipping, getting wet, or severe pain or tingling in toes   Pt/family/caregiver was instructed on need for offloading and elevation of affected extremity and on use of prescribed offloading device.  Dermagraft may be applied a total of 8 times per wound over a 12 week period. Additionally Dermagraft may only be used every 12 months per wound. Date of first application of Dermagraft for this current wound is May 29, 2019.                 Guidelines followed  Dermagraft 24 steps followed    Electronically signed by Gorge Marquez RN on 5/29/2019 at 3:19 PM

## 2019-05-29 NOTE — PROGRESS NOTES
feet. Transmetatarsal amputation of the left foot.     Assessment:        Problem List Items Addressed This Visit     Diabetic foot ulcer ([address]    Non-healing surgical wound           Procedure Note  Indications:  Based on my examination of this patient's wound(s)/ulcer(s) today, debridement is required to promote healing and evaluate the wound base. Performed by: Gracie Slaughter DPM    Consent obtained:  Yes    Time out taken:  Yes    Pain Control: Anesthetic  Anesthetic: 4% Lidocaine Liquid Topical(2.5ml)       Debridement: Excisional Debridement    Using curette, #15 blade scalpel and forceps the wound(s)/ulcer(s) was/were sharply debrided down through and including the removal of epidermis, dermis and subcutaneous tissue. Devitalized Tissue Debrided:  fibrin, biofilm and callus    Pre Debridement Measurements:  Are located in the [place]  Documentation Flow Sheet    Wound/Ulcer #: 2 and 3    Post Debridement Measurements:  Wound/Ulcer Descriptions are Pre Debridement except measurements:    Wound 03/13/19 Foot Right;Plantar #2 - right plantar foot- pt. noted 2/15/2019 (Active)   Wound Image   3/13/2019 10:18 AM   Wound Pressure Stage  3 5/22/2019  2:41 PM   Dressing Status Intact; Old drainage 5/29/2019  2:21 PM   Dressing Changed Changed/New 5/29/2019  2:47 PM   Dressing/Treatment Other (comment) 5/22/2019  3:35 PM   Wound Length (cm) 2.6 cm 5/29/2019  2:21 PM   Wound Width (cm) 1.7 cm 5/29/2019  2:21 PM   Wound Depth (cm) 0.1 cm 5/29/2019  2:21 PM   Wound Surface Area (cm^2) 4.42 cm^2 5/29/2019  2:21 PM   Change in Wound Size % (l*w) 42.45 5/29/2019  2:21 PM   Wound Volume (cm^3) 0.44 cm^3 5/29/2019  2:21 PM   Wound Healing % 43 5/29/2019  2:21 PM   Post-Procedure Length (cm) 2.7 cm 5/29/2019  2:47 PM   Post-Procedure Width (cm) 1.8 cm 5/29/2019  2:47 PM   Post-Procedure Depth (cm) 0.2 cm 5/29/2019  2:47 PM   Post-Procedure Surface Area (cm^2) 4.86 cm^2 5/29/2019  2:47 PM   Post-Procedure Volume Skin Substitute Application    Performed by: Dudley Ward DPM    Ulcer Type: diabetic    Consent obtained: Yes    Time out taken: Yes    Product Utilized:    Dermagraft 38 sq/cm     Fenestrated: No    Mesher Utilized: No    Instrument(s) curette, #15 blade scalpel and forceps    Skin Substitute was Applied to Ulcer Number(s):    Ulcer #: 2 and 3      Wound 03/13/19 Foot Right;Plantar #2 - right plantar foot- pt. noted 2/15/2019 (Active)   Wound Image   3/13/2019 10:18 AM   Wound Pressure Stage  3 5/22/2019  2:41 PM   Dressing Status Intact; Old drainage 5/29/2019  2:21 PM   Dressing Changed Changed/New 5/29/2019  2:47 PM   Dressing/Treatment Other (comment) 5/22/2019  3:35 PM   Wound Length (cm) 2.6 cm 5/29/2019  2:21 PM   Wound Width (cm) 1.7 cm 5/29/2019  2:21 PM   Wound Depth (cm) 0.1 cm 5/29/2019  2:21 PM   Wound Surface Area (cm^2) 4.42 cm^2 5/29/2019  2:21 PM   Change in Wound Size % (l*w) 42.45 5/29/2019  2:21 PM   Wound Volume (cm^3) 0.44 cm^3 5/29/2019  2:21 PM   Wound Healing % 43 5/29/2019  2:21 PM   Post-Procedure Length (cm) 2.7 cm 5/29/2019  2:47 PM   Post-Procedure Width (cm) 1.8 cm 5/29/2019  2:47 PM   Post-Procedure Depth (cm) 0.2 cm 5/29/2019  2:47 PM   Post-Procedure Surface Area (cm^2) 4.86 cm^2 5/29/2019  2:47 PM   Post-Procedure Volume (cm^3) 0.97 cm^3 5/29/2019  2:47 PM   Wound Assessment Granulation tissue;Pink;Yellow 5/29/2019  2:21 PM   Drainage Amount Scant 5/29/2019  2:21 PM   Drainage Description Brown;Yellow 5/29/2019  2:21 PM   Odor None 5/29/2019  2:21 PM   Margins Attached edges 5/29/2019  2:21 PM   Jina-wound Assessment Calloused 5/29/2019  2:21 PM   Non-staged Wound Description Full thickness 5/29/2019  2:21 PM   [place]%Wound Bed 50 5/29/2019  2:21 PM   Red%Wound Bed 80 5/15/2019  3:07 PM   Yellow%Wound Bed 50 5/29/2019  2:21 PM   Black%Wound Bed 10 5/1/2019  2:39 PM   Number of days: 77       Wound 03/20/19 Foot Left;Medial Wound #3 acquired 11/2018 left TMA site (Active)   Wound Image   3/20/2019  2:25 PM   Wound Non-Healing Surgical 5/22/2019  2:41 PM   Dressing Status Intact; Old drainage 5/29/2019  2:21 PM   Dressing Changed Changed/New 5/29/2019  2:47 PM   Dressing/Treatment Other (comment) 5/22/2019  3:35 PM   Wound Length (cm) 1.7 cm 5/29/2019  2:21 PM   Wound Width (cm) 2.2 cm 5/29/2019  2:21 PM   Wound Depth (cm) 0.1 cm 5/29/2019  2:21 PM   Wound Surface Area (cm^2) 3.74 cm^2 5/29/2019  2:21 PM   Change in Wound Size % (l*w) 35.07 5/29/2019  2:21 PM   Wound Volume (cm^3) 0.37 cm^3 5/29/2019  2:21 PM   Wound Healing % 94 5/29/2019  2:21 PM   Post-Procedure Length (cm) 1.8 cm 5/29/2019  2:47 PM   Post-Procedure Width (cm) 2.3 cm 5/29/2019  2:47 PM   Post-Procedure Depth (cm) 0.2 cm 5/29/2019  2:47 PM   Post-Procedure Surface Area (cm^2) 4.14 cm^2 5/29/2019  2:47 PM   Post-Procedure Volume (cm^3) 0.83 cm^3 5/29/2019  2:47 PM   Wound Assessment Pink;Yellow;Granulation tissue 5/29/2019  2:21 PM   Drainage Amount Scant 5/29/2019  2:21 PM   Drainage Description Serosanguinous 5/29/2019  2:21 PM   Odor None 5/29/2019  2:21 PM   Margins Attached edges 5/29/2019  2:21 PM   Jina-wound Assessment Dry 5/29/2019  2:21 PM   Non-staged Wound Description Full thickness 5/29/2019  2:21 PM   [place]%Wound Bed 30 5/29/2019  2:21 PM   Red%Wound Bed 90 5/8/2019  2:17 PM   Yellow%Wound Bed 70 5/29/2019  2:21 PM   Black%Wound Bed 100 5/22/2019  2:41 PM   Number of days: 70       Wound 03/20/19 Foot Left;Lateral Wound # 4 acquired 11/2018 left TMA site (Active)   Wound Image   5/29/2019  2:21 PM   Wound Non-Healing Surgical 5/22/2019  2:41 PM   Dressing Status Intact 5/29/2019  2:21 PM   Dressing Changed Changed/New 5/22/2019  3:35 PM   Dressing/Treatment Other (comment) 5/22/2019  3:35 PM   Wound Length (cm) 0 cm 5/29/2019  2:21 PM   Wound Width (cm) 0 cm 5/29/2019  2:21 PM   Wound Depth (cm) 0 cm 5/29/2019  2:21 PM   Wound Surface Area (cm^2) 0 cm^2 5/29/2019  2:21 PM   Change in Wound Size % (l*w) 100 5/29/2019  2:21 PM   Wound Volume (cm^3) 0 cm^3 5/29/2019  2:21 PM   Wound Healing % 100 5/29/2019  2:21 PM   Post-Procedure Length (cm) 0 cm 5/29/2019  2:47 PM   Post-Procedure Width (cm) 0 cm 5/29/2019  2:47 PM   Post-Procedure Depth (cm) 0 cm 5/29/2019  2:47 PM   Post-Procedure Surface Area (cm^2) 0 cm^2 5/29/2019  2:47 PM   Post-Procedure Volume (cm^3) 0 cm^3 5/29/2019  2:47 PM   Wound Assessment Slough 5/22/2019  2:41 PM   Drainage Amount None 5/29/2019  2:21 PM   Drainage Description Purulent;Yellow 5/22/2019  2:41 PM   Odor None 5/29/2019  2:21 PM   Margins Attached edges 5/22/2019  2:41 PM   Jina-wound Assessment Dry 5/22/2019  2:41 PM   Non-staged Wound Description Full thickness 5/22/2019  2:41 PM   [place]%Wound Bed 50 5/8/2019  2:17 PM   Red%Wound Bed 30 5/1/2019  2:39 PM   Yellow%Wound Bed 100 5/22/2019  2:41 PM   Black%Wound Bed 10 4/10/2019  2:26 PM   Number of days: 70       Diabetic/Pressure/Non Pressure Ulcers:  Ulcer:   Diabetic ulcer, fat layer exposed      Total Surface Area of Ulcer(s) Covered 9 sq/cm    Was the Product Layered  No     Amount of Product Applied 9 sq/cm     Amount of Product Wasted 29 sq/cm     Reason for Waste Wound Size smaller then product size      Surgically Fixated: No    Secured With: Steri Strips and Mepitel     Procedural Pain: 0/10     Post Procedural Pain: 0 / 10    Response to Treatment: Well tolerated by patient. Plan:     Treatment Note please see attached Discharge Instructions    Written patient dismissal instructions given to patient and signed by patient or POA.          Discharge Instructions       [facility]   Physician Orders and Discharge Instructions  [address] [place] [address], [facility]  Telephone: [phone]     NAME: [address]:  1970  MEDICAL RECORD NUMBER:  [id number]  DATE:  5/29/2019     Wound Cleansing:   Do not scrub or use excessive force. Cleanse wound prior to applying a clean dressing with:  [x] Normal Saline            [x] Keep Wound Dry in Shower    [] Wound Cleanser   [] Cleanse wound with Mild Soap & Water  [] May Shower at Discharge   [] Other:        Topical Treatments:  Do not apply lotions, creams, or ointments to wound bed unless directed. [] Apply moisturizing lotion to skin surrounding the wound prior to dressing change.  [] Apply antifungal ointment to skin surrounding the wound prior to dressing change.  [] Apply thin film of moisture barrier ointment to skin immediately around wound. [] Other:          YOU HAVE HAD A DERMAGRAFT      PLACED ON YOUR WOUND TODAY. FOR BEST RESULTS, WE RECOMMEND YOU LIMIT YOUR ACTIVITY FOR THE NEXT WEEK AS MUCH AS POSSIBLE. AVOID LONG PERIODS OF TIME ON YOUR FEET. THIS ALSO INCLUDES ELEVATING YOUR LEGS AND FEET 3-4 TIMES DAILY FOR AT LEAST 20-30 MINUTES ON PILLOWS AND AT NIGHT SO THAT THEY ARE HIGHER THAN THE LEVEL OF YOUR HEART      Electronically signed by Ethan Ivory RN on 5/29/2019 at 2:57 PM        **ATTEMPT TO MINIMIZE ANY WEIGHT BEARING IN RIGHT PLANTAR FOOT BY HEEL WEIGHT BEARING FOR ANY TRANSFERS**        Dressings : Wound Location : RIGHT PLANTAR FOOT and LEFT MEDIAL/LATERAL FOOT  **PURAPLY #3 APPLIED 5/22/19; Dermagraft #1 Applied 5/29/19**     Apply DERMAGRAFT, MEPITEL, STERI-STRIPS (APPLIED PER DR. KNOWLES [facility]). -DO NOT REMOVED DRESSING BELOW STERI-STRIPS**      COVER AND SECURED WITH GAUZE AND KERLIX (CHANGE GAUZE AND KERLIX ONLY)    CHANGE DRESSING ON Monday AND Friday- PATIENT WILL HAVE CHANGED IN WOUND CARE ON WEDNESDAY          Edema Control:  Apply:  [] Compression Stocking           []Right Leg         []Left Leg              [] Tubigrip          []Right Leg Double Layer          []Left Leg Double Layer                                                  []Right Leg Single Layer           []Left Leg Single Layer              [] SpandaGrip    []Right Leg         []Left Leg                                      []Low compression 5-10 mm/Hg                                                 []Medium compression 10-20 mm/Hg                                      []High compression  20-30 mm/Hg              every morning immediately when getting up should be applied to affected leg(s) from mid foot to knee making sure to cover the heel.  Remove every night before going to bed.              [] Elevate leg(s) above the level of the heart when sitting.                    [] Avoid prolonged standing in one place.        Compression:  Apply:  [] Multilayer Compression Wrap Applied in Clinic        []RightLeg          []Left Leg              [] Multi-layer compression.  Do not get leg(s) with wrap wet.  If wraps become too tight call the center or completely remove the wrap.                 [] Elevate leg(s) above the level of the heart when sitting.                    [] Avoid prolonged standing in one place.     Off-Loading:   [] Off-loading when        [] walking           [] in bed [] sitting  [] Total non-weight bearing  [] Right Leg  [] Left Leg          [x] Assistive Device         [] Walker            [] Cane   [x] Wheelchair      [] Crutches              [] Surgical shoe    [] Podus Boot(s)   [] Foam Boot(s)  [] Roll About              [] Cast Boot       [] CROW Boot  [] Other:                   Dietary:  [] Diet as tolerated:        [x] Calorie Diabetic Diet: [] No Added Salt:  [] Increase Protein:        [] Other:     Activity:  [x] Activity as tolerated:  [] Patient has no activity restrictions     [] Strict Bedrest:            [] Remain off Work:     [] May return to full duty work:                                         [] IPNUBW to work with restrictions:          If you are still having pain after you go home:  [X] Elevate the affected limb.            [X]Use over-the-counter medications you would normally use for pain as permitted by your family doctor.  [X] For persistent pain not relieved by the above interventions, please call your family doctor.                Return Appointment:  [] Wound and dressing supply provider:   [x] ECF or Home Healthcare: [facility]  [] Wound Assessment:  [] Physician or NP scheduled for Wound Assessment:   [x] Return Appointment: [address]  Week(s)  [] Ordered tests:      Nurse Case Manger: Sarita  Electronically signed by Coral Aguilar RN on 5/29/2019 at 2:58 PM  [address] [place] [address] Information: Should you experience any significant changes in your wound(s) or have questions about your wound care, please contact the [facility] [phone] [facility] - THURSDAY 8:30 am - 4:30 pm and Friday 8:30 am - 1:00 pm.  If you need help with your wound outside these hours and cannot wait until we are again available, contact your PCP or go to the hospital emergency room.      PLEASE NOTE: IF YOU ARE UNABLE TO OBTAIN WOUND SUPPLIES, CONTINUE TO USE THE SUPPLIES YOU HAVE AVAILABLE UNTIL YOU ARE ABLE TO [facility].  IT IS MOST IMPORTANT TO KEEP THE WOUND COVERED AT ALL TIMES.     Physician Signature:_______________________     Date: ___________ Time:  ____________                    [] Dr Johanna Gamez    [] Dr Madi Torres CNP                 [x] Dr Petty Richardson   [] Dr Rene Veloz                  [] Dr Jina Bravo   [] Srinivasan Mahoney NP                Electronically signed by Yee Greenberg DPM on 5/29/2019 at 3:35 PM

## 2019-06-01 ENCOUNTER — HOSPITAL ENCOUNTER (EMERGENCY)
Age: 49
Discharge: HOME | End: 2019-06-01
Attending: EMERGENCY MEDICINE
Payer: COMMERCIAL

## 2019-06-01 VITALS
RESPIRATION RATE: 16 BRPM | HEIGHT: 66 IN | OXYGEN SATURATION: 99 % | DIASTOLIC BLOOD PRESSURE: 89 MMHG | TEMPERATURE: 98.4 F | HEART RATE: 81 BPM | BODY MASS INDEX: 41.24 KG/M2 | SYSTOLIC BLOOD PRESSURE: 165 MMHG

## 2019-06-01 DIAGNOSIS — H57.11 ACUTE PAIN IN RIGHT EYE: Primary | ICD-10-CM

## 2019-06-01 LAB
ANION GAP SERPL CALCULATED.3IONS-SCNC: 10 MMOL/L (ref 3–16)
BASOPHILS ABSOLUTE: 0 K/UL (ref 0–0.2)
BASOPHILS RELATIVE PERCENT: 0.4 %
BUN BLDV-MCNC: 21 MG/DL (ref 7–20)
CALCIUM SERPL-MCNC: 9.9 MG/DL (ref 8.3–10.6)
CHLORIDE BLD-SCNC: 101 MMOL/L (ref 99–110)
CO2: 24 MMOL/L (ref 21–32)
CREAT SERPL-MCNC: 0.7 MG/DL (ref 0.9–1.3)
EOSINOPHILS ABSOLUTE: 0.2 K/UL (ref 0–0.6)
EOSINOPHILS RELATIVE PERCENT: 2.2 %
GFR AFRICAN AMERICAN: >60
GFR NON-AFRICAN AMERICAN: >60
GLUCOSE BLD-MCNC: 183 MG/DL (ref 70–99)
HCT VFR BLD CALC: 40.2 % (ref 40.5–52.5)
HEMOGLOBIN: 13.2 G/DL (ref 13.5–17.5)
LYMPHOCYTES ABSOLUTE: 2.2 K/UL (ref 1–5.1)
LYMPHOCYTES RELATIVE PERCENT: 21.7 %
MCH RBC QN AUTO: 27.2 PG (ref 26–34)
MCHC RBC AUTO-ENTMCNC: 32.7 G/DL (ref 31–36)
MCV RBC AUTO: 83.2 FL (ref 80–100)
MONOCYTES ABSOLUTE: 0.5 K/UL (ref 0–1.3)
MONOCYTES RELATIVE PERCENT: 5.1 %
NEUTROPHILS ABSOLUTE: 7.3 K/UL (ref 1.7–7.7)
NEUTROPHILS RELATIVE PERCENT: 70.6 %
PDW BLD-RTO: 16 % (ref 12.4–15.4)
PLATELET # BLD: 217 K/UL (ref 135–450)
PMV BLD AUTO: 9 FL (ref 5–10.5)
POTASSIUM REFLEX MAGNESIUM: 4.2 MMOL/L (ref 3.5–5.1)
RBC # BLD: 4.83 M/UL (ref 4.2–5.9)
SEDIMENTATION RATE, ERYTHROCYTE: 51 MM/HR (ref 0–20)
SODIUM BLD-SCNC: 135 MMOL/L (ref 136–145)
WBC # BLD: 10.3 K/UL (ref 4–11)

## 2019-06-01 PROCEDURE — 99283 EMERGENCY DEPT VISIT LOW MDM: CPT

## 2019-06-01 PROCEDURE — 6370000000 HC RX 637 (ALT 250 FOR IP): Performed by: PHYSICIAN ASSISTANT

## 2019-06-01 PROCEDURE — 85652 RBC SED RATE AUTOMATED: CPT

## 2019-06-01 PROCEDURE — 80048 BASIC METABOLIC PNL TOTAL CA: CPT

## 2019-06-01 PROCEDURE — 85025 COMPLETE CBC W/AUTO DIFF WBC: CPT

## 2019-06-01 RX ORDER — TETRACAINE HYDROCHLORIDE 5 MG/ML
1 SOLUTION OPHTHALMIC ONCE
Status: COMPLETED | OUTPATIENT
Start: 2019-06-01 | End: 2019-06-01

## 2019-06-01 RX ORDER — ACETAMINOPHEN 500 MG
1000 TABLET ORAL ONCE
Status: COMPLETED | OUTPATIENT
Start: 2019-06-01 | End: 2019-06-01

## 2019-06-01 RX ORDER — ACETAMINOPHEN 325 MG/1
650 TABLET ORAL EVERY 6 HOURS PRN
Qty: 60 TABLET | Refills: 0 | Status: SHIPPED | OUTPATIENT
Start: 2019-06-01 | End: 2019-11-19

## 2019-06-01 RX ADMIN — ACETAMINOPHEN 1000 MG: 500 TABLET ORAL at 18:08

## 2019-06-01 RX ADMIN — TETRACAINE HYDROCHLORIDE 1 DROP: 5 SOLUTION OPHTHALMIC at 16:33

## 2019-06-01 RX ADMIN — FLUORESCEIN SODIUM 1 MG: 1 STRIP OPHTHALMIC at 16:33

## 2019-06-01 ASSESSMENT — ENCOUNTER SYMPTOMS
EYE REDNESS: 0
NAUSEA: 1
ABDOMINAL PAIN: 0
EYE DISCHARGE: 0
EYE PAIN: 1
PHOTOPHOBIA: 0
SORE THROAT: 0
RHINORRHEA: 0
VOMITING: 0
EYE ITCHING: 0

## 2019-06-01 ASSESSMENT — VISUAL ACUITY
OD: 20/30
OU: 20/25
OS: 20/20

## 2019-06-01 ASSESSMENT — PAIN DESCRIPTION - PROGRESSION
CLINICAL_PROGRESSION: NOT CHANGED
CLINICAL_PROGRESSION: NOT CHANGED

## 2019-06-01 ASSESSMENT — PAIN DESCRIPTION - ONSET
ONSET: ON-GOING
ONSET: ON-GOING

## 2019-06-01 ASSESSMENT — PAIN - FUNCTIONAL ASSESSMENT
PAIN_FUNCTIONAL_ASSESSMENT: ACTIVITIES ARE NOT PREVENTED
PAIN_FUNCTIONAL_ASSESSMENT: 0-10
PAIN_FUNCTIONAL_ASSESSMENT: ACTIVITIES ARE NOT PREVENTED

## 2019-06-01 ASSESSMENT — PAIN SCALES - GENERAL
PAINLEVEL_OUTOF10: 8

## 2019-06-01 ASSESSMENT — PAIN DESCRIPTION - PAIN TYPE
TYPE: ACUTE PAIN
TYPE: ACUTE PAIN

## 2019-06-01 ASSESSMENT — PAIN DESCRIPTION - DESCRIPTORS
DESCRIPTORS: THROBBING
DESCRIPTORS: ACHING;THROBBING

## 2019-06-01 ASSESSMENT — PAIN DESCRIPTION - FREQUENCY
FREQUENCY: CONTINUOUS
FREQUENCY: CONTINUOUS

## 2019-06-01 ASSESSMENT — PAIN DESCRIPTION - ORIENTATION
ORIENTATION: RIGHT
ORIENTATION: RIGHT

## 2019-06-01 ASSESSMENT — PAIN DESCRIPTION - LOCATION
LOCATION: EYE
LOCATION: EYE

## 2019-06-01 NOTE — ED TRIAGE NOTES
Pt to ED with right eye pain and right eye blurred vision. Pt states this has been occurring for two days. Pt facial symmetry intact. Pt states he has no other symptoms.

## 2019-06-01 NOTE — ED PROVIDER NOTES
[address]  [address] [facility] [address]  Dept: [phone]  Loc: [phone]  eMERGENCYdEPARTMENT eNCOUnter      Pt Name: Victor Manuel Saleh  MRN: [id number]  [place] 1970  Date of evaluation: 6/1/2019  Provider:Shahida King PA-C    CHIEF COMPLAINT       Chief Complaint   Patient presents with    Eye Pain     right eye pain and blurry vision in right eye           HISTORY OF PRESENT ILLNESS  (Location/Symptom, Timing/Onset, Context/Setting, Quality, Duration,Modifying Factors, Severity.)   Victor Manuel Saleh is a 47 y.o. male who presents to the emergency department with pain and unilateral blurry vision in his right eye. The pain began 3 days ago, but the blurry vision began earlier to day and has been intermittent. He states the pain has been constant, but not getting worse. He does have an associated right sided headache and nausea, with no vomiting. He denies complete vision loss, dizziness and double vision. He rates his right eye right sided headache as a 8/10 described as aching in sensation. He does have diabetes and HTN, stating that he saw his ophthalmologist about a month ago. He reports there were no significant changes in his exam. He denies any trauma or injury to the eye. No drainage from eye. Blurred vision has been intermittent today, denies at my bedside evaluation. Nursing Notes were reviewedand agreed with or any disagreements were addressed in the HPI. REVIEW OF SYSTEMS    (2-9 systems for level 4, 10 or more for level 5)     Review of Systems   Constitutional: Negative for chills and fever. HENT: Negative for congestion, ear pain, rhinorrhea, sore throat and tinnitus. Eyes: Positive for pain and visual disturbance. Negative for photophobia, discharge, redness and itching. Cardiovascular: Negative for chest pain. Gastrointestinal: Positive for nausea. Negative for abdominal pain and vomiting. Neurological: Positive for headaches (right sided). All other systems reviewed and are negative. Except as noted above the remainder of the review of systems was reviewed and negative. PAST MEDICAL HISTORY         Diagnosis Date    Angina effort ([facility]     Arthritis     CAD (coronary artery disease)     Carotid stenosis     Diabetes mellitus with neurological manifestations, uncontrolled ([facility]     Diabetic foot ulcer ([facility] 3/13/2019    Diarrhea     DEAN (dyspnea on exertion) 2015    Hyperlipidemia     Hypertension     Obesity     Type II or unspecified type diabetes mellitus without mention of complication, not stated as uncontrolled        SURGICAL HISTORY           Procedure Laterality Date    CARDIAC CATHETERIZATION      CAROTID ENDARTERECTOMY Right 2015    CHOLECYSTECTOMY      [place] OFFICE/OUTPT VISIT,PROCEDURE ONLY Left 10/5/2018    AMPUTATION LEFT GREAT DIGIT performed by Lory Quintana DPM at [place] [address] OFFICE/OUTPT [address] [place] [address] Left 10/22/2018    INCISION AND DRAINAGE LEFT FOOT FIRST AND SECOND RAY AMPUTATION (DIGITS ONE, TWO AND METARSAL ONE AND TWO) performed by Lory Quintana DPM at [place] [address] OFFICE/OUTPT [address] [place], [place] [address] [facility] Left 10/26/2018    LEFT FOOT WOUND DEBRIDEMENT WITH PRIMARY CLOSURE performed by Lory Quintana DPM at [place] [address] OFFICE/OUTPT [address] [place], [place] [address] [facility] Left 2018    INCISION AND DRAINAGE WITH TRANSMETATARSAL CONVERTED TO LIST MART  AMPUTATION LEFT FOOT performed by Lory Quintana DPM at [address] [place] [address] Pl TOE AMPUTATION Left 10/05/2018    left great toe amputation       CURRENT MEDICATIONS     [unfilled]    ALLERGIES     Patient has no known allergies. FAMILY HISTORY           Problem Relation Age of Onset    High Blood Pressure Mother      Family Status   Relation Name Status    Mother  Alive        HTN    Father   at age 72        DM-2.    Kasey Patient   at age 47        DM, HD, had infection.          SOCIAL AUTO DIFFERENTIAL - Abnormal; Notable for the following components:       Result Value    Hemoglobin 13.2 (*)     Hematocrit 40.2 (*)     RDW 16.0 (*)     All other components within normal limits    Narrative:     Performed at:  [address] [place] [address]   Phone [phone]   BASIC METABOLIC PANEL W/ REFLEX TO MG FOR LOW K - Abnormal; Notable for the following components:    Sodium 135 (*)     Glucose 183 (*)     BUN 21 (*)     CREATININE 0.7 (*)     All other components within normal limits    Narrative:     Performed at:  [address] [facility] [place] [address]   Phone [phone]   SEDIMENTATION RATE - Abnormal; Notable for the following components:    Sed Rate 51 (*)     All other components within normal limits    Narrative:     Performed at:  [address] [place] [address]   Phone [phone]       All other labs were within normal range or not returned as of this dictation. EMERGENCY DEPARTMENT COURSE and DIFFERENTIAL DIAGNOSIS/MDM:   Vitals:    Vitals:    06/01/19 1353 06/01/19 1357 06/01/19 1633   BP:  (!) 172/95 (!) 165/89   Pulse: 86  81   Resp: 15  16   Temp: 97.8 °F (36.6 °C)  98.4 °F (36.9 °C)   TempSrc: Oral  Oral   SpO2: 98%  99%   Height: 5' 6\" (1.676 m)         MDM     Patient presents ED with HPI noted above. He is hemodynamically stable, afebrile and nontoxic-appearing. He is not hypoxic with oxygen saturation of 99% on room air. Physical exam as above. No significant abnormality on exam.  Patient denies any blurred vision, bedside evaluation. Visual acuity 20/30 on the right, 20/20 on left. Given isolated eye pain with no blurred vision at my exam do not suspect CVA, thus CT held at this time. Physical exam as above. IOP 24. Sed rate was elevated at 51.   Patient has no tenderness over temporal artery, no jaw pain or scalp tenderness. No periorbital erythema/edema or tenderness. Consultation made to ophthalmologist.  I spoke with Dr. Piyush Colon. After discussion of labs, IOP and exam Dr. Piyush Colon this patient come in on Monday to [place] [address] for evaluation. Contact information provided, patient will be given an appointment for Monday. Should patient have any worsening symptoms or recurrence of symptoms over the weekend he is to call [facility], Dr. Piyush Colon in on call and will evaluated patient. Should patient began with any acute worsening and can't get a hold of [facility] he is to return the ED promptly for reevaluation. The patient tolerated their visit well. They were seen and evaluated by the attending physician, Dr. Domonique Cueva who agreed with the assessment and plan. The patient and / or the family were informed of the results of any tests, a time was given to answer questions, a plan was proposed and they agreed with plan. CONSULTS:  IP CONSULT TO OPHTHALMOLOGY    PROCEDURES:  Procedures    FINAL IMPRESSION      1. Acute pain in right eye          DISPOSITION/PLAN   [unfilled]    PATIENT REFERRED TO:  [facility] Emergency Department  [address] [address]  [phone]  Go to   If symptoms worsen    [phone]  call this number this weekend if worsening of symptoms.  Call this number monday morning at 8:15 am to arrange for same day appointment (inform them we saw you in ED and talked to on call ophthalmologist over the weekend)    This is the [address]:  New Prescriptions    No medications on file       (Please note that portions of this note were completed with a voice recognition program.  Efforts were made to edit the dictations but occasionally words are mis-transcribed.)    [address] [facility], [facility]  06/01/19 [address]  06/01/19 3760

## 2019-06-03 ENCOUNTER — TELEPHONE (OUTPATIENT)
Dept: ENDOCRINOLOGY | Age: 49
End: 2019-06-03

## 2019-06-03 NOTE — TELEPHONE ENCOUNTER
Unlikely due to Trulicity   If blood sugars were high before and now coming down then it explains the blurring   He should see the eye physician

## 2019-06-04 NOTE — TELEPHONE ENCOUNTER
Mr. Fernandes Led informed highly likely. Explained fluctuations in his BS will can cause blurriness.

## 2019-06-05 ENCOUNTER — HOSPITAL ENCOUNTER (OUTPATIENT)
Dept: WOUND CARE | Age: 49
Discharge: HOME | End: 2019-06-05
Payer: COMMERCIAL

## 2019-06-12 ENCOUNTER — HOSPITAL ENCOUNTER (OUTPATIENT)
Dept: WOUND CARE | Age: 49
Discharge: HOME | End: 2019-06-12
Payer: COMMERCIAL

## 2019-06-12 VITALS
HEART RATE: 96 BPM | RESPIRATION RATE: 16 BRPM | TEMPERATURE: 98 F | DIASTOLIC BLOOD PRESSURE: 83 MMHG | SYSTOLIC BLOOD PRESSURE: 127 MMHG

## 2019-06-12 DIAGNOSIS — E11.621 DIABETIC ULCER OF RIGHT MIDFOOT ASSOCIATED WITH TYPE 2 DIABETES MELLITUS, WITH FAT LAYER EXPOSED (HCC): Primary | ICD-10-CM

## 2019-06-12 DIAGNOSIS — T81.89XD NON-HEALING SURGICAL WOUND, SUBSEQUENT ENCOUNTER: ICD-10-CM

## 2019-06-12 DIAGNOSIS — L97.412 DIABETIC ULCER OF RIGHT MIDFOOT ASSOCIATED WITH TYPE 2 DIABETES MELLITUS, WITH FAT LAYER EXPOSED (HCC): Primary | ICD-10-CM

## 2019-06-12 PROCEDURE — 15275 SKIN SUB GRAFT FACE/NK/HF/G: CPT

## 2019-06-12 RX ORDER — LIDOCAINE HYDROCHLORIDE 40 MG/ML
SOLUTION TOPICAL PRN
Status: DISCONTINUED | OUTPATIENT
Start: 2019-06-12 | End: 2019-06-13 | Stop reason: HOSPADM

## 2019-06-12 ASSESSMENT — PAIN SCALES - GENERAL
PAINLEVEL_OUTOF10: 0
PAINLEVEL_OUTOF10: 0

## 2019-06-12 NOTE — PROGRESS NOTES
Krissy Lopez [address]   Progress Note and Procedure Note      Chidi Whitney  MEDICAL RECORD NUMBER:  [id number]  AGE: 47 y.o. GENDER: male  : 1970  EPISODE DATE:  2019    Subjective:     Chief Complaint   Patient presents with    Wound Check     Follow Up on Bilateral Feet         HISTORY of PRESENT ILLNESS HPI     Chidi Whitney is a 47 y.o. male who presents today for wound/ulcer evaluation. History of Wound Context: Patient presents with complaining of a wound on his left foot. Patient had a transmetatarsal amputation of the left foot on 2018. Patient's skin flap failed after the surgery with dehiscence of the surgical wound. Patient states home health care is performing dressing changes as ordered. Patient reports he had angiograms of both legs with revascularization of the right leg on 2019 performed by Dr. Jonnie Urbina. Patient had revascularization of the left leg with Dr. Jonnie Urbina on 2019.       Patient also has a wound on the bottom of the right foot of 14 weeks duration. The wound started out as a blood blister. Patient states he missed his last appointment, because he overslept.        Wound/Ulcer Pain Timing/Severity: none  Quality of pain: N/A  Severity:  0 / 10   Modifying Factors: None  Associated Signs/Symptoms: none    Ulcer Identification:  Ulcer Type: diabetic and non-healing surgical    Contributing Factors: diabetes, poor glucose control, chronic pressure, decreased mobility and arterial insufficiency    Wound: Wound dehiscence        PAST MEDICAL HISTORY        Diagnosis Date    Angina effort     Arthritis     CAD (coronary artery disease)     Carotid stenosis     Diabetes mellitus with neurological manifestations, uncontrolled ([address]     Diabetic foot ulcer ([address] 3/13/2019    Diarrhea     DEAN (dyspnea on exertion) 2015    Hyperlipidemia     Hypertension     Obesity     Type II or unspecified type diabetes mellitus without mention of complication, not stated as uncontrolled        PAST SURGICAL HISTORY    Past Surgical History:   Procedure Laterality Date    CARDIAC CATHETERIZATION      CAROTID ENDARTERECTOMY Right 4-9-2015    CHOLECYSTECTOMY      [place] OFFICE/OUTPT VISIT,PROCEDURE ONLY Left 10/5/2018    AMPUTATION LEFT GREAT DIGIT performed by Gracie Slaughter DPM at [place] [address] OFFICE/OUTPT [address] [place] [address] Left 10/22/2018    INCISION AND DRAINAGE LEFT FOOT FIRST AND SECOND RAY AMPUTATION (DIGITS ONE, TWO AND METARSAL ONE AND TWO) performed by Gracie Slaughter DPM at [place] [address] OFFICE/OUTPT [address] [place] [address] Left 10/26/2018    LEFT FOOT WOUND DEBRIDEMENT WITH PRIMARY CLOSURE performed by Gracie Slaughter DPM at [place] [address] OFFICE/OUTPT [address] [place] [address] Left 11/2/2018    INCISION AND DRAINAGE WITH TRANSMETATARSAL CONVERTED TO LIST MART  AMPUTATION LEFT FOOT performed by Gracie Slaughter DPM at [address] [place] [address] TOE AMPUTATION Left 10/05/2018    left great toe amputation       FAMILY HISTORY    Family History   Problem Relation Age of Onset    High Blood Pressure Mother        SOCIAL HISTORY    Social History     Tobacco Use    Smoking status: Never Smoker    Smokeless tobacco: Never Used   Substance Use Topics    Alcohol use: No    Drug use: No     Types: Marijuana     Comment: QUIT 4 MONTHS        ALLERGIES    No Known Allergies    MEDICATIONS    Current Outpatient Medications on File Prior to Encounter   Medication Sig Dispense Refill    HUMALOG 100 UNIT/ML injection vial   5    Dulaglutide (TRULICITY) 1.5 XM/0.4DB SOPN Inject 1.5 mg into the skin once a week 12 pen 0    atorvastatin (LIPITOR) 40 MG tablet Take 1 tablet by mouth every morning 30 tablet 0    insulin glargine (BASAGLAR KWIKPEN) 100 UNIT/ML injection pen Inject 15 Units into the skin nightly (Patient taking differently: Inject 30 Units into the skin every morning ) 5 pen 3    clopidogrel (PLAVIX) 75 MG tablet Take 1 tablet by mouth daily 30 tablet 3    metoprolol succinate (TOPROL XL) 25 MG extended release tablet Take 25 mg by mouth daily      vitamin B-12 (CYANOCOBALAMIN) 1000 MCG tablet Take 1,000 mcg by mouth daily  2    ranitidine (ZANTAC) 150 MG tablet Take 150 mg by mouth 2 times daily  1    Multiple Vitamins-Minerals (THERAPEUTIC MULTIVITAMIN-MINERALS) tablet Take 1 tablet by mouth daily      aspirin (ASPIRIN LOW DOSE) 81 MG EC tablet Take 1 tablet by mouth daily 30 tablet 0    lisinopril (PRINIVIL;ZESTRIL) 40 MG tablet Take 40 mg by mouth every morning       acetaminophen (AMINOFEN) 325 MG tablet Take 2 tablets by mouth every 6 hours as needed for Pain 60 tablet 0    Blood Glucose Monitoring Suppl (FREESTYLE LITE) INEZ 1 Device by Does not apply route 3 times daily (before meals) 1 Device 0    blood glucose test strips (FREESTYLE LITE) strip 1 each by In Vitro route 3 times daily As needed. 100 each 3    FREESTYLE LANCETS MISC 1 each by Does not apply route daily 100 each 3    Insulin Disposable Pump (V-GO 40) KIT by Does not apply route 6 CLICKS DAILY      Insulin Pen Needle (B-D UF III MINI PEN NEEDLES) 31G X 5 MM MISC 1 each by Does not apply route 4 times daily (before meals and nightly) 150 each 6     No current facility-administered medications on file prior to encounter. REVIEW OF SYSTEMS    Pertinent items are noted in HPI.     Objective:      /83   Pulse 96   Temp 98 °F (36.7 °C) (Oral)   Resp 16     Wt Readings from Last 3 Encounters:   05/01/19 255 lb 8.2 oz (115.9 kg)   03/21/19 251 lb (113.9 kg)   03/09/19 258 lb 2.5 oz (117.1 kg)       PHYSICAL EXAM    Patient is alert and oriented x 3, and is in no acute distress.     Vascular: DP weakly palpable bilateral. PT pulse not palpable bilateral but audible on doppler exam. No Pedal hair noted bilateral. No Edema noted to ankles.    Derm:  Skin is warm to warm from proximal leg to distal foot bilateral. Toenails 1-5 on right foot are thickened, yellow and dystrophic. Neuro:  Protective sensation absent to 10/10 sites bilateral via a 5.07 Teaberry-Xuan monofilament.    Musculoskeletal: Muscle strength 5/5 with PF/DF/I and E of both feet. Transmetatarsal amputation of the left foot.       Assessment:        Problem List Items Addressed This Visit     Diabetic foot ulcer ([address] - Primary    Non-healing surgical wound           Procedure Note  Indications:  Based on my examination of this patient's wound(s)/ulcer(s) today, debridement is required to promote healing and evaluate the wound base. Performed by: Cesilia Vital DPM    Consent obtained:  Yes    Time out taken:  Yes    Pain Control: Anesthetic  Anesthetic: 4% Lidocaine Liquid Topical(2.5 ml)       Debridement: Excisional Debridement    Using curette, #15 blade scalpel and forceps the wound(s)/ulcer(s) was/were sharply debrided down through and including the removal of epidermis, dermis and subcutaneous tissue.         Devitalized Tissue Debrided:  fibrin, biofilm and callus    Pre Debridement Measurements:  Are located in the [facility]  Documentation Flow Sheet    Wound/Ulcer #: 2    Post Debridement Measurements:  Wound/Ulcer Descriptions are Pre Debridement except measurements:    Wound 03/13/19 Foot Right;Plantar #2 - right plantar foot- pt. noted 2/15/2019 (Active)   Wound Image   6/12/2019  2:37 PM   Wound Pressure Stage  3 5/22/2019  2:41 PM   Dressing Status Old drainage 6/12/2019  2:37 PM   Dressing Changed Changed/New 6/12/2019  3:46 PM   Dressing/Treatment Other (comment) 6/12/2019  3:46 PM   Wound Length (cm) 2 cm 6/12/2019  2:37 PM   Wound Width (cm) 1 cm 6/12/2019  2:37 PM   Wound Depth (cm) 0.1 cm 6/12/2019  2:37 PM   Wound Surface Area (cm^2) 2 cm^2 6/12/2019  2:37 PM   Change in Wound Size % (l*w) 73.96 6/12/2019  2:37 PM   Wound Volume (cm^3) 0.2 cm^3 6/12/2019  2:37 PM   Wound Healing % 74 6/12/2019  2:37 PM   Post-Procedure Length (cm) 2.1 cm 6/12/2019  3:15 PM   Post-Procedure Width (cm) 1.1 cm 6/12/2019  3:15 PM   Post-Procedure Depth (cm) 0.2 cm 6/12/2019  3:15 PM   Post-Procedure Surface Area (cm^2) 2.31 cm^2 6/12/2019  3:15 PM   Post-Procedure Volume (cm^3) 0.46 cm^3 6/12/2019  3:15 PM   Wound Assessment Granulation tissue;Slough 6/12/2019  2:37 PM   Drainage Amount Small 6/12/2019  2:37 PM   Drainage Description Serosanguinous 6/12/2019  2:37 PM   Odor None 6/12/2019  2:37 PM   Margins Attached edges 6/12/2019  2:37 PM   Jina-wound Assessment Dry;Calloused 6/12/2019  2:37 PM   Non-staged Wound Description Full thickness 6/12/2019  2:37 PM   Bly%Wound Bed 50 5/29/2019  2:21 PM   Red%Wound Bed 30 6/12/2019  2:37 PM   Yellow%Wound Bed 70 6/12/2019  2:37 PM   Black%Wound Bed 20 4/24/2019  2:19 PM   Number of days: 91       Wound 03/20/19 Foot Left;Medial Wound #3 acquired 11/2018 left TMA site (Active)   Wound Image   6/12/2019  3:15 PM   Wound Non-Healing Surgical 5/22/2019  2:41 PM   Dressing Status Intact; Old drainage 5/29/2019  2:21 PM   Dressing Changed Changed/New 5/29/2019  2:47 PM   Dressing/Treatment Other (comment) 5/22/2019  3:35 PM   Wound Length (cm) 0 cm 6/12/2019  2:37 PM   Wound Width (cm) 0 cm 6/12/2019  2:37 PM   Wound Depth (cm) 0 cm 6/12/2019  2:37 PM   Wound Surface Area (cm^2) 0 cm^2 6/12/2019  2:37 PM   Change in Wound Size % (l*w) 100 6/12/2019  2:37 PM   Wound Volume (cm^3) 0 cm^3 6/12/2019  2:37 PM   Wound Healing % 100 6/12/2019  2:37 PM   Post-Procedure Length (cm) 0 cm 6/12/2019  3:15 PM   Post-Procedure Width (cm) 0 cm 6/12/2019  3:15 PM   Post-Procedure Depth (cm) 0 cm 6/12/2019  3:15 PM   Post-Procedure Surface Area (cm^2) 0 cm^2 6/12/2019  3:15 PM   Post-Procedure Volume (cm^3) 0 cm^3 6/12/2019  3:15 PM   Wound Assessment Pink;Yellow;Granulation tissue 5/29/2019  2:21 PM   Drainage Amount Scant 5/29/2019  2:21 PM   Drainage Description Serosanguinous 5/29/2019  2:21 PM   Odor None 5/29/2019  2:21 PM   Margins Attached edges 5/29/2019  2:21 PM   Jina-wound Assessment Dry 5/29/2019  2:21 PM   Non-staged Wound Description Full thickness 5/29/2019  2:21 PM   Clarissa%Wound Bed 30 5/29/2019  2:21 PM   Red%Wound Bed 90 5/8/2019  2:17 PM   Yellow%Wound Bed 70 5/29/2019  2:21 PM   Black%Wound Bed 100 5/22/2019  2:41 PM   Number of days: 84          Percent of Wound(s)/Ulcer(s) Debrided: 100%    Total Surface Area Debrided:  2.31 sq cm     Diabetic/Pressure/Non Pressure Ulcers only:  Ulcer: Diabetic ulcer, fat layer exposed     Estimated Blood Loss:  Minimal    Hemostasis Achieved:  by pressure    Procedural Pain:  0  / 10     Post Procedural Pain:  0 / 10     Response to treatment:  Well tolerated by patient.      Procedure:  Skin Substitute Application    Performed by: Nicolette Jaffe DPM    Ulcer Type: diabetic    Consent obtained: Yes    Time out taken: Yes    Product Utilized:    Dermagraft 38 sq/cm     Fenestrated: No    Mesher Utilized: No    Instrument(s) curette, #15 blade scalpel and forceps    Skin Substitute was Applied to Ulcer Number(s):    Ulcer #: 2      Wound 03/13/19 Foot Right;Plantar #2 - right plantar foot- pt. noted 2/15/2019 (Active)   Wound Image   6/12/2019  2:37 PM   Wound Pressure Stage  3 5/22/2019  2:41 PM   Dressing Status Old drainage 6/12/2019  2:37 PM   Dressing Changed Changed/New 6/12/2019  3:46 PM   Dressing/Treatment Other (comment) 6/12/2019  3:46 PM   Wound Length (cm) 2 cm 6/12/2019  2:37 PM   Wound Width (cm) 1 cm 6/12/2019  2:37 PM   Wound Depth (cm) 0.1 cm 6/12/2019  2:37 PM   Wound Surface Area (cm^2) 2 cm^2 6/12/2019  2:37 PM   Change in Wound Size % (l*w) 73.96 6/12/2019  2:37 PM   Wound Volume (cm^3) 0.2 cm^3 6/12/2019  2:37 PM   Wound Healing % 74 6/12/2019  2:37 PM   Post-Procedure Length (cm) 2.1 cm 6/12/2019  3:15 PM   Post-Procedure Width (cm) 1.1 cm 6/12/2019  3:15 PM   Post-Procedure Depth (cm) 0.2 cm 6/12/2019  3:15 PM   Post-Procedure Surface Area (cm^2) 2.31 cm^2 6/12/2019  3:15 PM   Post-Procedure Volume (cm^3) 0.46 cm^3 6/12/2019  3:15 PM   Wound Assessment Granulation tissue;Slough 6/12/2019  2:37 PM   Drainage Amount Small 6/12/2019  2:37 PM   Drainage Description Serosanguinous 6/12/2019  2:37 PM   Odor None 6/12/2019  2:37 PM   Margins Attached edges 6/12/2019  2:37 PM   Jina-wound Assessment Dry;Calloused 6/12/2019  2:37 PM   Non-staged Wound Description Full thickness 6/12/2019  2:37 PM   [place]%Wound Bed 50 5/29/2019  2:21 PM   Red%Wound Bed 30 6/12/2019  2:37 PM   Yellow%Wound Bed 70 6/12/2019  2:37 PM   Black%Wound Bed 20 4/24/2019  2:19 PM   Number of days: 91       Wound 03/20/19 Foot Left;Medial Wound #3 acquired 11/2018 left TMA site (Active)   Wound Image   6/12/2019  3:15 PM   Wound Non-Healing Surgical 5/22/2019  2:41 PM   Dressing Status Intact; Old drainage 5/29/2019  2:21 PM   Dressing Changed Changed/New 5/29/2019  2:47 PM   Dressing/Treatment Other (comment) 5/22/2019  3:35 PM   Wound Length (cm) 0 cm 6/12/2019  2:37 PM   Wound Width (cm) 0 cm 6/12/2019  2:37 PM   Wound Depth (cm) 0 cm 6/12/2019  2:37 PM   Wound Surface Area (cm^2) 0 cm^2 6/12/2019  2:37 PM   Change in Wound Size % (l*w) 100 6/12/2019  2:37 PM   Wound Volume (cm^3) 0 cm^3 6/12/2019  2:37 PM   Wound Healing % 100 6/12/2019  2:37 PM   Post-Procedure Length (cm) 0 cm 6/12/2019  3:15 PM   Post-Procedure Width (cm) 0 cm 6/12/2019  3:15 PM   Post-Procedure Depth (cm) 0 cm 6/12/2019  3:15 PM   Post-Procedure Surface Area (cm^2) 0 cm^2 6/12/2019  3:15 PM   Post-Procedure Volume (cm^3) 0 cm^3 6/12/2019  3:15 PM   Wound Assessment Pink;Yellow;Granulation tissue 5/29/2019  2:21 PM   Drainage Amount Scant 5/29/2019  2:21 PM   Drainage Description Serosanguinous 5/29/2019  2:21 PM   Odor None 5/29/2019  2:21 PM   Margins Attached edges 5/29/2019  2:21 PM   Jina-wound Assessment Dry 5/29/2019  2:21 PM   Non-staged Wound Description Full thickness 5/29/2019  2:21 PM   [place]%Wound Bed 30 5/29/2019  2:21 PM   Red%Wound Bed 90 5/8/2019  2:17 PM   Yellow%Wound Bed 70 5/29/2019  2:21 PM   Black%Wound Bed 100 5/22/2019  2:41 PM   Number of days: 84       Diabetic/Pressure/Non Pressure Ulcers:  Ulcer:   Diabetic ulcer, fat layer exposed      Total Surface Area of Ulcer(s) Covered 2.31 sq/cm    Was the Product Layered  No     Amount of Product Applied 2.31 sq/cm     Amount of Product Wasted 35.69 sq/cm     Reason for Waste Wound Size smaller then product size      Surgically Fixated: No    Secured With: Steri Strips and Mepitel     Procedural Pain: 0/10     Post Procedural Pain: 0 / 10    Response to Treatment: Well tolerated by patient. Plan:     Treatment Note please see attached Discharge Instructions    Written patient dismissal instructions given to patient and signed by patient or POA. Discharge Instructions       [facility]   Physician Orders and Discharge Instructions  [address], [facility]  Telephone: [phone]      FAX [phone]     NAME: Lillian Dexter  DATE OF BIRTH:  1970  MEDICAL RECORD NUMBER:  [id number]  DATE:  6/12/2019     Wound Cleansing:   Do not scrub or use excessive force. Cleanse wound prior to applying a clean dressing with:  [x] Normal Saline            [x] Keep Wound Dry in Shower    [] Wound Cleanser   [] Cleanse wound with Mild Soap & Water  [] May Shower at Discharge   [] Other:        Topical Treatments:  Do not apply lotions, creams, or ointments to wound bed unless directed. [] Apply moisturizing lotion to skin surrounding the wound prior to dressing change.  [] Apply antifungal ointment to skin surrounding the wound prior to dressing change.  [] Apply thin film of moisture barrier ointment to skin immediately around wound. [] Other:        **Left TMA Site Healed Wear Podiatry Shoe on Left for 1 more week to protect**    YOU HAVE HAD A DERMAGRAFT      PLACED ON YOUR WOUND TODAY.  FOR BEST RESULTS, WE RECOMMEND YOU LIMIT YOUR ACTIVITY FOR THE NEXT WEEK AS MUCH AS POSSIBLE. AVOID LONG PERIODS OF TIME ON YOUR FEET. THIS ALSO INCLUDES ELEVATING YOUR LEGS AND FEET 3-4 TIMES DAILY FOR AT LEAST 20-30 MINUTES ON PILLOWS AND AT NIGHT SO THAT THEY ARE HIGHER THAN THE LEVEL OF YOUR HEART      Electronically signed by Sierra Charles RN on 6/12/2019 at 3:23 PM        **ATTEMPT TO MINIMIZE ANY WEIGHT BEARING IN RIGHT PLANTAR FOOT BY HEEL WEIGHT BEARING FOR ANY TRANSFERS**        Dressings : Wound Location : RIGHT PLANTAR FOOT  **PURAPLY #3 APPLIED 5/22/19; Dermagraft #2 Applied 6/12/19**     Apply DERMAGRAFT, MEPITEL, STERI-STRIPS (APPLIED PER DR. Steele). -DO NOT REMOVED DRESSING BELOW STERI-STRIPS**       COVER AND SECURED WITH GAUZE AND KERLIX (CHANGE GAUZE AND KERLIX ONLY)     CHANGE DRESSING ON Monday AND Friday- PATIENT WILL HAVE CHANGED IN WOUND CARE ON WEDNESDAY          Edema Control:  Apply:  [] Compression Stocking           []Right Leg         []Left Leg              [] Tubigrip          []Right Leg Double Layer          []Left Leg Double Layer                                                  []Right Leg Single Layer           []Left Leg Single Layer              [] SpandaGrip    []Right Leg         []Left Leg                                      []Low compression 5-10 mm/Hg                                                 []Medium compression 10-20 mm/Hg                                      []High compression  20-30 mm/Hg              every morning immediately when getting up should be applied to affected leg(s) from mid foot to knee making sure to cover the heel.  Remove every night before going to bed.              [] Elevate leg(s) above the level of the heart when sitting.                    [] Avoid prolonged standing in one place.        Compression:  Apply:  [] Multilayer Compression Wrap Applied in Clinic        []RightLeg          []Left Leg              [] Multi-layer compression.  Do not get leg(s) with wrap wet.  If wraps become too tight call the center or completely remove the wrap.                 [] Elevate leg(s) above the level of the heart when sitting.                    [] Avoid prolonged standing in one place.     Off-Loading:   [] Off-loading when        [] walking           [] in bed [] sitting  [] Total non-weight bearing  [] Right Leg  [] Left Leg          [x] Assistive Device         [] Walker            [] Cane   [x] Wheelchair      [] Crutches              [] Surgical shoe    [] Podus Boot(s)   [] Foam Boot(s)  [] Roll About              [] Cast Boot       [] CROW Boot  [] Other:                   Dietary:  [] Diet as tolerated:        [x] Calorie Diabetic Diet: [] No Added Salt:  [] Increase Protein:        [] Other:     Activity:  [x] Activity as tolerated:  [] Patient has no activity restrictions     [] Strict Bedrest:            [] Remain off Work:     [] May return to full duty work:                                         [] EDCFSZ to work with restrictions:          If you are still having pain after you go home:  [X] Elevate the affected limb.            [X]Use over-the-counter medications you would normally use for pain as permitted by your family doctor.  [X] For persistent pain not relieved by the above interventions, please call your family doctor.                Return Appointment:  [] Wound and dressing supply provider:   [x] ECF or Home Healthcare: [facility]  [] Wound Assessment:  [] Physician or NP scheduled for Wound Assessment:   [x] Return Appointment: Kirk THORPE  in  1 Week(s)  [] Ordered tests:      Nurse Case MangerRobert Stein  Electronically signed by Ashley Tom RN on 6/12/2019 at 3:26 PM  [address] [place] [address] Information: Should you experience any significant changes in your wound(s) or have questions about your wound care, please contact the [address] [place], [place] [address]ie [facility] [phone] [facility] - THURSDAY 8:30 am - 4:30 pm and Friday 8:30 am - 1:00 pm.  If you need help with your wound outside these hours and cannot wait until we are again available, contact your PCP or go to the hospital emergency room.      PLEASE NOTE: IF YOU ARE UNABLE TO [address], CONTINUE TO USE THE SUPPLIES YOU HAVE AVAILABLE UNTIL YOU ARE ABLE TO [facility].  IT IS MOST IMPORTANT TO KEEP THE WOUND COVERED AT ALL TIMES.     Physician Signature:_______________________     Date: ___________ Time:  ____________                    [] Dr Ben George    [] Dr Bisi Garcia   [] Latia Duval CNP                 [x] Dr Yocasta Heredia   [] Dr Apollo Soliz                  [] Dr Cristopher Lopez   [] Srinivasan Dale NP                   Electronically signed by [address] [place], [place] [id number] on 6/12/2019 at 5:26 PM

## 2019-06-12 NOTE — PLAN OF CARE
Dermagraft Treatment Note    NAME:  Rich Cohen  YOB: 1970  MEDICAL RECORD NUMBER:  [id number]  DATE:  6/12/2019    Goal:  Patient will receive safe and proper application of skin substitute. Patient will comply with caring for dressing, offloading and reporting complications. Expiration date of Dermagraft checked immediately prior to use. Package intact prior to use and no damage noted. Transport temperature controlled and acceptable. Dermagraft was prepared for application by removing from box and within 1 minute placing in thaw tub at a temperature of 34 to 37 degrees celsius until ice crystals were no longer present but no longer than 3 minutes. Dermagraft was rinsed 3 times in room temperature normal saline for 5 seconds each time. A 4th saline rinse was left on the Dermagraft until the physician was ready to apply it within 30 minutes of thawing. Dermagraft was applied to Right Plantar Foot and affixed with steri-strips by the provider. Dermagraft was covered with non-adherent dressing. Gauze and Kerlix was applied on top of non-adherent dressing. A foam plug cut to fit into ulcer was applied. Fluffed gauze was applied. Dressing was secured with cover roll type tape to stabilize graft. Coban or ace wrap was applied to secure graft and decrease edema. Patient/caregiver was instructed not to remove dressing and to keep it clean and dry. Pt/family/caregiver was instructed on signs and symptoms of complications to report such as draining through, falling down/slipping, getting wet, or severe pain or tingling in toes   Pt/family/caregiver was instructed on need for offloading and elevation of affected extremity and on use of prescribed offloading device.  Dermagraft may be applied a total of 8 times per wound over a 12 week period. Additionally Dermagraft may only be used every 12 months per wound.       Date of first application of Dermagraft for this current wound is May 29, 2019.                 Guidelines followed  Dermagraft 24 steps followed    Electronically signed by Shravan Dimas RN on 6/12/2019 at 5:30 PM

## 2019-06-19 ENCOUNTER — HOSPITAL ENCOUNTER (OUTPATIENT)
Dept: WOUND CARE | Age: 49
Discharge: HOME | End: 2019-06-19
Payer: COMMERCIAL

## 2019-06-19 VITALS
HEART RATE: 87 BPM | RESPIRATION RATE: 16 BRPM | TEMPERATURE: 97.2 F | DIASTOLIC BLOOD PRESSURE: 97 MMHG | SYSTOLIC BLOOD PRESSURE: 167 MMHG

## 2019-06-19 DIAGNOSIS — E11.621 DIABETIC ULCER OF RIGHT MIDFOOT ASSOCIATED WITH TYPE 2 DIABETES MELLITUS, WITH FAT LAYER EXPOSED (HCC): Primary | ICD-10-CM

## 2019-06-19 DIAGNOSIS — L97.412 DIABETIC ULCER OF RIGHT MIDFOOT ASSOCIATED WITH TYPE 2 DIABETES MELLITUS, WITH FAT LAYER EXPOSED (HCC): Primary | ICD-10-CM

## 2019-06-19 PROCEDURE — 15275 SKIN SUB GRAFT FACE/NK/HF/G: CPT

## 2019-06-19 RX ORDER — LIDOCAINE HYDROCHLORIDE 40 MG/ML
SOLUTION TOPICAL PRN
Status: DISCONTINUED | OUTPATIENT
Start: 2019-06-19 | End: 2019-06-20 | Stop reason: HOSPADM

## 2019-06-19 ASSESSMENT — PAIN DESCRIPTION - FREQUENCY: FREQUENCY: INTERMITTENT

## 2019-06-19 ASSESSMENT — PAIN DESCRIPTION - LOCATION: LOCATION: FOOT

## 2019-06-19 ASSESSMENT — PAIN DESCRIPTION - DESCRIPTORS: DESCRIPTORS: ACHING

## 2019-06-19 ASSESSMENT — PAIN SCALES - GENERAL
PAINLEVEL_OUTOF10: 5
PAINLEVEL_OUTOF10: 0

## 2019-06-19 ASSESSMENT — PAIN DESCRIPTION - PROGRESSION: CLINICAL_PROGRESSION: NOT CHANGED

## 2019-06-19 ASSESSMENT — PAIN DESCRIPTION - ONSET: ONSET: ON-GOING

## 2019-06-19 ASSESSMENT — PAIN - FUNCTIONAL ASSESSMENT: PAIN_FUNCTIONAL_ASSESSMENT: ACTIVITIES ARE NOT PREVENTED

## 2019-06-19 ASSESSMENT — PAIN DESCRIPTION - ORIENTATION: ORIENTATION: RIGHT

## 2019-06-19 ASSESSMENT — PAIN DESCRIPTION - PAIN TYPE: TYPE: ACUTE PAIN

## 2019-06-19 NOTE — PLAN OF CARE
Dermagraft Treatment Note    NAME:  Tory Earl  YOB: 1970  MEDICAL RECORD NUMBER:  [id number]  DATE:  6/19/2019    Goal:  Patient will receive safe and proper application of skin substitute. Patient will comply with caring for dressing, offloading and reporting complications. Expiration date of Dermagraft checked immediately prior to use. Package intact prior to use and no damage noted. Transport temperature controlled and acceptable. Dermagraft was prepared for application by removing from box and within 1 minute placing in thaw tub at a temperature of 34 to 37 degrees celsius until ice crystals were no longer present but no longer than 3 minutes. Dermagraft was rinsed 3 times in room temperature normal saline for 5 seconds each time. A 4th saline rinse was left on the Dermagraft until the physician was ready to apply it within 30 minutes of thawing. Dermagraft was applied to Right Plantar Foot and affixed with steri-strips by the provider. Dermagraft was covered with non-adherent dressing. Gauze and Kerlix was applied on top of non-adherent dressing. A foam plug cut to fit into ulcer was applied. Fluffed gauze was applied. Dressing was secured with cover roll type tape to stabilize graft. Coban or ace wrap was applied to secure graft and decrease edema. Patient/caregiver was instructed not to remove dressing and to keep it clean and dry. Pt/family/caregiver was instructed on signs and symptoms of complications to report such as draining through, falling down/slipping, getting wet, or severe pain or tingling in toes   Pt/family/caregiver was instructed on need for offloading and elevation of affected extremity and on use of prescribed offloading device.  Dermagraft may be applied a total of 8 times per wound over a 12 week period. Additionally Dermagraft may only be used every 12 months per wound.       Date of first application of Dermagraft for this current wound is May 29, 2019.                 Guidelines followed  Dermagraft 24 steps followed    Electronically signed by Kailey Conley RN on 6/19/2019 at 5:35 PM

## 2019-06-19 NOTE — PROGRESS NOTES
Right 4-9-2015    CHOLECYSTECTOMY      SD OFFICE/OUTPT VISIT,PROCEDURE ONLY Left 10/5/2018    AMPUTATION LEFT GREAT DIGIT performed by Jeremy Thurman DPM at [place] [address] OFFICE/OUTPT [address] [place] [address] Left 10/22/2018    INCISION AND DRAINAGE LEFT FOOT FIRST AND SECOND RAY AMPUTATION (DIGITS ONE, TWO AND METARSAL ONE AND TWO) performed by Jeremy Thurman DPM at [place] [address] OFFICE/OUTPT [address] [place], [place] [address]b [facility] Left 10/26/2018    LEFT FOOT WOUND DEBRIDEMENT WITH PRIMARY CLOSURE performed by Jeremy Thurman DPM at [place] [address] OFFICE/OUTPT [address] [place], [place] [address] [facility] Left 11/2/2018    INCISION AND DRAINAGE WITH TRANSMETATARSAL CONVERTED TO LIST MART  AMPUTATION LEFT FOOT performed by Jeremy Thurman DPM at [address] [place] [address] TOE AMPUTATION Left 10/05/2018    left great toe amputation       FAMILY HISTORY    Family History   Problem Relation Age of Onset    High Blood Pressure Mother        SOCIAL HISTORY    Social History     Tobacco Use    Smoking status: Never Smoker    Smokeless tobacco: Never Used   Substance Use Topics    Alcohol use: No    Drug use: No     Types: Marijuana     Comment: QUIT 4 MONTHS        ALLERGIES    No Known Allergies    MEDICATIONS    Current Outpatient Medications on File Prior to Encounter   Medication Sig Dispense Refill    HUMALOG 100 UNIT/ML injection vial   5    Dulaglutide (TRULICITY) 1.5 WB/8.8IS SOPN Inject 1.5 mg into the skin once a week 12 pen 0    atorvastatin (LIPITOR) 40 MG tablet Take 1 tablet by mouth every morning 30 tablet 0    insulin glargine (BASAGLAR KWIKPEN) 100 UNIT/ML injection pen Inject 15 Units into the skin nightly (Patient taking differently: Inject 30 Units into the skin every morning ) 5 pen 3    clopidogrel (PLAVIX) 75 MG tablet Take 1 tablet by mouth daily 30 tablet 3    metoprolol succinate (TOPROL XL) 25 MG extended release tablet Take 25 mg by mouth daily      vitamin B-12 (CYANOCOBALAMIN) 1000 MCG tablet Take 1,000 mcg by mouth daily  2    ranitidine (ZANTAC) 150 MG tablet Take 150 mg by mouth 2 times daily  1    Multiple Vitamins-Minerals (THERAPEUTIC MULTIVITAMIN-MINERALS) tablet Take 1 tablet by mouth daily      aspirin (ASPIRIN LOW DOSE) 81 MG EC tablet Take 1 tablet by mouth daily 30 tablet 0    lisinopril (PRINIVIL;ZESTRIL) 40 MG tablet Take 40 mg by mouth every morning       acetaminophen (AMINOFEN) 325 MG tablet Take 2 tablets by mouth every 6 hours as needed for Pain 60 tablet 0    Blood Glucose Monitoring Suppl (FREESTYLE LITE) INEZ 1 Device by Does not apply route 3 times daily (before meals) 1 Device 0    blood glucose test strips (FREESTYLE LITE) strip 1 each by In Vitro route 3 times daily As needed. 100 each 3    FREESTYLE LANCETS MISC 1 each by Does not apply route daily 100 each 3    Insulin Disposable Pump (V-GO 40) KIT by Does not apply route 6 CLICKS DAILY      Insulin Pen Needle (B-D UF III MINI PEN NEEDLES) 31G X 5 MM MISC 1 each by Does not apply route 4 times daily (before meals and nightly) 150 each 6     No current facility-administered medications on file prior to encounter. REVIEW OF SYSTEMS    Pertinent items are noted in HPI. Objective:      BP (!) 167/97   Pulse 87   Temp 97.2 °F (36.2 °C) (Oral)   Resp 16     Wt Readings from Last 3 Encounters:   05/01/19 255 lb 8.2 oz (115.9 kg)   03/21/19 251 lb (113.9 kg)   03/09/19 258 lb 2.5 oz (117.1 kg)       PHYSICAL EXAM    Patient is alert and oriented x 3, and is in no acute distress.     Vascular: DP weakly palpable bilateral. PT pulse not palpable bilateral but audible on doppler exam. No Pedal hair noted bilateral. No Edema noted to ankles.    Derm:  Skin is warm to warm from proximal leg to distal foot bilateral. Toenails 1-5 on right foot are thickened, yellow and dystrophic.   Neuro:  Protective sensation absent to 10/10 sites bilateral via a 5.07 [place] monofilament.    Musculoskeletal: Muscle strength 5/5 with PF/DF/I and E of both change.  [] Apply antifungal ointment to skin surrounding the wound prior to dressing change.  [] Apply thin film of moisture barrier ointment to skin immediately around wound. [] Other:       **May Resume using your custom shoe/brace on LEFT FOOT- check foot hourly for any breakdown and begin wearing gradually**    YOU HAVE HAD A DERMAGRAFT      PLACED ON YOUR WOUND TODAY. FOR BEST RESULTS, WE RECOMMEND YOU LIMIT YOUR ACTIVITY FOR THE NEXT WEEK AS MUCH AS POSSIBLE. AVOID LONG PERIODS OF TIME ON YOUR FEET. THIS ALSO INCLUDES ELEVATING YOUR LEGS AND FEET 3-4 TIMES DAILY FOR AT LEAST 20-30 MINUTES ON PILLOWS AND AT NIGHT SO THAT THEY ARE HIGHER THAN THE LEVEL OF YOUR HEART      Electronically signed by Lam Fraire RN on 6/19/2019 at 3:23 PM        **ATTEMPT TO MINIMIZE ANY WEIGHT BEARING IN RIGHT PLANTAR FOOT BY HEEL WEIGHT BEARING FOR ANY TRANSFERS**        Dressings : Wound Location : RIGHT PLANTAR FOOT  **Dermagraft #3 Applied 6/19/19**     Apply DERMAGRAFT, MEPITEL, STERI-STRIPS (APPLIED PER DR. Edmond Frazier). -DO NOT REMOVED DRESSING BELOW STERI-STRIPS**       COVER AND SECURED WITH GAUZE AND KERLIX (CHANGE GAUZE AND KERLIX ONLY)     CHANGE DRESSING ON Monday AND Friday- PATIENT WILL HAVE CHANGED IN WOUND CARE ON WEDNESDAY          Edema Control:  Apply:  [] Compression Stocking           []Right Leg         []Left Leg              [] Tubigrip          []Right Leg Double Layer          []Left Leg Double Layer                                                  []Right Leg Single Layer           []Left Leg Single Layer              [] SpandaGrip    []Right Leg         []Left Leg                                      []Low compression 5-10 mm/Hg                                                 []Medium compression 10-20 mm/Hg                                      []High compression  20-30 mm/Hg              every morning immediately when getting up should be applied to affected leg(s) from mid foot to knee making sure to cover the heel.  Remove every night before going to bed.              [] Elevate leg(s) above the level of the heart when sitting.                    [] Avoid prolonged standing in one place.        Compression:  Apply:  [] Multilayer Compression Wrap Applied in Clinic        []RightLeg          []Left Leg              [] Multi-layer compression.  Do not get leg(s) with wrap wet.  If wraps become too tight call the center or completely remove the wrap.                 [] Elevate leg(s) above the level of the heart when sitting.                    [] Avoid prolonged standing in one place.     Off-Loading:   [] Off-loading when        [] walking           [] in bed [] sitting  [] Total non-weight bearing  [] Right Leg  [] Left Leg          [x] Assistive Device         [] Walker            [] Cane   [x] Wheelchair      [] Crutches              [] Surgical shoe    [] Podus Boot(s)   [] Foam Boot(s)  [] Roll About              [] Cast Boot       [] CROW Boot  [] Other:                   Dietary:  [] Diet as tolerated:        [x] Calorie Diabetic Diet: [] No Added Salt:  [] Increase Protein:        [] Other:     Activity:  [x] Activity as tolerated:  [] Patient has no activity restrictions     [] Strict Bedrest:            [] Remain off Work:     [] May return to full duty work:                                         [] [employer] to work with restrictions:          If you are still having pain after you go home:  [X] Elevate the affected limb.            [X]Use over-the-counter medications you would normally use for pain as permitted by your family doctor.  [X] For persistent pain not relieved by the above interventions, please call your family doctor.                Return Appointment:  [] Wound and dressing supply provider:   [x] ECF or Home Healthcare: [facility]  [] Wound Assessment:  [] Physician or NP scheduled for Wound Assessment:   [x] Return Appointment: Jade THORPE  in  1 Week(s)  [] Ordered tests:    Nurse Case LilianaRobert Sarita  Electronically signed by Alberto Gale RN on 6/19/2019 at 3:23 PM  [address] [place] [address] Information: Should you experience any significant changes in your wound(s) or have questions about your wound care, please contact the [address] [place] [address] [phone] [facility] - THURSDAY 8:30 am - 4:30 pm and Friday 8:30 am - 1:00 pm.  If you need help with your wound outside these hours and cannot wait until we are again available, contact your PCP or go to the hospital emergency room.      PLEASE NOTE: IF YOU ARE UNABLE TO OBTAIN WOUND SUPPLIES, CONTINUE TO USE THE SUPPLIES YOU HAVE AVAILABLE UNTIL YOU ARE ABLE TO [facility].  IT IS MOST IMPORTANT TO KEEP THE WOUND COVERED AT ALL TIMES.     Physician Signature:_______________________     Date: ___________ Time:  ____________                    [] Dr Sharif Breen    [] Dr Brianna Pickens CNP                 [x] Dr Anthony Vega   [] Dr Sharif Riley                  [] Dr Elvin Loya   [] Srinivasan Coulter NP                Electronically signed by Tj Rojas DPM on 6/19/2019 at 5:32 PM

## 2019-06-20 ENCOUNTER — TELEPHONE (OUTPATIENT)
Dept: WOUND CARE | Age: 49
End: 2019-06-20

## 2019-06-20 DIAGNOSIS — E11.51 DIABETES MELLITUS WITH PERIPHERAL CIRCULATORY DISORDER (HCC): ICD-10-CM

## 2019-06-20 DIAGNOSIS — E11.9 DIABETES MELLITUS TYPE 2, INSULIN DEPENDENT (HCC): ICD-10-CM

## 2019-06-20 DIAGNOSIS — E11.51 TYPE 2 DIABETES MELLITUS WITH ATHEROSCLEROSIS OF NATIVE ARTERIES OF EXTREMITY WITH REST PAIN (HCC): ICD-10-CM

## 2019-06-20 DIAGNOSIS — Z79.4 DIABETES MELLITUS TYPE 2, INSULIN DEPENDENT (HCC): ICD-10-CM

## 2019-06-20 DIAGNOSIS — I70.229 TYPE 2 DIABETES MELLITUS WITH ATHEROSCLEROSIS OF NATIVE ARTERIES OF EXTREMITY WITH REST PAIN (HCC): ICD-10-CM

## 2019-06-20 LAB
A/G RATIO: 1.1 (ref 1.1–2.2)
ALBUMIN SERPL-MCNC: 3.9 G/DL (ref 3.4–5)
ALP BLD-CCNC: 97 U/L (ref 40–129)
ALT SERPL-CCNC: 52 U/L (ref 10–40)
ANION GAP SERPL CALCULATED.3IONS-SCNC: 15 MMOL/L (ref 3–16)
AST SERPL-CCNC: 26 U/L (ref 15–37)
BILIRUB SERPL-MCNC: 0.3 MG/DL (ref 0–1)
BUN BLDV-MCNC: 17 MG/DL (ref 7–20)
CALCIUM SERPL-MCNC: 9.8 MG/DL (ref 8.3–10.6)
CHLORIDE BLD-SCNC: 101 MMOL/L (ref 99–110)
CHOLESTEROL, FASTING: 185 MG/DL (ref 0–199)
CO2: 25 MMOL/L (ref 21–32)
CREAT SERPL-MCNC: 0.7 MG/DL (ref 0.9–1.3)
GFR AFRICAN AMERICAN: >60
GFR NON-AFRICAN AMERICAN: >60
GLOBULIN: 3.7 G/DL
GLUCOSE BLD-MCNC: 243 MG/DL (ref 70–99)
HDLC SERPL-MCNC: 50 MG/DL (ref 40–60)
LDL CHOLESTEROL CALCULATED: 112 MG/DL
POTASSIUM SERPL-SCNC: 4.5 MMOL/L (ref 3.5–5.1)
SODIUM BLD-SCNC: 141 MMOL/L (ref 136–145)
TOTAL PROTEIN: 7.6 G/DL (ref 6.4–8.2)
TRIGLYCERIDE, FASTING: 115 MG/DL (ref 0–150)
VLDLC SERPL CALC-MCNC: 23 MG/DL

## 2019-06-20 NOTE — TELEPHONE ENCOUNTER
Patient called to clinic late this AM at [phone], left message on machine voicing concerns about possible drainage from left TMA site after wearing prosthetic shoe for several hours. Returned call to patient this AM 6/20/19 at 0845, patient states he spoke with Dr. Corinne Generous last night regarding his concerns and received instructions from Dr. Corinne Generous to stop wearing his shoe, continue wearing surgical shoe until he is seen next Wednesday 6/26/19. Patient voices no further concerns at this time. Will plan to follow up next Wednesday and will call Wound Care for any further questions or concerns.    Electronically signed by Alberto Gale RN on 6/20/2019 at 10:15 AM

## 2019-06-21 LAB
ESTIMATED AVERAGE GLUCOSE: 266.1 MG/DL
HBA1C MFR BLD: 10.9 %

## 2019-06-24 ENCOUNTER — OFFICE VISIT (OUTPATIENT)
Dept: ENDOCRINOLOGY | Age: 49
End: 2019-06-24
Payer: COMMERCIAL

## 2019-06-24 VITALS — DIASTOLIC BLOOD PRESSURE: 90 MMHG | OXYGEN SATURATION: 100 % | HEART RATE: 88 BPM | SYSTOLIC BLOOD PRESSURE: 154 MMHG

## 2019-06-24 DIAGNOSIS — I70.229 TYPE 2 DIABETES MELLITUS WITH ATHEROSCLEROSIS OF NATIVE ARTERIES OF EXTREMITY WITH REST PAIN (HCC): ICD-10-CM

## 2019-06-24 DIAGNOSIS — E11.51 DIABETES MELLITUS WITH PERIPHERAL CIRCULATORY DISORDER (HCC): ICD-10-CM

## 2019-06-24 DIAGNOSIS — E11.51 DIABETES MELLITUS WITH PERIPHERAL CIRCULATORY DISORDER (HCC): Primary | ICD-10-CM

## 2019-06-24 DIAGNOSIS — Z79.4 DIABETES MELLITUS TYPE 2, INSULIN DEPENDENT (HCC): ICD-10-CM

## 2019-06-24 DIAGNOSIS — Z79.4 TYPE 2 DIABETES MELLITUS WITH DIABETIC NEPHROPATHY, WITH LONG-TERM CURRENT USE OF INSULIN (HCC): ICD-10-CM

## 2019-06-24 DIAGNOSIS — E11.21 TYPE 2 DIABETES MELLITUS WITH DIABETIC NEPHROPATHY, WITH LONG-TERM CURRENT USE OF INSULIN (HCC): ICD-10-CM

## 2019-06-24 DIAGNOSIS — E11.69 DYSLIPIDEMIA ASSOCIATED WITH TYPE 2 DIABETES MELLITUS (HCC): ICD-10-CM

## 2019-06-24 DIAGNOSIS — I10 ESSENTIAL HYPERTENSION: ICD-10-CM

## 2019-06-24 DIAGNOSIS — E11.9 DIABETES MELLITUS TYPE 2, INSULIN DEPENDENT (HCC): ICD-10-CM

## 2019-06-24 DIAGNOSIS — E78.5 DYSLIPIDEMIA ASSOCIATED WITH TYPE 2 DIABETES MELLITUS (HCC): ICD-10-CM

## 2019-06-24 DIAGNOSIS — E11.51 TYPE 2 DIABETES MELLITUS WITH ATHEROSCLEROSIS OF NATIVE ARTERIES OF EXTREMITY WITH REST PAIN (HCC): ICD-10-CM

## 2019-06-24 LAB
CREATININE URINE: 135.6 MG/DL (ref 39–259)
MICROALBUMIN UR-MCNC: 49 MG/DL
MICROALBUMIN/CREAT UR-RTO: 361.4 MG/G (ref 0–30)

## 2019-06-24 PROCEDURE — G8417 CALC BMI ABV UP PARAM F/U: HCPCS | Performed by: INTERNAL MEDICINE

## 2019-06-24 PROCEDURE — 2022F DILAT RTA XM EVC RTNOPTHY: CPT | Performed by: INTERNAL MEDICINE

## 2019-06-24 PROCEDURE — 3017F COLORECTAL CA SCREEN DOC REV: CPT | Performed by: INTERNAL MEDICINE

## 2019-06-24 PROCEDURE — G8598 ASA/ANTIPLAT THER USED: HCPCS | Performed by: INTERNAL MEDICINE

## 2019-06-24 PROCEDURE — 1036F TOBACCO NON-USER: CPT | Performed by: INTERNAL MEDICINE

## 2019-06-24 PROCEDURE — 3046F HEMOGLOBIN A1C LEVEL >9.0%: CPT | Performed by: INTERNAL MEDICINE

## 2019-06-24 PROCEDURE — G8427 DOCREV CUR MEDS BY ELIG CLIN: HCPCS | Performed by: INTERNAL MEDICINE

## 2019-06-24 PROCEDURE — 99214 OFFICE O/P EST MOD 30 MIN: CPT | Performed by: INTERNAL MEDICINE

## 2019-06-24 NOTE — PROGRESS NOTES
Patient ID:   Addie Carrington is a 47 y.o. male  Chief Complaint:   Addie Carrington presents for an evaluation of Type 2 Diabetes Mellitus , Hyperlipidemia and hypertension. Subjective:   Type 2 Diabetes Mellitus diagnosed in 2004  On insulin since 2011  Metformin caused diarrhea- does not want to try it   Saw Dr. Nuria Sims and Karla Joy in Dec 2015     Basaglar 30 units daily in am    VGO 40, 6 clicks for breakfast, lunch and dinner. Changes daily at noon. Trulicity 6.9HZ weekly     Checks blood sugars 3 times per day. Reportedly in 100's , last reading 214   AM:   Lunch:  Supper:   HS:     Hypoglycemias: None     Meals: Three, dinner is bigger. Snacks 1-2 per day. (crackers, fruits). No sodas. Exercise: None , wheelchair bound     Denies chest pain, exertional dyspnea. S/p revascularization of lower legs by Dr. Lawanda Riedel March 2019   Denies smoking/alcohol.    Currently on ASA and Plavix      The following portions of the patient's history were reviewed and updated as appropriate:       Family History   Problem Relation Age of Onset    High Blood Pressure Mother          Social History     Socioeconomic History    Marital status: Single     Spouse name: Not on file    Number of children: Not on file    Years of education: Not on file    Highest education level: Not on file   Occupational History    Not on file   Social Needs    Financial resource strain: Not on file    Food insecurity:     Worry: Not on file     Inability: Not on file    Transportation needs:     Medical: Not on file     Non-medical: Not on file   Tobacco Use    Smoking status: Never Smoker    Smokeless tobacco: Never Used   Substance and Sexual Activity    Alcohol use: No    Drug use: No     Types: Marijuana     Comment: QUIT 4 MONTHS     Sexual activity: Not on file   Lifestyle    Physical activity:     Days per week: Not on file     Minutes per session: Not on file    Stress: Not on file   Relationships    Social connections:     Talks on phone: Not on file     Gets together: Not on file     Attends Worship service: Not on file     Active member of club or organization: Not on file     Attends meetings of clubs or organizations: Not on file     Relationship status: Not on file    Intimate partner violence:     Fear of current or ex partner: Not on file     Emotionally abused: Not on file     Physically abused: Not on file     Forced sexual activity: Not on file   Other Topics Concern    Not on file   Social History Narrative    Not on file       Past Medical History:   Diagnosis Date    Angina effort     Arthritis     CAD (coronary artery disease)     Carotid stenosis     Diabetes mellitus with neurological manifestations, uncontrolled ([facility] [place] [address]     Diabetic foot ulcer ([facility] [place] [address] 3/13/2019    Diarrhea     DEAN (dyspnea on exertion) 1/27/2015    Hyperlipidemia     Hypertension     Obesity     Type II or unspecified type diabetes mellitus without mention of complication, not stated as uncontrolled        Past Surgical History:   Procedure Laterality Date    CARDIAC CATHETERIZATION      CAROTID ENDARTERECTOMY Right 4-9-2015    CHOLECYSTECTOMY      LA OFFICE/OUTPT VISIT,PROCEDURE ONLY Left 10/5/2018    AMPUTATION LEFT GREAT DIGIT performed by Yee Greenberg DPM at [place] [address] OFFICE/OUTPT [address] [place] [address] Left 10/22/2018    INCISION AND DRAINAGE LEFT FOOT FIRST AND SECOND RAY AMPUTATION (DIGITS ONE, TWO AND METARSAL ONE AND TWO) performed by Yee Greenberg DPM at [place] [address] OFFICE/OUTPT [address] [place] [address] Left 10/26/2018    LEFT FOOT WOUND DEBRIDEMENT WITH PRIMARY CLOSURE performed by Yee Greenberg DPM at [place] [address] OFFICE/OUTPT [address] [place] [address] Left 11/2/2018    INCISION AND DRAINAGE WITH TRANSMETATARSAL CONVERTED TO LIST [address] performed by Yee Greenberg DPM at [place] [address] Left 10/05/2018    left great toe amputation       No Known Allergies      Current Outpatient Medications:     acetaminophen (AMINOFEN) 325 MG tablet, Take 2 tablets by mouth every 6 hours as needed for Pain, Disp: 60 tablet, Rfl: 0    HUMALOG 100 UNIT/ML injection vial, , Disp: , Rfl: 5    Dulaglutide (TRULICITY) 1.5 ZP/6.3EZ SOPN, Inject 1.5 mg into the skin once a week, Disp: 12 pen, Rfl: 0    atorvastatin (LIPITOR) 40 MG tablet, Take 1 tablet by mouth every morning, Disp: 30 tablet, Rfl: 0    insulin glargine (BASAGLAR KWIKPEN) 100 UNIT/ML injection pen, Inject 15 Units into the skin nightly (Patient taking differently: Inject 30 Units into the skin every morning ), Disp: 5 pen, Rfl: 3    clopidogrel (PLAVIX) 75 MG tablet, Take 1 tablet by mouth daily, Disp: 30 tablet, Rfl: 3    metoprolol succinate (TOPROL XL) 25 MG extended release tablet, Take 25 mg by mouth daily, Disp: , Rfl:     vitamin B-12 (CYANOCOBALAMIN) 1000 MCG tablet, Take 1,000 mcg by mouth daily, Disp: , Rfl: 2    ranitidine (ZANTAC) 150 MG tablet, Take 150 mg by mouth 2 times daily, Disp: , Rfl: 1    Multiple Vitamins-Minerals (THERAPEUTIC MULTIVITAMIN-MINERALS) tablet, Take 1 tablet by mouth daily, Disp: , Rfl:     Blood Glucose Monitoring Suppl (FREESTYLE LITE) INEZ, 1 Device by Does not apply route 3 times daily (before meals), Disp: 1 Device, Rfl: 0    blood glucose test strips (FREESTYLE LITE) strip, 1 each by In Vitro route 3 times daily As needed. , Disp: 100 each, Rfl: 3    FREESTYLE LANCETS MISC, 1 each by Does not apply route daily, Disp: 100 each, Rfl: 3    Insulin Disposable Pump (V-GO 40) KIT, by Does not apply route 6 CLICKS DAILY, Disp: , Rfl:     aspirin (ASPIRIN LOW DOSE) 81 MG EC tablet, Take 1 tablet by mouth daily, Disp: 30 tablet, Rfl: 0    Insulin Pen Needle (B-D UF III MINI PEN NEEDLES) 31G X 5 MM MISC, 1 each by Does not apply route 4 times daily (before meals and nightly), Disp: 150 each, Rfl: 6    lisinopril (PRINIVIL;ZESTRIL) 40 MG tablet, Take 40 mg by mouth every morning , Disp: , Rfl:       Review of Systems:    Constitutional: Negative for fever, chills, and unexpected weight change. HENT: Negative for congestion, ear pain, rhinorrhea,  sore throat and trouble swallowing. Eyes: Negative for photophobia, redness, itching. Respiratory: Negative for cough, shortness of breath and sputum. Cardiovascular: Negative for chest pain, palpitations and leg swelling. Gastrointestinal: Negative for nausea, vomiting, abdominal pain, diarrhea, constipation. Endocrine: Negative for cold intolerance, heat intolerance, polydipsia, polyphagia and polyuria. Genitourinary: Negative for dysuria, urgency, frequency, hematuria and flank pain. Musculoskeletal: Negative for myalgias, back pain, arthralgias and neck pain. Skin/Nail: Negative for rash, itching. Normal nails. Neurological: Negative for seizures, weakness, light-headedness, numbness and headaches. Hematological/ Lymph nodes: Negative for adenopathy. Does not bruise/bleed easily. Psychiatric/Behavioral: Negative for suicidal ideas, depression, anxiety, sleep disturbance and decreased concentration. Objective:   Physical Exam:  BP (!) 147/92 (Site: Left Upper Arm, Position: Sitting, Cuff Size: Medium Adult)   Pulse 88   SpO2 100%   Constitutional: Patient is oriented to person, place, and time. Patient appears well-developed and well-nourished. HENT:    Head: Normocephalic and atraumatic. Eyes: Conjunctivae and EOM are normal.     Neck: Normal range of motion. Cardiovascular: Normal rate, regular rhythm and normal heart sounds. Pulmonary/Chest: Effort normal and breath sounds normal.   Musculoskeletal: Normal range of motion. Left foot dressed and attached to wound vac. Neurological: Patient is alert and oriented to person, place, and time. Skin: Skin is warm and dry. Psychiatric: Patient has a normal mood and affect.  Patient behavior is normal.     Lab Review:    Orders Only on 06/20/2019   Component 06/01/2019 70.6  % Final    Lymphocytes % 06/01/2019 21.7  % Final    Monocytes % 06/01/2019 5.1  % Final    Eosinophils % 06/01/2019 2.2  % Final    Basophils % 06/01/2019 0.4  % Final    Neutrophils # 06/01/2019 7.3  1.7 - 7.7 K/uL Final    Lymphocytes # 06/01/2019 2.2  1.0 - 5.1 K/uL Final    Monocytes # 06/01/2019 0.5  0.0 - 1.3 K/uL Final    Eosinophils # 06/01/2019 0.2  0.0 - 0.6 K/uL Final    Basophils # 06/01/2019 0.0  0.0 - 0.2 K/uL Final    Sodium 06/01/2019 135* 136 - 145 mmol/L Final    Potassium reflex Magnesium 06/01/2019 4.2  3.5 - 5.1 mmol/L Final    Chloride 06/01/2019 101  99 - 110 mmol/L Final    CO2 06/01/2019 24  21 - 32 mmol/L Final    Anion Gap 06/01/2019 10  3 - 16 Final    Glucose 06/01/2019 183* 70 - 99 mg/dL Final    BUN 06/01/2019 21* 7 - 20 mg/dL Final    CREATININE 06/01/2019 0.7* 0.9 - 1.3 mg/dL Final    GFR Non- 06/01/2019 >60  >60 Final    GFR  06/01/2019 >60  >60 Final    Calcium 06/01/2019 9.9  8.3 - 10.6 mg/dL Final    Sed Rate 06/01/2019 51* 0 - 20 mm/Hr Final   Office Visit on 04/29/2019   Component Date Value Ref Range Status    Diabetic Retinopathy 04/18/2018 Positive   Final   Hospital Outpatient Visit on 03/21/2019   Component Date Value Ref Range Status    WBC 03/21/2019 10.9  4.0 - 11.0 K/uL Final    RBC 03/21/2019 4.54  4.20 - 5.90 M/uL Final    Hemoglobin 03/21/2019 12.3* 13.5 - 17.5 g/dL Final    Hematocrit 03/21/2019 37.6* 40.5 - 52.5 % Final    MCV 03/21/2019 82.9  80.0 - 100.0 fL Final    MCH 03/21/2019 27.0  26.0 - 34.0 pg Final    MCHC 03/21/2019 32.6  31.0 - 36.0 g/dL Final    RDW 03/21/2019 14.1  12.4 - 15.4 % Final    Platelets 64/84/1044 296  135 - 450 K/uL Final    MPV 03/21/2019 8.5  5.0 - 10.5 fL Final    POC Sodium 03/21/2019 137  136 - 145 mmol/L Final    POC Potassium 03/21/2019 4.4  3.5 - 5.1 mmol/L Final    POC Chloride 03/21/2019 102  99 - 110 mmol/L Final    POC Glucose 03/21/2019 258* 70 - 99 mg/dl Final    POC Creatinine 03/21/2019 0.9  0.9 - 1.3 mg/dL Final    GFR Non- 03/21/2019 >60  >60 Final    GFR  03/21/2019 >60  >60 Final    Calcium, Ion 03/21/2019 1.20  1. 12 - 1.32 mmol/L Final    Sample Type 03/21/2019 COURTNEY   Final    Performed on 03/21/2019 SEE BELOW   Final    POC ACT LR 03/21/2019 271  Not Established sec Final   Admission on 03/06/2019, Discharged on 03/09/2019   Component Date Value Ref Range Status    WBC 03/06/2019 9.0  4.0 - 11.0 K/uL Final    RBC 03/06/2019 4.46  4.20 - 5.90 M/uL Final    Hemoglobin 03/06/2019 12.2* 13.5 - 17.5 g/dL Final    Hematocrit 03/06/2019 37.1* 40.5 - 52.5 % Final    MCV 03/06/2019 83.2  80.0 - 100.0 fL Final    MCH 03/06/2019 27.4  26.0 - 34.0 pg Final    MCHC 03/06/2019 32.9  31.0 - 36.0 g/dL Final    RDW 03/06/2019 14.5  12.4 - 15.4 % Final    Platelets 80/10/5919 252  135 - 450 K/uL Final    MPV 03/06/2019 8.7  5.0 - 10.5 fL Final    Neutrophils % 03/06/2019 69.5  % Final    Lymphocytes % 03/06/2019 22.2  % Final    Monocytes % 03/06/2019 6.6  % Final    Eosinophils % 03/06/2019 1.3  % Final    Basophils % 03/06/2019 0.4  % Final    Neutrophils # 03/06/2019 6.2  1.7 - 7.7 K/uL Final    Lymphocytes # 03/06/2019 2.0  1.0 - 5.1 K/uL Final    Monocytes # 03/06/2019 0.6  0.0 - 1.3 K/uL Final    Eosinophils # 03/06/2019 0.1  0.0 - 0.6 K/uL Final    Basophils # 03/06/2019 0.0  0.0 - 0.2 K/uL Final    Lactic Acid 03/06/2019 0.9  0.4 - 2.0 mmol/L Final    Sodium 03/06/2019 134* 136 - 145 mmol/L Final    Potassium 03/06/2019 4.6  3.5 - 5.1 mmol/L Final    Chloride 03/06/2019 97* 99 - 110 mmol/L Final    CO2 03/06/2019 24  21 - 32 mmol/L Final    Anion Gap 03/06/2019 13  3 - 16 Final    Glucose 03/06/2019 381* 70 - 99 mg/dL Final    BUN 03/06/2019 26* 7 - 20 mg/dL Final    CREATININE 03/06/2019 0.8* 0.9 - 1.3 mg/dL Final    GFR Non- 03/06/2019 >60  >60 Final    GFR  03/06/2019 >60  >60 Final    Calcium 03/06/2019 9.5  8.3 - 10.6 mg/dL Final    Protime 03/06/2019 11.3  9.8 - 13.0 sec Final    INR 03/06/2019 0.99  0.86 - 1.14 Final    aPTT 03/06/2019 38.6* 26.0 - 36.0 sec Final    Hemoglobin A1C 03/06/2019 14.4  See comment % Final    eAG 03/06/2019 366.6  mg/dL Final    POC Glucose 03/06/2019 295* 70 - 99 mg/dl Final    Performed on 03/06/2019 ACCU-CHEK   Final    Sodium 03/07/2019 134* 136 - 145 mmol/L Final    Potassium reflex Magnesium 03/07/2019 4.1  3.5 - 5.1 mmol/L Final    Chloride 03/07/2019 97* 99 - 110 mmol/L Final    CO2 03/07/2019 24  21 - 32 mmol/L Final    Anion Gap 03/07/2019 13  3 - 16 Final    Glucose 03/07/2019 366* 70 - 99 mg/dL Final    BUN 03/07/2019 21* 7 - 20 mg/dL Final    CREATININE 03/07/2019 0.7* 0.9 - 1.3 mg/dL Final    GFR Non- 03/07/2019 >60  >60 Final    GFR  03/07/2019 >60  >60 Final    Calcium 03/07/2019 9.3  8.3 - 10.6 mg/dL Final    WBC 03/07/2019 8.7  4.0 - 11.0 K/uL Final    RBC 03/07/2019 4.08* 4.20 - 5.90 M/uL Final    Hemoglobin 03/07/2019 11.3* 13.5 - 17.5 g/dL Final    Hematocrit 03/07/2019 33.9* 40.5 - 52.5 % Final    MCV 03/07/2019 83.1  80.0 - 100.0 fL Final    MCH 03/07/2019 27.7  26.0 - 34.0 pg Final    MCHC 03/07/2019 33.3  31.0 - 36.0 g/dL Final    RDW 03/07/2019 14.7  12.4 - 15.4 % Final    Platelets 21/03/5404 240  135 - 450 K/uL Final    MPV 03/07/2019 9.0  5.0 - 10.5 fL Final    aPTT 03/06/2019 43.5* 26.0 - 36.0 sec Final    POC Glucose 03/06/2019 377* 70 - 99 mg/dl Final    Performed on 03/06/2019 ACCU-CHEK   Final    aPTT 03/07/2019 72.3* 26.0 - 36.0 sec Final    aPTT 03/07/2019 71.1* 26.0 - 36.0 sec Final    POC Glucose 03/07/2019 343* 70 - 99 mg/dl Final    Performed on 03/07/2019 ACCU-CHEK   Final    POC Glucose 03/07/2019 321* 70 - 99 mg/dl Final    Performed on 03/07/2019 ACCU-CHEK   Final    POC Glucose 03/07/2019 242* 70 - 99 mg/dl Date Value Ref Range Status    CRP 11/19/2018 12.9* 0.0 - 5.1 mg/L Final   Orders Only on 11/19/2018   Component Date Value Ref Range Status    Sodium 11/19/2018 135* 136 - 145 mmol/L Final    Potassium 11/19/2018 4.9  3.5 - 5.1 mmol/L Final    Chloride 11/19/2018 93* 99 - 110 mmol/L Final    CO2 11/19/2018 20* 21 - 32 mmol/L Final    Anion Gap 11/19/2018 22* 3 - 16 Final    Glucose 11/19/2018 409* 70 - 99 mg/dL Final    BUN 11/19/2018 20  7 - 20 mg/dL Final    CREATININE 11/19/2018 0.9  0.9 - 1.3 mg/dL Final    GFR Non- 11/19/2018 >60  >60 Final    GFR  11/19/2018 >60  >60 Final    Calcium 11/19/2018 9.5  8.3 - 10.6 mg/dL Final    Total Protein 11/19/2018 8.1  6.4 - 8.2 g/dL Final    Alb 11/19/2018 3.5  3.4 - 5.0 g/dL Final    Albumin/Globulin Ratio 11/19/2018 0.8* 1.1 - 2.2 Final    Total Bilirubin 11/19/2018 0.3  0.0 - 1.0 mg/dL Final    Alkaline Phosphatase 11/19/2018 125  40 - 129 U/L Final    ALT 11/19/2018 31  10 - 40 U/L Final    AST 11/19/2018 41* 15 - 37 U/L Final    Globulin 11/19/2018 4.6  g/dL Final   Orders Only on 11/12/2018   Component Date Value Ref Range Status    CRP 11/12/2018 33.6* 0.0 - 5.1 mg/L Final   Orders Only on 11/12/2018   Component Date Value Ref Range Status    Sodium 11/12/2018 139  136 - 145 mmol/L Final    Potassium 11/12/2018 4.1  3.5 - 5.1 mmol/L Final    Chloride 11/12/2018 93* 99 - 110 mmol/L Final    CO2 11/12/2018 27  21 - 32 mmol/L Final    Anion Gap 11/12/2018 19* 3 - 16 Final    Glucose 11/12/2018 360* 70 - 99 mg/dL Final    BUN 11/12/2018 13  7 - 20 mg/dL Final    CREATININE 11/12/2018 0.8* 0.9 - 1.3 mg/dL Final    GFR Non- 11/12/2018 >60  >60 Final    GFR  11/12/2018 >60  >60 Final    Calcium 11/12/2018 9.3  8.3 - 10.6 mg/dL Final    Total Protein 11/12/2018 8.2  6.4 - 8.2 g/dL Final    Alb 11/12/2018 3.5  3.4 - 5.0 g/dL Final    Albumin/Globulin Ratio 11/12/2018 0.7* 1.1 - 2.2 Final    Total Bilirubin 11/12/2018 <0.2  0.0 - 1.0 mg/dL Final    Alkaline Phosphatase 11/12/2018 122  40 - 129 U/L Final    ALT 11/12/2018 18  10 - 40 U/L Final    AST 11/12/2018 16  15 - 37 U/L Final    Globulin 11/12/2018 4.7  g/dL Final   There may be more visits with results that are not included. Assessment and Plan     Lia Hunter was seen today for diabetes. Diagnoses and all orders for this visit:    Diabetes mellitus with peripheral circulatory disorder ([facility] [address]  -     Microalbumin / Creatinine Urine Ratio; Future    Type 2 diabetes mellitus with atherosclerosis of native arteries of extremity with rest pain (HCC)  -     Hemoglobin A1C; Future  -     Microalbumin / Creatinine Urine Ratio; Future    Diabetes mellitus type 2, insulin dependent (HCC)  -     Hemoglobin A1C; Future    Dyslipidemia associated with type 2 diabetes mellitus ([facility] [address]    Essential hypertension    Type 2 diabetes mellitus with diabetic nephropathy, with long-term current use of insulin ([facility] [address]          1: Type 2 DM complicated left foot ulcer s/p metatarsal amputation, nephropathy, retinopathy, peripheral atherosclerosis   Uncontrolled A1C 10.9% June 2019 << 14.4%  Uncontrolled diabetes for a long time   A1C of <8 would be acceptable because of microvascular and macrovascular complications. Need to see blood sugars before making any change     Trulicity 4.3PR weekly   C/w Basaglar to 30 units daily in am   Keep VGO 40, 6 clicks with each meal     All instructions provided in written. Check Blood sugars 3-4 times per day. Log them along with insulin and send them every 2 weeks. Call for blood sugars less than 60 or more than 400. Eye exam: Last exam in June 2019. Has diabetic retinopathy.     Foot exam: Due March 2020   Deformity/amputation:  transmetatarsal amputation of the left foot on 11/02/2018  Skin lesions/pre-ulcerative calluses: absent  Edema: right- negative, left- negative  Sensory exam: Monofilament sensation: normal  Pulses: normal, Vibration (128 Hz): Intact    Renal screen: high Dec 2015    TSH screen: Oct 2018     2: HTN   High, took meds late today   Adherence addressed     3: Hyperlipidemia   LDL: 112 , HDL: 50 , TGs: 115   Atorvastatin 40mg daily     4:Morbid obesity   Need to work on diet, exercise and lose weight     MACr today     RTC in 3 months with A1C     EDUCATION:   Greater than 50% of this follow-up visit was spent in general counseling regarding   obesity, diet, exercise, importance of adherence to insulin regime, recognition and treatment of hypo and hyperglycemia,  glucose logging, proper diabetes management, diabetic complications with poor management and the importance of glycemic control in order to avoid the complications of diabetes. Risks and potential complications of diabetes were reviewed with the patient. Diabetes health maintenance plan and follow-up were discussed and understood by the patient. We reviewed the importance of medication compliance and regular follow-up. Aggressive lifestyle modification was encouraged. Exercise Counselling:   This patient is hannah a candidate for regular physical exercise    Electronically signed by Katya Cedillo MD on 6/24/2019 at 10:38 AM

## 2019-06-24 NOTE — PATIENT INSTRUCTIONS
Patient Education        Learning About Foot and Toenail Care  Foot and toenail care: Overview  Checking your loved one's feet and keeping them clean and soft can help prevent cracks and infection in the skin. This is especially important for people who have diabetes. Keeping toenails trimmed--and polished if that's what the person likes--also helps the person feel well-groomed. If the person you care for has diabetes or has foot problems, such as bad bunions and corns, think about taking them to see a podiatrist. This is a doctor who specializes in the care of the feet. Sometimes a podiatrist will come to the home if the person can't go out for visits. Try to take the person for salon pedicures if that is what they want. It's a chance to get out and see people and continue a favorite activity. You can do basic nail care at home. Usually all you need to do is keep the nails clean and at a safe length. How do you trim someone's toenails? Try to trim the person's nails every week. Or check the nails each week to see if they need to be trimmed. It's easiest to trim nails after the person has had a shower or foot bath. It makes the nails softer and easier to trim. Start by gathering your supplies. You will need toenail clippers and a nail file. You may also need nail polish and nail polish remover. To trim the nails:  1. Wash and dry your hands. You don't need to wear gloves. 2. Use nail polish remover to take off any polish. 3. Hold the person's foot and toe steady with one hand while you trim the nail with your other hand. Trim the nails straight across. Leave the nails a little longer at the corners so that the sharp ends don't cut into the skin. 4. Keep the nails no longer than the tip of the toes. 5. Let the nails dry if they are still damp and soft. 6. Use a nail file to gently smooth the edges of the nails, especially at the corners. They may be sharp after the nails are cut straight.   7. Apply nail especially important for people who have diabetes. Keeping toenails trimmed--and polished if that's what the person likes--also helps the person feel well-groomed. If the person you care for has diabetes or has foot problems, such as bad bunions and corns, think about taking them to see a podiatrist. This is a doctor who specializes in the care of the feet. Sometimes a podiatrist will come to the home if the person can't go out for visits. Try to take the person for salon pedicures if that is what they want. It's a chance to get out and see people and continue a favorite activity. You can do basic nail care at home. Usually all you need to do is keep the nails clean and at a safe length. How do you trim someone's toenails? Try to trim the person's nails every week. Or check the nails each week to see if they need to be trimmed. It's easiest to trim nails after the person has had a shower or foot bath. It makes the nails softer and easier to trim. Start by gathering your supplies. You will need toenail clippers and a nail file. You may also need nail polish and nail polish remover. To trim the nails:  8. Wash and dry your hands. You don't need to wear gloves. 9. Use nail polish remover to take off any polish. 10. Hold the person's foot and toe steady with one hand while you trim the nail with your other hand. Trim the nails straight across. Leave the nails a little longer at the corners so that the sharp ends don't cut into the skin. 11. Keep the nails no longer than the tip of the toes. 12. Let the nails dry if they are still damp and soft. 13. Use a nail file to gently smooth the edges of the nails, especially at the corners. They may be sharp after the nails are cut straight. 14. Apply nail polish, if the person wants it. If the person's nails are thick and discolored, it may be safest to have a podiatrist cut them. What else do you need to know?   When you're caring for someone's nails, it is important to remember not to trim or cut the cuticles. A minor cut in a cuticle could lead to an infection. Wash the feet daily in the shower or bath or in a basin made for washing feet. It's extra important to wash the feet carefully if the person has diabetes. After washing the feet, dry gently. Put lotion on the feet, especially on the heels. But don't put it between the toes. If the person doesn't have diabetes and you see signs of athlete's foot (such as dry, cracking, or itchy skin between the toes), you can try an over-the-counter medicine. These medicines can kill the fungus that causes athlete's foot. If the problem doesn't go away, talk to the person's doctor. Look every day for cuts or signs of infection, such as pain, swelling, redness, or warmth. If you see any of these signs--especially in someone who has diabetes--call the doctor. Where can you learn more? Go to [email or web address] and sign in to your [employer] account. Enter A742 in the Into The Gloss box to learn more about \"Learning About Foot and Toenail Care. \"     If you do not have an account, please click on the \"Sign Up Now\" link. Current as of: April 18, 2018  Content Version: 12.0  © 8699-1716 [employer], [employer]. Care instructions adapted under license by [facility] [address] If you have questions about a medical condition or this instruction, always ask your healthcare professional. [place] [address] any warranty or liability for your use of this information.

## 2019-06-26 ENCOUNTER — HOSPITAL ENCOUNTER (OUTPATIENT)
Dept: WOUND CARE | Age: 49
Discharge: HOME | End: 2019-06-26
Payer: COMMERCIAL

## 2019-06-26 VITALS
RESPIRATION RATE: 16 BRPM | TEMPERATURE: 32.2 F | DIASTOLIC BLOOD PRESSURE: 82 MMHG | SYSTOLIC BLOOD PRESSURE: 122 MMHG | HEART RATE: 102 BPM

## 2019-06-26 DIAGNOSIS — E11.621 DIABETIC ULCER OF RIGHT MIDFOOT ASSOCIATED WITH TYPE 2 DIABETES MELLITUS, WITH FAT LAYER EXPOSED (HCC): Primary | ICD-10-CM

## 2019-06-26 DIAGNOSIS — L97.412 DIABETIC ULCER OF RIGHT MIDFOOT ASSOCIATED WITH TYPE 2 DIABETES MELLITUS, WITH FAT LAYER EXPOSED (HCC): Primary | ICD-10-CM

## 2019-06-26 PROCEDURE — 15275 SKIN SUB GRAFT FACE/NK/HF/G: CPT

## 2019-06-26 RX ORDER — LIDOCAINE HYDROCHLORIDE 40 MG/ML
SOLUTION TOPICAL PRN
Status: DISCONTINUED | OUTPATIENT
Start: 2019-06-26 | End: 2019-06-27 | Stop reason: HOSPADM

## 2019-06-26 ASSESSMENT — PAIN SCALES - GENERAL
PAINLEVEL_OUTOF10: 0
PAINLEVEL_OUTOF10: 0

## 2019-06-26 NOTE — PROGRESS NOTES
[facility]   Progress Note and Procedure Note      Angel Powell  MEDICAL RECORD NUMBER:  [id number]  AGE: 47 y.o. GENDER: male  : 1970  EPISODE DATE:  2019    Subjective:     Chief Complaint   Patient presents with    Wound Check     wound right foot         HISTORY of PRESENT ILLNESS HPI     Angel Powell is a 47 y.o. male who presents today for wound/ulcer evaluation. History of Wound Context: Patient presents with complaining of a wound on the bottom of the right foot of 16 weeks duration. The wound started out as a blood blister. Patient had Dermagraft applied to the right foot last week. Patient states home health care is performing dressing changes as ordered. Patient reports he had angiograms of both legs with revascularization of the right leg on 2019 performed by Dr. Marco Dukes. Patient had revascularization of the left leg with Dr. Marco Dukes on 2019. Melissa Joseph had a transmetatarsal amputation of the left foot on 2018.       Wound/Ulcer Pain Timing/Severity: none  Quality of pain: N/A  Severity:  0 / 10   Modifying Factors: None  Associated Signs/Symptoms: none    Ulcer Identification:  Ulcer Type: diabetic    Contributing Factors: diabetes, poor glucose control, chronic pressure, decreased mobility and arterial insufficiency    Wound: N/A        PAST MEDICAL HISTORY        Diagnosis Date    Angina effort     Arthritis     CAD (coronary artery disease)     Carotid stenosis     Diabetes mellitus with neurological manifestations, uncontrolled ([address]     Diabetic foot ulcer ([address] 3/13/2019    Diarrhea     DEAN (dyspnea on exertion) 2015    Hyperlipidemia     Hypertension     Obesity     Type II or unspecified type diabetes mellitus without mention of complication, not stated as uncontrolled        PAST SURGICAL HISTORY    Past Surgical History:   Procedure Laterality Date    CARDIAC CATHETERIZATION      CAROTID ENDARTERECTOMY Right 2015  CHOLECYSTECTOMY      [facility] OFFICE/OUTPT VISIT,PROCEDURE ONLY Left 10/5/2018    AMPUTATION LEFT GREAT DIGIT performed by Amelia Do DPM at [place] [address] OFFICE/OUTPT [address] [place] [address] Left 10/22/2018    INCISION AND DRAINAGE LEFT FOOT FIRST AND SECOND RAY AMPUTATION (DIGITS ONE, TWO AND METARSAL ONE AND TWO) performed by Amelia Do DPM at [place] [address] OFFICE/OUTPT [address] [place] [address] Left 10/26/2018    LEFT FOOT WOUND DEBRIDEMENT WITH PRIMARY CLOSURE performed by Amelia Do DPM at [place] [address] OFFICE/OUTPT [address] [place] [address] Left 11/2/2018    INCISION AND DRAINAGE WITH TRANSMETATARSAL CONVERTED TO LIST MART  AMPUTATION LEFT FOOT performed by Amelia Do DPM at [address] [place] [address] TOE AMPUTATION Left 10/05/2018    left great toe amputation       FAMILY HISTORY    Family History   Problem Relation Age of Onset    High Blood Pressure Mother        SOCIAL HISTORY    Social History     Tobacco Use    Smoking status: Never Smoker    Smokeless tobacco: Never Used   Substance Use Topics    Alcohol use: No    Drug use: No     Types: Marijuana     Comment: QUIT 4 MONTHS        ALLERGIES    No Known Allergies    MEDICATIONS    Current Outpatient Medications on File Prior to Encounter   Medication Sig Dispense Refill    HUMALOG 100 UNIT/ML injection vial   5    Dulaglutide (TRULICITY) 1.5 UG/4.1JQ SOPN Inject 1.5 mg into the skin once a week 12 pen 0    atorvastatin (LIPITOR) 40 MG tablet Take 1 tablet by mouth every morning 30 tablet 0    insulin glargine (BASAGLAR KWIKPEN) 100 UNIT/ML injection pen Inject 15 Units into the skin nightly (Patient taking differently: Inject 30 Units into the skin every morning ) 5 pen 3    clopidogrel (PLAVIX) 75 MG tablet Take 1 tablet by mouth daily 30 tablet 3    metoprolol succinate (TOPROL XL) 25 MG extended release tablet Take 25 mg by mouth daily      vitamin B-12 (CYANOCOBALAMIN) 1000 MCG tablet Take 1,000 mcg by mouth daily  2    ranitidine (ZANTAC) 150 MG tablet Take 150 mg by mouth 2 times daily  1    Multiple Vitamins-Minerals (THERAPEUTIC MULTIVITAMIN-MINERALS) tablet Take 1 tablet by mouth daily      lisinopril (PRINIVIL;ZESTRIL) 40 MG tablet Take 40 mg by mouth every morning       acetaminophen (AMINOFEN) 325 MG tablet Take 2 tablets by mouth every 6 hours as needed for Pain 60 tablet 0    Blood Glucose Monitoring Suppl (FREESTYLE LITE) INEZ 1 Device by Does not apply route 3 times daily (before meals) 1 Device 0    blood glucose test strips (FREESTYLE LITE) strip 1 each by In Vitro route 3 times daily As needed. 100 each 3    FREESTYLE LANCETS MISC 1 each by Does not apply route daily 100 each 3    Insulin Disposable Pump (V-GO 40) KIT by Does not apply route 6 CLICKS DAILY      aspirin (ASPIRIN LOW DOSE) 81 MG EC tablet Take 1 tablet by mouth daily 30 tablet 0    Insulin Pen Needle (B-D UF III MINI PEN NEEDLES) 31G X 5 MM MISC 1 each by Does not apply route 4 times daily (before meals and nightly) 150 each 6     No current facility-administered medications on file prior to encounter. REVIEW OF SYSTEMS    Pertinent items are noted in HPI. Objective:      /82   Pulse 102   Temp (!) 32.2 °F (0.1 °C) (Oral)   Resp 16     Wt Readings from Last 3 Encounters:   05/01/19 255 lb 8.2 oz (115.9 kg)   03/21/19 251 lb (113.9 kg)   03/09/19 258 lb 2.5 oz (117.1 kg)       PHYSICAL EXAM    Patient is alert and oriented x 3, and is in no acute distress.     Vascular: DP weakly palpable bilateral. PT pulse not palpable bilateral but audible on doppler exam. No Pedal hair noted bilateral. No Edema noted to ankles.    Derm:  Skin is warm to warm from proximal leg to distal foot bilateral. Toenails 1-5 on right foot are thickened, yellow and dystrophic.   Neuro:  Protective sensation absent to 10/10 sites bilateral via a 5.07 [place] monofilament.    Musculoskeletal: Muscle strength 5/5 with PF/DF/I and E of both feet. Transmetatarsal Assessment Granulation tissue;Slough 6/26/2019  2:20 PM   Drainage Amount Small 6/26/2019  2:20 PM   Drainage Description Serosanguinous 6/26/2019  2:20 PM   Odor Mild 6/26/2019  2:20 PM   Margins Attached edges 6/26/2019  2:20 PM   Jina-wound Assessment Calloused 6/26/2019  2:20 PM   Non-staged Wound Description Full thickness 6/26/2019  2:20 PM   [place]%Wound Bed 50 5/29/2019  2:21 PM   Red%Wound Bed 70 6/26/2019  2:20 PM   Yellow%Wound Bed 30 6/26/2019  2:20 PM   Black%Wound Bed 20 4/24/2019  2:19 PM   Number of days: 105          Percent of Wound(s)/Ulcer(s) Debrided: 100%    Total Surface Area Debrided:  0.52 sq cm     Diabetic/Pressure/Non Pressure Ulcers only:  Ulcer: Diabetic ulcer, fat layer exposed     Estimated Blood Loss:  Minimal    Hemostasis Achieved:  by pressure    Procedural Pain:  0  / 10     Post Procedural Pain:  0 / 10     Response to treatment:  Well tolerated by patient. Procedure:  Skin Substitute Application    Performed by: Una Parham DPM    Ulcer Type: diabetic    Consent obtained: Yes    Time out taken: Yes    Product Utilized:    Dermagraft 38 sq/cm     Fenestrated: No    Mesher Utilized: No    Instrument(s) curette, #15 blade scalpel and forceps    Skin Substitute was Applied to Ulcer Number(s):    Ulcer #: 2      Wound 03/13/19 Foot Right;Plantar #2 - right plantar foot- pt. noted 2/15/2019 (Active)   Wound Image   6/12/2019  2:37 PM   Wound Diabetic [place] 1 6/26/2019  2:20 PM   Dressing Status Intact; Old drainage 6/26/2019  2:20 PM   Dressing Changed Changed/New 6/26/2019  2:41 PM   Dressing/Treatment Other (comment) 6/12/2019  3:46 PM   Wound Length (cm) 1.2 cm 6/26/2019  2:20 PM   Wound Width (cm) 0.3 cm 6/26/2019  2:20 PM   Wound Depth (cm) 0.1 cm 6/26/2019  2:20 PM   Wound Surface Area (cm^2) 0.36 cm^2 6/26/2019  2:20 PM   Change in Wound Size % (l*w) 95.31 6/26/2019  2:20 PM   Wound Volume (cm^3) 0.04 cm^3 6/26/2019  2:20 PM   Wound Healing % 95 6/26/2019  2:20 PM Post-Procedure Length (cm) 1.3 cm 6/26/2019  2:41 PM   Post-Procedure Width (cm) 0.4 cm 6/26/2019  2:41 PM   Post-Procedure Depth (cm) 0.2 cm 6/26/2019  2:41 PM   Post-Procedure Surface Area (cm^2) 0.52 cm^2 6/26/2019  2:41 PM   Post-Procedure Volume (cm^3) 0.1 cm^3 6/26/2019  2:41 PM   Wound Assessment Granulation tissue;Slough 6/26/2019  2:20 PM   Drainage Amount Small 6/26/2019  2:20 PM   Drainage Description Serosanguinous 6/26/2019  2:20 PM   Odor Mild 6/26/2019  2:20 PM   Margins Attached edges 6/26/2019  2:20 PM   Jina-wound Assessment Calloused 6/26/2019  2:20 PM   Non-staged Wound Description Full thickness 6/26/2019  2:20 PM   [place]%Wound Bed 50 5/29/2019  2:21 PM   Red%Wound Bed 70 6/26/2019  2:20 PM   Yellow%Wound Bed 30 6/26/2019  2:20 PM   Black%Wound Bed 20 4/24/2019  2:19 PM   Number of days: 105       Diabetic/Pressure/Non Pressure Ulcers:  Ulcer:   Diabetic ulcer, fat layer exposed      Total Surface Area of Ulcer(s) Covered 0.52 sq/cm    Was the Product Layered  No     Amount of Product Applied 0.52 sq/cm     Amount of Product Wasted 37.48 sq/cm     Reason for Waste Wound Size smaller then product size      Surgically Fixated: No    Secured With: Steri Strips and Mepitel     Procedural Pain: 0/10     Post Procedural Pain: 0 / 10    Response to Treatment: Well tolerated by patient. Plan:     Treatment Note please see attached Discharge Instructions    Written patient dismissal instructions given to patient and signed by patient or POA.          Discharge Instructions       [facility]   Physician Orders and Discharge Instructions  [address]   [address] [place] [address]  Telephone: [phone]      FAX [phone]     NAME: Citlalli Lujan  DATE OF BIRTH:  1970  MEDICAL RECORD NUMBER:  [id number]  DATE:  6/26/2019     Wound Cleansing:   Do not scrub or use excessive force. Cleanse wound prior to applying a clean dressing with:  [x] Normal Saline            [x] Keep Wound Dry in Shower    [] Wound Cleanser   [] Cleanse wound with Mild Soap & Water  [] May Shower at Discharge   [] Other:        Topical Treatments:  Do not apply lotions, creams, or ointments to wound bed unless directed. [] Apply moisturizing lotion to skin surrounding the wound prior to dressing change.  [] Apply antifungal ointment to skin surrounding the wound prior to dressing change.  [] Apply thin film of moisture barrier ointment to skin immediately around wound. [] Other:       **Continue to Wear Surgical Shoe on LEFT FOOT**     YOU HAVE HAD A DERMAGRAFT      PLACED ON YOUR WOUND TODAY. FOR BEST RESULTS, WE RECOMMEND YOU LIMIT YOUR ACTIVITY FOR THE NEXT WEEK AS MUCH AS POSSIBLE. AVOID LONG PERIODS OF TIME ON YOUR FEET. THIS ALSO INCLUDES ELEVATING YOUR LEGS AND FEET 3-4 TIMES DAILY FOR AT LEAST 20-30 MINUTES ON PILLOWS AND AT NIGHT SO THAT THEY ARE HIGHER THAN THE LEVEL OF YOUR HEART      Electronically signed by Foy Sicard, RN on 6/26/2019 at 2:47 PM        **ATTEMPT TO MINIMIZE ANY WEIGHT BEARING IN RIGHT PLANTAR FOOT BY HEEL WEIGHT BEARING FOR ANY TRANSFERS**        Dressings : Wound Location : RIGHT PLANTAR FOOT  **Dermagraft #4 Applied 6/26/19**     Apply DERMAGRAFT, MEPITEL, STERI-STRIPS (APPLIED PER DR. Steele). -DO NOT REMOVED DRESSING BELOW STERI-STRIPS**       COVER AND SECURED WITH GAUZE AND KERLIX (CHANGE GAUZE AND KERLIX ONLY)     CHANGE DRESSING ON Monday AND Friday- PATIENT WILL HAVE CHANGED IN WOUND CARE ON WEDNESDAY          Edema Control:  Apply:  [] Compression Stocking           []Right Leg         []Left Leg              [] Tubigrip          []Right Leg Double Layer          []Left Leg Double Layer                                                  []Right Leg Single Layer           []Left Leg Single Layer              [] SpandaGrip    []Right Leg         []Left Leg                                      []Low compression 5-10 mm/Hg                                                 []Medium compression 10-20 mm/Hg                                      []High compression  20-30 mm/Hg              every morning immediately when getting up should be applied to affected leg(s) from mid foot to knee making sure to cover the heel.  Remove every night before going to bed.              [] Elevate leg(s) above the level of the heart when sitting.                    [] Avoid prolonged standing in one place.        Compression:  Apply:  [] Multilayer Compression Wrap Applied in Clinic        []RightLeg          []Left Leg              [] Multi-layer compression.  Do not get leg(s) with wrap wet.  If wraps become too tight call the center or completely remove the wrap.                 [] Elevate leg(s) above the level of the heart when sitting.                    [] Avoid prolonged standing in one place.     Off-Loading:   [] Off-loading when        [] walking           [] in bed [] sitting  [] Total non-weight bearing  [] Right Leg  [] Left Leg          [x] Assistive Device         [] Walker            [] Cane   [x] Wheelchair      [] Crutches              [] Surgical shoe    [] Podus Boot(s)   [] Foam Boot(s)  [] Roll About              [] Cast Boot       [] CROW Boot  [] Other:                   Dietary:  [] Diet as tolerated:        [x] Calorie Diabetic Diet: [] No Added Salt:  [] Increase Protein:        [] Other:     Activity:  [x] Activity as tolerated:  [] Patient has no activity restrictions     [] Strict Bedrest:            [] Remain off Work:     [] May return to full duty work:                                         [] MFPBLV to work with restrictions:          If you are still having pain after you go home:  [X] Elevate the affected limb.            [X]Use over-the-counter medications you would normally use for pain as permitted by your family doctor.  [X] For persistent pain not relieved by the above interventions, please call your family doctor.                Return Appointment:  [] Wound and dressing supply provider:   [x] ECF or Home Healthcare: [facility]  [] Wound Assessment:  [] Physician or NP scheduled for Wound Assessment:   [x] Return Appointment: Sandor THORPE  in  1 Week(s)  [] Ordered tests:      Nurse Case Manger: Sarita  Electronically signed by Venus Osorio RN on 6/26/2019 at 2:48 PM  [address] [place] [address] Information: Should you experience any significant changes in your wound(s) or have questions about your wound care, please contact the [address] [place] [address] [phone] [facility] - THURSDAY 8:30 am - 4:30 pm and Friday 8:30 am - 1:00 pm.  If you need help with your wound outside these hours and cannot wait until we are again available, contact your PCP or go to the hospital emergency room.      PLEASE NOTE: IF YOU ARE UNABLE TO OBTAIN WOUND SUPPLIES, CONTINUE TO USE THE SUPPLIES YOU HAVE AVAILABLE UNTIL YOU ARE ABLE TO [facility].  IT IS MOST IMPORTANT TO KEEP THE WOUND COVERED AT ALL TIMES.     Physician Signature:_______________________     Date: ___________ Time:  ____________                    [] Dr Destiny Staples    [] Dr Linda Whitley CNP                 [x] Dr Homer Ventura   [] Dr Shawn Lamb                  [] Dr Maritza Suárez   [] Srinivasan Whitehead NP                Electronically signed by Erick Foster DPM on 6/26/2019 at 5:31 PM

## 2019-06-26 NOTE — PLAN OF CARE
May 29, 2019.                 Guidelines followed  Dermagraft 24 steps followed    Electronically signed by Benjamin Steele RN on 6/26/2019 at 5:49 PM

## 2019-07-03 ENCOUNTER — HOSPITAL ENCOUNTER (OUTPATIENT)
Dept: WOUND CARE | Age: 49
Discharge: HOME | End: 2019-07-03
Payer: COMMERCIAL

## 2019-07-03 VITALS
DIASTOLIC BLOOD PRESSURE: 82 MMHG | HEART RATE: 93 BPM | RESPIRATION RATE: 16 BRPM | TEMPERATURE: 96.6 F | SYSTOLIC BLOOD PRESSURE: 144 MMHG

## 2019-07-03 DIAGNOSIS — L97.412 DIABETIC ULCER OF RIGHT MIDFOOT ASSOCIATED WITH TYPE 2 DIABETES MELLITUS, WITH FAT LAYER EXPOSED (HCC): Primary | ICD-10-CM

## 2019-07-03 DIAGNOSIS — E11.621 DIABETIC ULCER OF RIGHT MIDFOOT ASSOCIATED WITH TYPE 2 DIABETES MELLITUS, WITH FAT LAYER EXPOSED (HCC): Primary | ICD-10-CM

## 2019-07-03 PROCEDURE — 11042 DBRDMT SUBQ TIS 1ST 20SQCM/<: CPT

## 2019-07-03 PROCEDURE — 15275 SKIN SUB GRAFT FACE/NK/HF/G: CPT

## 2019-07-03 RX ORDER — LIDOCAINE HYDROCHLORIDE 40 MG/ML
SOLUTION TOPICAL PRN
Status: DISCONTINUED | OUTPATIENT
Start: 2019-07-03 | End: 2019-07-04 | Stop reason: HOSPADM

## 2019-07-03 ASSESSMENT — PAIN SCALES - GENERAL
PAINLEVEL_OUTOF10: 0
PAINLEVEL_OUTOF10: 0

## 2019-07-03 NOTE — PROGRESS NOTES
Krissy Lopez [address]   Progress Note and Procedure Note      Rani Crowe  MEDICAL RECORD NUMBER:  [id number]  AGE: 47 y.o. GENDER: male  : 1970  EPISODE DATE:  7/3/2019    Subjective:     Chief Complaint   Patient presents with    Wound Check     Follow Up on Right Plantar Foot         HISTORY of PRESENT ILLNESS HPI     Rani Crowe is a 47 y.o. male who presents today for wound/ulcer evaluation. History of Wound Context: Patient presents with complaining of a wound on the bottom of the right foot of 17 weeks duration. The wound started out as a blood blister.  Patient had Dermagraft applied to the right foot last week. Patient states home health care is performing dressing changes as ordered. Patient reports he had angiograms of both legs with revascularization of the right leg on 2019 performed by Dr. Ricardo Elizalde. Patient had revascularization of the left leg with Dr. Ricardo Elizalde on 2019. Rosalina Roberts had a transmetatarsal amputation of the left foot on 2018.       Wound/Ulcer Pain Timing/Severity: none  Quality of pain: N/A  Severity:  0 / 10   Modifying Factors: None  Associated Signs/Symptoms: none    Ulcer Identification:  Ulcer Type: diabetic    Contributing Factors: diabetes, poor glucose control, chronic pressure, decreased mobility and arterial insufficiency    Wound: N/A        PAST MEDICAL HISTORY        Diagnosis Date    Angina effort     Arthritis     CAD (coronary artery disease)     Carotid stenosis     Diabetes mellitus with neurological manifestations, uncontrolled ([address]     Diabetic foot ulcer ([address] 3/13/2019    Diarrhea     DEAN (dyspnea on exertion) 2015    Hyperlipidemia     Hypertension     Obesity     Type II or unspecified type diabetes mellitus without mention of complication, not stated as uncontrolled        PAST SURGICAL HISTORY    Past Surgical History:   Procedure Laterality Date    CARDIAC CATHETERIZATION      CAROTID ENDARTERECTOMY Assessment Granulation tissue;Slough 7/3/2019  2:27 PM   Drainage Amount Small 7/3/2019  2:27 PM   Drainage Description Serosanguinous 7/3/2019  2:27 PM   Odor None 7/3/2019  2:27 PM   Margins Attached edges 7/3/2019  2:27 PM   Jina-wound Assessment Calloused;Dry;Dark edges 7/3/2019  2:27 PM   Non-staged Wound Description Full thickness 7/3/2019  2:27 PM   [place]%Wound Bed 80 7/3/2019  2:27 PM   Red%Wound Bed 70 6/26/2019  2:20 PM   Yellow%Wound Bed 20 7/3/2019  2:27 PM   Black%Wound Bed 20 4/24/2019  2:19 PM   Number of days: 112       Wound 03/20/19 Foot Left;Medial Wound #3 acquired 11/2018 left TMA site (Active)   Wound Image   7/3/2019  2:27 PM   Wound Non-Healing Surgical 5/22/2019  2:41 PM   Dressing Status Old drainage 7/3/2019  2:27 PM   Dressing Changed Changed/New 7/3/2019  3:18 PM   Dressing/Treatment Other (comment) 7/3/2019  3:18 PM   Wound Length (cm) 0.7 cm 7/3/2019  2:27 PM   Wound Width (cm) 0.9 cm 7/3/2019  2:27 PM   Wound Depth (cm) 0.1 cm 7/3/2019  2:27 PM   Wound Surface Area (cm^2) 0.63 cm^2 7/3/2019  2:27 PM   Change in Wound Size % (l*w) 89.06 7/3/2019  2:27 PM   Wound Volume (cm^3) 0.06 cm^3 7/3/2019  2:27 PM   Wound Healing % 99 7/3/2019  2:27 PM   Post-Procedure Length (cm) 0.8 cm 7/3/2019  3:00 PM   Post-Procedure Width (cm) 1 cm 7/3/2019  3:00 PM   Post-Procedure Depth (cm) 0.2 cm 7/3/2019  3:00 PM   Post-Procedure Surface Area (cm^2) 0.8 cm^2 7/3/2019  3:00 PM   Post-Procedure Volume (cm^3) 0.16 cm^3 7/3/2019  3:00 PM   Wound Assessment Granulation tissue;Slough 7/3/2019  2:27 PM   Drainage Amount Scant 7/3/2019  2:27 PM   Drainage Description Serosanguinous 7/3/2019  2:27 PM   Odor None 7/3/2019  2:27 PM   Margins Attached edges 5/29/2019  2:21 PM   Jina-wound Assessment Dry;Pink;Calloused 7/3/2019  2:27 PM   Non-staged Wound Description Full thickness 7/3/2019  2:27 PM   [place]%Wound Bed 30 5/29/2019  2:21 PM   Red%Wound Bed 95 7/3/2019  2:27 PM   Yellow%Wound Bed 5 7/3/2019  2:27 PM sq/cm    Was the Product Layered  No     Amount of Product Applied 0.6 sq/cm     Amount of Product Wasted 37.4 sq/cm     Reason for Waste Wound Size smaller then product size      Surgically Fixated: No    Secured With: Steri Strips and Mepitel     Procedural Pain: 0/10     Post Procedural Pain: 0 / 10    Response to Treatment: Well tolerated by patient. Plan:     Treatment Note please see attached Discharge Instructions    Written patient dismissal instructions given to patient and signed by patient or POA. Discharge [facility]   Physician Orders and Discharge Instructions  [address] [facility] [address], [facility]  Telephone: [phone]      FAX [phone]     NAME: Joelle Boggs  DATE OF BIRTH:  1970  MEDICAL RECORD NUMBER:  [id number]  DATE:  7/3/2019     Wound Cleansing:   Do not scrub or use excessive force. Cleanse wound prior to applying a clean dressing with:  [x] Normal Saline            [x] Keep Wound Dry in Shower    [] Wound Cleanser   [] Cleanse wound with Mild Soap & Water  [] May Shower at Discharge   [] Other:        Topical Treatments:  Do not apply lotions, creams, or ointments to wound bed unless directed. [] Apply moisturizing lotion to skin surrounding the wound prior to dressing change.  [] Apply antifungal ointment to skin surrounding the wound prior to dressing change.  [] Apply thin film of moisture barrier ointment to skin immediately around wound. [] Other:            YOU HAVE HAD A DERMAGRAFT      PLACED ON YOUR WOUND TODAY. FOR BEST RESULTS, WE RECOMMEND YOU LIMIT YOUR ACTIVITY FOR THE NEXT WEEK AS MUCH AS POSSIBLE. AVOID LONG PERIODS OF TIME ON YOUR FEET.  THIS ALSO INCLUDES ELEVATING YOUR LEGS AND FEET 3-4 TIMES DAILY FOR AT LEAST 20-30 MINUTES ON PILLOWS AND AT NIGHT SO THAT THEY ARE HIGHER THAN THE LEVEL OF YOUR HEART

## 2019-07-10 ENCOUNTER — HOSPITAL ENCOUNTER (OUTPATIENT)
Dept: WOUND CARE | Age: 49
Discharge: HOME | End: 2019-07-10
Payer: COMMERCIAL

## 2019-07-10 VITALS
HEART RATE: 96 BPM | SYSTOLIC BLOOD PRESSURE: 157 MMHG | TEMPERATURE: 97.1 F | DIASTOLIC BLOOD PRESSURE: 86 MMHG | RESPIRATION RATE: 16 BRPM

## 2019-07-10 DIAGNOSIS — E11.621 DIABETIC ULCER OF LEFT MIDFOOT ASSOCIATED WITH TYPE 2 DIABETES MELLITUS, WITH FAT LAYER EXPOSED (HCC): Primary | ICD-10-CM

## 2019-07-10 DIAGNOSIS — L97.422 DIABETIC ULCER OF LEFT MIDFOOT ASSOCIATED WITH TYPE 2 DIABETES MELLITUS, WITH FAT LAYER EXPOSED (HCC): Primary | ICD-10-CM

## 2019-07-10 PROCEDURE — 15275 SKIN SUB GRAFT FACE/NK/HF/G: CPT

## 2019-07-10 RX ORDER — LIDOCAINE 50 MG/G
OINTMENT TOPICAL PRN
Status: DISCONTINUED | OUTPATIENT
Start: 2019-07-10 | End: 2019-07-11 | Stop reason: HOSPADM

## 2019-07-10 ASSESSMENT — PAIN SCALES - GENERAL
PAINLEVEL_OUTOF10: 0
PAINLEVEL_OUTOF10: 0

## 2019-07-10 NOTE — PLAN OF CARE
Dermagraft Treatment Note    NAME:  Adrienne Burrows  YOB: 1970  MEDICAL RECORD NUMBER:  [id number]  DATE:  7/10/2019    Goal:  Patient will receive safe and proper application of skin substitute. Patient will comply with caring for dressing, offloading and reporting complications. Expiration date of Dermagraft checked immediately prior to use. Package intact prior to use and no damage noted. Transport temperature controlled and acceptable. Dermagraft was prepared for application by removing from box and within 1 minute placing in thaw tub at a temperature of 34 to 37 degrees celsius until ice crystals were no longer present but no longer than 3 minutes. Dermagraft was rinsed 3 times in room temperature normal saline for 5 seconds each time. A 4th saline rinse was left on the Dermagraft until the physician was ready to apply it within 30 minutes of thawing. Dermagraft was applied to left medial foot and affixed with steri-strips by the provider. Dermagraft was covered with non-adherent dressing.   gauze, kerlix was applied on top of non-adherent dressing. A foam plug cut to fit into ulcer was applied. Fluffed gauze was applied. Dressing was secured with cover roll type tape to stabilize graft. Coban or ace wrap was applied to secure graft and decrease edema. Patient/caregiver was instructed not to remove dressing and to keep it clean and dry. Pt/family/caregiver was instructed on signs and symptoms of complications to report such as draining through, falling down/slipping, getting wet, or severe pain or tingling in toes   Pt/family/caregiver was instructed on need for offloading and elevation of affected extremity and on use of prescribed offloading device.  Dermagraft may be applied a total of 8 times per wound over a 12 week period. Additionally Dermagraft may only be used every 12 months per wound.       Date of first application of Dermagraft for this current wound is May

## 2019-07-10 NOTE — PROGRESS NOTES
7/3/2019  2:27 PM   Yellow%Wound Bed 5 7/10/2019  2:26 PM   Black%Wound Bed 100 5/22/2019  2:41 PM   Number of days: 112          Percent of Wound(s)/Ulcer(s) Debrided: 100%    Total Surface Area Debrided:  0.18 sq cm     Diabetic/Pressure/Non Pressure Ulcers only:  Ulcer: Diabetic ulcer, fat layer exposed     Estimated Blood Loss:  Minimal    Hemostasis Achieved:  by pressure    Procedural Pain:  0  / 10     Post Procedural Pain:  0 / 10     Response to treatment:  Well tolerated by patient.      Procedure:  Skin Substitute Application    Performed by: Omero Shultz DPM    Ulcer Type: diabetic    Consent obtained: Yes    Time out taken: Yes    Product Utilized:    Dermagraft 38 sq/cm     Fenestrated: No    Mesher Utilized: No    Instrument(s) curette, #15 blade scalpel and forceps    Skin Substitute was Applied to Ulcer Number(s):    Ulcer #: 3      Wound 03/13/19 Foot Right;Plantar #2 - right plantar foot- pt. noted 2/15/2019 (Active)   Wound Image   7/3/2019  2:27 PM   Wound Diabetic Arevalo 1 6/26/2019  2:20 PM   Dressing Status Old drainage 7/3/2019  2:27 PM   Dressing Changed Changed/New 7/10/2019  3:30 PM   Dressing/Treatment Other (comment) 7/10/2019  3:30 PM   Wound Length (cm) 0 cm 7/10/2019  2:26 PM   Wound Width (cm) 0 cm 7/10/2019  2:26 PM   Wound Depth (cm) 0 cm 7/10/2019  2:26 PM   Wound Surface Area (cm^2) 0 cm^2 7/10/2019  2:26 PM   Change in Wound Size % (l*w) 100 7/10/2019  2:26 PM   Wound Volume (cm^3) 0 cm^3 7/10/2019  2:26 PM   Wound Healing % 100 7/10/2019  2:26 PM   Post-Procedure Length (cm) 0 cm 7/10/2019  3:03 PM   Post-Procedure Width (cm) 0 cm 7/10/2019  3:03 PM   Post-Procedure Depth (cm) 0 cm 7/10/2019  3:03 PM   Post-Procedure Surface Area (cm^2) 0 cm^2 7/10/2019  3:03 PM   Post-Procedure Volume (cm^3) 0 cm^3 7/10/2019  3:03 PM   Wound Assessment Epithelialization 7/10/2019  2:26 PM   Drainage Amount Small 7/3/2019  2:27 PM   Drainage Description Serosanguinous 7/3/2019  2:27 PM completely remove the wrap.                 [] Elevate leg(s) above the level of the heart when sitting.                    [] Avoid prolonged standing in one place.     Off-Loading:   [] Off-loading when        [] walking           [] in bed [] sitting  [] Total non-weight bearing  [] Right Leg  [] Left Leg          [x] Assistive Device         [] Walker            [] Cane   [x] Wheelchair      [] Crutches              [] Surgical shoe    [] Podus Boot(s)   [] Foam Boot(s)  [] Roll About              [] Cast Boot       [] CROW Boot  [] Other:                   Dietary:  [] Diet as tolerated:        [x] Calorie Diabetic Diet: [] No Added Salt:  [] Increase Protein:        [] Other:     Activity:  [x] Activity as tolerated:  [] Patient has no activity restrictions     [] Strict Bedrest:            [] Remain off Work:     [] May return to full duty work:                                         [] EJJAT[employer] to work with restrictions:          If you are still having pain after you go home:  [X] Elevate the affected limb.            [X]Use over-the-counter medications you would normally use for pain as permitted by your family doctor.  [X] For persistent pain not relieved by the above interventions, please call your family doctor.                Return Appointment:  [] Wound and dressing supply provider:   [x] ECF or Home Healthcare: [facility]  [] Wound Assessment:  [] Physician or NP scheduled for Wound Assessment:   [x] Return Appointment: Eder THORPE  in  1 Week(s)  [] Ordered tests:      Nurse Case Manger: Sarita  Electronically signed by Mindy Phoenix RN on 7/10/2019 at 3:10 PM  [address] [place] [address] Information: Should you experience any significant changes in your wound(s) or have questions about your wound care, please contact the [address] [place] [address] [phone] [facility] - THURSDAY 8:30 am - 4:30 pm and Friday 8:30 am - 1:00 pm.  If you need help with your wound outside these hours and cannot wait

## 2019-07-17 ENCOUNTER — HOSPITAL ENCOUNTER (OUTPATIENT)
Dept: WOUND CARE | Age: 49
Discharge: HOME | End: 2019-07-17
Payer: COMMERCIAL

## 2019-07-17 VITALS
TEMPERATURE: 98.3 F | DIASTOLIC BLOOD PRESSURE: 88 MMHG | HEART RATE: 98 BPM | RESPIRATION RATE: 18 BRPM | SYSTOLIC BLOOD PRESSURE: 124 MMHG

## 2019-07-17 DIAGNOSIS — L97.422 DIABETIC ULCER OF LEFT MIDFOOT ASSOCIATED WITH TYPE 2 DIABETES MELLITUS, WITH FAT LAYER EXPOSED (HCC): Primary | ICD-10-CM

## 2019-07-17 DIAGNOSIS — E11.621 DIABETIC ULCER OF LEFT MIDFOOT ASSOCIATED WITH TYPE 2 DIABETES MELLITUS, WITH FAT LAYER EXPOSED (HCC): Primary | ICD-10-CM

## 2019-07-17 PROCEDURE — 15275 SKIN SUB GRAFT FACE/NK/HF/G: CPT

## 2019-07-17 PROCEDURE — 11042 DBRDMT SUBQ TIS 1ST 20SQCM/<: CPT

## 2019-07-17 RX ORDER — LIDOCAINE 50 MG/G
OINTMENT TOPICAL PRN
Status: DISCONTINUED | OUTPATIENT
Start: 2019-07-17 | End: 2019-07-18 | Stop reason: HOSPADM

## 2019-07-17 ASSESSMENT — PAIN SCALES - GENERAL
PAINLEVEL_OUTOF10: 0
PAINLEVEL_OUTOF10: 0

## 2019-07-17 NOTE — PLAN OF CARE
wound is May 29, 2019.                 Guidelines followed  Dermagraft 24 steps followed    Electronically signed by Kev Michelle RN on 7/17/2019 at 5:10 PM

## 2019-07-17 NOTE — PROGRESS NOTES
25 mg by mouth daily      vitamin B-12 (CYANOCOBALAMIN) 1000 MCG tablet Take 1,000 mcg by mouth daily  2    ranitidine (ZANTAC) 150 MG tablet Take 150 mg by mouth 2 times daily  1    Multiple Vitamins-Minerals (THERAPEUTIC MULTIVITAMIN-MINERALS) tablet Take 1 tablet by mouth daily      Blood Glucose Monitoring Suppl (FREESTYLE LITE) INEZ 1 Device by Does not apply route 3 times daily (before meals) 1 Device 0    blood glucose test strips (FREESTYLE LITE) strip 1 each by In Vitro route 3 times daily As needed. 100 each 3    FREESTYLE LANCETS MISC 1 each by Does not apply route daily 100 each 3    Insulin Disposable Pump (V-GO 40) KIT by Does not apply route 6 CLICKS DAILY      aspirin (ASPIRIN LOW DOSE) 81 MG EC tablet Take 1 tablet by mouth daily 30 tablet 0    Insulin Pen Needle (B-D UF III MINI PEN NEEDLES) 31G X 5 MM MISC 1 each by Does not apply route 4 times daily (before meals and nightly) 150 each 6    lisinopril (PRINIVIL;ZESTRIL) 40 MG tablet Take 40 mg by mouth every morning        No current facility-administered medications on file prior to encounter. REVIEW OF SYSTEMS    Pertinent items are noted in HPI. Objective:      /88   Pulse 98   Temp 98.3 °F (36.8 °C) (Oral)   Resp 18     Wt Readings from Last 3 Encounters:   05/01/19 255 lb 8.2 oz (115.9 kg)   03/21/19 251 lb (113.9 kg)   03/09/19 258 lb 2.5 oz (117.1 kg)       PHYSICAL EXAM    Patient is alert and oriented x 3, and is in no acute distress.     Vascular: DP weakly palpable bilateral. PT pulse not palpable bilateral but audible on doppler exam. No Pedal hair noted bilateral. No Edema noted to ankles.    Derm:  Skin is warm to warm from proximal leg to distal foot bilateral. Toenails 1-5 on right foot are thickened, yellow and dystrophic.   Neuro:  Protective sensation absent to 10/10 sites bilateral via a 5.07 [place]-Xuan monofilament.    Musculoskeletal: Muscle strength 5/5 with PF/DF/I and E of both Epithelialization 7/10/2019  2:26 PM   Drainage Amount Small 7/3/2019  2:27 PM   Drainage Description Serosanguinous 7/3/2019  2:27 PM   Odor None 7/3/2019  2:27 PM   Margins Attached edges 7/3/2019  2:27 PM   Jina-wound Assessment Calloused;Dry;Dark edges 7/3/2019  2:27 PM   Non-staged Wound Description Full thickness 7/3/2019  2:27 PM   Finger%Wound Bed 80 7/3/2019  2:27 PM   Red%Wound Bed 70 6/26/2019  2:20 PM   Yellow%Wound Bed 20 7/3/2019  2:27 PM   Black%Wound Bed 20 4/24/2019  2:19 PM   Number of days: 126       Wound 03/20/19 Foot Left;Medial Wound #3 acquired 11/2018 left TMA site (Active)   Wound Image   7/3/2019  2:27 PM   Wound Non-Healing Surgical 5/22/2019  2:41 PM   Dressing Status Intact; Old drainage 7/17/2019  2:39 PM   Dressing Changed Changed/New 7/17/2019  2:53 PM   Dressing/Treatment Other (comment) 7/10/2019  3:30 PM   Wound Length (cm) 0.2 cm 7/17/2019  2:39 PM   Wound Width (cm) 0.4 cm 7/17/2019  2:39 PM   Wound Depth (cm) 0.2 cm 7/17/2019  2:39 PM   Wound Surface Area (cm^2) 0.08 cm^2 7/17/2019  2:39 PM   Change in Wound Size % (l*w) 98.61 7/17/2019  2:39 PM   Wound Volume (cm^3) 0.02 cm^3 7/17/2019  2:39 PM   Wound Healing % 100 7/17/2019  2:39 PM   Post-Procedure Length (cm) 0.3 cm 7/17/2019  2:53 PM   Post-Procedure Width (cm) 0.5 cm 7/17/2019  2:53 PM   Post-Procedure Depth (cm) 0.2 cm 7/17/2019  2:53 PM   Post-Procedure Surface Area (cm^2) 0.15 cm^2 7/17/2019  2:53 PM   Post-Procedure Volume (cm^3) 0.03 cm^3 7/17/2019  2:53 PM   Wound Assessment Granulation tissue 7/17/2019  2:39 PM   Drainage Amount None 7/17/2019  2:39 PM   Drainage Description Serosanguinous 7/10/2019  2:26 PM   Odor None 7/17/2019  2:39 PM   Margins Attached edges 7/17/2019  2:39 PM   Jina-wound Assessment Calloused 7/17/2019  2:39 PM   Non-staged Wound Description Full thickness 7/17/2019  2:39 PM   Finger%Wound Bed 95 7/17/2019  2:39 PM   Red%Wound Bed 95 7/3/2019  2:27 PM   Yellow%Wound Bed 5 7/17/2019  2:39 PM Black%Wound Bed 100 5/22/2019  2:41 PM   Number of days: 119          Percent of Wound(s)/Ulcer(s) Debrided: 100%    Total Surface Area Debrided:  0.15 sq cm     Diabetic/Pressure/Non Pressure Ulcers only:  Ulcer: Diabetic ulcer, fat layer exposed     Estimated Blood Loss:  Minimal    Hemostasis Achieved:  by pressure    Procedural Pain:  0  / 10     Post Procedural Pain:  0 / 10     Response to treatment:  Well tolerated by patient.      Procedure:  Skin Substitute Application    Performed by: Freddy Monsalve DPM    Ulcer Type: diabetic    Consent obtained: Yes    Time out taken: Yes    Product Utilized:    Dermagraft 38 sq/cm     Fenestrated: No    Mesher Utilized: No    Instrument(s) curette, #15 blade scalpel and forceps    Skin Substitute was Applied to Ulcer Number(s):    Ulcer #: 2      Wound 03/13/19 Foot Right;Plantar #2 - right plantar foot- pt. noted 2/15/2019 (Active)   Wound Image   7/17/2019  2:39 PM   Wound Diabetic Arevalo 1 6/26/2019  2:20 PM   Dressing Status Old drainage 7/3/2019  2:27 PM   Dressing Changed Changed/New 7/10/2019  3:30 PM   Dressing/Treatment Other (comment) 7/10/2019  3:30 PM   Wound Length (cm) 0 cm 7/17/2019  2:34 PM   Wound Width (cm) 0 cm 7/17/2019  2:34 PM   Wound Depth (cm) 0 cm 7/17/2019  2:34 PM   Wound Surface Area (cm^2) 0 cm^2 7/17/2019  2:34 PM   Change in Wound Size % (l*w) 100 7/17/2019  2:34 PM   Wound Volume (cm^3) 0 cm^3 7/17/2019  2:34 PM   Wound Healing % 100 7/17/2019  2:34 PM   Post-Procedure Length (cm) 0 cm 7/17/2019  2:39 PM   Post-Procedure Width (cm) 0 cm 7/17/2019  2:39 PM   Post-Procedure Depth (cm) 0 cm 7/17/2019  2:39 PM   Post-Procedure Surface Area (cm^2) 0 cm^2 7/17/2019  2:39 PM   Post-Procedure Volume (cm^3) 0 cm^3 7/17/2019  2:39 PM   Wound Assessment Epithelialization 7/10/2019  2:26 PM   Drainage Amount Small 7/3/2019  2:27 PM   Drainage Description Serosanguinous 7/3/2019  2:27 PM   Odor None 7/3/2019  2:27 PM   Margins Attached edges    Electronically signed by Florecita Naranjo RN on 7/17/2019 at 3:02 PM        **ATTEMPT TO MINIMIZE ANY WEIGHT BEARING IN RIGHT PLANTAR FOOT BY HEEL WEIGHT BEARING FOR ANY TRANSFERS**        Dressings : Wound Location : LEFT MEDIAL FOOT  **Dermagraft #7 Applied 7/17/19**     Apply DERMAGRAFT, MEPITEL, HYDROGEL, STERI-STRIPS (APPLIED PER DR. Steele). -DO NOT REMOVED DRESSING BELOW STERI-STRIPS**       COVER AND SECURED WITH GAUZE, ABD PAD AND KERLIX (CHANGE GAUZE, a AND KERLIX ONLY)     CHANGE DRESSING ON Monday AND Friday- PATIENT WILL HAVE CHANGED IN WOUND CARE ON Wednesday        Dressings : Wound Location : RIGHT PLANTAR FOOT     Apply PODIATRY PADS (APPLIED PER DR. Steele)     COVER AND SECURED WITH GAUZE, KERLIX, CAST PADDING, COBAN (FOOTBALL DRESSING)     DO NOT CHANGE DRESSING          Edema Control:  Apply:  [] Compression Stocking           []Right Leg         []Left Leg              [] Tubigrip          []Right Leg Double Layer          []Left Leg Double Layer                                                  []Right Leg Single Layer           []Left Leg Single Layer              [] SpandaGrip    []Right Leg         []Left Leg                                      []Low compression 5-10 mm/Hg                                                 []Medium compression 10-20 mm/Hg                                      []High compression  20-30 mm/Hg              every morning immediately when getting up should be applied to affected leg(s) from mid foot to knee making sure to cover the heel.  Remove every night before going to bed.              [] Elevate leg(s) above the level of the heart when sitting.                    [] Avoid prolonged standing in one place.        Compression:  Apply:  [] Multilayer Compression Wrap Applied in Clinic        []RightLeg          []Left Leg              [] Multi-layer compression.  Do not get leg(s) with wrap wet.  If wraps become too tight call the center or completely remove the

## 2019-07-24 ENCOUNTER — HOSPITAL ENCOUNTER (OUTPATIENT)
Dept: WOUND CARE | Age: 49
Discharge: HOME | End: 2019-07-24
Payer: COMMERCIAL

## 2019-07-24 ENCOUNTER — HOSPITAL ENCOUNTER (OUTPATIENT)
Age: 49
Discharge: HOME | End: 2019-07-24
Payer: COMMERCIAL

## 2019-07-24 ENCOUNTER — HOSPITAL ENCOUNTER (OUTPATIENT)
Dept: GENERAL RADIOLOGY | Age: 49
Discharge: HOME | End: 2019-07-24
Payer: COMMERCIAL

## 2019-07-24 VITALS
DIASTOLIC BLOOD PRESSURE: 87 MMHG | TEMPERATURE: 98 F | SYSTOLIC BLOOD PRESSURE: 154 MMHG | RESPIRATION RATE: 18 BRPM | HEART RATE: 99 BPM

## 2019-07-24 DIAGNOSIS — E11.621 DIABETIC ULCER OF RIGHT MIDFOOT ASSOCIATED WITH TYPE 2 DIABETES MELLITUS, WITH FAT LAYER EXPOSED (HCC): ICD-10-CM

## 2019-07-24 DIAGNOSIS — L97.412 DIABETIC ULCER OF RIGHT MIDFOOT ASSOCIATED WITH TYPE 2 DIABETES MELLITUS, WITH FAT LAYER EXPOSED (HCC): Primary | ICD-10-CM

## 2019-07-24 DIAGNOSIS — L97.412 DIABETIC ULCER OF RIGHT MIDFOOT ASSOCIATED WITH TYPE 2 DIABETES MELLITUS, WITH FAT LAYER EXPOSED (HCC): ICD-10-CM

## 2019-07-24 DIAGNOSIS — I70.209 ARTERIAL OCCLUSION, LOWER EXTREMITY (HCC): ICD-10-CM

## 2019-07-24 DIAGNOSIS — E11.621 DIABETIC ULCER OF RIGHT MIDFOOT ASSOCIATED WITH TYPE 2 DIABETES MELLITUS, WITH FAT LAYER EXPOSED (HCC): Primary | ICD-10-CM

## 2019-07-24 PROCEDURE — 11042 DBRDMT SUBQ TIS 1ST 20SQCM/<: CPT

## 2019-07-24 PROCEDURE — 73630 X-RAY EXAM OF FOOT: CPT

## 2019-07-24 RX ORDER — LIDOCAINE HYDROCHLORIDE 40 MG/ML
SOLUTION TOPICAL PRN
Status: DISCONTINUED | OUTPATIENT
Start: 2019-07-24 | End: 2019-07-25 | Stop reason: HOSPADM

## 2019-07-24 ASSESSMENT — PAIN SCALES - GENERAL
PAINLEVEL_OUTOF10: 0
PAINLEVEL_OUTOF10: 0

## 2019-07-31 ENCOUNTER — HOSPITAL ENCOUNTER (OUTPATIENT)
Dept: VASCULAR LAB | Age: 49
Discharge: HOME | End: 2019-07-31
Payer: COMMERCIAL

## 2019-07-31 ENCOUNTER — HOSPITAL ENCOUNTER (OUTPATIENT)
Dept: WOUND CARE | Age: 49
Discharge: HOME | End: 2019-07-31
Payer: COMMERCIAL

## 2019-07-31 VITALS
HEART RATE: 98 BPM | SYSTOLIC BLOOD PRESSURE: 153 MMHG | RESPIRATION RATE: 16 BRPM | DIASTOLIC BLOOD PRESSURE: 91 MMHG | TEMPERATURE: 97.9 F

## 2019-07-31 DIAGNOSIS — E11.621 DIABETIC ULCER OF RIGHT MIDFOOT ASSOCIATED WITH TYPE 2 DIABETES MELLITUS, WITH FAT LAYER EXPOSED (HCC): Primary | ICD-10-CM

## 2019-07-31 DIAGNOSIS — L97.412 DIABETIC ULCER OF RIGHT MIDFOOT ASSOCIATED WITH TYPE 2 DIABETES MELLITUS, WITH FAT LAYER EXPOSED (HCC): Primary | ICD-10-CM

## 2019-07-31 DIAGNOSIS — I70.209 ARTERIAL OCCLUSION, LOWER EXTREMITY (HCC): ICD-10-CM

## 2019-07-31 PROCEDURE — 11042 DBRDMT SUBQ TIS 1ST 20SQCM/<: CPT

## 2019-07-31 PROCEDURE — 93925 LOWER EXTREMITY STUDY: CPT

## 2019-07-31 RX ORDER — LIDOCAINE HYDROCHLORIDE 40 MG/ML
SOLUTION TOPICAL PRN
Status: DISCONTINUED | OUTPATIENT
Start: 2019-07-31 | End: 2019-08-01 | Stop reason: HOSPADM

## 2019-07-31 ASSESSMENT — PAIN SCALES - GENERAL
PAINLEVEL_OUTOF10: 0
PAINLEVEL_OUTOF10: 0

## 2019-07-31 NOTE — PROGRESS NOTES
Krissy Lopez 36   Progress Note and Procedure Note      [facility] RECORD NUMBER:  [id number]  AGE: 47 y.o. GENDER: male  : 1970  EPISODE DATE:  2019    Subjective:     Chief Complaint   Patient presents with    Wound Check     wounds bilateral feet         HISTORY of PRESENT ILLNESS HPI     Laureano Lam is a 47 y.o. male who presents today for wound/ulcer evaluation. History of Wound Context: Patient presents with complaining of a wound on the bottom of the right foot and a wound on his left foot of five weeks duration. Patient states home health care is performing dressing changes as ordered. Patient reports he had angiograms of both legs with revascularization of the right leg on 2019 performed by Dr. Victor Manuel Sandoval. Patient had revascularization of the left leg with Dr. Victor Manuel Sandoval on 2019. Belkis Ware had a transmetatarsal amputation of the left foot on 2018.     Wound/Ulcer Pain Timing/Severity: none  Quality of pain: N/A  Severity:  0 / 10   Modifying Factors: None  Associated Signs/Symptoms: none    Ulcer Identification:  Ulcer Type: diabetic    Contributing Factors: diabetes, poor glucose control, chronic pressure, decreased mobility and arterial insufficiency    Wound: N/A        PAST MEDICAL HISTORY        Diagnosis Date    Angina effort     Arthritis     CAD (coronary artery disease)     Carotid stenosis     Diabetes mellitus with neurological manifestations, uncontrolled ([address]     Diabetic foot ulcer ([address] 3/13/2019    Diarrhea     DEAN (dyspnea on exertion) 2015    Hyperlipidemia     Hypertension     Obesity     Type II or unspecified type diabetes mellitus without mention of complication, not stated as uncontrolled        PAST SURGICAL HISTORY    Past Surgical History:   Procedure Laterality Date    CARDIAC CATHETERIZATION      CAROTID ENDARTERECTOMY Right 2015    CHOLECYSTECTOMY      UT OFFICE/OUTPT VISIT,PROCEDURE ONLY MULTIVITAMIN-MINERALS) tablet Take 1 tablet by mouth daily      aspirin (ASPIRIN LOW DOSE) 81 MG EC tablet Take 1 tablet by mouth daily 30 tablet 0    lisinopril (PRINIVIL;ZESTRIL) 40 MG tablet Take 40 mg by mouth every morning       acetaminophen (AMINOFEN) 325 MG tablet Take 2 tablets by mouth every 6 hours as needed for Pain 60 tablet 0    Blood Glucose Monitoring Suppl (FREESTYLE LITE) INEZ 1 Device by Does not apply route 3 times daily (before meals) 1 Device 0    blood glucose test strips (FREESTYLE LITE) strip 1 each by In Vitro route 3 times daily As needed. 100 each 3    FREESTYLE LANCETS MISC 1 each by Does not apply route daily 100 each 3    Insulin Disposable Pump (V-GO 40) KIT by Does not apply route 6 CLICKS DAILY      Insulin Pen Needle (B-D UF III MINI PEN NEEDLES) 31G X 5 MM MISC 1 each by Does not apply route 4 times daily (before meals and nightly) 150 each 6     No current facility-administered medications on file prior to encounter. REVIEW OF SYSTEMS    Pertinent items are noted in HPI. Objective:      BP (!) 153/91   Pulse 98   Temp 97.9 °F (36.6 °C) (Oral)   Resp 16     Wt Readings from Last 3 Encounters:   05/01/19 255 lb 8.2 oz (115.9 kg)   03/21/19 251 lb (113.9 kg)   03/09/19 258 lb 2.5 oz (117.1 kg)       PHYSICAL EXAM    Patient is alert and oriented x 3, and is in no acute distress.     Vascular: DP weakly palpable bilateral. PT pulse not palpable bilateral but audible on doppler exam. No Pedal hair noted bilateral. No Edema noted to ankles.    Derm:  Skin is warm to warm from proximal leg to distal foot bilateral. Toenails 1-5 on right foot are thickened, yellow and dystrophic.   Neuro:  Protective sensation absent to 10/10 sites bilateral via a 5.07 [place]-Xuan monofilament.    Musculoskeletal: Muscle strength 5/5 with PF/DF/I and E of both feet. Transmetatarsal amputation of the left foot.     Assessment:        Problem List Items Addressed This Visit

## 2019-08-01 ENCOUNTER — TELEPHONE (OUTPATIENT)
Dept: WOUND CARE | Age: 49
End: 2019-08-01

## 2019-08-07 ENCOUNTER — OFFICE VISIT (OUTPATIENT)
Dept: SURGERY | Age: 49
End: 2019-08-07
Payer: COMMERCIAL

## 2019-08-07 ENCOUNTER — HOSPITAL ENCOUNTER (OUTPATIENT)
Dept: WOUND CARE | Age: 49
Discharge: HOME | End: 2019-08-07
Payer: COMMERCIAL

## 2019-08-07 VITALS
HEART RATE: 90 BPM | WEIGHT: 251 LBS | BODY MASS INDEX: 40.51 KG/M2 | SYSTOLIC BLOOD PRESSURE: 115 MMHG | DIASTOLIC BLOOD PRESSURE: 71 MMHG

## 2019-08-07 VITALS
WEIGHT: 271.83 LBS | SYSTOLIC BLOOD PRESSURE: 166 MMHG | TEMPERATURE: 98.1 F | HEART RATE: 99 BPM | DIASTOLIC BLOOD PRESSURE: 78 MMHG | RESPIRATION RATE: 16 BRPM | BODY MASS INDEX: 43.87 KG/M2

## 2019-08-07 DIAGNOSIS — I73.9 PVD (PERIPHERAL VASCULAR DISEASE) (HCC): ICD-10-CM

## 2019-08-07 DIAGNOSIS — E11.51 TYPE 2 DIABETES MELLITUS WITH ATHEROSCLEROSIS OF NATIVE ARTERIES OF EXTREMITY WITH REST PAIN (HCC): Primary | ICD-10-CM

## 2019-08-07 DIAGNOSIS — I70.223 ATHEROSCLEROSIS OF NATIVE ARTERY OF BOTH LOWER EXTREMITIES WITH REST PAIN (HCC): ICD-10-CM

## 2019-08-07 DIAGNOSIS — E11.621 DIABETIC ULCER OF RIGHT MIDFOOT ASSOCIATED WITH TYPE 2 DIABETES MELLITUS, WITH FAT LAYER EXPOSED (HCC): Primary | ICD-10-CM

## 2019-08-07 DIAGNOSIS — I70.209 ARTERIAL OCCLUSION, LOWER EXTREMITY (HCC): ICD-10-CM

## 2019-08-07 DIAGNOSIS — L97.412 DIABETIC ULCER OF RIGHT MIDFOOT ASSOCIATED WITH TYPE 2 DIABETES MELLITUS, WITH FAT LAYER EXPOSED (HCC): Primary | ICD-10-CM

## 2019-08-07 DIAGNOSIS — I70.229 TYPE 2 DIABETES MELLITUS WITH ATHEROSCLEROSIS OF NATIVE ARTERIES OF EXTREMITY WITH REST PAIN (HCC): Primary | ICD-10-CM

## 2019-08-07 PROCEDURE — 1036F TOBACCO NON-USER: CPT | Performed by: NURSE PRACTITIONER

## 2019-08-07 PROCEDURE — 99213 OFFICE O/P EST LOW 20 MIN: CPT | Performed by: NURSE PRACTITIONER

## 2019-08-07 PROCEDURE — G8598 ASA/ANTIPLAT THER USED: HCPCS | Performed by: NURSE PRACTITIONER

## 2019-08-07 PROCEDURE — 11042 DBRDMT SUBQ TIS 1ST 20SQCM/<: CPT

## 2019-08-07 PROCEDURE — G8427 DOCREV CUR MEDS BY ELIG CLIN: HCPCS | Performed by: NURSE PRACTITIONER

## 2019-08-07 PROCEDURE — 3046F HEMOGLOBIN A1C LEVEL >9.0%: CPT | Performed by: NURSE PRACTITIONER

## 2019-08-07 PROCEDURE — 3017F COLORECTAL CA SCREEN DOC REV: CPT | Performed by: NURSE PRACTITIONER

## 2019-08-07 PROCEDURE — G8417 CALC BMI ABV UP PARAM F/U: HCPCS | Performed by: NURSE PRACTITIONER

## 2019-08-07 PROCEDURE — 2022F DILAT RTA XM EVC RTNOPTHY: CPT | Performed by: NURSE PRACTITIONER

## 2019-08-07 RX ORDER — LIDOCAINE HYDROCHLORIDE 40 MG/ML
SOLUTION TOPICAL PRN
Status: DISCONTINUED | OUTPATIENT
Start: 2019-08-07 | End: 2019-08-08 | Stop reason: HOSPADM

## 2019-08-07 ASSESSMENT — ENCOUNTER SYMPTOMS
RESPIRATORY NEGATIVE: 1
ALLERGIC/IMMUNOLOGIC NEGATIVE: 1
EYES NEGATIVE: 1
GASTROINTESTINAL NEGATIVE: 1

## 2019-08-07 ASSESSMENT — PAIN SCALES - GENERAL
PAINLEVEL_OUTOF10: 0
PAINLEVEL_OUTOF10: 0

## 2019-08-07 NOTE — PROGRESS NOTES
CARDIAC CATHETERIZATION      CAROTID ENDARTERECTOMY Right 4-9-2015    CHOLECYSTECTOMY      AR OFFICE/OUTPT VISIT,PROCEDURE ONLY Left 10/5/2018    AMPUTATION LEFT GREAT DIGIT performed by Loretta Bashir DPM at [place] [address] OFFICE/OUTPT [address] [place] [address] Left 10/22/2018    INCISION AND DRAINAGE LEFT FOOT FIRST AND SECOND RAY AMPUTATION (DIGITS ONE, TWO AND METARSAL ONE AND TWO) performed by Loretta Bashir DPM at [place] [address] OFFICE/OUTPT [address] [place] [address] Left 10/26/2018    LEFT FOOT WOUND DEBRIDEMENT WITH PRIMARY CLOSURE performed by Loretta Bashir DPM at [place] [address] OFFICE/OUTPT [address] [place] [address] Left 11/2/2018    INCISION AND DRAINAGE WITH TRANSMETATARSAL CONVERTED TO LIST MART  AMPUTATION LEFT FOOT performed by Loretta Bashir DPM at [address] [place] [address] TOE AMPUTATION Left 10/05/2018    left great toe amputation       FAMILY HISTORY    Family History   Problem Relation Age of Onset    High Blood Pressure Mother        SOCIAL HISTORY    Social History     Tobacco Use    Smoking status: Never Smoker    Smokeless tobacco: Never Used   Substance Use Topics    Alcohol use: No    Drug use: No     Types: Marijuana     Comment: QUIT 4 MONTHS        ALLERGIES    No Known Allergies    MEDICATIONS    Current Outpatient Medications on File Prior to Encounter   Medication Sig Dispense Refill    Dulaglutide (TRULICITY) 1.5 QK/0.2PM SOPN Inject 1.5 mg into the skin once a week 12 pen 0    atorvastatin (LIPITOR) 40 MG tablet Take 1 tablet by mouth every morning 30 tablet 0    insulin glargine (BASAGLAR KWIKPEN) 100 UNIT/ML injection pen Inject 15 Units into the skin nightly (Patient taking differently: Inject 30 Units into the skin every morning ) 5 pen 3    clopidogrel (PLAVIX) 75 MG tablet Take 1 tablet by mouth daily 30 tablet 3    metoprolol succinate (TOPROL XL) 25 MG extended release tablet Take 25 mg by mouth daily      vitamin B-12 (CYANOCOBALAMIN) 1000 MCG tablet Take 1,000 mcg by mouth daily  2    Musculoskeletal: Muscle strength 5/5 with PF/DF/I and E of both feet. Transmetatarsal amputation of the left foot.        Assessment:        Problem List Items Addressed This Visit     Diabetic foot ulcer ([address] - Primary           Procedure Note  Indications:  Based on my examination of this patient's wound(s)/ulcer(s) today, debridement is required to promote healing and evaluate the wound base. Performed by: Stephanie Molina DPM    Consent obtained:  Yes    Time out taken:  Yes    Pain Control: Anesthetic  Anesthetic: 4% Lidocaine Liquid Topical(2.5 ml)       Debridement: Excisional Debridement    Using curette, #15 blade scalpel and forceps the wound(s)/ulcer(s) was/were sharply debrided down through and including the removal of epidermis, dermis and subcutaneous tissue. Devitalized Tissue Debrided:  fibrin, biofilm and callus    Pre Debridement Measurements:  Are located in the [facility]  Documentation Flow Sheet    Wound/Ulcer #: 2    Post Debridement Measurements:  Wound/Ulcer Descriptions are Pre Debridement except measurements:    Wound 03/13/19 Foot Right;Plantar #2 - right plantar foot- pt. noted 2/15/2019 (Active)   Wound Image   8/7/2019  2:43 PM   Wound Diabetic [place] 1 8/7/2019  2:43 PM   Dressing Status Intact; Old drainage 8/7/2019  2:43 PM   Dressing Changed Changed/New 8/7/2019  3:22 PM   Dressing/Treatment ABD; Dry Dressing;Steri-strips;Silicone Dressing 6/67/7999  3:16 PM   Wound Cleansed Rinsed/Irrigated with saline 7/24/2019  2:32 PM   Wound Length (cm) 0.7 cm 8/7/2019  2:43 PM   Wound Width (cm) 0.4 cm 8/7/2019  2:43 PM   Wound Depth (cm) 0.2 cm 8/7/2019  2:43 PM   Wound Surface Area (cm^2) 0.28 cm^2 8/7/2019  2:43 PM   Change in Wound Size % (l*w) 96.35 8/7/2019  2:43 PM   Wound Volume (cm^3) 0.06 cm^3 8/7/2019  2:43 PM   Wound Healing % 92 8/7/2019  2:43 PM   Post-Procedure Length (cm) 0.8 cm 8/7/2019  3:07 PM   Post-Procedure Width (cm) 0.5 cm 8/7/2019  3:07 PM   Post-Procedure MINIMIZE ANY WEIGHT BEARING IN RIGHT PLANTAR FOOT BY HEEL WEIGHT BEARING FOR ANY TRANSFERS**      **LEFT MEDIAL FOOT HEALED- WRAP WITH ABD PAD AND KERLIX (CHANGE Monday AND Friday)**      Dressings : Wound Location : RIGHT PLANTAR FOOT        Apply ENDOFORM, MEPITEL AND STERI-STRIPS  (APPLY HORSESHOE PODIATRY PAD TO RAHUL-WOUND OF RIGHT PLANTAR FOOT)     COVER AND SECURED WITH GAUZE, ABD PAD AND KERLIX (CHANGE GAUZE, ABD AND KERLIX ONLY)     CHANGE DRESSING ON Monday AND Friday- PATIENT WILL HAVE CHANGED IN WOUND CARE ON Wednesday          Edema Control:  Apply:  [] Compression Stocking           []Right Leg         []Left Leg              [] Tubigrip          []Right Leg Double Layer          []Left Leg Double Layer                                                  []Right Leg Single Layer           []Left Leg Single Layer              [] SpandaGrip    []Right Leg         []Left Leg                                      []Low compression 5-10 mm/Hg                                                 []Medium compression 10-20 mm/Hg                                      []High compression  20-30 mm/Hg              every morning immediately when getting up should be applied to affected leg(s) from mid foot to knee making sure to cover the heel.  Remove every night before going to bed.              [] Elevate leg(s) above the level of the heart when sitting.                    [] Avoid prolonged standing in one place.        Compression:  Apply:  [] Multilayer Compression Wrap Applied in Clinic        []RightLeg          []Left Leg              [] Multi-layer compression.  Do not get leg(s) with wrap wet.  If wraps become too tight call the center or completely remove the wrap.                 [] Elevate leg(s) above the level of the heart when sitting.                    [] Avoid prolonged standing in one place.     Off-Loading:   [] Off-loading when        [] walking           [] in bed [] sitting  [] Total non-weight bearing  [] Right Leg  [] Left Leg          [x] Assistive Device         [] Walker            [] Cane   [x] Wheelchair- continue to use for all ambulation      [] Crutches              [] Surgical shoe    [] Podus Boot(s)   [] Foam Boot(s)  [] Roll About              [] Cast Boot       [] CROW Boot  [] Other:                   Dietary:  [] Diet as tolerated:        [x] Calorie Diabetic Diet: [] No Added Salt:  [] Increase Protein:        [] Other:     Activity:  [x] Activity as tolerated:  [] Patient has no activity restrictions     [] Strict Bedrest:            [] Remain off Work:     [] May return to full duty work:                                         [] TSHWJE to work with restrictions:          If you are still having pain after you go home:  [X] Elevate the affected limb.            [X]Use over-the-counter medications you would normally use for pain as permitted by your family doctor.  [X] For persistent pain not relieved by the above interventions, please call your family doctor.                Return Appointment:  [] Wound and dressing supply provider:   [x] ECF or Home Healthcare: [facility]  [] Wound Assessment:  [] Physician or NP scheduled for Wound Assessment:   [x] Return Appointment: Ruthy THORPE  in  1 Week(s)  [] Ordered tests:       Nurse Case Manger: Burke Curl   Electronically signed by Ashley Live RN on 8/7/2019 at 3:10 PM  [address] [place] [address] Information: Should you experience any significant changes in your wound(s) or have questions about your wound care, please contact the [facility] [phone] [facility] - THURSDAY 8:30 am - 4:30 pm and Friday 8:30 am - 1:00 pm.  If you need help with your wound outside these hours and cannot wait until we are again available, contact your PCP or go to the hospital emergency room.      PLEASE NOTE: IF YOU ARE UNABLE TO OBTAIN WOUND SUPPLIES, CONTINUE TO USE THE SUPPLIES YOU HAVE AVAILABLE UNTIL YOU ARE ABLE TO [address].  IT IS MOST

## 2019-08-07 NOTE — PROGRESS NOTES
!   !                      !          ! Atherectomy w/ angioplasty right GLADIS      !   !                      !          !Transcatheter infusion of TPA             !   +----------------------+----------+------------------------------------------+   ! Other                 !03/21/2019! Atherectomy w/ drug-coated balloon        !   !                      !          !angioplasty left popliteal artery         !   !                      !          ! Atherectomy w/ angioplasty left peroneal  !   !                      !          !and GLADIS                                   !   +----------------------+----------+------------------------------------------+       Velocities are measured in cm/s ; Diameters are measured in mm       LE Duplex Measurements       +-------------------++-----+-----+----------++----+-----+----------+   !                   ! ! Right!     ! Left      !!    !     !          !   +-------------------++-----+-----+----------++----+-----+----------+   ! Location           !!PSV  !Ratio! Wave Desc. !!PSV ! Ratio! Wave Desc.!   +-------------------++-----+-----+----------++----+-----+----------+   ! Prox Common Femoral!!104  !     !Monophasic!!95  !     !Biphasic  !   +-------------------++-----+-----+----------++----+-----+----------+   ! Dist Common Femoral!!99   !     !Biphasic  !!99  !     !Monophasic!   +-------------------++-----+-----+----------++----+-----+----------+   ! PFA                !!81   !     !Monophasic!!128 !     !Monophasic!   +-------------------++-----+-----+----------++----+-----+----------+   ! Prox SFA           !!103  !1.04 ! Monophasic!!85  !0.86 ! Monophasic!   +-------------------++-----+-----+----------++----+-----+----------+   ! Mid SFA            !!53.4 !0.52 ! Monophasic!!44.6!0.52 ! Monophasic!   +-------------------++-----+-----+----------++----+-----+----------+   ! Dist SFA           !!68   !1.44 ! Monophasic!!88.4!1.98 ! Monophasic!

## 2019-08-14 ENCOUNTER — HOSPITAL ENCOUNTER (OUTPATIENT)
Dept: WOUND CARE | Age: 49
Discharge: HOME | End: 2019-08-14
Payer: COMMERCIAL

## 2019-08-14 VITALS
RESPIRATION RATE: 16 BRPM | DIASTOLIC BLOOD PRESSURE: 81 MMHG | SYSTOLIC BLOOD PRESSURE: 142 MMHG | TEMPERATURE: 97.9 F | HEART RATE: 92 BPM

## 2019-08-14 DIAGNOSIS — L97.412 DIABETIC ULCER OF RIGHT MIDFOOT ASSOCIATED WITH TYPE 2 DIABETES MELLITUS, WITH FAT LAYER EXPOSED (HCC): Primary | ICD-10-CM

## 2019-08-14 DIAGNOSIS — E11.621 DIABETIC ULCER OF RIGHT MIDFOOT ASSOCIATED WITH TYPE 2 DIABETES MELLITUS, WITH FAT LAYER EXPOSED (HCC): Primary | ICD-10-CM

## 2019-08-14 PROCEDURE — 11042 DBRDMT SUBQ TIS 1ST 20SQCM/<: CPT

## 2019-08-14 RX ORDER — LIDOCAINE 50 MG/G
OINTMENT TOPICAL PRN
Status: DISCONTINUED | OUTPATIENT
Start: 2019-08-14 | End: 2019-08-15 | Stop reason: HOSPADM

## 2019-08-14 ASSESSMENT — PAIN SCALES - GENERAL
PAINLEVEL_OUTOF10: 0
PAINLEVEL_OUTOF10: 0

## 2019-08-14 NOTE — PROGRESS NOTES
HISTORY    Past Surgical History:   Procedure Laterality Date    CARDIAC CATHETERIZATION      CAROTID ENDARTERECTOMY Right 4-9-2015    CHOLECYSTECTOMY      CT OFFICE/OUTPT VISIT,PROCEDURE ONLY Left 10/5/2018    AMPUTATION LEFT GREAT DIGIT performed by Billie Garcia DPM at [place] [address] OFFICE/OUTPT [address] [place] [address] Left 10/22/2018    INCISION AND DRAINAGE LEFT FOOT FIRST AND SECOND RAY AMPUTATION (DIGITS ONE, TWO AND METARSAL ONE AND TWO) performed by Billie Garcia DPM at [place] [address] OFFICE/OUTPT [address] [place] [address] Left 10/26/2018    LEFT FOOT WOUND DEBRIDEMENT WITH PRIMARY CLOSURE performed by Billie Garcia DPM at [place] [address] OFFICE/OUTPT [address] [place] [address] Left 11/2/2018    INCISION AND DRAINAGE WITH TRANSMETATARSAL CONVERTED TO LIST MART  AMPUTATION LEFT FOOT performed by Billie Garcia DPM at [address] [place] [address] Pl TOE AMPUTATION Left 10/05/2018    left great toe amputation       FAMILY HISTORY    Family History   Problem Relation Age of Onset    High Blood Pressure Mother        SOCIAL HISTORY    Social History     Tobacco Use    Smoking status: Never Smoker    Smokeless tobacco: Never Used   Substance Use Topics    Alcohol use: No    Drug use: No     Types: Marijuana     Comment: QUIT 4 MONTHS        ALLERGIES    No Known Allergies    MEDICATIONS    Current Outpatient Medications on File Prior to Encounter   Medication Sig Dispense Refill    Dulaglutide (TRULICITY) 1.5 IT/8.6AX SOPN Inject 1.5 mg into the skin once a week 12 pen 0    atorvastatin (LIPITOR) 40 MG tablet Take 1 tablet by mouth every morning 30 tablet 0    insulin glargine (BASAGLAR KWIKPEN) 100 UNIT/ML injection pen Inject 15 Units into the skin nightly (Patient taking differently: Inject 30 Units into the skin every morning ) 5 pen 3    clopidogrel (PLAVIX) 75 MG tablet Take 1 tablet by mouth daily 30 tablet 3    metoprolol succinate (TOPROL XL) 25 MG extended release tablet Take 25 mg by mouth daily      vitamin B-12 (CYANOCOBALAMIN) 1000 MCG tablet Take 1,000 mcg by mouth daily  2    ranitidine (ZANTAC) 150 MG tablet Take 150 mg by mouth 2 times daily  1    Multiple Vitamins-Minerals (THERAPEUTIC MULTIVITAMIN-MINERALS) tablet Take 1 tablet by mouth daily      aspirin (ASPIRIN LOW DOSE) 81 MG EC tablet Take 1 tablet by mouth daily 30 tablet 0    lisinopril (PRINIVIL;ZESTRIL) 40 MG tablet Take 40 mg by mouth every morning       acetaminophen (AMINOFEN) 325 MG tablet Take 2 tablets by mouth every 6 hours as needed for Pain 60 tablet 0    Blood Glucose Monitoring Suppl (FREESTYLE LITE) INEZ 1 Device by Does not apply route 3 times daily (before meals) 1 Device 0    blood glucose test strips (FREESTYLE LITE) strip 1 each by In Vitro route 3 times daily As needed. 100 each 3    FREESTYLE LANCETS MISC 1 each by Does not apply route daily 100 each 3    Insulin Disposable Pump (V-GO 40) KIT by Does not apply route 6 CLICKS DAILY      Insulin Pen Needle (B-D UF III MINI PEN NEEDLES) 31G X 5 MM MISC 1 each by Does not apply route 4 times daily (before meals and nightly) 150 each 6     No current facility-administered medications on file prior to encounter. REVIEW OF SYSTEMS    Pertinent items are noted in HPI. Objective:      BP (!) 142/81   Pulse 92   Temp 97.9 °F (36.6 °C) (Oral)   Resp 16     Wt Readings from Last 3 Encounters:   08/07/19 271 lb 13.2 oz (123.3 kg)   08/07/19 251 lb (113.9 kg)   05/01/19 255 lb 8.2 oz (115.9 kg)       PHYSICAL EXAM    Patient is alert and oriented x 3, and is in no acute distress.     Vascular: DP weakly palpable bilateral. PT pulse not palpable bilateral but audible on doppler exam. No Pedal hair noted bilateral. No Edema noted to ankles.    Derm:  Skin is warm to warm from proximal leg to distal foot bilateral. Toenails 1-5 on right foot are thickened, yellow and dystrophic. Neuro:  Protective sensation absent to 10/10 sites bilateral via a 5.07 [place]-Xuan monofilament.

## 2019-08-21 ENCOUNTER — HOSPITAL ENCOUNTER (OUTPATIENT)
Dept: WOUND CARE | Age: 49
Discharge: HOME | End: 2019-08-21
Payer: COMMERCIAL

## 2019-08-21 VITALS
SYSTOLIC BLOOD PRESSURE: 128 MMHG | DIASTOLIC BLOOD PRESSURE: 75 MMHG | HEART RATE: 85 BPM | TEMPERATURE: 97.8 F | RESPIRATION RATE: 16 BRPM

## 2019-08-21 DIAGNOSIS — L97.412 DIABETIC ULCER OF RIGHT MIDFOOT ASSOCIATED WITH TYPE 2 DIABETES MELLITUS, WITH FAT LAYER EXPOSED (HCC): Primary | ICD-10-CM

## 2019-08-21 DIAGNOSIS — E11.621 DIABETIC ULCER OF RIGHT MIDFOOT ASSOCIATED WITH TYPE 2 DIABETES MELLITUS, WITH FAT LAYER EXPOSED (HCC): Primary | ICD-10-CM

## 2019-08-21 PROCEDURE — 11042 DBRDMT SUBQ TIS 1ST 20SQCM/<: CPT

## 2019-08-21 RX ORDER — LIDOCAINE 50 MG/G
OINTMENT TOPICAL PRN
Status: DISCONTINUED | OUTPATIENT
Start: 2019-08-21 | End: 2019-08-22 | Stop reason: HOSPADM

## 2019-08-21 ASSESSMENT — PAIN SCALES - GENERAL
PAINLEVEL_OUTOF10: 0
PAINLEVEL_OUTOF10: 0

## 2019-08-21 NOTE — PROGRESS NOTES
[facility] [address]   Progress Note and Procedure Note      [facility] RECORD NUMBER:  [id number]  AGE: 47 y.o. GENDER: male  : 1970  EPISODE DATE:  2019    Subjective:     Chief Complaint   Patient presents with    Wound Check      wound right foot         HISTORY of PRESENT ILLNESS HPI     Robert Higuera is a 47 y.o. male who presents today for wound/ulcer evaluation. History of Wound Context: Patient presents with complaining of a wound on the bottom of the right foot of eight weeks duration.  Patient states home health care is performing dressing changes as ordered. Patient reports he had angiograms of both legs with revascularization of the right leg on 2019 performed by Dr. Regla Lim. Patient had revascularization of the left [facility] Dr. Regla Lim on 2019. Hien Forde had a transmetatarsal amputation of the left foot on 2018.     Patient was evaluated by Jessica Grimm RN in Dr. Sima Champagne office on 2019.  Patient states he was told \"the circulation in his legs has not changed since the surgery\" and to follow up in three months.     Wound/Ulcer Pain Timing/Severity: none  Quality of pain: N/A  Severity:  0 / 10   Modifying Factors: None  Associated Signs/Symptoms: none    Ulcer Identification:  Ulcer Type: diabetic    Contributing Factors: diabetes, chronic pressure, decreased mobility and arterial insufficiency    Wound: N/A        PAST MEDICAL HISTORY        Diagnosis Date    Angina effort     Arthritis     CAD (coronary artery disease)     Carotid stenosis     Diabetes mellitus with neurological manifestations, uncontrolled ([address]     Diabetic foot ulcer ([address] 3/13/2019    Diarrhea     DEAN (dyspnea on exertion) 2015    Hyperlipidemia     Hypertension     Obesity     Type II or unspecified type diabetes mellitus without mention of complication, not stated as uncontrolled        PAST SURGICAL HISTORY    Past Surgical History:   Procedure mcg by mouth daily  2    ranitidine (ZANTAC) 150 MG tablet Take 150 mg by mouth 2 times daily  1    Multiple Vitamins-Minerals (THERAPEUTIC MULTIVITAMIN-MINERALS) tablet Take 1 tablet by mouth daily      aspirin (ASPIRIN LOW DOSE) 81 MG EC tablet Take 1 tablet by mouth daily 30 tablet 0    lisinopril (PRINIVIL;ZESTRIL) 40 MG tablet Take 40 mg by mouth every morning       acetaminophen (AMINOFEN) 325 MG tablet Take 2 tablets by mouth every 6 hours as needed for Pain 60 tablet 0    Blood Glucose Monitoring Suppl (FREESTYLE LITE) INEZ 1 Device by Does not apply route 3 times daily (before meals) 1 Device 0    blood glucose test strips (FREESTYLE LITE) strip 1 each by In Vitro route 3 times daily As needed. 100 each 3    FREESTYLE LANCETS MISC 1 each by Does not apply route daily 100 each 3    Insulin Disposable Pump (V-GO 40) KIT by Does not apply route 6 CLICKS DAILY      Insulin Pen Needle (B-D UF III MINI PEN NEEDLES) 31G X 5 MM MISC 1 each by Does not apply route 4 times daily (before meals and nightly) 150 each 6     No current facility-administered medications on file prior to encounter. REVIEW OF SYSTEMS    Pertinent items are noted in HPI. Objective:      /75   Pulse 85   Temp 97.8 °F (36.6 °C) (Oral)   Resp 16     Wt Readings from Last 3 Encounters:   08/07/19 271 lb 13.2 oz (123.3 kg)   08/07/19 251 lb (113.9 kg)   05/01/19 255 lb 8.2 oz (115.9 kg)       PHYSICAL EXAM    Patient is alert and oriented x 3, and is in no acute distress.     Vascular: DP weakly palpable bilateral. PT pulse not palpable bilateral but audible on doppler exam. No Pedal hair noted bilateral. No Edema noted to ankles.    Derm:  Skin is warm to warm from proximal leg to distal foot bilateral. Toenails 1-5 on right foot are thickened, yellow and dystrophic.   Neuro:  Protective sensation absent to 10/10 sites bilateral via a 5.07 [place]-Xuan monofilament.    Musculoskeletal: Muscle strength 5/5 with

## 2019-08-28 ENCOUNTER — HOSPITAL ENCOUNTER (OUTPATIENT)
Dept: WOUND CARE | Age: 49
Discharge: HOME | End: 2019-08-28
Payer: COMMERCIAL

## 2019-08-28 VITALS
DIASTOLIC BLOOD PRESSURE: 83 MMHG | RESPIRATION RATE: 16 BRPM | TEMPERATURE: 97.7 F | HEART RATE: 97 BPM | SYSTOLIC BLOOD PRESSURE: 131 MMHG

## 2019-08-28 DIAGNOSIS — L97.412 DIABETIC ULCER OF RIGHT MIDFOOT ASSOCIATED WITH TYPE 2 DIABETES MELLITUS, WITH FAT LAYER EXPOSED (HCC): Primary | ICD-10-CM

## 2019-08-28 DIAGNOSIS — E11.621 DIABETIC ULCER OF RIGHT MIDFOOT ASSOCIATED WITH TYPE 2 DIABETES MELLITUS, WITH FAT LAYER EXPOSED (HCC): Primary | ICD-10-CM

## 2019-08-28 PROCEDURE — 99212 OFFICE O/P EST SF 10 MIN: CPT

## 2019-08-28 ASSESSMENT — PAIN SCALES - GENERAL
PAINLEVEL_OUTOF10: 0
PAINLEVEL_OUTOF10: 0

## 2019-09-04 ENCOUNTER — HOSPITAL ENCOUNTER (OUTPATIENT)
Dept: WOUND CARE | Age: 49
Discharge: HOME | End: 2019-09-04
Payer: COMMERCIAL

## 2019-09-04 VITALS
TEMPERATURE: 98 F | RESPIRATION RATE: 16 BRPM | HEART RATE: 95 BPM | DIASTOLIC BLOOD PRESSURE: 84 MMHG | SYSTOLIC BLOOD PRESSURE: 127 MMHG

## 2019-09-04 DIAGNOSIS — L97.412 DIABETIC ULCER OF RIGHT MIDFOOT ASSOCIATED WITH TYPE 2 DIABETES MELLITUS, WITH FAT LAYER EXPOSED (HCC): Primary | ICD-10-CM

## 2019-09-04 DIAGNOSIS — E11.621 DIABETIC ULCER OF RIGHT MIDFOOT ASSOCIATED WITH TYPE 2 DIABETES MELLITUS, WITH FAT LAYER EXPOSED (HCC): Primary | ICD-10-CM

## 2019-09-04 PROCEDURE — 99212 OFFICE O/P EST SF 10 MIN: CPT

## 2019-09-04 ASSESSMENT — PAIN SCALES - GENERAL
PAINLEVEL_OUTOF10: 0
PAINLEVEL_OUTOF10: 0

## 2019-09-19 PROBLEM — E11.621: Status: ACTIVE | Noted: 2018-09-28

## 2019-09-19 PROBLEM — L97.409: Status: ACTIVE | Noted: 2018-09-28

## 2019-09-23 ENCOUNTER — OFFICE VISIT (OUTPATIENT)
Dept: ENDOCRINOLOGY | Age: 49
End: 2019-09-23
Payer: COMMERCIAL

## 2019-09-23 VITALS
SYSTOLIC BLOOD PRESSURE: 134 MMHG | HEIGHT: 66 IN | WEIGHT: 272.6 LBS | BODY MASS INDEX: 43.81 KG/M2 | DIASTOLIC BLOOD PRESSURE: 86 MMHG | HEART RATE: 88 BPM | OXYGEN SATURATION: 99 %

## 2019-09-23 DIAGNOSIS — E11.51 TYPE 2 DIABETES MELLITUS WITH ATHEROSCLEROSIS OF NATIVE ARTERIES OF EXTREMITY WITH REST PAIN (HCC): ICD-10-CM

## 2019-09-23 DIAGNOSIS — E11.9 DIABETES MELLITUS TYPE 2, INSULIN DEPENDENT (HCC): ICD-10-CM

## 2019-09-23 DIAGNOSIS — Z79.4 DIABETES MELLITUS TYPE 2, INSULIN DEPENDENT (HCC): ICD-10-CM

## 2019-09-23 DIAGNOSIS — E78.5 DYSLIPIDEMIA ASSOCIATED WITH TYPE 2 DIABETES MELLITUS (HCC): ICD-10-CM

## 2019-09-23 DIAGNOSIS — I10 ESSENTIAL HYPERTENSION: ICD-10-CM

## 2019-09-23 DIAGNOSIS — E66.01 MORBID OBESITY DUE TO EXCESS CALORIES (HCC): ICD-10-CM

## 2019-09-23 DIAGNOSIS — I70.229 TYPE 2 DIABETES MELLITUS WITH ATHEROSCLEROSIS OF NATIVE ARTERIES OF EXTREMITY WITH REST PAIN (HCC): ICD-10-CM

## 2019-09-23 DIAGNOSIS — E11.51 DIABETES MELLITUS WITH PERIPHERAL CIRCULATORY DISORDER (HCC): Primary | ICD-10-CM

## 2019-09-23 DIAGNOSIS — E11.69 DYSLIPIDEMIA ASSOCIATED WITH TYPE 2 DIABETES MELLITUS (HCC): ICD-10-CM

## 2019-09-23 PROCEDURE — G8598 ASA/ANTIPLAT THER USED: HCPCS | Performed by: INTERNAL MEDICINE

## 2019-09-23 PROCEDURE — G8417 CALC BMI ABV UP PARAM F/U: HCPCS | Performed by: INTERNAL MEDICINE

## 2019-09-23 PROCEDURE — G8427 DOCREV CUR MEDS BY ELIG CLIN: HCPCS | Performed by: INTERNAL MEDICINE

## 2019-09-23 PROCEDURE — 3046F HEMOGLOBIN A1C LEVEL >9.0%: CPT | Performed by: INTERNAL MEDICINE

## 2019-09-23 PROCEDURE — 2022F DILAT RTA XM EVC RTNOPTHY: CPT | Performed by: INTERNAL MEDICINE

## 2019-09-23 PROCEDURE — 99214 OFFICE O/P EST MOD 30 MIN: CPT | Performed by: INTERNAL MEDICINE

## 2019-09-23 PROCEDURE — 1036F TOBACCO NON-USER: CPT | Performed by: INTERNAL MEDICINE

## 2019-09-23 PROCEDURE — 3017F COLORECTAL CA SCREEN DOC REV: CPT | Performed by: INTERNAL MEDICINE

## 2019-09-23 RX ORDER — INSULIN LISPRO 100 [IU]/ML
INJECTION, SUSPENSION SUBCUTANEOUS
Qty: 10 PEN | Refills: 3 | Status: SHIPPED | OUTPATIENT
Start: 2019-09-23 | End: 2019-10-02 | Stop reason: ALTCHOICE

## 2019-09-23 NOTE — PROGRESS NOTES
Cardiovascular: Negative for chest pain, palpitations and leg swelling. Gastrointestinal: Negative for nausea, vomiting, abdominal pain, diarrhea, constipation. Endocrine: Negative for cold intolerance, heat intolerance, polydipsia, polyphagia and polyuria. Genitourinary: Negative for dysuria, urgency, frequency, hematuria and flank pain. Musculoskeletal: Negative for myalgias, back pain, arthralgias and neck pain. Skin/Nail: Negative for rash, itching. Normal nails. Neurological: Negative for seizures, weakness, light-headedness, numbness and headaches. Hematological/ Lymph nodes: Negative for adenopathy. Does not bruise/bleed easily. Psychiatric/Behavioral: Negative for suicidal ideas, depression, anxiety, sleep disturbance and decreased concentration. Objective:   Physical Exam:  /86 (Site: Left Upper Arm, Position: Sitting, Cuff Size: Large Adult)   Pulse 88   Ht 5' 6\" (1.676 m)   Wt 272 lb 9.6 oz (123.7 kg)   SpO2 99%   BMI 44.00 kg/m²   Constitutional: Patient is oriented to person, place, and time. Patient appears well-developed and well-nourished. HENT:    Head: Normocephalic and atraumatic. Eyes: Conjunctivae and EOM are normal.     Neck: Normal range of motion. Cardiovascular: Normal rate, regular rhythm and normal heart sounds. Pulmonary/Chest: Effort normal and breath sounds normal.   Musculoskeletal: Normal range of motion. Left foot dressed and attached to wound vac. Neurological: Patient is alert and oriented to person, place, and time. Skin: Skin is warm and dry. Psychiatric: Patient has a normal mood and affect.  Patient behavior is normal.     Lab Review:    Orders Only on 06/24/2019   Component Date Value Ref Range Status    Microalbumin, Random Urine 06/24/2019 49.00* <2.0 mg/dL Final    Creatinine, Ur 06/24/2019 135.6  39.0 - 259.0 mg/dL Final    Microalbumin Creatinine Ratio 06/24/2019 361.4* 0.0 - 30.0 mg/g Final   Orders Only on 06/20/2019   Component Date Value Ref Range Status    Cholesterol, Fasting 06/20/2019 185  0 - 199 mg/dL Final    Triglyceride, Fasting 06/20/2019 115  0 - 150 mg/dL Final    HDL 06/20/2019 50  40 - 60 mg/dL Final    LDL Calculated 06/20/2019 112* <100 mg/dL Final    VLDL Cholesterol Calculated 06/20/2019 23  Not Established mg/dL Final    Hemoglobin A1C 06/20/2019 10.9  See comment % Final    eAG 06/20/2019 266.1  mg/dL Final    Sodium 06/20/2019 141  136 - 145 mmol/L Final    Potassium 06/20/2019 4.5  3.5 - 5.1 mmol/L Final    Chloride 06/20/2019 101  99 - 110 mmol/L Final    CO2 06/20/2019 25  21 - 32 mmol/L Final    Anion Gap 06/20/2019 15  3 - 16 Final    Glucose 06/20/2019 243* 70 - 99 mg/dL Final    BUN 06/20/2019 17  7 - 20 mg/dL Final    CREATININE 06/20/2019 0.7* 0.9 - 1.3 mg/dL Final    GFR Non- 06/20/2019 >60  >60 Final    GFR  06/20/2019 >60  >60 Final    Calcium 06/20/2019 9.8  8.3 - 10.6 mg/dL Final    Total Protein 06/20/2019 7.6  6.4 - 8.2 g/dL Final    Alb 06/20/2019 3.9  3.4 - 5.0 g/dL Final    Albumin/Globulin Ratio 06/20/2019 1.1  1.1 - 2.2 Final    Total Bilirubin 06/20/2019 0.3  0.0 - 1.0 mg/dL Final    Alkaline Phosphatase 06/20/2019 97  40 - 129 U/L Final    ALT 06/20/2019 52* 10 - 40 U/L Final    AST 06/20/2019 26  15 - 37 U/L Final    Globulin 06/20/2019 3.7  g/dL Final   Admission on 06/01/2019, Discharged on 06/01/2019   Component Date Value Ref Range Status    WBC 06/01/2019 10.3  4.0 - 11.0 K/uL Final    RBC 06/01/2019 4.83  4.20 - 5.90 M/uL Final    Hemoglobin 06/01/2019 13.2* 13.5 - 17.5 g/dL Final    Hematocrit 06/01/2019 40.2* 40.5 - 52.5 % Final    MCV 06/01/2019 83.2  80.0 - 100.0 fL Final    MCH 06/01/2019 27.2  26.0 - 34.0 pg Final    MCHC 06/01/2019 32.7  31.0 - 36.0 g/dL Final    RDW 06/01/2019 16.0* 12.4 - 15.4 % Final    Platelets 38/19/1858 217  135 - 450 K/uL Final    MPV 06/01/2019 9.0  5.0 - 10.5 fL Final    Neutrophils % 06/01/2019 70.6  % Final    Lymphocytes % 06/01/2019 21.7  % Final    Monocytes % 06/01/2019 5.1  % Final    Eosinophils % 06/01/2019 2.2  % Final    Basophils % 06/01/2019 0.4  % Final    Neutrophils Absolute 06/01/2019 7.3  1.7 - 7.7 K/uL Final    Lymphocytes Absolute 06/01/2019 2.2  1.0 - 5.1 K/uL Final    Monocytes Absolute 06/01/2019 0.5  0.0 - 1.3 K/uL Final    Eosinophils Absolute 06/01/2019 0.2  0.0 - 0.6 K/uL Final    Basophils Absolute 06/01/2019 0.0  0.0 - 0.2 K/uL Final    Sodium 06/01/2019 135* 136 - 145 mmol/L Final    Potassium reflex Magnesium 06/01/2019 4.2  3.5 - 5.1 mmol/L Final    Chloride 06/01/2019 101  99 - 110 mmol/L Final    CO2 06/01/2019 24  21 - 32 mmol/L Final    Anion Gap 06/01/2019 10  3 - 16 Final    Glucose 06/01/2019 183* 70 - 99 mg/dL Final    BUN 06/01/2019 21* 7 - 20 mg/dL Final    CREATININE 06/01/2019 0.7* 0.9 - 1.3 mg/dL Final    GFR Non- 06/01/2019 >60  >60 Final    GFR  06/01/2019 >60  >60 Final    Calcium 06/01/2019 9.9  8.3 - 10.6 mg/dL Final    Sed Rate 06/01/2019 51* 0 - 20 mm/Hr Final   Office Visit on 04/29/2019   Component Date Value Ref Range Status    Diabetic Retinopathy 04/18/2018 Positive   Final   Hospital Outpatient Visit on 03/21/2019   Component Date Value Ref Range Status    WBC 03/21/2019 10.9  4.0 - 11.0 K/uL Final    RBC 03/21/2019 4.54  4.20 - 5.90 M/uL Final    Hemoglobin 03/21/2019 12.3* 13.5 - 17.5 g/dL Final    Hematocrit 03/21/2019 37.6* 40.5 - 52.5 % Final    MCV 03/21/2019 82.9  80.0 - 100.0 fL Final    MCH 03/21/2019 27.0  26.0 - 34.0 pg Final    MCHC 03/21/2019 32.6  31.0 - 36.0 g/dL Final    RDW 03/21/2019 14.1  12.4 - 15.4 % Final    Platelets 24/64/4158 296  135 - 450 K/uL Final    MPV 03/21/2019 8.5  5.0 - 10.5 fL Final    POC Sodium 03/21/2019 137  136 - 145 mmol/L Final    POC Potassium 03/21/2019 4.4  3.5 - 5.1 mmol/L Final    POC Chloride mg/dl Final    Performed on 03/07/2019 ACCU-CHEK   Final    POC Glucose 03/07/2019 242* 70 - 99 mg/dl Final    Performed on 03/07/2019 ACCU-CHEK   Final    POC Glucose 03/07/2019 207* 70 - 99 mg/dl Final    Performed on 03/07/2019 ACCU-CHEK   Final    POC Glucose 03/08/2019 261* 70 - 99 mg/dl Final    Performed on 03/08/2019 ACCU-CHEK   Final    POC ACT LR 03/08/2019 339  Not Established sec Final    POC Glucose 03/08/2019 320* 70 - 99 mg/dl Final    Performed on 03/08/2019 ACCU-CHEK   Final    POC Glucose 03/08/2019 255* 70 - 99 mg/dl Final    Performed on 03/08/2019 ACCU-CHEK   Final    POC Glucose 03/08/2019 122* 70 - 99 mg/dl Final    Performed on 03/08/2019 ACCU-CHEK   Final    POC Glucose 03/09/2019 213* 70 - 99 mg/dl Final    Performed on 03/09/2019 ACCU-CHEK   Final    WBC 03/09/2019 9.8  4.0 - 11.0 K/uL Final    RBC 03/09/2019 4.33  4.20 - 5.90 M/uL Final    Hemoglobin 03/09/2019 11.6* 13.5 - 17.5 g/dL Final    Hematocrit 03/09/2019 36.1* 40.5 - 52.5 % Final    MCV 03/09/2019 83.4  80.0 - 100.0 fL Final    MCH 03/09/2019 26.8  26.0 - 34.0 pg Final    MCHC 03/09/2019 32.1  31.0 - 36.0 g/dL Final    RDW 03/09/2019 14.3  12.4 - 15.4 % Final    Platelets 26/57/2294 242  135 - 450 K/uL Final    MPV 03/09/2019 8.9  5.0 - 10.5 fL Final    Sodium 03/09/2019 134* 136 - 145 mmol/L Final    Potassium 03/09/2019 3.9  3.5 - 5.1 mmol/L Final    Chloride 03/09/2019 96* 99 - 110 mmol/L Final    CO2 03/09/2019 26  21 - 32 mmol/L Final    Anion Gap 03/09/2019 12  3 - 16 Final    Glucose 03/09/2019 220* 70 - 99 mg/dL Final    BUN 03/09/2019 24* 7 - 20 mg/dL Final    CREATININE 03/09/2019 0.8* 0.9 - 1.3 mg/dL Final    GFR Non- 03/09/2019 >60  >60 Final    GFR  03/09/2019 >60  >60 Final    Calcium 03/09/2019 9.3  8.3 - 10.6 mg/dL Final    POC Glucose 03/09/2019 215* 70 - 99 mg/dl Final    Performed on 03/09/2019 ACCU-CHEK   Final   Admission on 01/30/2019, 37 U/L Final    Globulin 01/30/2019 4.3  g/dL Final    Lipase 01/30/2019 26.0  13.0 - 60.0 U/L Final   Orders Only on 11/19/2018   Component Date Value Ref Range Status    CRP 11/19/2018 12.9* 0.0 - 5.1 mg/L Final   Orders Only on 11/19/2018   Component Date Value Ref Range Status    Sodium 11/19/2018 135* 136 - 145 mmol/L Final    Potassium 11/19/2018 4.9  3.5 - 5.1 mmol/L Final    Chloride 11/19/2018 93* 99 - 110 mmol/L Final    CO2 11/19/2018 20* 21 - 32 mmol/L Final    Anion Gap 11/19/2018 22* 3 - 16 Final    Glucose 11/19/2018 409* 70 - 99 mg/dL Final    BUN 11/19/2018 20  7 - 20 mg/dL Final    CREATININE 11/19/2018 0.9  0.9 - 1.3 mg/dL Final    GFR Non- 11/19/2018 >60  >60 Final    GFR  11/19/2018 >60  >60 Final    Calcium 11/19/2018 9.5  8.3 - 10.6 mg/dL Final    Total Protein 11/19/2018 8.1  6.4 - 8.2 g/dL Final    Alb 11/19/2018 3.5  3.4 - 5.0 g/dL Final    Albumin/Globulin Ratio 11/19/2018 0.8* 1.1 - 2.2 Final    Total Bilirubin 11/19/2018 0.3  0.0 - 1.0 mg/dL Final    Alkaline Phosphatase 11/19/2018 125  40 - 129 U/L Final    ALT 11/19/2018 31  10 - 40 U/L Final    AST 11/19/2018 41* 15 - 37 U/L Final    Globulin 11/19/2018 4.6  g/dL Final   Orders Only on 11/12/2018   Component Date Value Ref Range Status    CRP 11/12/2018 33.6* 0.0 - 5.1 mg/L Final   There may be more visits with results that are not included. Assessment and Plan     Zeenat Attica was seen today for diabetes.     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

## 2019-09-24 LAB
ESTIMATED AVERAGE GLUCOSE: 314.9 MG/DL
HBA1C MFR BLD: 12.6 %

## 2019-09-25 ENCOUNTER — HOSPITAL ENCOUNTER (EMERGENCY)
Age: 49
Discharge: HOME | End: 2019-09-25
Attending: EMERGENCY MEDICINE
Payer: COMMERCIAL

## 2019-09-25 ENCOUNTER — APPOINTMENT (OUTPATIENT)
Dept: GENERAL RADIOLOGY | Age: 49
End: 2019-09-25
Payer: COMMERCIAL

## 2019-09-25 ENCOUNTER — APPOINTMENT (OUTPATIENT)
Dept: CT IMAGING | Age: 49
End: 2019-09-25
Payer: COMMERCIAL

## 2019-09-25 VITALS
RESPIRATION RATE: 18 BRPM | TEMPERATURE: 97.8 F | BODY MASS INDEX: 43.4 KG/M2 | HEART RATE: 86 BPM | DIASTOLIC BLOOD PRESSURE: 63 MMHG | WEIGHT: 270.06 LBS | OXYGEN SATURATION: 96 % | HEIGHT: 66 IN | SYSTOLIC BLOOD PRESSURE: 153 MMHG

## 2019-09-25 DIAGNOSIS — R42 LIGHTHEADEDNESS: Primary | ICD-10-CM

## 2019-09-25 LAB
ANION GAP SERPL CALCULATED.3IONS-SCNC: 13 MMOL/L (ref 3–16)
BASOPHILS ABSOLUTE: 0 K/UL (ref 0–0.2)
BASOPHILS RELATIVE PERCENT: 0.4 %
BUN BLDV-MCNC: 26 MG/DL (ref 7–20)
CALCIUM SERPL-MCNC: 9.6 MG/DL (ref 8.3–10.6)
CHLORIDE BLD-SCNC: 99 MMOL/L (ref 99–110)
CHP ED QC CHECK: YES
CO2: 24 MMOL/L (ref 21–32)
CREAT SERPL-MCNC: 1.1 MG/DL (ref 0.9–1.3)
EKG ATRIAL RATE: 95 BPM
EKG DIAGNOSIS: NORMAL
EKG P AXIS: 56 DEGREES
EKG P-R INTERVAL: 166 MS
EKG Q-T INTERVAL: 350 MS
EKG QRS DURATION: 78 MS
EKG QTC CALCULATION (BAZETT): 439 MS
EKG R AXIS: -7 DEGREES
EKG T AXIS: 44 DEGREES
EKG VENTRICULAR RATE: 95 BPM
EOSINOPHILS ABSOLUTE: 0.2 K/UL (ref 0–0.6)
EOSINOPHILS RELATIVE PERCENT: 1.8 %
GFR AFRICAN AMERICAN: >60
GFR NON-AFRICAN AMERICAN: >60
GLUCOSE BLD-MCNC: 150 MG/DL
GLUCOSE BLD-MCNC: 150 MG/DL (ref 70–99)
GLUCOSE BLD-MCNC: 150 MG/DL (ref 70–99)
HCT VFR BLD CALC: 38.7 % (ref 40.5–52.5)
HEMOGLOBIN: 12.5 G/DL (ref 13.5–17.5)
LYMPHOCYTES ABSOLUTE: 2.2 K/UL (ref 1–5.1)
LYMPHOCYTES RELATIVE PERCENT: 19.6 %
MCH RBC QN AUTO: 26.8 PG (ref 26–34)
MCHC RBC AUTO-ENTMCNC: 32.2 G/DL (ref 31–36)
MCV RBC AUTO: 83.3 FL (ref 80–100)
MONOCYTES ABSOLUTE: 0.7 K/UL (ref 0–1.3)
MONOCYTES RELATIVE PERCENT: 5.8 %
NEUTROPHILS ABSOLUTE: 8.1 K/UL (ref 1.7–7.7)
NEUTROPHILS RELATIVE PERCENT: 72.4 %
PDW BLD-RTO: 14.8 % (ref 12.4–15.4)
PERFORMED ON: ABNORMAL
PLATELET # BLD: 240 K/UL (ref 135–450)
PMV BLD AUTO: 8.9 FL (ref 5–10.5)
POTASSIUM REFLEX MAGNESIUM: 4.2 MMOL/L (ref 3.5–5.1)
RBC # BLD: 4.64 M/UL (ref 4.2–5.9)
SODIUM BLD-SCNC: 136 MMOL/L (ref 136–145)
WBC # BLD: 11.2 K/UL (ref 4–11)

## 2019-09-25 PROCEDURE — 71046 X-RAY EXAM CHEST 2 VIEWS: CPT

## 2019-09-25 PROCEDURE — 80048 BASIC METABOLIC PNL TOTAL CA: CPT

## 2019-09-25 PROCEDURE — 96361 HYDRATE IV INFUSION ADD-ON: CPT

## 2019-09-25 PROCEDURE — 93010 ELECTROCARDIOGRAM REPORT: CPT | Performed by: INTERNAL MEDICINE

## 2019-09-25 PROCEDURE — 96360 HYDRATION IV INFUSION INIT: CPT

## 2019-09-25 PROCEDURE — 99284 EMERGENCY DEPT VISIT MOD MDM: CPT

## 2019-09-25 PROCEDURE — 93005 ELECTROCARDIOGRAM TRACING: CPT | Performed by: EMERGENCY MEDICINE

## 2019-09-25 PROCEDURE — 70450 CT HEAD/BRAIN W/O DYE: CPT

## 2019-09-25 PROCEDURE — 85025 COMPLETE CBC W/AUTO DIFF WBC: CPT

## 2019-09-25 PROCEDURE — 2580000003 HC RX 258: Performed by: EMERGENCY MEDICINE

## 2019-09-25 RX ORDER — 0.9 % SODIUM CHLORIDE 0.9 %
1000 INTRAVENOUS SOLUTION INTRAVENOUS ONCE
Status: COMPLETED | OUTPATIENT
Start: 2019-09-25 | End: 2019-09-25

## 2019-09-25 RX ADMIN — SODIUM CHLORIDE 1000 ML: 9 INJECTION, SOLUTION INTRAVENOUS at 15:16

## 2019-09-25 ASSESSMENT — ENCOUNTER SYMPTOMS
CONSTIPATION: 0
BACK PAIN: 0
DIARRHEA: 0
EYE ITCHING: 0
PHOTOPHOBIA: 0
COUGH: 0
BLOOD IN STOOL: 0
WHEEZING: 0
SHORTNESS OF BREATH: 0
NAUSEA: 0
APNEA: 0
EYE PAIN: 0
STRIDOR: 0
CHOKING: 0
EYE DISCHARGE: 0
EYE REDNESS: 0
COLOR CHANGE: 0
ANAL BLEEDING: 0
RECTAL PAIN: 0
ABDOMINAL PAIN: 0
CHEST TIGHTNESS: 0
VOMITING: 0
ABDOMINAL DISTENTION: 0

## 2019-09-25 ASSESSMENT — PAIN SCALES - GENERAL
PAINLEVEL_OUTOF10: 0

## 2019-09-25 NOTE — ED PROVIDER NOTES
Lifestyle    Physical activity:     Days per week: None     Minutes per session: None    Stress: None   Relationships    Social connections:     Talks on phone: None     Gets together: None     Attends Judaism service: None     Active member of club or organization: None     Attends meetings of clubs or organizations: None     Relationship status: None    Intimate partner violence:     Fear of current or ex partner: None     Emotionally abused: None     Physically abused: None     Forced sexual activity: None   Other Topics Concern    None   Social History Narrative    None       PHYSICAL EXAM       ED Triage Vitals [09/25/19 1420]   BP Temp Temp src Pulse Resp SpO2 Height Weight   121/68 97.8 °F (36.6 °C) -- 94 18 97 % 5' 6\" (1.676 m) 270 lb 1 oz (122.5 kg)       Physical Exam   Constitutional: He appears well-developed. No distress. HENT:   Head: Normocephalic and atraumatic. Eyes: Pupils are equal, round, and reactive to light. Right eye exhibits no discharge. Left eye exhibits no discharge. Neck: Normal range of motion. No tracheal deviation present. No thyromegaly present. Cardiovascular: Normal rate and regular rhythm. No murmur heard. Pulmonary/Chest: He has no wheezes. He has no rales. He exhibits no tenderness. Abdominal: Soft. He exhibits no distension and no mass. There is no tenderness. There is no rebound and no guarding. Musculoskeletal: Normal range of motion. He exhibits no edema, tenderness or deformity. Neurological: He is alert. No cranial nerve deficit. He exhibits normal muscle tone. Coordination normal.   Ambulates with steady gait, finger to nose, shin to knee normal.    Skin: Skin is warm. No rash noted. He is not diaphoretic. No erythema. No pallor.        DIAGNOSTIC RESULTS     EKG: All EKG's are interpreted by the Emergency Department Physician who either signs or Co-signs this chart in the absence of acardiologist.    EKG shows NSR no ectopy no acute st changes RADIOLOGY:   Non-plain film images such as CT, Ultrasoundand MRI are read by the radiologist. Plain radiographic images are visualized and preliminarily interpreted by the emergency physician with the below findings:    CXR and CT reassuring     ED BEDSIDE ULTRASOUND:   Performed by ED Physician - none    LABS:  Labs Reviewed   CBC WITH AUTO DIFFERENTIAL - Abnormal; Notable for the following components:       Result Value    WBC 11.2 (*)     Hemoglobin 12.5 (*)     Hematocrit 38.7 (*)     Neutrophils Absolute 8.1 (*)     All other components within normal limits    Narrative:     Performed at:  [address] [facility] [address]   Phone [phone]   BASIC METABOLIC PANEL W/ REFLEX TO MG FOR LOW K - Abnormal; Notable for the following components:    Glucose 150 (*)     BUN 26 (*)     All other components within normal limits    Narrative:     Performed at:  [address] [facility] [address]   Phone [phone]   POCT GLUCOSE - Abnormal; Notable for the following components:    POC Glucose 150 (*)     All other components within normal limits    Narrative:     Performed at:  [address] [facility] [address]   Phone [phone]   POCT GLUCOSE - Normal       All other labs were withinnormal range or not returned as of this dictation. EMERGENCY DEPARTMENT COURSE and DIFFERENTIAL DIAGNOSIS/MDM:     1L of fluids given and patient states he feels better. Denies light-headedness or any symptoms. Walks with steady gait. Labs and imaging reassuring    I discussed with patient the results of evaluation in the ED, diagnosis, care, and prognosis. The plan is to discharge to home.   Patient is in agreement with plan and questions have been answered.      I also discussed with patient the reasons which may require a return visit and the importance of follow-up care. The patient is well-appearing, nontoxic, and improved at the time of discharge. Patient agrees to call to arrange follow-up care as directed. Patient understands to return immediately for worsening/change in symptoms. CRITICAL CARE TIME   Total Critical Caretime was 0 minutes, excluding separately reportable procedures. There was a high probability of clinically significant/life threatening deterioration in the patient's condition which required my urgent intervention. PROCEDURES:  Unlessotherwise noted below, none    FINAL IMPRESSION      1.  Lightheadedness          DISPOSITION/PLAN   DISPOSITION Decision To Discharge 09/25/2019 04:01:16 PM    PATIENT REFERRED TO:  [address]  [address]  [phone]            DISCHARGE MEDICATIONS:  New Prescriptions    No medications on file          (Please note that portions ofthis note were completed with a voice recognition program.  Efforts were made to edit the dictations but occasionally words are mis-transcribed.)    Joey Freeman MD(electronically signed)  Attending Emergency Physician        Joey Freeman MD  09/25/19 2991

## 2019-09-25 NOTE — ED TRIAGE NOTES
Pt arrives to the ER via private vehicle with complaints of dizziness. Pt states it started 2 days ago, and it only seems to happen when he is walking. Pt states that he is not dizzy while sitting or laying down, and it doesn't happen right when he stands up. NAD noted, respirations even and easy, skin warm and dry.

## 2019-10-02 ENCOUNTER — HOSPITAL ENCOUNTER (OUTPATIENT)
Dept: WOUND CARE | Age: 49
Discharge: HOME | End: 2019-10-02
Payer: COMMERCIAL

## 2019-10-02 VITALS
RESPIRATION RATE: 18 BRPM | BODY MASS INDEX: 43.59 KG/M2 | HEIGHT: 66 IN | SYSTOLIC BLOOD PRESSURE: 126 MMHG | DIASTOLIC BLOOD PRESSURE: 86 MMHG | TEMPERATURE: 98 F | HEART RATE: 97 BPM

## 2019-10-02 DIAGNOSIS — L97.412 DIABETIC ULCER OF RIGHT MIDFOOT ASSOCIATED WITH TYPE 2 DIABETES MELLITUS, WITH FAT LAYER EXPOSED (HCC): Primary | ICD-10-CM

## 2019-10-02 DIAGNOSIS — E11.621 DIABETIC ULCER OF RIGHT MIDFOOT ASSOCIATED WITH TYPE 2 DIABETES MELLITUS, WITH FAT LAYER EXPOSED (HCC): Primary | ICD-10-CM

## 2019-10-02 PROCEDURE — 11042 DBRDMT SUBQ TIS 1ST 20SQCM/<: CPT

## 2019-10-02 RX ORDER — LIDOCAINE HYDROCHLORIDE 40 MG/ML
SOLUTION TOPICAL PRN
Status: DISCONTINUED | OUTPATIENT
Start: 2019-10-02 | End: 2019-10-03 | Stop reason: HOSPADM

## 2019-10-02 ASSESSMENT — PAIN DESCRIPTION - LOCATION
LOCATION: FOOT
LOCATION: FOOT

## 2019-10-02 ASSESSMENT — PAIN SCALES - GENERAL
PAINLEVEL_OUTOF10: 5
PAINLEVEL_OUTOF10: 5

## 2019-10-02 ASSESSMENT — PAIN DESCRIPTION - ONSET
ONSET: ON-GOING
ONSET: ON-GOING

## 2019-10-02 ASSESSMENT — PAIN DESCRIPTION - PROGRESSION
CLINICAL_PROGRESSION: NOT CHANGED
CLINICAL_PROGRESSION: NOT CHANGED

## 2019-10-02 ASSESSMENT — PAIN DESCRIPTION - DESCRIPTORS
DESCRIPTORS: BURNING
DESCRIPTORS: BURNING

## 2019-10-02 ASSESSMENT — PAIN DESCRIPTION - PAIN TYPE
TYPE: ACUTE PAIN
TYPE: ACUTE PAIN

## 2019-10-02 ASSESSMENT — PAIN DESCRIPTION - FREQUENCY
FREQUENCY: INTERMITTENT
FREQUENCY: INTERMITTENT

## 2019-10-02 ASSESSMENT — PAIN DESCRIPTION - ORIENTATION
ORIENTATION: RIGHT
ORIENTATION: RIGHT

## 2019-10-04 DIAGNOSIS — Z79.4 DIABETES MELLITUS TYPE 2, INSULIN DEPENDENT (HCC): ICD-10-CM

## 2019-10-04 DIAGNOSIS — E11.51 DIABETES MELLITUS WITH PERIPHERAL CIRCULATORY DISORDER (HCC): ICD-10-CM

## 2019-10-04 DIAGNOSIS — E11.9 DIABETES MELLITUS TYPE 2, INSULIN DEPENDENT (HCC): ICD-10-CM

## 2019-10-04 RX ORDER — DULAGLUTIDE 1.5 MG/.5ML
INJECTION, SOLUTION SUBCUTANEOUS
Qty: 2 ML | Refills: 0 | Status: SHIPPED | OUTPATIENT
Start: 2019-10-04 | End: 2019-11-10 | Stop reason: SDUPTHER

## 2019-10-09 ENCOUNTER — HOSPITAL ENCOUNTER (OUTPATIENT)
Dept: WOUND CARE | Age: 49
Discharge: HOME | End: 2019-10-09
Payer: COMMERCIAL

## 2019-10-09 VITALS
SYSTOLIC BLOOD PRESSURE: 110 MMHG | TEMPERATURE: 97.4 F | HEART RATE: 102 BPM | RESPIRATION RATE: 18 BRPM | DIASTOLIC BLOOD PRESSURE: 72 MMHG

## 2019-10-09 DIAGNOSIS — E11.621: Primary | ICD-10-CM

## 2019-10-09 DIAGNOSIS — L97.409: Primary | ICD-10-CM

## 2019-10-09 PROCEDURE — 11042 DBRDMT SUBQ TIS 1ST 20SQCM/<: CPT

## 2019-10-09 RX ORDER — LIDOCAINE HYDROCHLORIDE 40 MG/ML
SOLUTION TOPICAL PRN
Status: DISCONTINUED | OUTPATIENT
Start: 2019-10-09 | End: 2019-10-10 | Stop reason: HOSPADM

## 2019-10-09 ASSESSMENT — PAIN - FUNCTIONAL ASSESSMENT: PAIN_FUNCTIONAL_ASSESSMENT: ACTIVITIES ARE NOT PREVENTED

## 2019-10-09 ASSESSMENT — PAIN DESCRIPTION - PAIN TYPE: TYPE: ACUTE PAIN

## 2019-10-09 ASSESSMENT — PAIN DESCRIPTION - DESCRIPTORS: DESCRIPTORS: BURNING

## 2019-10-09 ASSESSMENT — PAIN DESCRIPTION - LOCATION: LOCATION: FOOT

## 2019-10-09 ASSESSMENT — PAIN SCALES - GENERAL
PAINLEVEL_OUTOF10: 5
PAINLEVEL_OUTOF10: 0

## 2019-10-09 ASSESSMENT — PAIN DESCRIPTION - ONSET: ONSET: ON-GOING

## 2019-10-09 ASSESSMENT — PAIN DESCRIPTION - ORIENTATION: ORIENTATION: RIGHT

## 2019-10-09 ASSESSMENT — PAIN DESCRIPTION - FREQUENCY: FREQUENCY: INTERMITTENT

## 2019-10-09 ASSESSMENT — PAIN DESCRIPTION - PROGRESSION: CLINICAL_PROGRESSION: NOT CHANGED

## 2019-10-16 ENCOUNTER — HOSPITAL ENCOUNTER (OUTPATIENT)
Dept: WOUND CARE | Age: 49
Discharge: HOME | End: 2019-10-16
Payer: COMMERCIAL

## 2019-10-16 VITALS
HEART RATE: 93 BPM | RESPIRATION RATE: 20 BRPM | SYSTOLIC BLOOD PRESSURE: 142 MMHG | TEMPERATURE: 97.6 F | DIASTOLIC BLOOD PRESSURE: 86 MMHG

## 2019-10-16 DIAGNOSIS — L97.412 DIABETIC ULCER OF RIGHT MIDFOOT ASSOCIATED WITH TYPE 2 DIABETES MELLITUS, WITH FAT LAYER EXPOSED (HCC): Primary | ICD-10-CM

## 2019-10-16 DIAGNOSIS — E11.621 DIABETIC ULCER OF RIGHT MIDFOOT ASSOCIATED WITH TYPE 2 DIABETES MELLITUS, WITH FAT LAYER EXPOSED (HCC): Primary | ICD-10-CM

## 2019-10-16 PROCEDURE — 11042 DBRDMT SUBQ TIS 1ST 20SQCM/<: CPT

## 2019-10-16 RX ORDER — LIDOCAINE HYDROCHLORIDE 40 MG/ML
SOLUTION TOPICAL PRN
Status: DISCONTINUED | OUTPATIENT
Start: 2019-10-16 | End: 2019-10-17 | Stop reason: HOSPADM

## 2019-10-16 ASSESSMENT — PAIN DESCRIPTION - ONSET
ONSET: ON-GOING
ONSET: ON-GOING

## 2019-10-16 ASSESSMENT — PAIN SCALES - GENERAL
PAINLEVEL_OUTOF10: 5
PAINLEVEL_OUTOF10: 5

## 2019-10-16 ASSESSMENT — PAIN DESCRIPTION - PAIN TYPE
TYPE: ACUTE PAIN
TYPE: ACUTE PAIN

## 2019-10-16 ASSESSMENT — PAIN DESCRIPTION - LOCATION
LOCATION: FOOT
LOCATION: FOOT

## 2019-10-16 ASSESSMENT — PAIN - FUNCTIONAL ASSESSMENT
PAIN_FUNCTIONAL_ASSESSMENT: ACTIVITIES ARE NOT PREVENTED
PAIN_FUNCTIONAL_ASSESSMENT: ACTIVITIES ARE NOT PREVENTED

## 2019-10-16 ASSESSMENT — PAIN DESCRIPTION - PROGRESSION
CLINICAL_PROGRESSION: NOT CHANGED
CLINICAL_PROGRESSION: NOT CHANGED

## 2019-10-16 ASSESSMENT — PAIN DESCRIPTION - DESCRIPTORS
DESCRIPTORS: BURNING
DESCRIPTORS: BURNING

## 2019-10-16 ASSESSMENT — PAIN DESCRIPTION - ORIENTATION
ORIENTATION: RIGHT
ORIENTATION: RIGHT

## 2019-10-16 ASSESSMENT — PAIN DESCRIPTION - FREQUENCY
FREQUENCY: INTERMITTENT
FREQUENCY: INTERMITTENT

## 2019-10-23 ENCOUNTER — HOSPITAL ENCOUNTER (OUTPATIENT)
Dept: WOUND CARE | Age: 49
Discharge: HOME | End: 2019-10-23
Payer: COMMERCIAL

## 2019-10-23 VITALS
DIASTOLIC BLOOD PRESSURE: 91 MMHG | TEMPERATURE: 97 F | SYSTOLIC BLOOD PRESSURE: 142 MMHG | HEART RATE: 104 BPM | RESPIRATION RATE: 20 BRPM

## 2019-10-23 DIAGNOSIS — E11.621 DIABETIC ULCER OF RIGHT FOOT ASSOCIATED WITH TYPE 2 DIABETES MELLITUS, WITH FAT LAYER EXPOSED, UNSPECIFIED PART OF FOOT (HCC): Primary | ICD-10-CM

## 2019-10-23 DIAGNOSIS — L97.512 DIABETIC ULCER OF RIGHT FOOT ASSOCIATED WITH TYPE 2 DIABETES MELLITUS, WITH FAT LAYER EXPOSED, UNSPECIFIED PART OF FOOT (HCC): Primary | ICD-10-CM

## 2019-10-23 PROCEDURE — 11042 DBRDMT SUBQ TIS 1ST 20SQCM/<: CPT

## 2019-10-23 PROCEDURE — 11042 DBRDMT SUBQ TIS 1ST 20SQCM/<: CPT | Performed by: NURSE PRACTITIONER

## 2019-10-23 RX ORDER — LIDOCAINE HYDROCHLORIDE 40 MG/ML
SOLUTION TOPICAL ONCE
Status: DISCONTINUED | OUTPATIENT
Start: 2019-10-23 | End: 2019-10-23

## 2019-10-23 RX ORDER — LIDOCAINE HYDROCHLORIDE 40 MG/ML
SOLUTION TOPICAL ONCE
Status: DISCONTINUED | OUTPATIENT
Start: 2019-10-23 | End: 2019-10-24 | Stop reason: HOSPADM

## 2019-10-23 ASSESSMENT — PAIN DESCRIPTION - ONSET: ONSET: ON-GOING

## 2019-10-23 ASSESSMENT — PAIN SCALES - GENERAL
PAINLEVEL_OUTOF10: 6
PAINLEVEL_OUTOF10: 0

## 2019-10-23 ASSESSMENT — PAIN - FUNCTIONAL ASSESSMENT: PAIN_FUNCTIONAL_ASSESSMENT: ACTIVITIES ARE NOT PREVENTED

## 2019-10-23 ASSESSMENT — PAIN DESCRIPTION - PROGRESSION: CLINICAL_PROGRESSION: NOT CHANGED

## 2019-10-23 ASSESSMENT — PAIN DESCRIPTION - ORIENTATION: ORIENTATION: RIGHT

## 2019-10-23 ASSESSMENT — PAIN DESCRIPTION - PAIN TYPE: TYPE: ACUTE PAIN

## 2019-10-23 ASSESSMENT — PAIN DESCRIPTION - DESCRIPTORS: DESCRIPTORS: BURNING

## 2019-10-23 ASSESSMENT — PAIN DESCRIPTION - LOCATION: LOCATION: FOOT

## 2019-10-23 ASSESSMENT — PAIN DESCRIPTION - FREQUENCY: FREQUENCY: INTERMITTENT

## 2019-10-30 ENCOUNTER — HOSPITAL ENCOUNTER (OUTPATIENT)
Dept: WOUND CARE | Age: 49
Discharge: HOME | End: 2019-10-30
Payer: COMMERCIAL

## 2019-10-30 VITALS
TEMPERATURE: 98.5 F | HEART RATE: 101 BPM | SYSTOLIC BLOOD PRESSURE: 143 MMHG | RESPIRATION RATE: 18 BRPM | DIASTOLIC BLOOD PRESSURE: 80 MMHG

## 2019-10-30 DIAGNOSIS — E11.621: Primary | ICD-10-CM

## 2019-10-30 DIAGNOSIS — L97.409: Primary | ICD-10-CM

## 2019-10-30 PROCEDURE — 11042 DBRDMT SUBQ TIS 1ST 20SQCM/<: CPT

## 2019-10-30 RX ORDER — LIDOCAINE HYDROCHLORIDE 40 MG/ML
SOLUTION TOPICAL PRN
Status: DISCONTINUED | OUTPATIENT
Start: 2019-10-30 | End: 2019-10-31 | Stop reason: HOSPADM

## 2019-10-30 ASSESSMENT — PAIN DESCRIPTION - ORIENTATION: ORIENTATION: RIGHT

## 2019-10-30 ASSESSMENT — PAIN DESCRIPTION - LOCATION: LOCATION: TOE (COMMENT WHICH ONE)

## 2019-10-30 ASSESSMENT — PAIN DESCRIPTION - FREQUENCY: FREQUENCY: INTERMITTENT

## 2019-10-30 ASSESSMENT — PAIN SCALES - GENERAL
PAINLEVEL_OUTOF10: 0
PAINLEVEL_OUTOF10: 7

## 2019-10-30 ASSESSMENT — PAIN - FUNCTIONAL ASSESSMENT: PAIN_FUNCTIONAL_ASSESSMENT: ACTIVITIES ARE NOT PREVENTED

## 2019-10-30 ASSESSMENT — PAIN DESCRIPTION - ONSET: ONSET: ON-GOING

## 2019-10-30 ASSESSMENT — PAIN DESCRIPTION - PAIN TYPE: TYPE: ACUTE PAIN

## 2019-10-30 ASSESSMENT — PAIN DESCRIPTION - DESCRIPTORS: DESCRIPTORS: SHARP

## 2019-10-30 ASSESSMENT — PAIN DESCRIPTION - PROGRESSION: CLINICAL_PROGRESSION: NOT CHANGED

## 2019-11-06 ENCOUNTER — HOSPITAL ENCOUNTER (OUTPATIENT)
Dept: WOUND CARE | Age: 49
Discharge: HOME | End: 2019-11-06
Payer: COMMERCIAL

## 2019-11-06 VITALS
BODY MASS INDEX: 44.76 KG/M2 | RESPIRATION RATE: 16 BRPM | HEART RATE: 92 BPM | TEMPERATURE: 98.2 F | SYSTOLIC BLOOD PRESSURE: 144 MMHG | WEIGHT: 277.34 LBS | DIASTOLIC BLOOD PRESSURE: 86 MMHG

## 2019-11-06 DIAGNOSIS — L97.409: Primary | ICD-10-CM

## 2019-11-06 DIAGNOSIS — E11.621 DIABETIC ULCER OF RIGHT MIDFOOT ASSOCIATED WITH TYPE 2 DIABETES MELLITUS, WITH FAT LAYER EXPOSED (HCC): ICD-10-CM

## 2019-11-06 DIAGNOSIS — E11.621: Primary | ICD-10-CM

## 2019-11-06 DIAGNOSIS — L97.412 DIABETIC ULCER OF RIGHT MIDFOOT ASSOCIATED WITH TYPE 2 DIABETES MELLITUS, WITH FAT LAYER EXPOSED (HCC): ICD-10-CM

## 2019-11-06 PROCEDURE — 11042 DBRDMT SUBQ TIS 1ST 20SQCM/<: CPT

## 2019-11-06 RX ORDER — LIDOCAINE HYDROCHLORIDE 40 MG/ML
SOLUTION TOPICAL PRN
Status: DISCONTINUED | OUTPATIENT
Start: 2019-11-06 | End: 2019-11-07 | Stop reason: HOSPADM

## 2019-11-06 ASSESSMENT — PAIN - FUNCTIONAL ASSESSMENT: PAIN_FUNCTIONAL_ASSESSMENT: ACTIVITIES ARE NOT PREVENTED

## 2019-11-06 ASSESSMENT — PAIN SCALES - GENERAL
PAINLEVEL_OUTOF10: 8
PAINLEVEL_OUTOF10: 0

## 2019-11-06 ASSESSMENT — PAIN DESCRIPTION - ONSET: ONSET: ON-GOING

## 2019-11-06 ASSESSMENT — PAIN DESCRIPTION - DESCRIPTORS: DESCRIPTORS: BURNING

## 2019-11-06 ASSESSMENT — PAIN DESCRIPTION - PAIN TYPE: TYPE: CHRONIC PAIN

## 2019-11-06 ASSESSMENT — PAIN DESCRIPTION - LOCATION: LOCATION: FOOT;TOE (COMMENT WHICH ONE)

## 2019-11-06 ASSESSMENT — PAIN DESCRIPTION - PROGRESSION: CLINICAL_PROGRESSION: NOT CHANGED

## 2019-11-06 ASSESSMENT — PAIN DESCRIPTION - FREQUENCY: FREQUENCY: INTERMITTENT

## 2019-11-06 ASSESSMENT — PAIN DESCRIPTION - ORIENTATION: ORIENTATION: RIGHT

## 2019-11-13 ENCOUNTER — HOSPITAL ENCOUNTER (OUTPATIENT)
Dept: WOUND CARE | Age: 49
Discharge: HOME | End: 2019-11-13
Payer: COMMERCIAL

## 2019-11-13 VITALS
DIASTOLIC BLOOD PRESSURE: 88 MMHG | RESPIRATION RATE: 16 BRPM | SYSTOLIC BLOOD PRESSURE: 152 MMHG | TEMPERATURE: 98.2 F | HEART RATE: 90 BPM

## 2019-11-13 DIAGNOSIS — L97.412 DIABETIC ULCER OF RIGHT MIDFOOT ASSOCIATED WITH TYPE 2 DIABETES MELLITUS, WITH FAT LAYER EXPOSED (HCC): Primary | ICD-10-CM

## 2019-11-13 DIAGNOSIS — E11.621 DIABETIC ULCER OF RIGHT MIDFOOT ASSOCIATED WITH TYPE 2 DIABETES MELLITUS, WITH FAT LAYER EXPOSED (HCC): Primary | ICD-10-CM

## 2019-11-13 PROCEDURE — 11042 DBRDMT SUBQ TIS 1ST 20SQCM/<: CPT

## 2019-11-13 RX ORDER — LIDOCAINE HYDROCHLORIDE 40 MG/ML
SOLUTION TOPICAL PRN
Status: DISCONTINUED | OUTPATIENT
Start: 2019-11-13 | End: 2019-11-14 | Stop reason: HOSPADM

## 2019-11-13 ASSESSMENT — PAIN SCALES - GENERAL
PAINLEVEL_OUTOF10: 0
PAINLEVEL_OUTOF10: 0

## 2019-11-19 ENCOUNTER — HOSPITAL ENCOUNTER (INPATIENT)
Age: 49
LOS: 22 days | Discharge: NURSING, REHAB OR LONG-TERM CARE | DRG: 710 | End: 2019-12-11
Attending: EMERGENCY MEDICINE | Admitting: INTERNAL MEDICINE
Payer: COMMERCIAL

## 2019-11-19 ENCOUNTER — APPOINTMENT (OUTPATIENT)
Dept: GENERAL RADIOLOGY | Age: 49
DRG: 710 | End: 2019-11-19
Payer: COMMERCIAL

## 2019-11-19 DIAGNOSIS — L97.418 DIABETIC ULCER OF RIGHT MIDFOOT ASSOCIATED WITH TYPE 2 DIABETES MELLITUS, WITH OTHER ULCER SEVERITY (HCC): ICD-10-CM

## 2019-11-19 DIAGNOSIS — R50.9 FEVER IN ADULT: ICD-10-CM

## 2019-11-19 DIAGNOSIS — E11.621 DIABETIC ULCER OF RIGHT MIDFOOT ASSOCIATED WITH TYPE 2 DIABETES MELLITUS, WITH OTHER ULCER SEVERITY (HCC): ICD-10-CM

## 2019-11-19 DIAGNOSIS — L03.115 CELLULITIS OF FOOT, RIGHT: Primary | ICD-10-CM

## 2019-11-19 DIAGNOSIS — E87.1 HYPONATREMIA: ICD-10-CM

## 2019-11-19 DIAGNOSIS — I44.7 NEW ONSET LEFT BUNDLE BRANCH BLOCK (LBBB): ICD-10-CM

## 2019-11-19 DIAGNOSIS — I96 GANGRENE OF FOOT (HCC): ICD-10-CM

## 2019-11-19 DIAGNOSIS — R42 LIGHTHEADED: ICD-10-CM

## 2019-11-19 DIAGNOSIS — A41.9 SEPSIS, DUE TO UNSPECIFIED ORGANISM, UNSPECIFIED WHETHER ACUTE ORGAN DYSFUNCTION PRESENT (HCC): ICD-10-CM

## 2019-11-19 PROBLEM — L03.90 CELLULITIS: Status: ACTIVE | Noted: 2019-11-19

## 2019-11-19 LAB
ANION GAP SERPL CALCULATED.3IONS-SCNC: 15 MMOL/L (ref 3–16)
BASOPHILS ABSOLUTE: 0.1 K/UL (ref 0–0.2)
BASOPHILS RELATIVE PERCENT: 0.3 %
BUN BLDV-MCNC: 23 MG/DL (ref 7–20)
C-REACTIVE PROTEIN: 297.7 MG/L (ref 0–5.1)
CALCIUM SERPL-MCNC: 9.3 MG/DL (ref 8.3–10.6)
CHLORIDE BLD-SCNC: 89 MMOL/L (ref 99–110)
CO2: 26 MMOL/L (ref 21–32)
CREAT SERPL-MCNC: 1 MG/DL (ref 0.9–1.3)
EOSINOPHILS ABSOLUTE: 0 K/UL (ref 0–0.6)
EOSINOPHILS RELATIVE PERCENT: 0.2 %
GFR AFRICAN AMERICAN: >60
GFR NON-AFRICAN AMERICAN: >60
GLUCOSE BLD-MCNC: 172 MG/DL (ref 70–99)
GLUCOSE BLD-MCNC: 235 MG/DL (ref 70–99)
HCT VFR BLD CALC: 35.6 % (ref 40.5–52.5)
HEMOGLOBIN: 11.6 G/DL (ref 13.5–17.5)
LACTIC ACID: 1.7 MMOL/L (ref 0.4–2)
LYMPHOCYTES ABSOLUTE: 2.2 K/UL (ref 1–5.1)
LYMPHOCYTES RELATIVE PERCENT: 11.7 %
MCH RBC QN AUTO: 26.7 PG (ref 26–34)
MCHC RBC AUTO-ENTMCNC: 32.6 G/DL (ref 31–36)
MCV RBC AUTO: 82.1 FL (ref 80–100)
MONOCYTES ABSOLUTE: 1.3 K/UL (ref 0–1.3)
MONOCYTES RELATIVE PERCENT: 7 %
NEUTROPHILS ABSOLUTE: 15.1 K/UL (ref 1.7–7.7)
NEUTROPHILS RELATIVE PERCENT: 80.8 %
PDW BLD-RTO: 14.5 % (ref 12.4–15.4)
PERFORMED ON: ABNORMAL
PLATELET # BLD: 400 K/UL (ref 135–450)
PMV BLD AUTO: 8.4 FL (ref 5–10.5)
POTASSIUM REFLEX MAGNESIUM: 3.7 MMOL/L (ref 3.5–5.1)
RAPID INFLUENZA  B AGN: NEGATIVE
RAPID INFLUENZA A AGN: NEGATIVE
RBC # BLD: 4.34 M/UL (ref 4.2–5.9)
SEDIMENTATION RATE, ERYTHROCYTE: 107 MM/HR (ref 0–20)
SODIUM BLD-SCNC: 130 MMOL/L (ref 136–145)
TROPONIN: <0.01 NG/ML
WBC # BLD: 18.7 K/UL (ref 4–11)

## 2019-11-19 PROCEDURE — 87186 SC STD MICRODIL/AGAR DIL: CPT

## 2019-11-19 PROCEDURE — 87205 SMEAR GRAM STAIN: CPT

## 2019-11-19 PROCEDURE — 2580000003 HC RX 258: Performed by: INTERNAL MEDICINE

## 2019-11-19 PROCEDURE — 86403 PARTICLE AGGLUT ANTBDY SCRN: CPT

## 2019-11-19 PROCEDURE — 85652 RBC SED RATE AUTOMATED: CPT

## 2019-11-19 PROCEDURE — 96375 TX/PRO/DX INJ NEW DRUG ADDON: CPT

## 2019-11-19 PROCEDURE — 87077 CULTURE AEROBIC IDENTIFY: CPT

## 2019-11-19 PROCEDURE — 80048 BASIC METABOLIC PNL TOTAL CA: CPT

## 2019-11-19 PROCEDURE — 2500000003 HC RX 250 WO HCPCS: Performed by: INTERNAL MEDICINE

## 2019-11-19 PROCEDURE — 6360000002 HC RX W HCPCS: Performed by: NURSE PRACTITIONER

## 2019-11-19 PROCEDURE — 6370000000 HC RX 637 (ALT 250 FOR IP): Performed by: INTERNAL MEDICINE

## 2019-11-19 PROCEDURE — 2580000003 HC RX 258: Performed by: NURSE PRACTITIONER

## 2019-11-19 PROCEDURE — 73630 X-RAY EXAM OF FOOT: CPT

## 2019-11-19 PROCEDURE — 84484 ASSAY OF TROPONIN QUANT: CPT

## 2019-11-19 PROCEDURE — 83605 ASSAY OF LACTIC ACID: CPT

## 2019-11-19 PROCEDURE — 87070 CULTURE OTHR SPECIMN AEROBIC: CPT

## 2019-11-19 PROCEDURE — 87804 INFLUENZA ASSAY W/OPTIC: CPT

## 2019-11-19 PROCEDURE — 96361 HYDRATE IV INFUSION ADD-ON: CPT

## 2019-11-19 PROCEDURE — 2500000003 HC RX 250 WO HCPCS: Performed by: NURSE PRACTITIONER

## 2019-11-19 PROCEDURE — 96365 THER/PROPH/DIAG IV INF INIT: CPT

## 2019-11-19 PROCEDURE — 87040 BLOOD CULTURE FOR BACTERIA: CPT

## 2019-11-19 PROCEDURE — 71046 X-RAY EXAM CHEST 2 VIEWS: CPT

## 2019-11-19 PROCEDURE — 6370000000 HC RX 637 (ALT 250 FOR IP): Performed by: NURSE PRACTITIONER

## 2019-11-19 PROCEDURE — 86140 C-REACTIVE PROTEIN: CPT

## 2019-11-19 PROCEDURE — 85025 COMPLETE CBC W/AUTO DIFF WBC: CPT

## 2019-11-19 PROCEDURE — 99285 EMERGENCY DEPT VISIT HI MDM: CPT

## 2019-11-19 PROCEDURE — 93005 ELECTROCARDIOGRAM TRACING: CPT | Performed by: NURSE PRACTITIONER

## 2019-11-19 PROCEDURE — 1200000000 HC SEMI PRIVATE

## 2019-11-19 RX ORDER — METOPROLOL SUCCINATE 25 MG/1
25 TABLET, EXTENDED RELEASE ORAL DAILY
Status: DISCONTINUED | OUTPATIENT
Start: 2019-11-20 | End: 2019-12-11 | Stop reason: HOSPADM

## 2019-11-19 RX ORDER — SODIUM CHLORIDE 0.9 % (FLUSH) 0.9 %
10 SYRINGE (ML) INJECTION EVERY 12 HOURS SCHEDULED
Status: DISCONTINUED | OUTPATIENT
Start: 2019-11-19 | End: 2019-11-26 | Stop reason: SDUPTHER

## 2019-11-19 RX ORDER — POTASSIUM CHLORIDE 7.45 MG/ML
10 INJECTION INTRAVENOUS PRN
Status: DISCONTINUED | OUTPATIENT
Start: 2019-11-19 | End: 2019-12-10

## 2019-11-19 RX ORDER — DEXTROSE MONOHYDRATE 50 MG/ML
100 INJECTION, SOLUTION INTRAVENOUS PRN
Status: DISCONTINUED | OUTPATIENT
Start: 2019-11-19 | End: 2019-12-10

## 2019-11-19 RX ORDER — ACETAMINOPHEN 500 MG
1000 TABLET ORAL ONCE
Status: COMPLETED | OUTPATIENT
Start: 2019-11-19 | End: 2019-11-19

## 2019-11-19 RX ORDER — CLINDAMYCIN PHOSPHATE 600 MG/50ML
600 INJECTION INTRAVENOUS ONCE
Status: COMPLETED | OUTPATIENT
Start: 2019-11-19 | End: 2019-11-19

## 2019-11-19 RX ORDER — DEXTROSE MONOHYDRATE 25 G/50ML
12.5 INJECTION, SOLUTION INTRAVENOUS PRN
Status: DISCONTINUED | OUTPATIENT
Start: 2019-11-19 | End: 2019-12-10

## 2019-11-19 RX ORDER — POTASSIUM CHLORIDE 20 MEQ/1
40 TABLET, EXTENDED RELEASE ORAL PRN
Status: DISCONTINUED | OUTPATIENT
Start: 2019-11-19 | End: 2019-12-11 | Stop reason: HOSPADM

## 2019-11-19 RX ORDER — SODIUM CHLORIDE 9 MG/ML
25 INJECTION, SOLUTION INTRAVENOUS PRN
Status: DISCONTINUED | OUTPATIENT
Start: 2019-11-19 | End: 2019-12-10

## 2019-11-19 RX ORDER — INSULIN LISPRO 100 [IU]/ML
38 INJECTION, SUSPENSION SUBCUTANEOUS 2 TIMES DAILY WITH MEALS
Refills: 2 | COMMUNITY
Start: 2019-11-10

## 2019-11-19 RX ORDER — LISINOPRIL 40 MG/1
40 TABLET ORAL EVERY MORNING
Status: DISCONTINUED | OUTPATIENT
Start: 2019-11-20 | End: 2019-12-11 | Stop reason: HOSPADM

## 2019-11-19 RX ORDER — ONDANSETRON 2 MG/ML
4 INJECTION INTRAMUSCULAR; INTRAVENOUS EVERY 6 HOURS PRN
Status: DISCONTINUED | OUTPATIENT
Start: 2019-11-19 | End: 2019-12-10

## 2019-11-19 RX ORDER — 0.9 % SODIUM CHLORIDE 0.9 %
1000 INTRAVENOUS SOLUTION INTRAVENOUS ONCE
Status: COMPLETED | OUTPATIENT
Start: 2019-11-19 | End: 2019-11-19

## 2019-11-19 RX ORDER — MAGNESIUM SULFATE 1 G/100ML
1 INJECTION INTRAVENOUS PRN
Status: DISCONTINUED | OUTPATIENT
Start: 2019-11-19 | End: 2019-12-10

## 2019-11-19 RX ORDER — CLINDAMYCIN PHOSPHATE 600 MG/50ML
600 INJECTION INTRAVENOUS EVERY 6 HOURS
Status: DISCONTINUED | OUTPATIENT
Start: 2019-11-20 | End: 2019-11-25

## 2019-11-19 RX ORDER — ONDANSETRON 2 MG/ML
4 INJECTION INTRAMUSCULAR; INTRAVENOUS ONCE
Status: COMPLETED | OUTPATIENT
Start: 2019-11-19 | End: 2019-11-19

## 2019-11-19 RX ORDER — CLOPIDOGREL BISULFATE 75 MG/1
75 TABLET ORAL DAILY
Status: DISCONTINUED | OUTPATIENT
Start: 2019-11-20 | End: 2019-12-11 | Stop reason: HOSPADM

## 2019-11-19 RX ORDER — CHLORTHALIDONE 50 MG/1
1 TABLET ORAL DAILY
Refills: 0 | COMMUNITY
Start: 2019-09-23

## 2019-11-19 RX ORDER — SODIUM CHLORIDE 0.9 % (FLUSH) 0.9 %
10 SYRINGE (ML) INJECTION PRN
Status: DISCONTINUED | OUTPATIENT
Start: 2019-11-19 | End: 2019-11-26 | Stop reason: SDUPTHER

## 2019-11-19 RX ORDER — CHLORTHALIDONE 25 MG/1
50 TABLET ORAL DAILY
Status: DISCONTINUED | OUTPATIENT
Start: 2019-11-20 | End: 2019-12-11 | Stop reason: HOSPADM

## 2019-11-19 RX ORDER — ATORVASTATIN CALCIUM 40 MG/1
40 TABLET, FILM COATED ORAL DAILY
Status: DISCONTINUED | OUTPATIENT
Start: 2019-11-20 | End: 2019-12-11 | Stop reason: HOSPADM

## 2019-11-19 RX ORDER — ASPIRIN 81 MG/1
81 TABLET ORAL DAILY
Status: DISCONTINUED | OUTPATIENT
Start: 2019-11-20 | End: 2019-12-11 | Stop reason: HOSPADM

## 2019-11-19 RX ORDER — LANOLIN ALCOHOL/MO/W.PET/CERES
1000 CREAM (GRAM) TOPICAL DAILY
Status: DISCONTINUED | OUTPATIENT
Start: 2019-11-20 | End: 2019-12-11 | Stop reason: HOSPADM

## 2019-11-19 RX ORDER — NICOTINE POLACRILEX 4 MG
15 LOZENGE BUCCAL PRN
Status: DISCONTINUED | OUTPATIENT
Start: 2019-11-19 | End: 2019-12-11 | Stop reason: HOSPADM

## 2019-11-19 RX ADMIN — INSULIN LISPRO 1 UNITS: 100 INJECTION, SOLUTION INTRAVENOUS; SUBCUTANEOUS at 23:52

## 2019-11-19 RX ADMIN — CLINDAMYCIN PHOSPHATE 600 MG: 12 INJECTION, SOLUTION INTRAMUSCULAR; INTRAVENOUS at 18:26

## 2019-11-19 RX ADMIN — SODIUM CHLORIDE 1000 ML: 9 INJECTION, SOLUTION INTRAVENOUS at 20:36

## 2019-11-19 RX ADMIN — ACETAMINOPHEN 1000 MG: 500 TABLET ORAL at 18:27

## 2019-11-19 RX ADMIN — ONDANSETRON 4 MG: 2 INJECTION INTRAMUSCULAR; INTRAVENOUS at 18:27

## 2019-11-19 RX ADMIN — CLINDAMYCIN PHOSPHATE 600 MG: 600 INJECTION, SOLUTION INTRAVENOUS at 23:52

## 2019-11-19 RX ADMIN — SODIUM CHLORIDE 1000 ML: 9 INJECTION, SOLUTION INTRAVENOUS at 18:41

## 2019-11-19 RX ADMIN — SODIUM CHLORIDE, PRESERVATIVE FREE 10 ML: 5 INJECTION INTRAVENOUS at 23:52

## 2019-11-19 ASSESSMENT — PAIN SCALES - GENERAL
PAINLEVEL_OUTOF10: 0
PAINLEVEL_OUTOF10: 10
PAINLEVEL_OUTOF10: 10
PAINLEVEL_OUTOF10: 0

## 2019-11-19 ASSESSMENT — PAIN DESCRIPTION - ORIENTATION: ORIENTATION: RIGHT

## 2019-11-19 ASSESSMENT — PAIN DESCRIPTION - PAIN TYPE: TYPE: ACUTE PAIN;CHRONIC PAIN

## 2019-11-19 ASSESSMENT — PAIN - FUNCTIONAL ASSESSMENT: PAIN_FUNCTIONAL_ASSESSMENT: PREVENTS OR INTERFERES SOME ACTIVE ACTIVITIES AND ADLS

## 2019-11-19 ASSESSMENT — PAIN DESCRIPTION - LOCATION: LOCATION: ANKLE

## 2019-11-19 ASSESSMENT — PAIN DESCRIPTION - ONSET: ONSET: ON-GOING

## 2019-11-19 ASSESSMENT — PAIN DESCRIPTION - PROGRESSION: CLINICAL_PROGRESSION: NOT CHANGED

## 2019-11-19 ASSESSMENT — PAIN DESCRIPTION - DESCRIPTORS: DESCRIPTORS: ACHING

## 2019-11-19 ASSESSMENT — PAIN DESCRIPTION - FREQUENCY: FREQUENCY: CONTINUOUS

## 2019-11-20 ENCOUNTER — TELEPHONE (OUTPATIENT)
Dept: WOUND CARE | Age: 49
End: 2019-11-20

## 2019-11-20 ENCOUNTER — HOSPITAL ENCOUNTER (OUTPATIENT)
Dept: WOUND CARE | Age: 49
Discharge: HOME | End: 2019-11-20
Payer: COMMERCIAL

## 2019-11-20 LAB
ANION GAP SERPL CALCULATED.3IONS-SCNC: 13 MMOL/L (ref 3–16)
BILIRUBIN URINE: NEGATIVE
BLOOD, URINE: ABNORMAL
BUN BLDV-MCNC: 23 MG/DL (ref 7–20)
CALCIUM SERPL-MCNC: 8.5 MG/DL (ref 8.3–10.6)
CHLORIDE BLD-SCNC: 96 MMOL/L (ref 99–110)
CLARITY: CLEAR
CO2: 24 MMOL/L (ref 21–32)
COLOR: ABNORMAL
CREAT SERPL-MCNC: 1 MG/DL (ref 0.9–1.3)
EKG ATRIAL RATE: 107 BPM
EKG DIAGNOSIS: NORMAL
EKG P AXIS: 29 DEGREES
EKG P-R INTERVAL: 164 MS
EKG Q-T INTERVAL: 386 MS
EKG QRS DURATION: 158 MS
EKG QTC CALCULATION (BAZETT): 515 MS
EKG R AXIS: -24 DEGREES
EKG T AXIS: 118 DEGREES
EKG VENTRICULAR RATE: 107 BPM
EPITHELIAL CELLS, UA: 1 /HPF (ref 0–5)
GFR AFRICAN AMERICAN: >60
GFR NON-AFRICAN AMERICAN: >60
GLUCOSE BLD-MCNC: 153 MG/DL (ref 70–99)
GLUCOSE BLD-MCNC: 154 MG/DL (ref 70–99)
GLUCOSE BLD-MCNC: 158 MG/DL (ref 70–99)
GLUCOSE BLD-MCNC: 169 MG/DL (ref 70–99)
GLUCOSE BLD-MCNC: 180 MG/DL (ref 70–99)
GLUCOSE URINE: NEGATIVE MG/DL
HCT VFR BLD CALC: 32.6 % (ref 40.5–52.5)
HEMOGLOBIN: 10.5 G/DL (ref 13.5–17.5)
HYALINE CASTS: 5 /LPF (ref 0–8)
KETONES, URINE: NEGATIVE MG/DL
LEUKOCYTE ESTERASE, URINE: NEGATIVE
MCH RBC QN AUTO: 26.6 PG (ref 26–34)
MCHC RBC AUTO-ENTMCNC: 32.2 G/DL (ref 31–36)
MCV RBC AUTO: 82.6 FL (ref 80–100)
MICROSCOPIC EXAMINATION: YES
NITRITE, URINE: NEGATIVE
PDW BLD-RTO: 14.5 % (ref 12.4–15.4)
PERFORMED ON: ABNORMAL
PH UA: 5 (ref 5–8)
PLATELET # BLD: 331 K/UL (ref 135–450)
PMV BLD AUTO: 8.3 FL (ref 5–10.5)
POTASSIUM REFLEX MAGNESIUM: 3.8 MMOL/L (ref 3.5–5.1)
PROTEIN UA: 100 MG/DL
RBC # BLD: 3.95 M/UL (ref 4.2–5.9)
RBC UA: 2 /HPF (ref 0–4)
SODIUM BLD-SCNC: 133 MMOL/L (ref 136–145)
SPECIFIC GRAVITY UA: 1.02 (ref 1–1.03)
URINE REFLEX TO CULTURE: YES
URINE TYPE: ABNORMAL
UROBILINOGEN, URINE: 1 E.U./DL
WBC # BLD: 16.1 K/UL (ref 4–11)
WBC UA: 7 /HPF (ref 0–5)

## 2019-11-20 PROCEDURE — 80048 BASIC METABOLIC PNL TOTAL CA: CPT

## 2019-11-20 PROCEDURE — 6370000000 HC RX 637 (ALT 250 FOR IP): Performed by: HOSPITALIST

## 2019-11-20 PROCEDURE — 87641 MR-STAPH DNA AMP PROBE: CPT

## 2019-11-20 PROCEDURE — 1200000000 HC SEMI PRIVATE

## 2019-11-20 PROCEDURE — 6370000000 HC RX 637 (ALT 250 FOR IP): Performed by: INTERNAL MEDICINE

## 2019-11-20 PROCEDURE — 36415 COLL VENOUS BLD VENIPUNCTURE: CPT

## 2019-11-20 PROCEDURE — 87086 URINE CULTURE/COLONY COUNT: CPT

## 2019-11-20 PROCEDURE — 94760 N-INVAS EAR/PLS OXIMETRY 1: CPT

## 2019-11-20 PROCEDURE — 6360000002 HC RX W HCPCS: Performed by: INTERNAL MEDICINE

## 2019-11-20 PROCEDURE — 2500000003 HC RX 250 WO HCPCS: Performed by: INTERNAL MEDICINE

## 2019-11-20 PROCEDURE — 2580000003 HC RX 258: Performed by: INTERNAL MEDICINE

## 2019-11-20 PROCEDURE — 81001 URINALYSIS AUTO W/SCOPE: CPT

## 2019-11-20 PROCEDURE — 93010 ELECTROCARDIOGRAM REPORT: CPT | Performed by: INTERNAL MEDICINE

## 2019-11-20 PROCEDURE — 85027 COMPLETE CBC AUTOMATED: CPT

## 2019-11-20 RX ORDER — PANTOPRAZOLE SODIUM 40 MG/1
40 TABLET, DELAYED RELEASE ORAL
Status: DISCONTINUED | OUTPATIENT
Start: 2019-11-20 | End: 2019-11-25

## 2019-11-20 RX ORDER — INSULIN GLARGINE 100 [IU]/ML
20 INJECTION, SOLUTION SUBCUTANEOUS 2 TIMES DAILY
Status: DISCONTINUED | OUTPATIENT
Start: 2019-11-20 | End: 2019-11-21

## 2019-11-20 RX ORDER — ACETAMINOPHEN 325 MG/1
650 TABLET ORAL EVERY 4 HOURS PRN
Status: DISCONTINUED | OUTPATIENT
Start: 2019-11-20 | End: 2019-11-26 | Stop reason: SDUPTHER

## 2019-11-20 RX ORDER — CALCIUM CARBONATE 200(500)MG
500 TABLET,CHEWABLE ORAL 3 TIMES DAILY PRN
Status: DISCONTINUED | OUTPATIENT
Start: 2019-11-20 | End: 2019-12-11 | Stop reason: HOSPADM

## 2019-11-20 RX ADMIN — CHLORTHALIDONE 50 MG: 25 TABLET ORAL at 09:02

## 2019-11-20 RX ADMIN — INSULIN LISPRO 2 UNITS: 100 INJECTION, SOLUTION INTRAVENOUS; SUBCUTANEOUS at 16:39

## 2019-11-20 RX ADMIN — ACETAMINOPHEN 650 MG: 325 TABLET ORAL at 16:40

## 2019-11-20 RX ADMIN — CLINDAMYCIN PHOSPHATE 600 MG: 600 INJECTION, SOLUTION INTRAVENOUS at 11:56

## 2019-11-20 RX ADMIN — INSULIN GLARGINE 20 UNITS: 100 INJECTION, SOLUTION SUBCUTANEOUS at 13:22

## 2019-11-20 RX ADMIN — INSULIN LISPRO 2 UNITS: 100 INJECTION, SOLUTION INTRAVENOUS; SUBCUTANEOUS at 09:01

## 2019-11-20 RX ADMIN — LISINOPRIL 40 MG: 40 TABLET ORAL at 09:02

## 2019-11-20 RX ADMIN — INSULIN GLARGINE 20 UNITS: 100 INJECTION, SOLUTION SUBCUTANEOUS at 20:58

## 2019-11-20 RX ADMIN — ANTACID TABLETS 500 MG: 500 TABLET, CHEWABLE ORAL at 09:02

## 2019-11-20 RX ADMIN — INSULIN LISPRO 2 UNITS: 100 INJECTION, SOLUTION INTRAVENOUS; SUBCUTANEOUS at 11:56

## 2019-11-20 RX ADMIN — INSULIN LISPRO 1 UNITS: 100 INJECTION, SOLUTION INTRAVENOUS; SUBCUTANEOUS at 20:59

## 2019-11-20 RX ADMIN — ACETAMINOPHEN 650 MG: 325 TABLET ORAL at 09:01

## 2019-11-20 RX ADMIN — SODIUM CHLORIDE, PRESERVATIVE FREE 10 ML: 5 INJECTION INTRAVENOUS at 20:50

## 2019-11-20 RX ADMIN — SODIUM CHLORIDE, PRESERVATIVE FREE 10 ML: 5 INJECTION INTRAVENOUS at 09:03

## 2019-11-20 RX ADMIN — INSULIN LISPRO 5 UNITS: 100 INJECTION, SOLUTION INTRAVENOUS; SUBCUTANEOUS at 16:39

## 2019-11-20 RX ADMIN — METOPROLOL SUCCINATE 25 MG: 25 TABLET, EXTENDED RELEASE ORAL at 09:01

## 2019-11-20 RX ADMIN — ASPIRIN 81 MG: 81 TABLET, COATED ORAL at 09:02

## 2019-11-20 RX ADMIN — PANTOPRAZOLE SODIUM 40 MG: 40 TABLET, DELAYED RELEASE ORAL at 09:02

## 2019-11-20 RX ADMIN — CLINDAMYCIN PHOSPHATE 600 MG: 600 INJECTION, SOLUTION INTRAVENOUS at 17:32

## 2019-11-20 RX ADMIN — ATORVASTATIN CALCIUM 40 MG: 40 TABLET, FILM COATED ORAL at 09:01

## 2019-11-20 RX ADMIN — CLINDAMYCIN PHOSPHATE 600 MG: 600 INJECTION, SOLUTION INTRAVENOUS at 05:40

## 2019-11-20 RX ADMIN — ENOXAPARIN SODIUM 40 MG: 40 INJECTION SUBCUTANEOUS at 09:01

## 2019-11-20 RX ADMIN — CYANOCOBALAMIN TAB 1000 MCG 1000 MCG: 1000 TAB at 09:01

## 2019-11-20 RX ADMIN — CLOPIDOGREL BISULFATE 75 MG: 75 TABLET ORAL at 09:02

## 2019-11-20 ASSESSMENT — PAIN SCALES - WONG BAKER
WONGBAKER_NUMERICALRESPONSE: 0

## 2019-11-20 ASSESSMENT — PAIN SCALES - GENERAL
PAINLEVEL_OUTOF10: 0
PAINLEVEL_OUTOF10: 8
PAINLEVEL_OUTOF10: 0

## 2019-11-20 ASSESSMENT — PAIN DESCRIPTION - DESCRIPTORS: DESCRIPTORS: ACHING

## 2019-11-20 ASSESSMENT — PAIN - FUNCTIONAL ASSESSMENT: PAIN_FUNCTIONAL_ASSESSMENT: PREVENTS OR INTERFERES SOME ACTIVE ACTIVITIES AND ADLS

## 2019-11-20 ASSESSMENT — PAIN DESCRIPTION - PAIN TYPE: TYPE: ACUTE PAIN

## 2019-11-20 ASSESSMENT — PAIN DESCRIPTION - ORIENTATION: ORIENTATION: RIGHT

## 2019-11-20 ASSESSMENT — PAIN DESCRIPTION - LOCATION: LOCATION: FOOT

## 2019-11-20 ASSESSMENT — PAIN DESCRIPTION - FREQUENCY: FREQUENCY: INTERMITTENT

## 2019-11-20 ASSESSMENT — PAIN DESCRIPTION - ONSET: ONSET: ON-GOING

## 2019-11-20 ASSESSMENT — PAIN DESCRIPTION - PROGRESSION: CLINICAL_PROGRESSION: NOT CHANGED

## 2019-11-21 LAB
ANION GAP SERPL CALCULATED.3IONS-SCNC: 11 MMOL/L (ref 3–16)
BUN BLDV-MCNC: 21 MG/DL (ref 7–20)
CALCIUM SERPL-MCNC: 8.6 MG/DL (ref 8.3–10.6)
CHLORIDE BLD-SCNC: 92 MMOL/L (ref 99–110)
CO2: 25 MMOL/L (ref 21–32)
CREAT SERPL-MCNC: 1 MG/DL (ref 0.9–1.3)
GFR AFRICAN AMERICAN: >60
GFR NON-AFRICAN AMERICAN: >60
GLUCOSE BLD-MCNC: 113 MG/DL (ref 70–99)
GLUCOSE BLD-MCNC: 119 MG/DL (ref 70–99)
GLUCOSE BLD-MCNC: 129 MG/DL (ref 70–99)
GLUCOSE BLD-MCNC: 133 MG/DL (ref 70–99)
GLUCOSE BLD-MCNC: 98 MG/DL (ref 70–99)
HCT VFR BLD CALC: 31.4 % (ref 40.5–52.5)
HEMOGLOBIN: 10.3 G/DL (ref 13.5–17.5)
MCH RBC QN AUTO: 26.7 PG (ref 26–34)
MCHC RBC AUTO-ENTMCNC: 32.7 G/DL (ref 31–36)
MCV RBC AUTO: 81.7 FL (ref 80–100)
MRSA SCREEN RT-PCR: NORMAL
PDW BLD-RTO: 14.4 % (ref 12.4–15.4)
PERFORMED ON: ABNORMAL
PERFORMED ON: NORMAL
PLATELET # BLD: 373 K/UL (ref 135–450)
PMV BLD AUTO: 7.9 FL (ref 5–10.5)
POTASSIUM SERPL-SCNC: 3.7 MMOL/L (ref 3.5–5.1)
RBC # BLD: 3.84 M/UL (ref 4.2–5.9)
SODIUM BLD-SCNC: 128 MMOL/L (ref 136–145)
URINE CULTURE, ROUTINE: NORMAL
WBC # BLD: 17.8 K/UL (ref 4–11)

## 2019-11-21 PROCEDURE — 80048 BASIC METABOLIC PNL TOTAL CA: CPT

## 2019-11-21 PROCEDURE — 2580000003 HC RX 258: Performed by: HOSPITALIST

## 2019-11-21 PROCEDURE — 85027 COMPLETE CBC AUTOMATED: CPT

## 2019-11-21 PROCEDURE — 2500000003 HC RX 250 WO HCPCS: Performed by: INTERNAL MEDICINE

## 2019-11-21 PROCEDURE — 94760 N-INVAS EAR/PLS OXIMETRY 1: CPT

## 2019-11-21 PROCEDURE — 6360000002 HC RX W HCPCS: Performed by: INTERNAL MEDICINE

## 2019-11-21 PROCEDURE — 6370000000 HC RX 637 (ALT 250 FOR IP): Performed by: NURSE PRACTITIONER

## 2019-11-21 PROCEDURE — 83036 HEMOGLOBIN GLYCOSYLATED A1C: CPT

## 2019-11-21 PROCEDURE — 87070 CULTURE OTHR SPECIMN AEROBIC: CPT

## 2019-11-21 PROCEDURE — 87186 SC STD MICRODIL/AGAR DIL: CPT

## 2019-11-21 PROCEDURE — 6360000002 HC RX W HCPCS: Performed by: HOSPITALIST

## 2019-11-21 PROCEDURE — 87205 SMEAR GRAM STAIN: CPT

## 2019-11-21 PROCEDURE — 6370000000 HC RX 637 (ALT 250 FOR IP): Performed by: INTERNAL MEDICINE

## 2019-11-21 PROCEDURE — 36415 COLL VENOUS BLD VENIPUNCTURE: CPT

## 2019-11-21 PROCEDURE — 87077 CULTURE AEROBIC IDENTIFY: CPT

## 2019-11-21 PROCEDURE — 1200000000 HC SEMI PRIVATE

## 2019-11-21 PROCEDURE — 86403 PARTICLE AGGLUT ANTBDY SCRN: CPT

## 2019-11-21 PROCEDURE — 6370000000 HC RX 637 (ALT 250 FOR IP): Performed by: HOSPITALIST

## 2019-11-21 RX ORDER — INSULIN GLARGINE 100 [IU]/ML
15 INJECTION, SOLUTION SUBCUTANEOUS 2 TIMES DAILY
Status: DISCONTINUED | OUTPATIENT
Start: 2019-11-22 | End: 2019-11-21

## 2019-11-21 RX ORDER — OXYCODONE HYDROCHLORIDE AND ACETAMINOPHEN 5; 325 MG/1; MG/1
1 TABLET ORAL EVERY 4 HOURS PRN
Status: DISCONTINUED | OUTPATIENT
Start: 2019-11-21 | End: 2019-12-04

## 2019-11-21 RX ORDER — INSULIN GLARGINE 100 [IU]/ML
15 INJECTION, SOLUTION SUBCUTANEOUS 2 TIMES DAILY
Status: DISCONTINUED | OUTPATIENT
Start: 2019-11-21 | End: 2019-11-27

## 2019-11-21 RX ADMIN — PIPERACILLIN AND TAZOBACTAM 3.38 G: 3; .375 INJECTION, POWDER, LYOPHILIZED, FOR SOLUTION INTRAVENOUS at 14:58

## 2019-11-21 RX ADMIN — CLINDAMYCIN PHOSPHATE 600 MG: 600 INJECTION, SOLUTION INTRAVENOUS at 00:07

## 2019-11-21 RX ADMIN — CLINDAMYCIN PHOSPHATE 600 MG: 600 INJECTION, SOLUTION INTRAVENOUS at 23:40

## 2019-11-21 RX ADMIN — CLOPIDOGREL BISULFATE 75 MG: 75 TABLET ORAL at 08:46

## 2019-11-21 RX ADMIN — INSULIN LISPRO 5 UNITS: 100 INJECTION, SOLUTION INTRAVENOUS; SUBCUTANEOUS at 17:32

## 2019-11-21 RX ADMIN — Medication 1250 MG: at 12:43

## 2019-11-21 RX ADMIN — CYANOCOBALAMIN TAB 1000 MCG 1000 MCG: 1000 TAB at 08:47

## 2019-11-21 RX ADMIN — LISINOPRIL 40 MG: 40 TABLET ORAL at 08:47

## 2019-11-21 RX ADMIN — ATORVASTATIN CALCIUM 40 MG: 40 TABLET, FILM COATED ORAL at 08:47

## 2019-11-21 RX ADMIN — METOPROLOL SUCCINATE 25 MG: 25 TABLET, EXTENDED RELEASE ORAL at 08:47

## 2019-11-21 RX ADMIN — ENOXAPARIN SODIUM 40 MG: 40 INJECTION SUBCUTANEOUS at 08:47

## 2019-11-21 RX ADMIN — ACETAMINOPHEN 650 MG: 325 TABLET ORAL at 08:47

## 2019-11-21 RX ADMIN — PIPERACILLIN AND TAZOBACTAM 3.38 G: 3; .375 INJECTION, POWDER, LYOPHILIZED, FOR SOLUTION INTRAVENOUS at 20:35

## 2019-11-21 RX ADMIN — INSULIN GLARGINE 15 UNITS: 100 INJECTION, SOLUTION SUBCUTANEOUS at 22:25

## 2019-11-21 RX ADMIN — CLINDAMYCIN PHOSPHATE 600 MG: 600 INJECTION, SOLUTION INTRAVENOUS at 05:33

## 2019-11-21 RX ADMIN — CLINDAMYCIN PHOSPHATE 600 MG: 600 INJECTION, SOLUTION INTRAVENOUS at 17:32

## 2019-11-21 RX ADMIN — ASPIRIN 81 MG: 81 TABLET, COATED ORAL at 08:47

## 2019-11-21 RX ADMIN — MAGNESIUM HYDROXIDE 30 ML: 400 SUSPENSION ORAL at 09:15

## 2019-11-21 RX ADMIN — OXYCODONE HYDROCHLORIDE AND ACETAMINOPHEN 1 TABLET: 5; 325 TABLET ORAL at 09:15

## 2019-11-21 RX ADMIN — CHLORTHALIDONE 50 MG: 25 TABLET ORAL at 08:46

## 2019-11-21 RX ADMIN — PANTOPRAZOLE SODIUM 40 MG: 40 TABLET, DELAYED RELEASE ORAL at 05:15

## 2019-11-21 RX ADMIN — CLINDAMYCIN PHOSPHATE 600 MG: 600 INJECTION, SOLUTION INTRAVENOUS at 12:43

## 2019-11-21 ASSESSMENT — PAIN DESCRIPTION - ORIENTATION
ORIENTATION: RIGHT
ORIENTATION: RIGHT

## 2019-11-21 ASSESSMENT — PAIN DESCRIPTION - PROGRESSION
CLINICAL_PROGRESSION: GRADUALLY IMPROVING
CLINICAL_PROGRESSION: GRADUALLY WORSENING

## 2019-11-21 ASSESSMENT — PAIN DESCRIPTION - DESCRIPTORS
DESCRIPTORS: ACHING
DESCRIPTORS: ACHING

## 2019-11-21 ASSESSMENT — PAIN DESCRIPTION - PAIN TYPE
TYPE: ACUTE PAIN
TYPE: ACUTE PAIN

## 2019-11-21 ASSESSMENT — PAIN DESCRIPTION - FREQUENCY
FREQUENCY: CONTINUOUS
FREQUENCY: CONTINUOUS

## 2019-11-21 ASSESSMENT — PAIN - FUNCTIONAL ASSESSMENT
PAIN_FUNCTIONAL_ASSESSMENT: PREVENTS OR INTERFERES SOME ACTIVE ACTIVITIES AND ADLS
PAIN_FUNCTIONAL_ASSESSMENT: PREVENTS OR INTERFERES SOME ACTIVE ACTIVITIES AND ADLS

## 2019-11-21 ASSESSMENT — PAIN SCALES - GENERAL
PAINLEVEL_OUTOF10: 6
PAINLEVEL_OUTOF10: 3

## 2019-11-21 ASSESSMENT — PAIN DESCRIPTION - LOCATION
LOCATION: FOOT
LOCATION: FOOT

## 2019-11-21 ASSESSMENT — PAIN DESCRIPTION - ONSET
ONSET: ON-GOING
ONSET: ON-GOING

## 2019-11-22 ENCOUNTER — ANESTHESIA EVENT (OUTPATIENT)
Dept: OPERATING ROOM | Age: 49
DRG: 710 | End: 2019-11-22
Payer: COMMERCIAL

## 2019-11-22 ENCOUNTER — ANESTHESIA (OUTPATIENT)
Dept: OPERATING ROOM | Age: 49
DRG: 710 | End: 2019-11-22
Payer: COMMERCIAL

## 2019-11-22 ENCOUNTER — APPOINTMENT (OUTPATIENT)
Dept: MRI IMAGING | Age: 49
DRG: 710 | End: 2019-11-22
Payer: COMMERCIAL

## 2019-11-22 VITALS — OXYGEN SATURATION: 97 % | SYSTOLIC BLOOD PRESSURE: 97 MMHG | DIASTOLIC BLOOD PRESSURE: 54 MMHG

## 2019-11-22 PROBLEM — L02.611 FOOT ABSCESS, RIGHT: Status: ACTIVE | Noted: 2019-11-22

## 2019-11-22 LAB
ALBUMIN SERPL-MCNC: 2.5 G/DL (ref 3.4–5)
ANION GAP SERPL CALCULATED.3IONS-SCNC: 14 MMOL/L (ref 3–16)
BUN BLDV-MCNC: 22 MG/DL (ref 7–20)
CALCIUM SERPL-MCNC: 8.7 MG/DL (ref 8.3–10.6)
CHLORIDE BLD-SCNC: 88 MMOL/L (ref 99–110)
CO2: 25 MMOL/L (ref 21–32)
CREAT SERPL-MCNC: 1.2 MG/DL (ref 0.9–1.3)
ESTIMATED AVERAGE GLUCOSE: 248.9 MG/DL
GFR AFRICAN AMERICAN: >60
GFR NON-AFRICAN AMERICAN: >60
GLUCOSE BLD-MCNC: 105 MG/DL (ref 70–99)
GLUCOSE BLD-MCNC: 109 MG/DL (ref 70–99)
GLUCOSE BLD-MCNC: 109 MG/DL (ref 70–99)
GLUCOSE BLD-MCNC: 115 MG/DL (ref 70–99)
GLUCOSE BLD-MCNC: 116 MG/DL (ref 70–99)
GLUCOSE BLD-MCNC: 132 MG/DL (ref 70–99)
GLUCOSE BLD-MCNC: 232 MG/DL (ref 70–99)
GRAM STAIN RESULT: ABNORMAL
HBA1C MFR BLD: 10.3 %
ORGANISM: ABNORMAL
PERFORMED ON: ABNORMAL
PHOSPHORUS: 2.8 MG/DL (ref 2.5–4.9)
POTASSIUM SERPL-SCNC: 3.8 MMOL/L (ref 3.5–5.1)
SODIUM BLD-SCNC: 127 MMOL/L (ref 136–145)
WOUND/ABSCESS: ABNORMAL

## 2019-11-22 PROCEDURE — 87076 CULTURE ANAEROBE IDENT EACH: CPT

## 2019-11-22 PROCEDURE — 6370000000 HC RX 637 (ALT 250 FOR IP): Performed by: INTERNAL MEDICINE

## 2019-11-22 PROCEDURE — 6360000002 HC RX W HCPCS: Performed by: NURSE ANESTHETIST, CERTIFIED REGISTERED

## 2019-11-22 PROCEDURE — 73718 MRI LOWER EXTREMITY W/O DYE: CPT

## 2019-11-22 PROCEDURE — 2500000003 HC RX 250 WO HCPCS: Performed by: INTERNAL MEDICINE

## 2019-11-22 PROCEDURE — 99252 IP/OBS CONSLTJ NEW/EST SF 35: CPT | Performed by: NURSE PRACTITIONER

## 2019-11-22 PROCEDURE — 87040 BLOOD CULTURE FOR BACTERIA: CPT

## 2019-11-22 PROCEDURE — 6370000000 HC RX 637 (ALT 250 FOR IP): Performed by: PODIATRIST

## 2019-11-22 PROCEDURE — 2500000003 HC RX 250 WO HCPCS: Performed by: PODIATRIST

## 2019-11-22 PROCEDURE — 87075 CULTR BACTERIA EXCEPT BLOOD: CPT

## 2019-11-22 PROCEDURE — 2580000003 HC RX 258: Performed by: INTERNAL MEDICINE

## 2019-11-22 PROCEDURE — 6360000002 HC RX W HCPCS: Performed by: INTERNAL MEDICINE

## 2019-11-22 PROCEDURE — 2580000003 HC RX 258: Performed by: HOSPITALIST

## 2019-11-22 PROCEDURE — 80069 RENAL FUNCTION PANEL: CPT

## 2019-11-22 PROCEDURE — 3600000013 HC SURGERY LEVEL 3 ADDTL 15MIN: Performed by: PODIATRIST

## 2019-11-22 PROCEDURE — 87070 CULTURE OTHR SPECIMN AEROBIC: CPT

## 2019-11-22 PROCEDURE — 2060000000 HC ICU INTERMEDIATE R&B

## 2019-11-22 PROCEDURE — 2580000003 HC RX 258: Performed by: PODIATRIST

## 2019-11-22 PROCEDURE — 6370000000 HC RX 637 (ALT 250 FOR IP): Performed by: HOSPITALIST

## 2019-11-22 PROCEDURE — 94761 N-INVAS EAR/PLS OXIMETRY MLT: CPT

## 2019-11-22 PROCEDURE — 7100000001 HC PACU RECOVERY - ADDTL 15 MIN: Performed by: PODIATRIST

## 2019-11-22 PROCEDURE — 6360000002 HC RX W HCPCS: Performed by: PODIATRIST

## 2019-11-22 PROCEDURE — 0JBQ0ZZ EXCISION OF RIGHT FOOT SUBCUTANEOUS TISSUE AND FASCIA, OPEN APPROACH: ICD-10-PCS | Performed by: PODIATRIST

## 2019-11-22 PROCEDURE — 3700000000 HC ANESTHESIA ATTENDED CARE: Performed by: PODIATRIST

## 2019-11-22 PROCEDURE — 2500000003 HC RX 250 WO HCPCS: Performed by: NURSE ANESTHETIST, CERTIFIED REGISTERED

## 2019-11-22 PROCEDURE — 36415 COLL VENOUS BLD VENIPUNCTURE: CPT

## 2019-11-22 PROCEDURE — 3700000001 HC ADD 15 MINUTES (ANESTHESIA): Performed by: PODIATRIST

## 2019-11-22 PROCEDURE — 6360000002 HC RX W HCPCS: Performed by: HOSPITALIST

## 2019-11-22 PROCEDURE — 87176 TISSUE HOMOGENIZATION CULTR: CPT

## 2019-11-22 PROCEDURE — 2709999900 HC NON-CHARGEABLE SUPPLY: Performed by: PODIATRIST

## 2019-11-22 PROCEDURE — 6370000000 HC RX 637 (ALT 250 FOR IP): Performed by: NURSE PRACTITIONER

## 2019-11-22 PROCEDURE — 2580000003 HC RX 258: Performed by: NURSE ANESTHETIST, CERTIFIED REGISTERED

## 2019-11-22 PROCEDURE — 7100000000 HC PACU RECOVERY - FIRST 15 MIN: Performed by: PODIATRIST

## 2019-11-22 PROCEDURE — 99255 IP/OBS CONSLTJ NEW/EST HI 80: CPT | Performed by: INTERNAL MEDICINE

## 2019-11-22 PROCEDURE — 87205 SMEAR GRAM STAIN: CPT

## 2019-11-22 PROCEDURE — 3600000003 HC SURGERY LEVEL 3 BASE: Performed by: PODIATRIST

## 2019-11-22 PROCEDURE — 93926 LOWER EXTREMITY STUDY: CPT

## 2019-11-22 RX ORDER — FENTANYL CITRATE 50 UG/ML
25 INJECTION, SOLUTION INTRAMUSCULAR; INTRAVENOUS EVERY 5 MIN PRN
Status: DISCONTINUED | OUTPATIENT
Start: 2019-11-22 | End: 2019-11-22 | Stop reason: HOSPADM

## 2019-11-22 RX ORDER — PROPOFOL 10 MG/ML
INJECTION, EMULSION INTRAVENOUS PRN
Status: DISCONTINUED | OUTPATIENT
Start: 2019-11-22 | End: 2019-11-22 | Stop reason: SDUPTHER

## 2019-11-22 RX ORDER — OXYCODONE HYDROCHLORIDE AND ACETAMINOPHEN 5; 325 MG/1; MG/1
1 TABLET ORAL PRN
Status: DISCONTINUED | OUTPATIENT
Start: 2019-11-22 | End: 2019-11-22 | Stop reason: HOSPADM

## 2019-11-22 RX ORDER — ONDANSETRON 2 MG/ML
4 INJECTION INTRAMUSCULAR; INTRAVENOUS
Status: DISCONTINUED | OUTPATIENT
Start: 2019-11-22 | End: 2019-11-22 | Stop reason: HOSPADM

## 2019-11-22 RX ORDER — LIDOCAINE HYDROCHLORIDE 10 MG/ML
INJECTION, SOLUTION INFILTRATION; PERINEURAL
Status: COMPLETED | OUTPATIENT
Start: 2019-11-22 | End: 2019-11-22

## 2019-11-22 RX ORDER — SODIUM CHLORIDE 9 MG/ML
INJECTION, SOLUTION INTRAVENOUS CONTINUOUS PRN
Status: DISCONTINUED | OUTPATIENT
Start: 2019-11-22 | End: 2019-11-22 | Stop reason: SDUPTHER

## 2019-11-22 RX ORDER — OXYCODONE HYDROCHLORIDE AND ACETAMINOPHEN 5; 325 MG/1; MG/1
2 TABLET ORAL PRN
Status: DISCONTINUED | OUTPATIENT
Start: 2019-11-22 | End: 2019-11-22 | Stop reason: HOSPADM

## 2019-11-22 RX ORDER — LIDOCAINE HYDROCHLORIDE 20 MG/ML
INJECTION, SOLUTION EPIDURAL; INFILTRATION; INTRACAUDAL; PERINEURAL PRN
Status: DISCONTINUED | OUTPATIENT
Start: 2019-11-22 | End: 2019-11-22 | Stop reason: SDUPTHER

## 2019-11-22 RX ORDER — MAGNESIUM HYDROXIDE 1200 MG/15ML
LIQUID ORAL CONTINUOUS PRN
Status: DISCONTINUED | OUTPATIENT
Start: 2019-11-22 | End: 2019-11-22 | Stop reason: ALTCHOICE

## 2019-11-22 RX ORDER — MIDAZOLAM HYDROCHLORIDE 1 MG/ML
INJECTION INTRAMUSCULAR; INTRAVENOUS PRN
Status: DISCONTINUED | OUTPATIENT
Start: 2019-11-22 | End: 2019-11-22 | Stop reason: SDUPTHER

## 2019-11-22 RX ORDER — BUPIVACAINE HYDROCHLORIDE 2.5 MG/ML
INJECTION, SOLUTION EPIDURAL; INFILTRATION; INTRACAUDAL
Status: COMPLETED | OUTPATIENT
Start: 2019-11-22 | End: 2019-11-22

## 2019-11-22 RX ORDER — PROPOFOL 10 MG/ML
INJECTION, EMULSION INTRAVENOUS CONTINUOUS PRN
Status: DISCONTINUED | OUTPATIENT
Start: 2019-11-22 | End: 2019-11-22 | Stop reason: SDUPTHER

## 2019-11-22 RX ADMIN — PROPOFOL 100 MCG/KG/MIN: 10 INJECTION, EMULSION INTRAVENOUS at 15:51

## 2019-11-22 RX ADMIN — SODIUM CHLORIDE: 9 INJECTION, SOLUTION INTRAVENOUS at 15:34

## 2019-11-22 RX ADMIN — INSULIN GLARGINE 15 UNITS: 100 INJECTION, SOLUTION SUBCUTANEOUS at 08:45

## 2019-11-22 RX ADMIN — CLOPIDOGREL BISULFATE 75 MG: 75 TABLET ORAL at 08:42

## 2019-11-22 RX ADMIN — PIPERACILLIN AND TAZOBACTAM 3.38 G: 3; .375 INJECTION, POWDER, LYOPHILIZED, FOR SOLUTION INTRAVENOUS at 14:00

## 2019-11-22 RX ADMIN — CLINDAMYCIN PHOSPHATE 600 MG: 600 INJECTION, SOLUTION INTRAVENOUS at 05:46

## 2019-11-22 RX ADMIN — MIDAZOLAM 2 MG: 1 INJECTION INTRAMUSCULAR; INTRAVENOUS at 15:36

## 2019-11-22 RX ADMIN — PROPOFOL 80 MG: 10 INJECTION, EMULSION INTRAVENOUS at 15:38

## 2019-11-22 RX ADMIN — PIPERACILLIN AND TAZOBACTAM 3.38 G: 3; .375 INJECTION, POWDER, LYOPHILIZED, FOR SOLUTION INTRAVENOUS at 04:30

## 2019-11-22 RX ADMIN — CYANOCOBALAMIN TAB 1000 MCG 1000 MCG: 1000 TAB at 08:42

## 2019-11-22 RX ADMIN — ASPIRIN 81 MG: 81 TABLET, COATED ORAL at 08:42

## 2019-11-22 RX ADMIN — INSULIN GLARGINE 15 UNITS: 100 INJECTION, SOLUTION SUBCUTANEOUS at 21:01

## 2019-11-22 RX ADMIN — SODIUM CHLORIDE, PRESERVATIVE FREE 10 ML: 5 INJECTION INTRAVENOUS at 20:40

## 2019-11-22 RX ADMIN — PROPOFOL 100 MCG/KG/MIN: 10 INJECTION, EMULSION INTRAVENOUS at 15:43

## 2019-11-22 RX ADMIN — INSULIN LISPRO 2 UNITS: 100 INJECTION, SOLUTION INTRAVENOUS; SUBCUTANEOUS at 21:01

## 2019-11-22 RX ADMIN — CHLORTHALIDONE 50 MG: 25 TABLET ORAL at 08:41

## 2019-11-22 RX ADMIN — Medication 1250 MG: at 00:50

## 2019-11-22 RX ADMIN — CLINDAMYCIN PHOSPHATE 600 MG: 600 INJECTION, SOLUTION INTRAVENOUS at 12:03

## 2019-11-22 RX ADMIN — ACETAMINOPHEN 650 MG: 325 TABLET ORAL at 08:41

## 2019-11-22 RX ADMIN — ATORVASTATIN CALCIUM 40 MG: 40 TABLET, FILM COATED ORAL at 08:42

## 2019-11-22 RX ADMIN — METOPROLOL SUCCINATE 25 MG: 25 TABLET, EXTENDED RELEASE ORAL at 08:41

## 2019-11-22 RX ADMIN — PIPERACILLIN AND TAZOBACTAM 4.5 G: 4; .5 INJECTION, POWDER, LYOPHILIZED, FOR SOLUTION INTRAVENOUS at 21:12

## 2019-11-22 RX ADMIN — SODIUM CHLORIDE, PRESERVATIVE FREE 10 ML: 5 INJECTION INTRAVENOUS at 08:43

## 2019-11-22 RX ADMIN — LIDOCAINE HYDROCHLORIDE 100 MG: 20 INJECTION, SOLUTION EPIDURAL; INFILTRATION; INTRACAUDAL; PERINEURAL at 15:38

## 2019-11-22 RX ADMIN — LISINOPRIL 40 MG: 40 TABLET ORAL at 08:42

## 2019-11-22 RX ADMIN — CLINDAMYCIN PHOSPHATE 600 MG: 600 INJECTION, SOLUTION INTRAVENOUS at 19:24

## 2019-11-22 ASSESSMENT — PULMONARY FUNCTION TESTS
PIF_VALUE: 0
PIF_VALUE: 1
PIF_VALUE: 0
PIF_VALUE: 1
PIF_VALUE: 0
PIF_VALUE: 1
PIF_VALUE: 0
PIF_VALUE: 1
PIF_VALUE: 0
PIF_VALUE: 1
PIF_VALUE: 0
PIF_VALUE: 1
PIF_VALUE: 0
PIF_VALUE: 1
PIF_VALUE: 0
PIF_VALUE: 0
PIF_VALUE: 1
PIF_VALUE: 0
PIF_VALUE: 1
PIF_VALUE: 0
PIF_VALUE: 1
PIF_VALUE: 0
PIF_VALUE: 1
PIF_VALUE: 0
PIF_VALUE: 1
PIF_VALUE: 0

## 2019-11-22 ASSESSMENT — PAIN SCALES - GENERAL
PAINLEVEL_OUTOF10: 0

## 2019-11-22 ASSESSMENT — PAIN - FUNCTIONAL ASSESSMENT
PAIN_FUNCTIONAL_ASSESSMENT: PREVENTS OR INTERFERES SOME ACTIVE ACTIVITIES AND ADLS
PAIN_FUNCTIONAL_ASSESSMENT: 0-10

## 2019-11-22 ASSESSMENT — ENCOUNTER SYMPTOMS: SHORTNESS OF BREATH: 1

## 2019-11-22 ASSESSMENT — PAIN DESCRIPTION - DESCRIPTORS: DESCRIPTORS: ACHING

## 2019-11-23 LAB
ALBUMIN SERPL-MCNC: 2.4 G/DL (ref 3.4–5)
ANION GAP SERPL CALCULATED.3IONS-SCNC: 12 MMOL/L (ref 3–16)
BUN BLDV-MCNC: 31 MG/DL (ref 7–20)
CALCIUM SERPL-MCNC: 8.4 MG/DL (ref 8.3–10.6)
CHLORIDE BLD-SCNC: 91 MMOL/L (ref 99–110)
CO2: 25 MMOL/L (ref 21–32)
CREAT SERPL-MCNC: 1.2 MG/DL (ref 0.9–1.3)
GFR AFRICAN AMERICAN: >60
GFR NON-AFRICAN AMERICAN: >60
GLUCOSE BLD-MCNC: 134 MG/DL (ref 70–99)
GLUCOSE BLD-MCNC: 158 MG/DL (ref 70–99)
GLUCOSE BLD-MCNC: 162 MG/DL (ref 70–99)
GLUCOSE BLD-MCNC: 178 MG/DL (ref 70–99)
GLUCOSE BLD-MCNC: 186 MG/DL (ref 70–99)
PERFORMED ON: ABNORMAL
PHOSPHORUS: 2.9 MG/DL (ref 2.5–4.9)
POTASSIUM SERPL-SCNC: 3.8 MMOL/L (ref 3.5–5.1)
SODIUM BLD-SCNC: 128 MMOL/L (ref 136–145)

## 2019-11-23 PROCEDURE — 6370000000 HC RX 637 (ALT 250 FOR IP): Performed by: PODIATRIST

## 2019-11-23 PROCEDURE — 6360000002 HC RX W HCPCS: Performed by: INTERNAL MEDICINE

## 2019-11-23 PROCEDURE — 36415 COLL VENOUS BLD VENIPUNCTURE: CPT

## 2019-11-23 PROCEDURE — 2060000000 HC ICU INTERMEDIATE R&B

## 2019-11-23 PROCEDURE — 80069 RENAL FUNCTION PANEL: CPT

## 2019-11-23 PROCEDURE — 2500000003 HC RX 250 WO HCPCS: Performed by: PODIATRIST

## 2019-11-23 PROCEDURE — 6360000002 HC RX W HCPCS: Performed by: PODIATRIST

## 2019-11-23 PROCEDURE — 2580000003 HC RX 258: Performed by: INTERNAL MEDICINE

## 2019-11-23 PROCEDURE — 94760 N-INVAS EAR/PLS OXIMETRY 1: CPT

## 2019-11-23 PROCEDURE — 2580000003 HC RX 258: Performed by: PODIATRIST

## 2019-11-23 RX ADMIN — SODIUM CHLORIDE, PRESERVATIVE FREE 10 ML: 5 INJECTION INTRAVENOUS at 10:23

## 2019-11-23 RX ADMIN — ENOXAPARIN SODIUM 40 MG: 40 INJECTION SUBCUTANEOUS at 10:21

## 2019-11-23 RX ADMIN — CLINDAMYCIN PHOSPHATE 600 MG: 600 INJECTION, SOLUTION INTRAVENOUS at 00:15

## 2019-11-23 RX ADMIN — INSULIN LISPRO 2 UNITS: 100 INJECTION, SOLUTION INTRAVENOUS; SUBCUTANEOUS at 12:40

## 2019-11-23 RX ADMIN — CHLORTHALIDONE 50 MG: 25 TABLET ORAL at 10:22

## 2019-11-23 RX ADMIN — CLINDAMYCIN PHOSPHATE 600 MG: 600 INJECTION, SOLUTION INTRAVENOUS at 12:39

## 2019-11-23 RX ADMIN — CLINDAMYCIN PHOSPHATE 600 MG: 600 INJECTION, SOLUTION INTRAVENOUS at 06:00

## 2019-11-23 RX ADMIN — INSULIN GLARGINE 15 UNITS: 100 INJECTION, SOLUTION SUBCUTANEOUS at 21:07

## 2019-11-23 RX ADMIN — PIPERACILLIN AND TAZOBACTAM 4.5 G: 4; .5 INJECTION, POWDER, LYOPHILIZED, FOR SOLUTION INTRAVENOUS at 22:06

## 2019-11-23 RX ADMIN — SODIUM CHLORIDE, PRESERVATIVE FREE 10 ML: 5 INJECTION INTRAVENOUS at 22:05

## 2019-11-23 RX ADMIN — INSULIN GLARGINE 15 UNITS: 100 INJECTION, SOLUTION SUBCUTANEOUS at 10:21

## 2019-11-23 RX ADMIN — PANTOPRAZOLE SODIUM 40 MG: 40 TABLET, DELAYED RELEASE ORAL at 06:36

## 2019-11-23 RX ADMIN — PIPERACILLIN AND TAZOBACTAM 4.5 G: 4; .5 INJECTION, POWDER, LYOPHILIZED, FOR SOLUTION INTRAVENOUS at 12:40

## 2019-11-23 RX ADMIN — CYANOCOBALAMIN TAB 1000 MCG 1000 MCG: 1000 TAB at 10:22

## 2019-11-23 RX ADMIN — PIPERACILLIN AND TAZOBACTAM 4.5 G: 4; .5 INJECTION, POWDER, LYOPHILIZED, FOR SOLUTION INTRAVENOUS at 05:00

## 2019-11-23 RX ADMIN — LISINOPRIL 40 MG: 40 TABLET ORAL at 10:21

## 2019-11-23 RX ADMIN — METOPROLOL SUCCINATE 25 MG: 25 TABLET, EXTENDED RELEASE ORAL at 10:22

## 2019-11-23 RX ADMIN — INSULIN LISPRO 5 UNITS: 100 INJECTION, SOLUTION INTRAVENOUS; SUBCUTANEOUS at 12:40

## 2019-11-23 RX ADMIN — CLINDAMYCIN PHOSPHATE 600 MG: 600 INJECTION, SOLUTION INTRAVENOUS at 17:48

## 2019-11-23 RX ADMIN — ATORVASTATIN CALCIUM 40 MG: 40 TABLET, FILM COATED ORAL at 10:21

## 2019-11-23 RX ADMIN — ASPIRIN 81 MG: 81 TABLET, COATED ORAL at 10:21

## 2019-11-23 RX ADMIN — CLOPIDOGREL BISULFATE 75 MG: 75 TABLET ORAL at 10:22

## 2019-11-23 ASSESSMENT — PAIN SCALES - GENERAL
PAINLEVEL_OUTOF10: 0
PAINLEVEL_OUTOF10: 0

## 2019-11-24 PROBLEM — E11.65 POORLY CONTROLLED TYPE 2 DIABETES MELLITUS (HCC): Status: ACTIVE | Noted: 2019-04-29

## 2019-11-24 LAB
ALBUMIN SERPL-MCNC: 2.5 G/DL (ref 3.4–5)
ANION GAP SERPL CALCULATED.3IONS-SCNC: 13 MMOL/L (ref 3–16)
BLOOD CULTURE, ROUTINE: NORMAL
BUN BLDV-MCNC: 27 MG/DL (ref 7–20)
CALCIUM SERPL-MCNC: 8.7 MG/DL (ref 8.3–10.6)
CHLORIDE BLD-SCNC: 91 MMOL/L (ref 99–110)
CO2: 25 MMOL/L (ref 21–32)
CREAT SERPL-MCNC: 1.3 MG/DL (ref 0.9–1.3)
CULTURE, BLOOD 2: NORMAL
GFR AFRICAN AMERICAN: >60
GFR NON-AFRICAN AMERICAN: 58
GLUCOSE BLD-MCNC: 102 MG/DL (ref 70–99)
GLUCOSE BLD-MCNC: 108 MG/DL (ref 70–99)
GLUCOSE BLD-MCNC: 108 MG/DL (ref 70–99)
GLUCOSE BLD-MCNC: 116 MG/DL (ref 70–99)
GLUCOSE BLD-MCNC: 130 MG/DL (ref 70–99)
PERFORMED ON: ABNORMAL
PHOSPHORUS: 3.6 MG/DL (ref 2.5–4.9)
POTASSIUM SERPL-SCNC: 4 MMOL/L (ref 3.5–5.1)
SODIUM BLD-SCNC: 129 MMOL/L (ref 136–145)

## 2019-11-24 PROCEDURE — 36415 COLL VENOUS BLD VENIPUNCTURE: CPT

## 2019-11-24 PROCEDURE — 2580000003 HC RX 258: Performed by: HOSPITALIST

## 2019-11-24 PROCEDURE — 6370000000 HC RX 637 (ALT 250 FOR IP): Performed by: PODIATRIST

## 2019-11-24 PROCEDURE — 6360000002 HC RX W HCPCS: Performed by: HOSPITALIST

## 2019-11-24 PROCEDURE — 94760 N-INVAS EAR/PLS OXIMETRY 1: CPT

## 2019-11-24 PROCEDURE — 6370000000 HC RX 637 (ALT 250 FOR IP): Performed by: INTERNAL MEDICINE

## 2019-11-24 PROCEDURE — 2500000003 HC RX 250 WO HCPCS: Performed by: PODIATRIST

## 2019-11-24 PROCEDURE — 2580000003 HC RX 258: Performed by: INTERNAL MEDICINE

## 2019-11-24 PROCEDURE — 80069 RENAL FUNCTION PANEL: CPT

## 2019-11-24 PROCEDURE — 2060000000 HC ICU INTERMEDIATE R&B

## 2019-11-24 PROCEDURE — 6360000002 HC RX W HCPCS: Performed by: PODIATRIST

## 2019-11-24 PROCEDURE — 6360000002 HC RX W HCPCS: Performed by: INTERNAL MEDICINE

## 2019-11-24 PROCEDURE — 99233 SBSQ HOSP IP/OBS HIGH 50: CPT | Performed by: INTERNAL MEDICINE

## 2019-11-24 PROCEDURE — 2580000003 HC RX 258: Performed by: PODIATRIST

## 2019-11-24 RX ORDER — SODIUM CHLORIDE 9 MG/ML
INJECTION, SOLUTION INTRAVENOUS CONTINUOUS
Status: DISCONTINUED | OUTPATIENT
Start: 2019-11-24 | End: 2019-11-26

## 2019-11-24 RX ORDER — 0.9 % SODIUM CHLORIDE 0.9 %
500 INTRAVENOUS SOLUTION INTRAVENOUS ONCE
Status: COMPLETED | OUTPATIENT
Start: 2019-11-24 | End: 2019-11-24

## 2019-11-24 RX ORDER — CIPROFLOXACIN 500 MG/1
500 TABLET, FILM COATED ORAL EVERY 12 HOURS SCHEDULED
Status: DISCONTINUED | OUTPATIENT
Start: 2019-11-24 | End: 2019-11-25

## 2019-11-24 RX ADMIN — Medication 1500 MG: at 14:34

## 2019-11-24 RX ADMIN — CIPROFLOXACIN 500 MG: 500 TABLET, FILM COATED ORAL at 21:21

## 2019-11-24 RX ADMIN — SODIUM CHLORIDE 500 ML: 9 INJECTION, SOLUTION INTRAVENOUS at 13:30

## 2019-11-24 RX ADMIN — SODIUM CHLORIDE, PRESERVATIVE FREE 10 ML: 5 INJECTION INTRAVENOUS at 09:38

## 2019-11-24 RX ADMIN — PIPERACILLIN AND TAZOBACTAM 4.5 G: 4; .5 INJECTION, POWDER, LYOPHILIZED, FOR SOLUTION INTRAVENOUS at 05:42

## 2019-11-24 RX ADMIN — ENOXAPARIN SODIUM 40 MG: 40 INJECTION SUBCUTANEOUS at 21:20

## 2019-11-24 RX ADMIN — CYANOCOBALAMIN TAB 1000 MCG 1000 MCG: 1000 TAB at 09:35

## 2019-11-24 RX ADMIN — SODIUM CHLORIDE: 9 INJECTION, SOLUTION INTRAVENOUS at 15:30

## 2019-11-24 RX ADMIN — INSULIN LISPRO 5 UNITS: 100 INJECTION, SOLUTION INTRAVENOUS; SUBCUTANEOUS at 12:35

## 2019-11-24 RX ADMIN — ATORVASTATIN CALCIUM 40 MG: 40 TABLET, FILM COATED ORAL at 09:35

## 2019-11-24 RX ADMIN — ASPIRIN 81 MG: 81 TABLET, COATED ORAL at 09:35

## 2019-11-24 RX ADMIN — CLINDAMYCIN PHOSPHATE 600 MG: 600 INJECTION, SOLUTION INTRAVENOUS at 12:34

## 2019-11-24 RX ADMIN — CLINDAMYCIN PHOSPHATE 600 MG: 600 INJECTION, SOLUTION INTRAVENOUS at 17:54

## 2019-11-24 RX ADMIN — SODIUM CHLORIDE: 9 INJECTION, SOLUTION INTRAVENOUS at 17:57

## 2019-11-24 RX ADMIN — PANTOPRAZOLE SODIUM 40 MG: 40 TABLET, DELAYED RELEASE ORAL at 05:42

## 2019-11-24 RX ADMIN — CLINDAMYCIN PHOSPHATE 600 MG: 600 INJECTION, SOLUTION INTRAVENOUS at 06:05

## 2019-11-24 RX ADMIN — PIPERACILLIN AND TAZOBACTAM 4.5 G: 4; .5 INJECTION, POWDER, LYOPHILIZED, FOR SOLUTION INTRAVENOUS at 12:34

## 2019-11-24 RX ADMIN — CHLORTHALIDONE 50 MG: 25 TABLET ORAL at 09:35

## 2019-11-24 RX ADMIN — LISINOPRIL 40 MG: 40 TABLET ORAL at 09:35

## 2019-11-24 RX ADMIN — CLOPIDOGREL BISULFATE 75 MG: 75 TABLET ORAL at 09:35

## 2019-11-24 RX ADMIN — INSULIN GLARGINE 15 UNITS: 100 INJECTION, SOLUTION SUBCUTANEOUS at 21:21

## 2019-11-24 RX ADMIN — METOPROLOL SUCCINATE 25 MG: 25 TABLET, EXTENDED RELEASE ORAL at 09:35

## 2019-11-24 RX ADMIN — CLINDAMYCIN PHOSPHATE 600 MG: 600 INJECTION, SOLUTION INTRAVENOUS at 00:15

## 2019-11-24 RX ADMIN — ENOXAPARIN SODIUM 40 MG: 40 INJECTION SUBCUTANEOUS at 09:34

## 2019-11-24 ASSESSMENT — ENCOUNTER SYMPTOMS
RHINORRHEA: 0
NAUSEA: 0
BACK PAIN: 0
EYE DISCHARGE: 0
DIARRHEA: 0
SORE THROAT: 0
CONSTIPATION: 0
WHEEZING: 0
EYE REDNESS: 0
ABDOMINAL PAIN: 0
SHORTNESS OF BREATH: 0
TROUBLE SWALLOWING: 0
COUGH: 0

## 2019-11-24 ASSESSMENT — PAIN SCALES - GENERAL: PAINLEVEL_OUTOF10: 0

## 2019-11-25 LAB
ALBUMIN SERPL-MCNC: 2.5 G/DL (ref 3.4–5)
ANAEROBIC CULTURE: ABNORMAL
ANION GAP SERPL CALCULATED.3IONS-SCNC: 12 MMOL/L (ref 3–16)
BASOPHILS ABSOLUTE: 0.1 K/UL (ref 0–0.2)
BASOPHILS RELATIVE PERCENT: 0.6 %
BUN BLDV-MCNC: 24 MG/DL (ref 7–20)
CALCIUM SERPL-MCNC: 8.4 MG/DL (ref 8.3–10.6)
CHLORIDE BLD-SCNC: 92 MMOL/L (ref 99–110)
CO2: 24 MMOL/L (ref 21–32)
CREAT SERPL-MCNC: 1 MG/DL (ref 0.9–1.3)
CULTURE SURGICAL: ABNORMAL
EOSINOPHILS ABSOLUTE: 0.2 K/UL (ref 0–0.6)
EOSINOPHILS RELATIVE PERCENT: 0.9 %
GFR AFRICAN AMERICAN: >60
GFR NON-AFRICAN AMERICAN: >60
GLUCOSE BLD-MCNC: 100 MG/DL (ref 70–99)
GLUCOSE BLD-MCNC: 102 MG/DL (ref 70–99)
GLUCOSE BLD-MCNC: 87 MG/DL (ref 70–99)
GLUCOSE BLD-MCNC: 89 MG/DL (ref 70–99)
GLUCOSE BLD-MCNC: 98 MG/DL (ref 70–99)
GRAM STAIN RESULT: ABNORMAL
GRAM STAIN RESULT: ABNORMAL
HCT VFR BLD CALC: 27.9 % (ref 40.5–52.5)
HEMOGLOBIN: 9.2 G/DL (ref 13.5–17.5)
LYMPHOCYTES ABSOLUTE: 2 K/UL (ref 1–5.1)
LYMPHOCYTES RELATIVE PERCENT: 11.5 %
MCH RBC QN AUTO: 26.8 PG (ref 26–34)
MCHC RBC AUTO-ENTMCNC: 33 G/DL (ref 31–36)
MCV RBC AUTO: 81.2 FL (ref 80–100)
MONOCYTES ABSOLUTE: 1.1 K/UL (ref 0–1.3)
MONOCYTES RELATIVE PERCENT: 6.2 %
NEUTROPHILS ABSOLUTE: 13.8 K/UL (ref 1.7–7.7)
NEUTROPHILS RELATIVE PERCENT: 80.8 %
ORGANISM: ABNORMAL
PDW BLD-RTO: 14.5 % (ref 12.4–15.4)
PERFORMED ON: ABNORMAL
PERFORMED ON: NORMAL
PHOSPHORUS: 2.6 MG/DL (ref 2.5–4.9)
PLATELET # BLD: 467 K/UL (ref 135–450)
PMV BLD AUTO: 7.6 FL (ref 5–10.5)
POTASSIUM SERPL-SCNC: 3.6 MMOL/L (ref 3.5–5.1)
RBC # BLD: 3.44 M/UL (ref 4.2–5.9)
SODIUM BLD-SCNC: 128 MMOL/L (ref 136–145)
VANCOMYCIN TROUGH: 19.8 UG/ML (ref 10–20)
WBC # BLD: 17.1 K/UL (ref 4–11)
WOUND/ABSCESS: ABNORMAL

## 2019-11-25 PROCEDURE — 36415 COLL VENOUS BLD VENIPUNCTURE: CPT

## 2019-11-25 PROCEDURE — 94760 N-INVAS EAR/PLS OXIMETRY 1: CPT

## 2019-11-25 PROCEDURE — 6360000002 HC RX W HCPCS: Performed by: INTERNAL MEDICINE

## 2019-11-25 PROCEDURE — 2580000003 HC RX 258: Performed by: HOSPITALIST

## 2019-11-25 PROCEDURE — 2580000003 HC RX 258: Performed by: PODIATRIST

## 2019-11-25 PROCEDURE — 2500000003 HC RX 250 WO HCPCS: Performed by: PODIATRIST

## 2019-11-25 PROCEDURE — 6360000002 HC RX W HCPCS: Performed by: PHARMACIST

## 2019-11-25 PROCEDURE — 6370000000 HC RX 637 (ALT 250 FOR IP): Performed by: PODIATRIST

## 2019-11-25 PROCEDURE — 85025 COMPLETE CBC W/AUTO DIFF WBC: CPT

## 2019-11-25 PROCEDURE — 2580000003 HC RX 258: Performed by: INTERNAL MEDICINE

## 2019-11-25 PROCEDURE — 99232 SBSQ HOSP IP/OBS MODERATE 35: CPT | Performed by: SURGERY

## 2019-11-25 PROCEDURE — 99233 SBSQ HOSP IP/OBS HIGH 50: CPT | Performed by: INTERNAL MEDICINE

## 2019-11-25 PROCEDURE — 2060000000 HC ICU INTERMEDIATE R&B

## 2019-11-25 PROCEDURE — 93923 UPR/LXTR ART STDY 3+ LVLS: CPT

## 2019-11-25 PROCEDURE — 6370000000 HC RX 637 (ALT 250 FOR IP): Performed by: INTERNAL MEDICINE

## 2019-11-25 PROCEDURE — APPNB30 APP NON BILLABLE TIME 0-30 MINS: Performed by: NURSE PRACTITIONER

## 2019-11-25 PROCEDURE — 80069 RENAL FUNCTION PANEL: CPT

## 2019-11-25 PROCEDURE — 6360000002 HC RX W HCPCS: Performed by: HOSPITALIST

## 2019-11-25 PROCEDURE — 80202 ASSAY OF VANCOMYCIN: CPT

## 2019-11-25 PROCEDURE — APPSS30 APP SPLIT SHARED TIME 16-30 MINUTES: Performed by: NURSE PRACTITIONER

## 2019-11-25 RX ORDER — SODIUM CHLORIDE 0.9 % (FLUSH) 0.9 %
10 SYRINGE (ML) INJECTION PRN
Status: DISCONTINUED | OUTPATIENT
Start: 2019-11-25 | End: 2019-11-26 | Stop reason: SDUPTHER

## 2019-11-25 RX ORDER — LIDOCAINE HYDROCHLORIDE 10 MG/ML
5 INJECTION, SOLUTION EPIDURAL; INFILTRATION; INTRACAUDAL; PERINEURAL ONCE
Status: DISCONTINUED | OUTPATIENT
Start: 2019-11-26 | End: 2019-12-04

## 2019-11-25 RX ORDER — SODIUM CHLORIDE 0.9 % (FLUSH) 0.9 %
10 SYRINGE (ML) INJECTION EVERY 12 HOURS SCHEDULED
Status: DISCONTINUED | OUTPATIENT
Start: 2019-11-26 | End: 2019-11-26 | Stop reason: SDUPTHER

## 2019-11-25 RX ORDER — FAMOTIDINE 20 MG/1
20 TABLET, FILM COATED ORAL 2 TIMES DAILY
Status: DISCONTINUED | OUTPATIENT
Start: 2019-11-26 | End: 2019-12-11 | Stop reason: HOSPADM

## 2019-11-25 RX ORDER — METRONIDAZOLE 500 MG/1
500 TABLET ORAL EVERY 8 HOURS SCHEDULED
Status: DISCONTINUED | OUTPATIENT
Start: 2019-11-25 | End: 2019-12-11 | Stop reason: HOSPADM

## 2019-11-25 RX ADMIN — METRONIDAZOLE 500 MG: 500 TABLET ORAL at 22:20

## 2019-11-25 RX ADMIN — SODIUM CHLORIDE: 9 INJECTION, SOLUTION INTRAVENOUS at 00:06

## 2019-11-25 RX ADMIN — PANTOPRAZOLE SODIUM 40 MG: 40 TABLET, DELAYED RELEASE ORAL at 05:29

## 2019-11-25 RX ADMIN — Medication 1250 MG: at 15:18

## 2019-11-25 RX ADMIN — CLOPIDOGREL BISULFATE 75 MG: 75 TABLET ORAL at 09:12

## 2019-11-25 RX ADMIN — ENOXAPARIN SODIUM 40 MG: 40 INJECTION SUBCUTANEOUS at 21:30

## 2019-11-25 RX ADMIN — CHLORTHALIDONE 50 MG: 25 TABLET ORAL at 09:13

## 2019-11-25 RX ADMIN — CEFTRIAXONE 2 G: 2 INJECTION, POWDER, FOR SOLUTION INTRAMUSCULAR; INTRAVENOUS at 12:21

## 2019-11-25 RX ADMIN — CLINDAMYCIN PHOSPHATE 600 MG: 600 INJECTION, SOLUTION INTRAVENOUS at 00:06

## 2019-11-25 RX ADMIN — ENOXAPARIN SODIUM 40 MG: 40 INJECTION SUBCUTANEOUS at 09:12

## 2019-11-25 RX ADMIN — CIPROFLOXACIN 500 MG: 500 TABLET, FILM COATED ORAL at 09:13

## 2019-11-25 RX ADMIN — Medication 1500 MG: at 01:40

## 2019-11-25 RX ADMIN — ATORVASTATIN CALCIUM 40 MG: 40 TABLET, FILM COATED ORAL at 09:13

## 2019-11-25 RX ADMIN — METRONIDAZOLE 500 MG: 500 TABLET ORAL at 15:18

## 2019-11-25 RX ADMIN — INSULIN GLARGINE 15 UNITS: 100 INJECTION, SOLUTION SUBCUTANEOUS at 09:12

## 2019-11-25 RX ADMIN — ASPIRIN 81 MG: 81 TABLET, COATED ORAL at 09:12

## 2019-11-25 RX ADMIN — INSULIN GLARGINE 15 UNITS: 100 INJECTION, SOLUTION SUBCUTANEOUS at 21:30

## 2019-11-25 RX ADMIN — SODIUM CHLORIDE, PRESERVATIVE FREE 10 ML: 5 INJECTION INTRAVENOUS at 21:00

## 2019-11-25 RX ADMIN — ACETAMINOPHEN 650 MG: 325 TABLET ORAL at 09:13

## 2019-11-25 RX ADMIN — CLINDAMYCIN PHOSPHATE 600 MG: 600 INJECTION, SOLUTION INTRAVENOUS at 05:29

## 2019-11-25 RX ADMIN — METOPROLOL SUCCINATE 25 MG: 25 TABLET, EXTENDED RELEASE ORAL at 09:13

## 2019-11-25 RX ADMIN — LISINOPRIL 40 MG: 40 TABLET ORAL at 09:12

## 2019-11-25 RX ADMIN — CYANOCOBALAMIN TAB 1000 MCG 1000 MCG: 1000 TAB at 09:13

## 2019-11-25 ASSESSMENT — PAIN SCALES - GENERAL
PAINLEVEL_OUTOF10: 0
PAINLEVEL_OUTOF10: 0

## 2019-11-26 ENCOUNTER — APPOINTMENT (OUTPATIENT)
Dept: CARDIAC CATH/INVASIVE PROCEDURES | Age: 49
DRG: 710 | End: 2019-11-26
Payer: COMMERCIAL

## 2019-11-26 ENCOUNTER — APPOINTMENT (OUTPATIENT)
Dept: GENERAL RADIOLOGY | Age: 49
DRG: 710 | End: 2019-11-26
Payer: COMMERCIAL

## 2019-11-26 LAB
ALBUMIN SERPL-MCNC: 2.2 G/DL (ref 3.4–5)
ANION GAP SERPL CALCULATED.3IONS-SCNC: 14 MMOL/L (ref 3–16)
BUN BLDV-MCNC: 17 MG/DL (ref 7–20)
CALCIUM SERPL-MCNC: 8.3 MG/DL (ref 8.3–10.6)
CHLORIDE BLD-SCNC: 94 MMOL/L (ref 99–110)
CO2: 23 MMOL/L (ref 21–32)
CREAT SERPL-MCNC: 0.8 MG/DL (ref 0.9–1.3)
GFR AFRICAN AMERICAN: >60
GFR NON-AFRICAN AMERICAN: >60
GLUCOSE BLD-MCNC: 65 MG/DL (ref 70–99)
GLUCOSE BLD-MCNC: 69 MG/DL (ref 70–99)
GLUCOSE BLD-MCNC: 73 MG/DL (ref 70–99)
GLUCOSE BLD-MCNC: 79 MG/DL (ref 70–99)
GLUCOSE BLD-MCNC: 85 MG/DL (ref 70–99)
GLUCOSE BLD-MCNC: 90 MG/DL (ref 70–99)
GLUCOSE BLD-MCNC: 92 MG/DL (ref 70–99)
GLUCOSE BLD-MCNC: 98 MG/DL (ref 70–99)
PERFORMED ON: ABNORMAL
PERFORMED ON: ABNORMAL
PERFORMED ON: NORMAL
PHOSPHORUS: 2.5 MG/DL (ref 2.5–4.9)
POC ACT LR: 240 SEC
POC ACT LR: 254 SEC
POTASSIUM SERPL-SCNC: 3.4 MMOL/L (ref 3.5–5.1)
SODIUM BLD-SCNC: 131 MMOL/L (ref 136–145)
VANCOMYCIN TROUGH: 21.1 UG/ML (ref 10–20)

## 2019-11-26 PROCEDURE — C1724 CATH, TRANS ATHEREC,ROTATION: HCPCS

## 2019-11-26 PROCEDURE — 2580000003 HC RX 258: Performed by: INTERNAL MEDICINE

## 2019-11-26 PROCEDURE — 2060000000 HC ICU INTERMEDIATE R&B

## 2019-11-26 PROCEDURE — B41D1ZZ FLUOROSCOPY OF AORTA AND BILATERAL LOWER EXTREMITY ARTERIES USING LOW OSMOLAR CONTRAST: ICD-10-PCS | Performed by: INTERNAL MEDICINE

## 2019-11-26 PROCEDURE — 6370000000 HC RX 637 (ALT 250 FOR IP): Performed by: HOSPITALIST

## 2019-11-26 PROCEDURE — 75625 CONTRAST EXAM ABDOMINL AORTA: CPT

## 2019-11-26 PROCEDURE — 6360000002 HC RX W HCPCS: Performed by: INTERNAL MEDICINE

## 2019-11-26 PROCEDURE — B4101ZZ FLUOROSCOPY OF ABDOMINAL AORTA USING LOW OSMOLAR CONTRAST: ICD-10-PCS | Performed by: INTERNAL MEDICINE

## 2019-11-26 PROCEDURE — 99232 SBSQ HOSP IP/OBS MODERATE 35: CPT | Performed by: SURGERY

## 2019-11-26 PROCEDURE — 6370000000 HC RX 637 (ALT 250 FOR IP): Performed by: PODIATRIST

## 2019-11-26 PROCEDURE — 2500000003 HC RX 250 WO HCPCS

## 2019-11-26 PROCEDURE — 75774 ARTERY X-RAY EACH VESSEL: CPT

## 2019-11-26 PROCEDURE — 36569 INSJ PICC 5 YR+ W/O IMAGING: CPT

## 2019-11-26 PROCEDURE — 99152 MOD SED SAME PHYS/QHP 5/>YRS: CPT

## 2019-11-26 PROCEDURE — C1760 CLOSURE DEV, VASC: HCPCS

## 2019-11-26 PROCEDURE — 71045 X-RAY EXAM CHEST 1 VIEW: CPT

## 2019-11-26 PROCEDURE — 02HV33Z INSERTION OF INFUSION DEVICE INTO SUPERIOR VENA CAVA, PERCUTANEOUS APPROACH: ICD-10-PCS | Performed by: INTERNAL MEDICINE

## 2019-11-26 PROCEDURE — 6360000002 HC RX W HCPCS: Performed by: HOSPITALIST

## 2019-11-26 PROCEDURE — 6370000000 HC RX 637 (ALT 250 FOR IP): Performed by: INTERNAL MEDICINE

## 2019-11-26 PROCEDURE — C1887 CATHETER, GUIDING: HCPCS

## 2019-11-26 PROCEDURE — 36415 COLL VENOUS BLD VENIPUNCTURE: CPT

## 2019-11-26 PROCEDURE — 80069 RENAL FUNCTION PANEL: CPT

## 2019-11-26 PROCEDURE — 75716 ARTERY X-RAYS ARMS/LEGS: CPT

## 2019-11-26 PROCEDURE — 99153 MOD SED SAME PHYS/QHP EA: CPT

## 2019-11-26 PROCEDURE — 6360000004 HC RX CONTRAST MEDICATION: Performed by: HOSPITALIST

## 2019-11-26 PROCEDURE — 85347 COAGULATION TIME ACTIVATED: CPT

## 2019-11-26 PROCEDURE — 99233 SBSQ HOSP IP/OBS HIGH 50: CPT | Performed by: INTERNAL MEDICINE

## 2019-11-26 PROCEDURE — C1769 GUIDE WIRE: HCPCS

## 2019-11-26 PROCEDURE — 37229 PR REVSC OPN/PRQ TIB/PERO W/ATHRC/ANGIOP SM VSL: CPT | Performed by: INTERNAL MEDICINE

## 2019-11-26 PROCEDURE — 76937 US GUIDE VASCULAR ACCESS: CPT

## 2019-11-26 PROCEDURE — 2709999900 HC NON-CHARGEABLE SUPPLY

## 2019-11-26 PROCEDURE — 2580000003 HC RX 258: Performed by: PHARMACIST

## 2019-11-26 PROCEDURE — C1751 CATH, INF, PER/CENT/MIDLINE: HCPCS

## 2019-11-26 PROCEDURE — 6360000002 HC RX W HCPCS

## 2019-11-26 PROCEDURE — 94760 N-INVAS EAR/PLS OXIMETRY 1: CPT

## 2019-11-26 PROCEDURE — 75630 X-RAY AORTA LEG ARTERIES: CPT | Performed by: INTERNAL MEDICINE

## 2019-11-26 PROCEDURE — C1894 INTRO/SHEATH, NON-LASER: HCPCS

## 2019-11-26 PROCEDURE — 80202 ASSAY OF VANCOMYCIN: CPT

## 2019-11-26 PROCEDURE — 37225 PR REVSC OPN/PRQ FEM/POP W/ATHRC/ANGIOP SM VSL: CPT | Performed by: INTERNAL MEDICINE

## 2019-11-26 PROCEDURE — C1725 CATH, TRANSLUMIN NON-LASER: HCPCS

## 2019-11-26 PROCEDURE — 37229 HC TIB PER TERRITORY ATHER: CPT

## 2019-11-26 PROCEDURE — 6360000002 HC RX W HCPCS: Performed by: PHARMACIST

## 2019-11-26 RX ORDER — SODIUM CHLORIDE 0.9 % (FLUSH) 0.9 %
10 SYRINGE (ML) INJECTION PRN
Status: DISCONTINUED | OUTPATIENT
Start: 2019-11-26 | End: 2019-11-26 | Stop reason: SDUPTHER

## 2019-11-26 RX ORDER — SODIUM CHLORIDE 0.9 % (FLUSH) 0.9 %
10 SYRINGE (ML) INJECTION EVERY 12 HOURS SCHEDULED
Status: DISCONTINUED | OUTPATIENT
Start: 2019-11-26 | End: 2019-12-11 | Stop reason: HOSPADM

## 2019-11-26 RX ORDER — SODIUM CHLORIDE 0.9 % (FLUSH) 0.9 %
10 SYRINGE (ML) INJECTION PRN
Status: DISCONTINUED | OUTPATIENT
Start: 2019-11-26 | End: 2019-12-10

## 2019-11-26 RX ORDER — SODIUM CHLORIDE 9 MG/ML
INJECTION, SOLUTION INTRAVENOUS CONTINUOUS
Status: DISCONTINUED | OUTPATIENT
Start: 2019-11-26 | End: 2019-11-26 | Stop reason: SDUPTHER

## 2019-11-26 RX ORDER — SODIUM CHLORIDE 9 MG/ML
INJECTION, SOLUTION INTRAVENOUS CONTINUOUS
Status: DISCONTINUED | OUTPATIENT
Start: 2019-11-26 | End: 2019-12-09

## 2019-11-26 RX ORDER — ACETAMINOPHEN 325 MG/1
650 TABLET ORAL EVERY 4 HOURS PRN
Status: DISCONTINUED | OUTPATIENT
Start: 2019-11-26 | End: 2019-12-11 | Stop reason: HOSPADM

## 2019-11-26 RX ORDER — SODIUM CHLORIDE 0.9 % (FLUSH) 0.9 %
10 SYRINGE (ML) INJECTION EVERY 12 HOURS SCHEDULED
Status: DISCONTINUED | OUTPATIENT
Start: 2019-11-26 | End: 2019-11-26 | Stop reason: SDUPTHER

## 2019-11-26 RX ADMIN — Medication 1250 MG: at 03:30

## 2019-11-26 RX ADMIN — ENOXAPARIN SODIUM 40 MG: 40 INJECTION SUBCUTANEOUS at 09:26

## 2019-11-26 RX ADMIN — ATORVASTATIN CALCIUM 40 MG: 40 TABLET, FILM COATED ORAL at 09:26

## 2019-11-26 RX ADMIN — METRONIDAZOLE 500 MG: 500 TABLET ORAL at 22:03

## 2019-11-26 RX ADMIN — METRONIDAZOLE 500 MG: 500 TABLET ORAL at 05:35

## 2019-11-26 RX ADMIN — IOPAMIDOL 148 ML: 755 INJECTION, SOLUTION INTRAVENOUS at 13:43

## 2019-11-26 RX ADMIN — RIVAROXABAN 2.5 MG: 2.5 TABLET, FILM COATED ORAL at 22:03

## 2019-11-26 RX ADMIN — INSULIN GLARGINE 15 UNITS: 100 INJECTION, SOLUTION SUBCUTANEOUS at 21:57

## 2019-11-26 RX ADMIN — CEFTRIAXONE 2 G: 2 INJECTION, POWDER, FOR SOLUTION INTRAMUSCULAR; INTRAVENOUS at 14:37

## 2019-11-26 RX ADMIN — SODIUM CHLORIDE: 9 INJECTION, SOLUTION INTRAVENOUS at 22:37

## 2019-11-26 RX ADMIN — Medication 1250 MG: at 22:03

## 2019-11-26 RX ADMIN — SODIUM CHLORIDE: 9 INJECTION, SOLUTION INTRAVENOUS at 14:37

## 2019-11-26 RX ADMIN — SODIUM CHLORIDE, PRESERVATIVE FREE 10 ML: 5 INJECTION INTRAVENOUS at 22:04

## 2019-11-26 RX ADMIN — LISINOPRIL 40 MG: 40 TABLET ORAL at 09:26

## 2019-11-26 RX ADMIN — ASPIRIN 81 MG: 81 TABLET, COATED ORAL at 09:26

## 2019-11-26 RX ADMIN — FAMOTIDINE 20 MG: 20 TABLET ORAL at 09:26

## 2019-11-26 RX ADMIN — METRONIDAZOLE 500 MG: 500 TABLET ORAL at 14:37

## 2019-11-26 RX ADMIN — CHLORTHALIDONE 50 MG: 25 TABLET ORAL at 09:26

## 2019-11-26 RX ADMIN — ENOXAPARIN SODIUM 40 MG: 40 INJECTION SUBCUTANEOUS at 22:03

## 2019-11-26 RX ADMIN — CYANOCOBALAMIN TAB 1000 MCG 1000 MCG: 1000 TAB at 09:26

## 2019-11-26 RX ADMIN — FAMOTIDINE 20 MG: 20 TABLET ORAL at 22:02

## 2019-11-26 RX ADMIN — CLOPIDOGREL BISULFATE 75 MG: 75 TABLET ORAL at 09:26

## 2019-11-26 RX ADMIN — METOPROLOL SUCCINATE 25 MG: 25 TABLET, EXTENDED RELEASE ORAL at 09:26

## 2019-11-26 ASSESSMENT — PAIN SCALES - WONG BAKER
WONGBAKER_NUMERICALRESPONSE: 0
WONGBAKER_NUMERICALRESPONSE: 0

## 2019-11-26 ASSESSMENT — PAIN SCALES - GENERAL
PAINLEVEL_OUTOF10: 0

## 2019-11-27 ENCOUNTER — HOSPITAL ENCOUNTER (OUTPATIENT)
Dept: WOUND CARE | Age: 49
Discharge: HOME | End: 2019-11-27
Payer: COMMERCIAL

## 2019-11-27 LAB
ALBUMIN SERPL-MCNC: 2.1 G/DL (ref 3.4–5)
ANION GAP SERPL CALCULATED.3IONS-SCNC: 13 MMOL/L (ref 3–16)
BLOOD CULTURE, ROUTINE: NORMAL
BUN BLDV-MCNC: 13 MG/DL (ref 7–20)
CALCIUM SERPL-MCNC: 8 MG/DL (ref 8.3–10.6)
CHLORIDE BLD-SCNC: 97 MMOL/L (ref 99–110)
CO2: 23 MMOL/L (ref 21–32)
CREAT SERPL-MCNC: 0.8 MG/DL (ref 0.9–1.3)
CULTURE, BLOOD 2: NORMAL
GFR AFRICAN AMERICAN: >60
GFR NON-AFRICAN AMERICAN: >60
GLUCOSE BLD-MCNC: 101 MG/DL (ref 70–99)
GLUCOSE BLD-MCNC: 128 MG/DL (ref 70–99)
GLUCOSE BLD-MCNC: 151 MG/DL (ref 70–99)
GLUCOSE BLD-MCNC: 84 MG/DL (ref 70–99)
GLUCOSE BLD-MCNC: 94 MG/DL (ref 70–99)
GLUCOSE BLD-MCNC: 98 MG/DL (ref 70–99)
PERFORMED ON: ABNORMAL
PERFORMED ON: NORMAL
PERFORMED ON: NORMAL
PHOSPHORUS: 2.8 MG/DL (ref 2.5–4.9)
POTASSIUM SERPL-SCNC: 3.8 MMOL/L (ref 3.5–5.1)
SODIUM BLD-SCNC: 133 MMOL/L (ref 136–145)
VANCOMYCIN TROUGH: 13.5 UG/ML (ref 10–20)

## 2019-11-27 PROCEDURE — 6370000000 HC RX 637 (ALT 250 FOR IP): Performed by: PODIATRIST

## 2019-11-27 PROCEDURE — 6370000000 HC RX 637 (ALT 250 FOR IP): Performed by: INTERNAL MEDICINE

## 2019-11-27 PROCEDURE — 80069 RENAL FUNCTION PANEL: CPT

## 2019-11-27 PROCEDURE — 0JBQ0ZZ EXCISION OF RIGHT FOOT SUBCUTANEOUS TISSUE AND FASCIA, OPEN APPROACH: ICD-10-PCS | Performed by: INTERNAL MEDICINE

## 2019-11-27 PROCEDURE — 2580000003 HC RX 258: Performed by: INTERNAL MEDICINE

## 2019-11-27 PROCEDURE — 36592 COLLECT BLOOD FROM PICC: CPT

## 2019-11-27 PROCEDURE — 6360000002 HC RX W HCPCS: Performed by: INTERNAL MEDICINE

## 2019-11-27 PROCEDURE — 6370000000 HC RX 637 (ALT 250 FOR IP): Performed by: HOSPITALIST

## 2019-11-27 PROCEDURE — 80202 ASSAY OF VANCOMYCIN: CPT

## 2019-11-27 PROCEDURE — 2060000000 HC ICU INTERMEDIATE R&B

## 2019-11-27 PROCEDURE — 6360000002 HC RX W HCPCS: Performed by: HOSPITALIST

## 2019-11-27 PROCEDURE — 99232 SBSQ HOSP IP/OBS MODERATE 35: CPT | Performed by: SURGERY

## 2019-11-27 PROCEDURE — 99233 SBSQ HOSP IP/OBS HIGH 50: CPT | Performed by: INTERNAL MEDICINE

## 2019-11-27 PROCEDURE — 99232 SBSQ HOSP IP/OBS MODERATE 35: CPT | Performed by: NURSE PRACTITIONER

## 2019-11-27 PROCEDURE — 94760 N-INVAS EAR/PLS OXIMETRY 1: CPT

## 2019-11-27 RX ORDER — INSULIN GLARGINE 100 [IU]/ML
10 INJECTION, SOLUTION SUBCUTANEOUS 2 TIMES DAILY
Status: DISCONTINUED | OUTPATIENT
Start: 2019-11-27 | End: 2019-12-09

## 2019-11-27 RX ORDER — FLUOXETINE HYDROCHLORIDE 20 MG/1
20 CAPSULE ORAL DAILY
Status: DISCONTINUED | OUTPATIENT
Start: 2019-11-27 | End: 2019-12-11 | Stop reason: HOSPADM

## 2019-11-27 RX ORDER — PAROXETINE HYDROCHLORIDE 20 MG/1
20 TABLET, FILM COATED ORAL NIGHTLY
Status: DISCONTINUED | OUTPATIENT
Start: 2019-11-27 | End: 2019-11-27

## 2019-11-27 RX ADMIN — SODIUM CHLORIDE, PRESERVATIVE FREE 10 ML: 5 INJECTION INTRAVENOUS at 21:42

## 2019-11-27 RX ADMIN — METRONIDAZOLE 500 MG: 500 TABLET ORAL at 13:59

## 2019-11-27 RX ADMIN — ATORVASTATIN CALCIUM 40 MG: 40 TABLET, FILM COATED ORAL at 10:39

## 2019-11-27 RX ADMIN — CYANOCOBALAMIN TAB 1000 MCG 1000 MCG: 1000 TAB at 10:39

## 2019-11-27 RX ADMIN — LISINOPRIL 40 MG: 40 TABLET ORAL at 10:39

## 2019-11-27 RX ADMIN — INSULIN GLARGINE 10 UNITS: 100 INJECTION, SOLUTION SUBCUTANEOUS at 21:46

## 2019-11-27 RX ADMIN — CLOPIDOGREL BISULFATE 75 MG: 75 TABLET ORAL at 10:39

## 2019-11-27 RX ADMIN — CHLORTHALIDONE 50 MG: 25 TABLET ORAL at 10:39

## 2019-11-27 RX ADMIN — METRONIDAZOLE 500 MG: 500 TABLET ORAL at 05:35

## 2019-11-27 RX ADMIN — INSULIN GLARGINE 15 UNITS: 100 INJECTION, SOLUTION SUBCUTANEOUS at 10:45

## 2019-11-27 RX ADMIN — FAMOTIDINE 20 MG: 20 TABLET ORAL at 10:39

## 2019-11-27 RX ADMIN — ENOXAPARIN SODIUM 40 MG: 40 INJECTION SUBCUTANEOUS at 10:39

## 2019-11-27 RX ADMIN — SODIUM CHLORIDE, PRESERVATIVE FREE 10 ML: 5 INJECTION INTRAVENOUS at 10:44

## 2019-11-27 RX ADMIN — METOPROLOL SUCCINATE 25 MG: 25 TABLET, EXTENDED RELEASE ORAL at 10:39

## 2019-11-27 RX ADMIN — METRONIDAZOLE 500 MG: 500 TABLET ORAL at 21:43

## 2019-11-27 RX ADMIN — FAMOTIDINE 20 MG: 20 TABLET ORAL at 21:43

## 2019-11-27 RX ADMIN — INSULIN LISPRO 2 UNITS: 100 INJECTION, SOLUTION INTRAVENOUS; SUBCUTANEOUS at 18:40

## 2019-11-27 RX ADMIN — INSULIN LISPRO 3 UNITS: 100 INJECTION, SOLUTION INTRAVENOUS; SUBCUTANEOUS at 18:40

## 2019-11-27 RX ADMIN — CEFTRIAXONE 2 G: 2 INJECTION, POWDER, FOR SOLUTION INTRAMUSCULAR; INTRAVENOUS at 13:59

## 2019-11-27 RX ADMIN — RIVAROXABAN 2.5 MG: 2.5 TABLET, FILM COATED ORAL at 21:43

## 2019-11-27 RX ADMIN — Medication 1250 MG: at 15:50

## 2019-11-27 RX ADMIN — RIVAROXABAN 2.5 MG: 2.5 TABLET, FILM COATED ORAL at 10:39

## 2019-11-27 RX ADMIN — ASPIRIN 81 MG: 81 TABLET, COATED ORAL at 10:39

## 2019-11-27 ASSESSMENT — PAIN SCALES - GENERAL
PAINLEVEL_OUTOF10: 0
PAINLEVEL_OUTOF10: 0

## 2019-11-28 LAB
ALBUMIN SERPL-MCNC: 2.3 G/DL (ref 3.4–5)
ANION GAP SERPL CALCULATED.3IONS-SCNC: 12 MMOL/L (ref 3–16)
BASOPHILS ABSOLUTE: 0.1 K/UL (ref 0–0.2)
BASOPHILS RELATIVE PERCENT: 0.7 %
BUN BLDV-MCNC: 14 MG/DL (ref 7–20)
CALCIUM SERPL-MCNC: 8.7 MG/DL (ref 8.3–10.6)
CHLORIDE BLD-SCNC: 96 MMOL/L (ref 99–110)
CO2: 25 MMOL/L (ref 21–32)
CREAT SERPL-MCNC: 0.8 MG/DL (ref 0.9–1.3)
EOSINOPHILS ABSOLUTE: 0.2 K/UL (ref 0–0.6)
EOSINOPHILS RELATIVE PERCENT: 1.1 %
GFR AFRICAN AMERICAN: >60
GFR NON-AFRICAN AMERICAN: >60
GLUCOSE BLD-MCNC: 123 MG/DL (ref 70–99)
GLUCOSE BLD-MCNC: 127 MG/DL (ref 70–99)
GLUCOSE BLD-MCNC: 129 MG/DL (ref 70–99)
GLUCOSE BLD-MCNC: 167 MG/DL (ref 70–99)
GLUCOSE BLD-MCNC: 202 MG/DL (ref 70–99)
HCT VFR BLD CALC: 26.9 % (ref 40.5–52.5)
HEMOGLOBIN: 8.8 G/DL (ref 13.5–17.5)
LYMPHOCYTES ABSOLUTE: 2.1 K/UL (ref 1–5.1)
LYMPHOCYTES RELATIVE PERCENT: 14 %
MCH RBC QN AUTO: 26.6 PG (ref 26–34)
MCHC RBC AUTO-ENTMCNC: 32.7 G/DL (ref 31–36)
MCV RBC AUTO: 81.2 FL (ref 80–100)
MONOCYTES ABSOLUTE: 1 K/UL (ref 0–1.3)
MONOCYTES RELATIVE PERCENT: 6.4 %
NEUTROPHILS ABSOLUTE: 11.9 K/UL (ref 1.7–7.7)
NEUTROPHILS RELATIVE PERCENT: 77.8 %
PDW BLD-RTO: 14.8 % (ref 12.4–15.4)
PERFORMED ON: ABNORMAL
PHOSPHORUS: 3.1 MG/DL (ref 2.5–4.9)
PLATELET # BLD: 562 K/UL (ref 135–450)
PMV BLD AUTO: 7 FL (ref 5–10.5)
POTASSIUM SERPL-SCNC: 4.3 MMOL/L (ref 3.5–5.1)
RBC # BLD: 3.31 M/UL (ref 4.2–5.9)
SODIUM BLD-SCNC: 133 MMOL/L (ref 136–145)
WBC # BLD: 15.3 K/UL (ref 4–11)

## 2019-11-28 PROCEDURE — 6370000000 HC RX 637 (ALT 250 FOR IP): Performed by: PODIATRIST

## 2019-11-28 PROCEDURE — 94761 N-INVAS EAR/PLS OXIMETRY MLT: CPT

## 2019-11-28 PROCEDURE — 6360000002 HC RX W HCPCS: Performed by: INTERNAL MEDICINE

## 2019-11-28 PROCEDURE — 2060000000 HC ICU INTERMEDIATE R&B

## 2019-11-28 PROCEDURE — 6370000000 HC RX 637 (ALT 250 FOR IP): Performed by: HOSPITALIST

## 2019-11-28 PROCEDURE — 85025 COMPLETE CBC W/AUTO DIFF WBC: CPT

## 2019-11-28 PROCEDURE — 2580000003 HC RX 258: Performed by: INTERNAL MEDICINE

## 2019-11-28 PROCEDURE — 80069 RENAL FUNCTION PANEL: CPT

## 2019-11-28 PROCEDURE — 6370000000 HC RX 637 (ALT 250 FOR IP): Performed by: INTERNAL MEDICINE

## 2019-11-28 RX ADMIN — METRONIDAZOLE 500 MG: 500 TABLET ORAL at 14:38

## 2019-11-28 RX ADMIN — CYANOCOBALAMIN TAB 1000 MCG 1000 MCG: 1000 TAB at 09:42

## 2019-11-28 RX ADMIN — FAMOTIDINE 20 MG: 20 TABLET ORAL at 09:42

## 2019-11-28 RX ADMIN — ATORVASTATIN CALCIUM 40 MG: 40 TABLET, FILM COATED ORAL at 09:42

## 2019-11-28 RX ADMIN — RIVAROXABAN 2.5 MG: 2.5 TABLET, FILM COATED ORAL at 21:33

## 2019-11-28 RX ADMIN — SODIUM CHLORIDE: 9 INJECTION, SOLUTION INTRAVENOUS at 21:02

## 2019-11-28 RX ADMIN — SODIUM CHLORIDE: 9 INJECTION, SOLUTION INTRAVENOUS at 00:48

## 2019-11-28 RX ADMIN — INSULIN LISPRO 3 UNITS: 100 INJECTION, SOLUTION INTRAVENOUS; SUBCUTANEOUS at 17:21

## 2019-11-28 RX ADMIN — LISINOPRIL 40 MG: 40 TABLET ORAL at 09:42

## 2019-11-28 RX ADMIN — ASPIRIN 81 MG: 81 TABLET, COATED ORAL at 09:42

## 2019-11-28 RX ADMIN — Medication 1250 MG: at 09:41

## 2019-11-28 RX ADMIN — SODIUM CHLORIDE, PRESERVATIVE FREE 10 ML: 5 INJECTION INTRAVENOUS at 09:43

## 2019-11-28 RX ADMIN — CLOPIDOGREL BISULFATE 75 MG: 75 TABLET ORAL at 09:42

## 2019-11-28 RX ADMIN — INSULIN LISPRO 3 UNITS: 100 INJECTION, SOLUTION INTRAVENOUS; SUBCUTANEOUS at 09:41

## 2019-11-28 RX ADMIN — CHLORTHALIDONE 50 MG: 25 TABLET ORAL at 09:42

## 2019-11-28 RX ADMIN — FAMOTIDINE 20 MG: 20 TABLET ORAL at 21:02

## 2019-11-28 RX ADMIN — INSULIN LISPRO 4 UNITS: 100 INJECTION, SOLUTION INTRAVENOUS; SUBCUTANEOUS at 12:10

## 2019-11-28 RX ADMIN — INSULIN GLARGINE 10 UNITS: 100 INJECTION, SOLUTION SUBCUTANEOUS at 09:41

## 2019-11-28 RX ADMIN — FLUOXETINE 20 MG: 20 CAPSULE ORAL at 09:42

## 2019-11-28 RX ADMIN — METRONIDAZOLE 500 MG: 500 TABLET ORAL at 21:02

## 2019-11-28 RX ADMIN — SILVER NITRATE APPLICATORS 1 EACH: 25; 75 STICK TOPICAL at 12:30

## 2019-11-28 RX ADMIN — METRONIDAZOLE 500 MG: 500 TABLET ORAL at 05:08

## 2019-11-28 RX ADMIN — METOPROLOL SUCCINATE 25 MG: 25 TABLET, EXTENDED RELEASE ORAL at 09:42

## 2019-11-28 RX ADMIN — SODIUM CHLORIDE, PRESERVATIVE FREE 10 ML: 5 INJECTION INTRAVENOUS at 21:03

## 2019-11-28 RX ADMIN — CEFTRIAXONE 2 G: 2 INJECTION, POWDER, FOR SOLUTION INTRAMUSCULAR; INTRAVENOUS at 12:09

## 2019-11-28 RX ADMIN — INSULIN LISPRO 3 UNITS: 100 INJECTION, SOLUTION INTRAVENOUS; SUBCUTANEOUS at 12:10

## 2019-11-28 RX ADMIN — INSULIN GLARGINE 10 UNITS: 100 INJECTION, SOLUTION SUBCUTANEOUS at 21:34

## 2019-11-28 RX ADMIN — RIVAROXABAN 2.5 MG: 2.5 TABLET, FILM COATED ORAL at 09:42

## 2019-11-28 RX ADMIN — INSULIN LISPRO 2 UNITS: 100 INJECTION, SOLUTION INTRAVENOUS; SUBCUTANEOUS at 09:41

## 2019-11-28 ASSESSMENT — PAIN SCALES - GENERAL
PAINLEVEL_OUTOF10: 0
PAINLEVEL_OUTOF10: 0

## 2019-11-29 LAB
CREAT SERPL-MCNC: 0.8 MG/DL (ref 0.9–1.3)
GFR AFRICAN AMERICAN: >60
GFR NON-AFRICAN AMERICAN: >60
GLUCOSE BLD-MCNC: 148 MG/DL (ref 70–99)
GLUCOSE BLD-MCNC: 189 MG/DL (ref 70–99)
GLUCOSE BLD-MCNC: 190 MG/DL (ref 70–99)
GLUCOSE BLD-MCNC: 251 MG/DL (ref 70–99)
PERFORMED ON: ABNORMAL
VANCOMYCIN TROUGH: 8.7 UG/ML (ref 10–20)

## 2019-11-29 PROCEDURE — 6370000000 HC RX 637 (ALT 250 FOR IP): Performed by: HOSPITALIST

## 2019-11-29 PROCEDURE — 94760 N-INVAS EAR/PLS OXIMETRY 1: CPT

## 2019-11-29 PROCEDURE — 97166 OT EVAL MOD COMPLEX 45 MIN: CPT

## 2019-11-29 PROCEDURE — 97535 SELF CARE MNGMENT TRAINING: CPT

## 2019-11-29 PROCEDURE — 99233 SBSQ HOSP IP/OBS HIGH 50: CPT | Performed by: INTERNAL MEDICINE

## 2019-11-29 PROCEDURE — 97530 THERAPEUTIC ACTIVITIES: CPT

## 2019-11-29 PROCEDURE — 6370000000 HC RX 637 (ALT 250 FOR IP): Performed by: PODIATRIST

## 2019-11-29 PROCEDURE — 6370000000 HC RX 637 (ALT 250 FOR IP): Performed by: INTERNAL MEDICINE

## 2019-11-29 PROCEDURE — 99232 SBSQ HOSP IP/OBS MODERATE 35: CPT | Performed by: SURGERY

## 2019-11-29 PROCEDURE — 2580000003 HC RX 258: Performed by: INTERNAL MEDICINE

## 2019-11-29 PROCEDURE — 2060000000 HC ICU INTERMEDIATE R&B

## 2019-11-29 PROCEDURE — 80202 ASSAY OF VANCOMYCIN: CPT

## 2019-11-29 PROCEDURE — 82565 ASSAY OF CREATININE: CPT

## 2019-11-29 PROCEDURE — 97162 PT EVAL MOD COMPLEX 30 MIN: CPT | Performed by: PHYSICAL THERAPIST

## 2019-11-29 PROCEDURE — 6360000002 HC RX W HCPCS: Performed by: INTERNAL MEDICINE

## 2019-11-29 PROCEDURE — 97530 THERAPEUTIC ACTIVITIES: CPT | Performed by: PHYSICAL THERAPIST

## 2019-11-29 RX ADMIN — SODIUM CHLORIDE: 9 INJECTION, SOLUTION INTRAVENOUS at 16:52

## 2019-11-29 RX ADMIN — SODIUM CHLORIDE, PRESERVATIVE FREE 10 ML: 5 INJECTION INTRAVENOUS at 21:12

## 2019-11-29 RX ADMIN — CHLORTHALIDONE 50 MG: 25 TABLET ORAL at 09:22

## 2019-11-29 RX ADMIN — LISINOPRIL 40 MG: 40 TABLET ORAL at 09:23

## 2019-11-29 RX ADMIN — INSULIN LISPRO 3 UNITS: 100 INJECTION, SOLUTION INTRAVENOUS; SUBCUTANEOUS at 09:25

## 2019-11-29 RX ADMIN — FLUOXETINE 20 MG: 20 CAPSULE ORAL at 09:23

## 2019-11-29 RX ADMIN — FAMOTIDINE 20 MG: 20 TABLET ORAL at 21:13

## 2019-11-29 RX ADMIN — INSULIN LISPRO 6 UNITS: 100 INJECTION, SOLUTION INTRAVENOUS; SUBCUTANEOUS at 09:24

## 2019-11-29 RX ADMIN — Medication 1500 MG: at 06:06

## 2019-11-29 RX ADMIN — INSULIN LISPRO 2 UNITS: 100 INJECTION, SOLUTION INTRAVENOUS; SUBCUTANEOUS at 21:13

## 2019-11-29 RX ADMIN — CLOPIDOGREL BISULFATE 75 MG: 75 TABLET ORAL at 09:23

## 2019-11-29 RX ADMIN — INSULIN GLARGINE 10 UNITS: 100 INJECTION, SOLUTION SUBCUTANEOUS at 09:24

## 2019-11-29 RX ADMIN — ATORVASTATIN CALCIUM 40 MG: 40 TABLET, FILM COATED ORAL at 09:23

## 2019-11-29 RX ADMIN — CEFTRIAXONE 2 G: 2 INJECTION, POWDER, FOR SOLUTION INTRAMUSCULAR; INTRAVENOUS at 12:40

## 2019-11-29 RX ADMIN — INSULIN GLARGINE 10 UNITS: 100 INJECTION, SOLUTION SUBCUTANEOUS at 21:13

## 2019-11-29 RX ADMIN — METRONIDAZOLE 500 MG: 500 TABLET ORAL at 15:14

## 2019-11-29 RX ADMIN — METOPROLOL SUCCINATE 25 MG: 25 TABLET, EXTENDED RELEASE ORAL at 09:23

## 2019-11-29 RX ADMIN — INSULIN LISPRO 2 UNITS: 100 INJECTION, SOLUTION INTRAVENOUS; SUBCUTANEOUS at 12:40

## 2019-11-29 RX ADMIN — RIVAROXABAN 2.5 MG: 2.5 TABLET, FILM COATED ORAL at 09:22

## 2019-11-29 RX ADMIN — CYANOCOBALAMIN TAB 1000 MCG 1000 MCG: 1000 TAB at 09:23

## 2019-11-29 RX ADMIN — METRONIDAZOLE 500 MG: 500 TABLET ORAL at 06:06

## 2019-11-29 RX ADMIN — RIVAROXABAN 2.5 MG: 2.5 TABLET, FILM COATED ORAL at 21:12

## 2019-11-29 RX ADMIN — INSULIN LISPRO 3 UNITS: 100 INJECTION, SOLUTION INTRAVENOUS; SUBCUTANEOUS at 16:53

## 2019-11-29 RX ADMIN — SODIUM CHLORIDE, PRESERVATIVE FREE 10 ML: 5 INJECTION INTRAVENOUS at 09:22

## 2019-11-29 RX ADMIN — FAMOTIDINE 20 MG: 20 TABLET ORAL at 09:23

## 2019-11-29 RX ADMIN — ASPIRIN 81 MG: 81 TABLET, COATED ORAL at 09:22

## 2019-11-29 RX ADMIN — INSULIN LISPRO 3 UNITS: 100 INJECTION, SOLUTION INTRAVENOUS; SUBCUTANEOUS at 12:40

## 2019-11-29 RX ADMIN — METRONIDAZOLE 500 MG: 500 TABLET ORAL at 21:13

## 2019-11-29 RX ADMIN — INSULIN LISPRO 2 UNITS: 100 INJECTION, SOLUTION INTRAVENOUS; SUBCUTANEOUS at 16:53

## 2019-11-29 ASSESSMENT — PAIN SCALES - GENERAL
PAINLEVEL_OUTOF10: 0

## 2019-11-30 ENCOUNTER — APPOINTMENT (OUTPATIENT)
Dept: GENERAL RADIOLOGY | Age: 49
DRG: 710 | End: 2019-11-30
Payer: COMMERCIAL

## 2019-11-30 LAB
ALBUMIN SERPL-MCNC: 2.4 G/DL (ref 3.4–5)
ANION GAP SERPL CALCULATED.3IONS-SCNC: 12 MMOL/L (ref 3–16)
BUN BLDV-MCNC: 12 MG/DL (ref 7–20)
CALCIUM SERPL-MCNC: 8.7 MG/DL (ref 8.3–10.6)
CHLORIDE BLD-SCNC: 99 MMOL/L (ref 99–110)
CO2: 24 MMOL/L (ref 21–32)
CREAT SERPL-MCNC: 0.7 MG/DL (ref 0.9–1.3)
GFR AFRICAN AMERICAN: >60
GFR NON-AFRICAN AMERICAN: >60
GLUCOSE BLD-MCNC: 170 MG/DL (ref 70–99)
GLUCOSE BLD-MCNC: 190 MG/DL (ref 70–99)
GLUCOSE BLD-MCNC: 196 MG/DL (ref 70–99)
GLUCOSE BLD-MCNC: 225 MG/DL (ref 70–99)
GLUCOSE BLD-MCNC: 239 MG/DL (ref 70–99)
HCT VFR BLD CALC: 27.9 % (ref 40.5–52.5)
HEMOGLOBIN: 9.1 G/DL (ref 13.5–17.5)
MCH RBC QN AUTO: 26.9 PG (ref 26–34)
MCHC RBC AUTO-ENTMCNC: 32.6 G/DL (ref 31–36)
MCV RBC AUTO: 82.6 FL (ref 80–100)
PDW BLD-RTO: 15.3 % (ref 12.4–15.4)
PERFORMED ON: ABNORMAL
PHOSPHORUS: 3.1 MG/DL (ref 2.5–4.9)
PLATELET # BLD: 562 K/UL (ref 135–450)
PMV BLD AUTO: 6.9 FL (ref 5–10.5)
POTASSIUM SERPL-SCNC: 3.9 MMOL/L (ref 3.5–5.1)
RBC # BLD: 3.38 M/UL (ref 4.2–5.9)
SODIUM BLD-SCNC: 135 MMOL/L (ref 136–145)
VANCOMYCIN TROUGH: 10.3 UG/ML (ref 10–20)
WBC # BLD: 14 K/UL (ref 4–11)

## 2019-11-30 PROCEDURE — 99232 SBSQ HOSP IP/OBS MODERATE 35: CPT | Performed by: SURGERY

## 2019-11-30 PROCEDURE — 6370000000 HC RX 637 (ALT 250 FOR IP): Performed by: INTERNAL MEDICINE

## 2019-11-30 PROCEDURE — 85027 COMPLETE CBC AUTOMATED: CPT

## 2019-11-30 PROCEDURE — 2060000000 HC ICU INTERMEDIATE R&B

## 2019-11-30 PROCEDURE — 80069 RENAL FUNCTION PANEL: CPT

## 2019-11-30 PROCEDURE — 80202 ASSAY OF VANCOMYCIN: CPT

## 2019-11-30 PROCEDURE — 2580000003 HC RX 258: Performed by: INTERNAL MEDICINE

## 2019-11-30 PROCEDURE — 6370000000 HC RX 637 (ALT 250 FOR IP): Performed by: HOSPITALIST

## 2019-11-30 PROCEDURE — 73630 X-RAY EXAM OF FOOT: CPT

## 2019-11-30 PROCEDURE — 99233 SBSQ HOSP IP/OBS HIGH 50: CPT | Performed by: INTERNAL MEDICINE

## 2019-11-30 PROCEDURE — 6370000000 HC RX 637 (ALT 250 FOR IP): Performed by: PODIATRIST

## 2019-11-30 PROCEDURE — 6360000002 HC RX W HCPCS: Performed by: INTERNAL MEDICINE

## 2019-11-30 PROCEDURE — 94760 N-INVAS EAR/PLS OXIMETRY 1: CPT

## 2019-11-30 RX ADMIN — SODIUM CHLORIDE, PRESERVATIVE FREE 10 ML: 5 INJECTION INTRAVENOUS at 21:36

## 2019-11-30 RX ADMIN — INSULIN GLARGINE 10 UNITS: 100 INJECTION, SOLUTION SUBCUTANEOUS at 21:34

## 2019-11-30 RX ADMIN — ATORVASTATIN CALCIUM 40 MG: 40 TABLET, FILM COATED ORAL at 09:30

## 2019-11-30 RX ADMIN — CEFTRIAXONE 2 G: 2 INJECTION, POWDER, FOR SOLUTION INTRAMUSCULAR; INTRAVENOUS at 13:57

## 2019-11-30 RX ADMIN — INSULIN GLARGINE 10 UNITS: 100 INJECTION, SOLUTION SUBCUTANEOUS at 09:31

## 2019-11-30 RX ADMIN — RIVAROXABAN 2.5 MG: 2.5 TABLET, FILM COATED ORAL at 09:30

## 2019-11-30 RX ADMIN — LISINOPRIL 40 MG: 40 TABLET ORAL at 09:30

## 2019-11-30 RX ADMIN — METRONIDAZOLE 500 MG: 500 TABLET ORAL at 13:57

## 2019-11-30 RX ADMIN — SODIUM CHLORIDE: 9 INJECTION, SOLUTION INTRAVENOUS at 13:56

## 2019-11-30 RX ADMIN — RIVAROXABAN 2.5 MG: 2.5 TABLET, FILM COATED ORAL at 21:35

## 2019-11-30 RX ADMIN — METOPROLOL SUCCINATE 25 MG: 25 TABLET, EXTENDED RELEASE ORAL at 09:30

## 2019-11-30 RX ADMIN — METRONIDAZOLE 500 MG: 500 TABLET ORAL at 21:36

## 2019-11-30 RX ADMIN — Medication 1500 MG: at 17:35

## 2019-11-30 RX ADMIN — CLOPIDOGREL BISULFATE 75 MG: 75 TABLET ORAL at 09:30

## 2019-11-30 RX ADMIN — FAMOTIDINE 20 MG: 20 TABLET ORAL at 21:36

## 2019-11-30 RX ADMIN — CHLORTHALIDONE 50 MG: 25 TABLET ORAL at 09:29

## 2019-11-30 RX ADMIN — INSULIN LISPRO 5 UNITS: 100 INJECTION, SOLUTION INTRAVENOUS; SUBCUTANEOUS at 09:31

## 2019-11-30 RX ADMIN — Medication 1500 MG: at 01:05

## 2019-11-30 RX ADMIN — INSULIN LISPRO 5 UNITS: 100 INJECTION, SOLUTION INTRAVENOUS; SUBCUTANEOUS at 17:39

## 2019-11-30 RX ADMIN — METRONIDAZOLE 500 MG: 500 TABLET ORAL at 06:13

## 2019-11-30 RX ADMIN — INSULIN LISPRO 2 UNITS: 100 INJECTION, SOLUTION INTRAVENOUS; SUBCUTANEOUS at 09:31

## 2019-11-30 RX ADMIN — FAMOTIDINE 20 MG: 20 TABLET ORAL at 09:30

## 2019-11-30 RX ADMIN — CYANOCOBALAMIN TAB 1000 MCG 1000 MCG: 1000 TAB at 09:30

## 2019-11-30 RX ADMIN — ASPIRIN 81 MG: 81 TABLET, COATED ORAL at 09:29

## 2019-11-30 RX ADMIN — INSULIN LISPRO 2 UNITS: 100 INJECTION, SOLUTION INTRAVENOUS; SUBCUTANEOUS at 17:38

## 2019-11-30 RX ADMIN — INSULIN LISPRO 2 UNITS: 100 INJECTION, SOLUTION INTRAVENOUS; SUBCUTANEOUS at 21:35

## 2019-11-30 ASSESSMENT — PAIN SCALES - GENERAL
PAINLEVEL_OUTOF10: 0
PAINLEVEL_OUTOF10: 0

## 2019-12-01 LAB
ALBUMIN SERPL-MCNC: 2.3 G/DL (ref 3.4–5)
ANION GAP SERPL CALCULATED.3IONS-SCNC: 13 MMOL/L (ref 3–16)
BUN BLDV-MCNC: 9 MG/DL (ref 7–20)
CALCIUM SERPL-MCNC: 8.8 MG/DL (ref 8.3–10.6)
CHLORIDE BLD-SCNC: 101 MMOL/L (ref 99–110)
CO2: 23 MMOL/L (ref 21–32)
CREAT SERPL-MCNC: 0.7 MG/DL (ref 0.9–1.3)
GFR AFRICAN AMERICAN: >60
GFR NON-AFRICAN AMERICAN: >60
GLUCOSE BLD-MCNC: 158 MG/DL (ref 70–99)
GLUCOSE BLD-MCNC: 185 MG/DL (ref 70–99)
GLUCOSE BLD-MCNC: 197 MG/DL (ref 70–99)
GLUCOSE BLD-MCNC: 203 MG/DL (ref 70–99)
GLUCOSE BLD-MCNC: 213 MG/DL (ref 70–99)
PERFORMED ON: ABNORMAL
PHOSPHORUS: 3.4 MG/DL (ref 2.5–4.9)
POTASSIUM SERPL-SCNC: 4.3 MMOL/L (ref 3.5–5.1)
SODIUM BLD-SCNC: 137 MMOL/L (ref 136–145)
VANCOMYCIN RANDOM: 9.9 UG/ML

## 2019-12-01 PROCEDURE — 6360000002 HC RX W HCPCS: Performed by: INTERNAL MEDICINE

## 2019-12-01 PROCEDURE — 6370000000 HC RX 637 (ALT 250 FOR IP): Performed by: PODIATRIST

## 2019-12-01 PROCEDURE — 80069 RENAL FUNCTION PANEL: CPT

## 2019-12-01 PROCEDURE — 80202 ASSAY OF VANCOMYCIN: CPT

## 2019-12-01 PROCEDURE — 2060000000 HC ICU INTERMEDIATE R&B

## 2019-12-01 PROCEDURE — 2580000003 HC RX 258: Performed by: INTERNAL MEDICINE

## 2019-12-01 PROCEDURE — 99232 SBSQ HOSP IP/OBS MODERATE 35: CPT | Performed by: INTERNAL MEDICINE

## 2019-12-01 PROCEDURE — 6370000000 HC RX 637 (ALT 250 FOR IP): Performed by: INTERNAL MEDICINE

## 2019-12-01 PROCEDURE — 6370000000 HC RX 637 (ALT 250 FOR IP): Performed by: HOSPITALIST

## 2019-12-01 RX ADMIN — CYANOCOBALAMIN TAB 1000 MCG 1000 MCG: 1000 TAB at 09:11

## 2019-12-01 RX ADMIN — FAMOTIDINE 20 MG: 20 TABLET ORAL at 21:21

## 2019-12-01 RX ADMIN — RIVAROXABAN 2.5 MG: 2.5 TABLET, FILM COATED ORAL at 21:21

## 2019-12-01 RX ADMIN — METRONIDAZOLE 500 MG: 500 TABLET ORAL at 05:54

## 2019-12-01 RX ADMIN — SODIUM CHLORIDE: 9 INJECTION, SOLUTION INTRAVENOUS at 12:47

## 2019-12-01 RX ADMIN — INSULIN GLARGINE 10 UNITS: 100 INJECTION, SOLUTION SUBCUTANEOUS at 09:06

## 2019-12-01 RX ADMIN — CLOPIDOGREL BISULFATE 75 MG: 75 TABLET ORAL at 09:03

## 2019-12-01 RX ADMIN — LISINOPRIL 40 MG: 40 TABLET ORAL at 09:04

## 2019-12-01 RX ADMIN — INSULIN LISPRO 5 UNITS: 100 INJECTION, SOLUTION INTRAVENOUS; SUBCUTANEOUS at 09:20

## 2019-12-01 RX ADMIN — VANCOMYCIN HYDROCHLORIDE 1250 MG: 10 INJECTION, POWDER, LYOPHILIZED, FOR SOLUTION INTRAVENOUS at 21:21

## 2019-12-01 RX ADMIN — ATORVASTATIN CALCIUM 40 MG: 40 TABLET, FILM COATED ORAL at 09:03

## 2019-12-01 RX ADMIN — CEFTRIAXONE 2 G: 2 INJECTION, POWDER, FOR SOLUTION INTRAMUSCULAR; INTRAVENOUS at 12:42

## 2019-12-01 RX ADMIN — FLUOXETINE 20 MG: 20 CAPSULE ORAL at 09:03

## 2019-12-01 RX ADMIN — ASPIRIN 81 MG: 81 TABLET, COATED ORAL at 09:03

## 2019-12-01 RX ADMIN — METOPROLOL SUCCINATE 25 MG: 25 TABLET, EXTENDED RELEASE ORAL at 09:04

## 2019-12-01 RX ADMIN — METRONIDAZOLE 500 MG: 500 TABLET ORAL at 13:41

## 2019-12-01 RX ADMIN — INSULIN LISPRO 4 UNITS: 100 INJECTION, SOLUTION INTRAVENOUS; SUBCUTANEOUS at 12:41

## 2019-12-01 RX ADMIN — CHLORTHALIDONE 50 MG: 25 TABLET ORAL at 09:03

## 2019-12-01 RX ADMIN — INSULIN LISPRO 5 UNITS: 100 INJECTION, SOLUTION INTRAVENOUS; SUBCUTANEOUS at 12:41

## 2019-12-01 RX ADMIN — INSULIN LISPRO 1 UNITS: 100 INJECTION, SOLUTION INTRAVENOUS; SUBCUTANEOUS at 21:21

## 2019-12-01 RX ADMIN — FAMOTIDINE 20 MG: 20 TABLET ORAL at 09:03

## 2019-12-01 RX ADMIN — METRONIDAZOLE 500 MG: 500 TABLET ORAL at 21:21

## 2019-12-01 RX ADMIN — RIVAROXABAN 2.5 MG: 2.5 TABLET, FILM COATED ORAL at 09:09

## 2019-12-01 RX ADMIN — SODIUM CHLORIDE, PRESERVATIVE FREE 10 ML: 5 INJECTION INTRAVENOUS at 21:21

## 2019-12-01 RX ADMIN — INSULIN LISPRO 2 UNITS: 100 INJECTION, SOLUTION INTRAVENOUS; SUBCUTANEOUS at 09:20

## 2019-12-01 RX ADMIN — INSULIN GLARGINE 10 UNITS: 100 INJECTION, SOLUTION SUBCUTANEOUS at 21:21

## 2019-12-01 RX ADMIN — SODIUM CHLORIDE, PRESERVATIVE FREE 10 ML: 5 INJECTION INTRAVENOUS at 09:08

## 2019-12-01 RX ADMIN — VANCOMYCIN HYDROCHLORIDE 1250 MG: 10 INJECTION, POWDER, LYOPHILIZED, FOR SOLUTION INTRAVENOUS at 13:41

## 2019-12-01 ASSESSMENT — PAIN SCALES - GENERAL
PAINLEVEL_OUTOF10: 0
PAINLEVEL_OUTOF10: 0

## 2019-12-02 LAB
ALBUMIN SERPL-MCNC: 2.4 G/DL (ref 3.4–5)
ANION GAP SERPL CALCULATED.3IONS-SCNC: 13 MMOL/L (ref 3–16)
BUN BLDV-MCNC: 10 MG/DL (ref 7–20)
CALCIUM SERPL-MCNC: 8.9 MG/DL (ref 8.3–10.6)
CHLORIDE BLD-SCNC: 101 MMOL/L (ref 99–110)
CO2: 24 MMOL/L (ref 21–32)
CREAT SERPL-MCNC: 0.7 MG/DL (ref 0.9–1.3)
GFR AFRICAN AMERICAN: >60
GFR NON-AFRICAN AMERICAN: >60
GLUCOSE BLD-MCNC: 143 MG/DL (ref 70–99)
GLUCOSE BLD-MCNC: 156 MG/DL (ref 70–99)
GLUCOSE BLD-MCNC: 156 MG/DL (ref 70–99)
GLUCOSE BLD-MCNC: 160 MG/DL (ref 70–99)
GLUCOSE BLD-MCNC: 170 MG/DL (ref 70–99)
HCT VFR BLD CALC: 28.1 % (ref 40.5–52.5)
HEMOGLOBIN: 8.9 G/DL (ref 13.5–17.5)
MCH RBC QN AUTO: 26.1 PG (ref 26–34)
MCHC RBC AUTO-ENTMCNC: 31.6 G/DL (ref 31–36)
MCV RBC AUTO: 82.5 FL (ref 80–100)
PDW BLD-RTO: 15.5 % (ref 12.4–15.4)
PERFORMED ON: ABNORMAL
PHOSPHORUS: 3.7 MG/DL (ref 2.5–4.9)
PLATELET # BLD: 557 K/UL (ref 135–450)
PMV BLD AUTO: 6.9 FL (ref 5–10.5)
POTASSIUM SERPL-SCNC: 3.7 MMOL/L (ref 3.5–5.1)
RBC # BLD: 3.4 M/UL (ref 4.2–5.9)
SODIUM BLD-SCNC: 138 MMOL/L (ref 136–145)
VANCOMYCIN RANDOM: 7.8 UG/ML
WBC # BLD: 12.1 K/UL (ref 4–11)

## 2019-12-02 PROCEDURE — 6370000000 HC RX 637 (ALT 250 FOR IP): Performed by: INTERNAL MEDICINE

## 2019-12-02 PROCEDURE — 97530 THERAPEUTIC ACTIVITIES: CPT | Performed by: PHYSICAL THERAPIST

## 2019-12-02 PROCEDURE — 6360000002 HC RX W HCPCS: Performed by: INTERNAL MEDICINE

## 2019-12-02 PROCEDURE — 6360000002 HC RX W HCPCS: Performed by: PHARMACIST

## 2019-12-02 PROCEDURE — 94760 N-INVAS EAR/PLS OXIMETRY 1: CPT

## 2019-12-02 PROCEDURE — 6370000000 HC RX 637 (ALT 250 FOR IP): Performed by: HOSPITALIST

## 2019-12-02 PROCEDURE — 80202 ASSAY OF VANCOMYCIN: CPT

## 2019-12-02 PROCEDURE — 80069 RENAL FUNCTION PANEL: CPT

## 2019-12-02 PROCEDURE — 2580000003 HC RX 258: Performed by: INTERNAL MEDICINE

## 2019-12-02 PROCEDURE — 97530 THERAPEUTIC ACTIVITIES: CPT

## 2019-12-02 PROCEDURE — 85027 COMPLETE CBC AUTOMATED: CPT

## 2019-12-02 PROCEDURE — 2580000003 HC RX 258: Performed by: PHARMACIST

## 2019-12-02 PROCEDURE — 99232 SBSQ HOSP IP/OBS MODERATE 35: CPT | Performed by: INTERNAL MEDICINE

## 2019-12-02 PROCEDURE — 99233 SBSQ HOSP IP/OBS HIGH 50: CPT | Performed by: SURGERY

## 2019-12-02 PROCEDURE — 2060000000 HC ICU INTERMEDIATE R&B

## 2019-12-02 PROCEDURE — 6370000000 HC RX 637 (ALT 250 FOR IP): Performed by: PODIATRIST

## 2019-12-02 RX ADMIN — ATORVASTATIN CALCIUM 40 MG: 40 TABLET, FILM COATED ORAL at 08:35

## 2019-12-02 RX ADMIN — FAMOTIDINE 20 MG: 20 TABLET ORAL at 08:35

## 2019-12-02 RX ADMIN — METRONIDAZOLE 500 MG: 500 TABLET ORAL at 22:26

## 2019-12-02 RX ADMIN — CHLORTHALIDONE 50 MG: 25 TABLET ORAL at 08:35

## 2019-12-02 RX ADMIN — METOPROLOL SUCCINATE 25 MG: 25 TABLET, EXTENDED RELEASE ORAL at 08:35

## 2019-12-02 RX ADMIN — INSULIN LISPRO 2 UNITS: 100 INJECTION, SOLUTION INTRAVENOUS; SUBCUTANEOUS at 12:29

## 2019-12-02 RX ADMIN — INSULIN LISPRO 2 UNITS: 100 INJECTION, SOLUTION INTRAVENOUS; SUBCUTANEOUS at 08:41

## 2019-12-02 RX ADMIN — METRONIDAZOLE 500 MG: 500 TABLET ORAL at 15:21

## 2019-12-02 RX ADMIN — INSULIN LISPRO 2 UNITS: 100 INJECTION, SOLUTION INTRAVENOUS; SUBCUTANEOUS at 17:40

## 2019-12-02 RX ADMIN — RIVAROXABAN 2.5 MG: 2.5 TABLET, FILM COATED ORAL at 22:26

## 2019-12-02 RX ADMIN — INSULIN GLARGINE 10 UNITS: 100 INJECTION, SOLUTION SUBCUTANEOUS at 08:41

## 2019-12-02 RX ADMIN — CLOPIDOGREL BISULFATE 75 MG: 75 TABLET ORAL at 08:39

## 2019-12-02 RX ADMIN — INSULIN GLARGINE 10 UNITS: 100 INJECTION, SOLUTION SUBCUTANEOUS at 22:31

## 2019-12-02 RX ADMIN — SODIUM CHLORIDE: 9 INJECTION, SOLUTION INTRAVENOUS at 08:34

## 2019-12-02 RX ADMIN — INSULIN LISPRO 1 UNITS: 100 INJECTION, SOLUTION INTRAVENOUS; SUBCUTANEOUS at 22:26

## 2019-12-02 RX ADMIN — FAMOTIDINE 20 MG: 20 TABLET ORAL at 22:26

## 2019-12-02 RX ADMIN — METRONIDAZOLE 500 MG: 500 TABLET ORAL at 06:31

## 2019-12-02 RX ADMIN — Medication 1250 MG: at 08:34

## 2019-12-02 RX ADMIN — INSULIN LISPRO 5 UNITS: 100 INJECTION, SOLUTION INTRAVENOUS; SUBCUTANEOUS at 08:41

## 2019-12-02 RX ADMIN — INSULIN LISPRO 5 UNITS: 100 INJECTION, SOLUTION INTRAVENOUS; SUBCUTANEOUS at 17:40

## 2019-12-02 RX ADMIN — RIVAROXABAN 2.5 MG: 2.5 TABLET, FILM COATED ORAL at 08:35

## 2019-12-02 RX ADMIN — LISINOPRIL 40 MG: 40 TABLET ORAL at 08:35

## 2019-12-02 RX ADMIN — CEFTRIAXONE 2 G: 2 INJECTION, POWDER, FOR SOLUTION INTRAMUSCULAR; INTRAVENOUS at 12:28

## 2019-12-02 RX ADMIN — Medication 1250 MG: at 17:40

## 2019-12-02 RX ADMIN — CYANOCOBALAMIN TAB 1000 MCG 1000 MCG: 1000 TAB at 08:35

## 2019-12-02 RX ADMIN — ASPIRIN 81 MG: 81 TABLET, COATED ORAL at 08:35

## 2019-12-02 RX ADMIN — SODIUM CHLORIDE, PRESERVATIVE FREE 10 ML: 5 INJECTION INTRAVENOUS at 08:36

## 2019-12-02 RX ADMIN — INSULIN LISPRO 5 UNITS: 100 INJECTION, SOLUTION INTRAVENOUS; SUBCUTANEOUS at 12:29

## 2019-12-02 ASSESSMENT — PAIN SCALES - GENERAL
PAINLEVEL_OUTOF10: 0

## 2019-12-03 LAB
ALBUMIN SERPL-MCNC: 2.7 G/DL (ref 3.4–5)
ANION GAP SERPL CALCULATED.3IONS-SCNC: 12 MMOL/L (ref 3–16)
BUN BLDV-MCNC: 12 MG/DL (ref 7–20)
CALCIUM SERPL-MCNC: 8.9 MG/DL (ref 8.3–10.6)
CHLORIDE BLD-SCNC: 98 MMOL/L (ref 99–110)
CO2: 24 MMOL/L (ref 21–32)
CREAT SERPL-MCNC: 0.7 MG/DL (ref 0.9–1.3)
GFR AFRICAN AMERICAN: >60
GFR NON-AFRICAN AMERICAN: >60
GLUCOSE BLD-MCNC: 138 MG/DL (ref 70–99)
GLUCOSE BLD-MCNC: 160 MG/DL (ref 70–99)
GLUCOSE BLD-MCNC: 168 MG/DL (ref 70–99)
GLUCOSE BLD-MCNC: 239 MG/DL (ref 70–99)
GLUCOSE BLD-MCNC: 295 MG/DL (ref 70–99)
HCT VFR BLD CALC: 30 % (ref 40.5–52.5)
HEMOGLOBIN: 9.5 G/DL (ref 13.5–17.5)
MCH RBC QN AUTO: 26.2 PG (ref 26–34)
MCHC RBC AUTO-ENTMCNC: 31.6 G/DL (ref 31–36)
MCV RBC AUTO: 82.9 FL (ref 80–100)
PDW BLD-RTO: 15.5 % (ref 12.4–15.4)
PERFORMED ON: ABNORMAL
PHOSPHORUS: 3.7 MG/DL (ref 2.5–4.9)
PLATELET # BLD: 595 K/UL (ref 135–450)
PMV BLD AUTO: 7 FL (ref 5–10.5)
POTASSIUM SERPL-SCNC: 4.1 MMOL/L (ref 3.5–5.1)
RBC # BLD: 3.62 M/UL (ref 4.2–5.9)
SODIUM BLD-SCNC: 134 MMOL/L (ref 136–145)
WBC # BLD: 13.6 K/UL (ref 4–11)

## 2019-12-03 PROCEDURE — 6370000000 HC RX 637 (ALT 250 FOR IP): Performed by: INTERNAL MEDICINE

## 2019-12-03 PROCEDURE — 6360000002 HC RX W HCPCS: Performed by: PHARMACIST

## 2019-12-03 PROCEDURE — 99232 SBSQ HOSP IP/OBS MODERATE 35: CPT | Performed by: INTERNAL MEDICINE

## 2019-12-03 PROCEDURE — 2060000000 HC ICU INTERMEDIATE R&B

## 2019-12-03 PROCEDURE — APPNB30 APP NON BILLABLE TIME 0-30 MINS: Performed by: NURSE PRACTITIONER

## 2019-12-03 PROCEDURE — 2580000003 HC RX 258: Performed by: PHARMACIST

## 2019-12-03 PROCEDURE — 6370000000 HC RX 637 (ALT 250 FOR IP): Performed by: PODIATRIST

## 2019-12-03 PROCEDURE — 36592 COLLECT BLOOD FROM PICC: CPT

## 2019-12-03 PROCEDURE — 6360000002 HC RX W HCPCS: Performed by: INTERNAL MEDICINE

## 2019-12-03 PROCEDURE — APPSS30 APP SPLIT SHARED TIME 16-30 MINUTES: Performed by: NURSE PRACTITIONER

## 2019-12-03 PROCEDURE — 85027 COMPLETE CBC AUTOMATED: CPT

## 2019-12-03 PROCEDURE — 94760 N-INVAS EAR/PLS OXIMETRY 1: CPT

## 2019-12-03 PROCEDURE — 2580000003 HC RX 258: Performed by: INTERNAL MEDICINE

## 2019-12-03 PROCEDURE — 80069 RENAL FUNCTION PANEL: CPT

## 2019-12-03 PROCEDURE — 6370000000 HC RX 637 (ALT 250 FOR IP): Performed by: HOSPITALIST

## 2019-12-03 RX ORDER — LINEZOLID 2 MG/ML
600 INJECTION, SOLUTION INTRAVENOUS EVERY 12 HOURS
Status: DISCONTINUED | OUTPATIENT
Start: 2019-12-03 | End: 2019-12-09

## 2019-12-03 RX ADMIN — METRONIDAZOLE 500 MG: 500 TABLET ORAL at 06:30

## 2019-12-03 RX ADMIN — Medication 1250 MG: at 01:31

## 2019-12-03 RX ADMIN — CEFTRIAXONE 2 G: 2 INJECTION, POWDER, FOR SOLUTION INTRAMUSCULAR; INTRAVENOUS at 12:17

## 2019-12-03 RX ADMIN — INSULIN GLARGINE 10 UNITS: 100 INJECTION, SOLUTION SUBCUTANEOUS at 10:34

## 2019-12-03 RX ADMIN — FAMOTIDINE 20 MG: 20 TABLET ORAL at 10:36

## 2019-12-03 RX ADMIN — METRONIDAZOLE 500 MG: 500 TABLET ORAL at 16:39

## 2019-12-03 RX ADMIN — INSULIN LISPRO 5 UNITS: 100 INJECTION, SOLUTION INTRAVENOUS; SUBCUTANEOUS at 12:18

## 2019-12-03 RX ADMIN — INSULIN GLARGINE 10 UNITS: 100 INJECTION, SOLUTION SUBCUTANEOUS at 21:55

## 2019-12-03 RX ADMIN — SODIUM CHLORIDE, PRESERVATIVE FREE 10 ML: 5 INJECTION INTRAVENOUS at 09:00

## 2019-12-03 RX ADMIN — FAMOTIDINE 20 MG: 20 TABLET ORAL at 21:54

## 2019-12-03 RX ADMIN — CHLORTHALIDONE 50 MG: 25 TABLET ORAL at 10:36

## 2019-12-03 RX ADMIN — CYANOCOBALAMIN TAB 1000 MCG 1000 MCG: 1000 TAB at 10:36

## 2019-12-03 RX ADMIN — LISINOPRIL 40 MG: 40 TABLET ORAL at 10:36

## 2019-12-03 RX ADMIN — ASPIRIN 81 MG: 81 TABLET, COATED ORAL at 10:36

## 2019-12-03 RX ADMIN — LINEZOLID 600 MG: 600 INJECTION, SOLUTION INTRAVENOUS at 16:39

## 2019-12-03 RX ADMIN — INSULIN LISPRO 4 UNITS: 100 INJECTION, SOLUTION INTRAVENOUS; SUBCUTANEOUS at 12:18

## 2019-12-03 RX ADMIN — INSULIN LISPRO 5 UNITS: 100 INJECTION, SOLUTION INTRAVENOUS; SUBCUTANEOUS at 17:22

## 2019-12-03 RX ADMIN — ATORVASTATIN CALCIUM 40 MG: 40 TABLET, FILM COATED ORAL at 10:37

## 2019-12-03 RX ADMIN — METOPROLOL SUCCINATE 25 MG: 25 TABLET, EXTENDED RELEASE ORAL at 10:36

## 2019-12-03 RX ADMIN — INSULIN LISPRO 5 UNITS: 100 INJECTION, SOLUTION INTRAVENOUS; SUBCUTANEOUS at 10:35

## 2019-12-03 RX ADMIN — METRONIDAZOLE 500 MG: 500 TABLET ORAL at 21:54

## 2019-12-03 RX ADMIN — SODIUM CHLORIDE: 9 INJECTION, SOLUTION INTRAVENOUS at 10:38

## 2019-12-03 RX ADMIN — INSULIN LISPRO 2 UNITS: 100 INJECTION, SOLUTION INTRAVENOUS; SUBCUTANEOUS at 10:34

## 2019-12-03 RX ADMIN — Medication 1250 MG: at 10:43

## 2019-12-03 RX ADMIN — INSULIN LISPRO 3 UNITS: 100 INJECTION, SOLUTION INTRAVENOUS; SUBCUTANEOUS at 21:54

## 2019-12-03 ASSESSMENT — PAIN SCALES - GENERAL
PAINLEVEL_OUTOF10: 0

## 2019-12-04 ENCOUNTER — ANESTHESIA (OUTPATIENT)
Dept: OPERATING ROOM | Age: 49
DRG: 710 | End: 2019-12-04
Payer: COMMERCIAL

## 2019-12-04 ENCOUNTER — ANESTHESIA EVENT (OUTPATIENT)
Dept: OPERATING ROOM | Age: 49
DRG: 710 | End: 2019-12-04
Payer: COMMERCIAL

## 2019-12-04 VITALS
OXYGEN SATURATION: 100 % | DIASTOLIC BLOOD PRESSURE: 59 MMHG | SYSTOLIC BLOOD PRESSURE: 110 MMHG | RESPIRATION RATE: 1 BRPM

## 2019-12-04 LAB
ALBUMIN SERPL-MCNC: 2.5 G/DL (ref 3.4–5)
ANION GAP SERPL CALCULATED.3IONS-SCNC: 12 MMOL/L (ref 3–16)
BUN BLDV-MCNC: 16 MG/DL (ref 7–20)
CALCIUM SERPL-MCNC: 8.7 MG/DL (ref 8.3–10.6)
CHLORIDE BLD-SCNC: 99 MMOL/L (ref 99–110)
CO2: 25 MMOL/L (ref 21–32)
CREAT SERPL-MCNC: 0.9 MG/DL (ref 0.9–1.3)
GFR AFRICAN AMERICAN: >60
GFR NON-AFRICAN AMERICAN: >60
GLUCOSE BLD-MCNC: 192 MG/DL (ref 70–99)
GLUCOSE BLD-MCNC: 211 MG/DL (ref 70–99)
GLUCOSE BLD-MCNC: 240 MG/DL (ref 70–99)
GLUCOSE BLD-MCNC: 250 MG/DL (ref 70–99)
GLUCOSE BLD-MCNC: 254 MG/DL (ref 70–99)
PERFORMED ON: ABNORMAL
PHOSPHORUS: 3.2 MG/DL (ref 2.5–4.9)
POTASSIUM SERPL-SCNC: 3.7 MMOL/L (ref 3.5–5.1)
SODIUM BLD-SCNC: 136 MMOL/L (ref 136–145)

## 2019-12-04 PROCEDURE — 3700000001 HC ADD 15 MINUTES (ANESTHESIA): Performed by: SURGERY

## 2019-12-04 PROCEDURE — 88307 TISSUE EXAM BY PATHOLOGIST: CPT

## 2019-12-04 PROCEDURE — 2500000003 HC RX 250 WO HCPCS

## 2019-12-04 PROCEDURE — 2500000003 HC RX 250 WO HCPCS: Performed by: NURSE ANESTHETIST, CERTIFIED REGISTERED

## 2019-12-04 PROCEDURE — 6360000002 HC RX W HCPCS: Performed by: SURGERY

## 2019-12-04 PROCEDURE — 27880 AMPUTATION OF LOWER LEG: CPT | Performed by: SURGERY

## 2019-12-04 PROCEDURE — 6370000000 HC RX 637 (ALT 250 FOR IP): Performed by: SURGERY

## 2019-12-04 PROCEDURE — 3600000005 HC SURGERY LEVEL 5 BASE: Performed by: SURGERY

## 2019-12-04 PROCEDURE — 2580000003 HC RX 258: Performed by: INTERNAL MEDICINE

## 2019-12-04 PROCEDURE — 0Y6H0Z1 DETACHMENT AT RIGHT LOWER LEG, HIGH, OPEN APPROACH: ICD-10-PCS | Performed by: SURGERY

## 2019-12-04 PROCEDURE — 99232 SBSQ HOSP IP/OBS MODERATE 35: CPT | Performed by: INTERNAL MEDICINE

## 2019-12-04 PROCEDURE — 2709999900 HC NON-CHARGEABLE SUPPLY: Performed by: SURGERY

## 2019-12-04 PROCEDURE — 36592 COLLECT BLOOD FROM PICC: CPT

## 2019-12-04 PROCEDURE — 3700000000 HC ANESTHESIA ATTENDED CARE: Performed by: SURGERY

## 2019-12-04 PROCEDURE — 6360000002 HC RX W HCPCS: Performed by: NURSE ANESTHETIST, CERTIFIED REGISTERED

## 2019-12-04 PROCEDURE — 88311 DECALCIFY TISSUE: CPT

## 2019-12-04 PROCEDURE — 2060000000 HC ICU INTERMEDIATE R&B

## 2019-12-04 PROCEDURE — 80069 RENAL FUNCTION PANEL: CPT

## 2019-12-04 PROCEDURE — 7100000000 HC PACU RECOVERY - FIRST 15 MIN: Performed by: SURGERY

## 2019-12-04 PROCEDURE — 2580000003 HC RX 258: Performed by: SURGERY

## 2019-12-04 PROCEDURE — 6370000000 HC RX 637 (ALT 250 FOR IP): Performed by: INTERNAL MEDICINE

## 2019-12-04 PROCEDURE — 6360000002 HC RX W HCPCS: Performed by: INTERNAL MEDICINE

## 2019-12-04 PROCEDURE — 6360000002 HC RX W HCPCS: Performed by: ANESTHESIOLOGY

## 2019-12-04 PROCEDURE — 2500000003 HC RX 250 WO HCPCS: Performed by: SURGERY

## 2019-12-04 PROCEDURE — 3600000015 HC SURGERY LEVEL 5 ADDTL 15MIN: Performed by: SURGERY

## 2019-12-04 PROCEDURE — 7100000001 HC PACU RECOVERY - ADDTL 15 MIN: Performed by: SURGERY

## 2019-12-04 PROCEDURE — 94760 N-INVAS EAR/PLS OXIMETRY 1: CPT

## 2019-12-04 RX ORDER — FENTANYL CITRATE 50 UG/ML
INJECTION, SOLUTION INTRAMUSCULAR; INTRAVENOUS PRN
Status: DISCONTINUED | OUTPATIENT
Start: 2019-12-04 | End: 2019-12-04 | Stop reason: SDUPTHER

## 2019-12-04 RX ORDER — LIDOCAINE HYDROCHLORIDE 20 MG/ML
INJECTION, SOLUTION EPIDURAL; INFILTRATION; INTRACAUDAL; PERINEURAL PRN
Status: DISCONTINUED | OUTPATIENT
Start: 2019-12-04 | End: 2019-12-04 | Stop reason: SDUPTHER

## 2019-12-04 RX ORDER — SUCCINYLCHOLINE/SOD CL,ISO/PF 200MG/10ML
SYRINGE (ML) INTRAVENOUS PRN
Status: DISCONTINUED | OUTPATIENT
Start: 2019-12-04 | End: 2019-12-04 | Stop reason: SDUPTHER

## 2019-12-04 RX ORDER — OXYCODONE HYDROCHLORIDE AND ACETAMINOPHEN 5; 325 MG/1; MG/1
1 TABLET ORAL PRN
Status: DISCONTINUED | OUTPATIENT
Start: 2019-12-04 | End: 2019-12-04 | Stop reason: HOSPADM

## 2019-12-04 RX ORDER — ONDANSETRON 2 MG/ML
4 INJECTION INTRAMUSCULAR; INTRAVENOUS
Status: DISCONTINUED | OUTPATIENT
Start: 2019-12-04 | End: 2019-12-04 | Stop reason: HOSPADM

## 2019-12-04 RX ORDER — PROPOFOL 10 MG/ML
INJECTION, EMULSION INTRAVENOUS PRN
Status: DISCONTINUED | OUTPATIENT
Start: 2019-12-04 | End: 2019-12-04 | Stop reason: SDUPTHER

## 2019-12-04 RX ORDER — ONDANSETRON 2 MG/ML
INJECTION INTRAMUSCULAR; INTRAVENOUS PRN
Status: DISCONTINUED | OUTPATIENT
Start: 2019-12-04 | End: 2019-12-04

## 2019-12-04 RX ORDER — ROCURONIUM BROMIDE 10 MG/ML
INJECTION, SOLUTION INTRAVENOUS PRN
Status: DISCONTINUED | OUTPATIENT
Start: 2019-12-04 | End: 2019-12-04 | Stop reason: SDUPTHER

## 2019-12-04 RX ORDER — OXYCODONE HYDROCHLORIDE AND ACETAMINOPHEN 5; 325 MG/1; MG/1
2 TABLET ORAL PRN
Status: DISCONTINUED | OUTPATIENT
Start: 2019-12-04 | End: 2019-12-04 | Stop reason: HOSPADM

## 2019-12-04 RX ORDER — OXYCODONE HYDROCHLORIDE AND ACETAMINOPHEN 5; 325 MG/1; MG/1
2 TABLET ORAL EVERY 4 HOURS PRN
Status: DISCONTINUED | OUTPATIENT
Start: 2019-12-04 | End: 2019-12-11 | Stop reason: HOSPADM

## 2019-12-04 RX ORDER — MAGNESIUM HYDROXIDE 1200 MG/15ML
LIQUID ORAL CONTINUOUS PRN
Status: COMPLETED | OUTPATIENT
Start: 2019-12-04 | End: 2019-12-04

## 2019-12-04 RX ORDER — FENTANYL CITRATE 50 UG/ML
25 INJECTION, SOLUTION INTRAMUSCULAR; INTRAVENOUS EVERY 5 MIN PRN
Status: DISCONTINUED | OUTPATIENT
Start: 2019-12-04 | End: 2019-12-04 | Stop reason: HOSPADM

## 2019-12-04 RX ORDER — MIDAZOLAM HYDROCHLORIDE 1 MG/ML
INJECTION INTRAMUSCULAR; INTRAVENOUS PRN
Status: DISCONTINUED | OUTPATIENT
Start: 2019-12-04 | End: 2019-12-04 | Stop reason: SDUPTHER

## 2019-12-04 RX ORDER — ONDANSETRON 2 MG/ML
INJECTION INTRAMUSCULAR; INTRAVENOUS PRN
Status: DISCONTINUED | OUTPATIENT
Start: 2019-12-04 | End: 2019-12-04 | Stop reason: SDUPTHER

## 2019-12-04 RX ORDER — DEXAMETHASONE SODIUM PHOSPHATE 4 MG/ML
INJECTION, SOLUTION INTRA-ARTICULAR; INTRALESIONAL; INTRAMUSCULAR; INTRAVENOUS; SOFT TISSUE PRN
Status: DISCONTINUED | OUTPATIENT
Start: 2019-12-04 | End: 2019-12-04 | Stop reason: SDUPTHER

## 2019-12-04 RX ADMIN — Medication 120 MG: at 07:37

## 2019-12-04 RX ADMIN — ROCURONIUM BROMIDE 15 MG: 10 INJECTION INTRAVENOUS at 07:44

## 2019-12-04 RX ADMIN — FENTANYL CITRATE 50 MCG: 50 INJECTION INTRAMUSCULAR; INTRAVENOUS at 08:20

## 2019-12-04 RX ADMIN — INSULIN LISPRO 5 UNITS: 100 INJECTION, SOLUTION INTRAVENOUS; SUBCUTANEOUS at 18:27

## 2019-12-04 RX ADMIN — OXYCODONE HYDROCHLORIDE AND ACETAMINOPHEN 2 TABLET: 5; 325 TABLET ORAL at 21:05

## 2019-12-04 RX ADMIN — SUGAMMADEX 50 MG: 100 INJECTION, SOLUTION INTRAVENOUS at 09:51

## 2019-12-04 RX ADMIN — HYDROMORPHONE HYDROCHLORIDE 0.5 MG: 1 INJECTION, SOLUTION INTRAMUSCULAR; INTRAVENOUS; SUBCUTANEOUS at 10:12

## 2019-12-04 RX ADMIN — FENTANYL CITRATE 50 MCG: 50 INJECTION INTRAMUSCULAR; INTRAVENOUS at 07:37

## 2019-12-04 RX ADMIN — INSULIN GLARGINE 10 UNITS: 100 INJECTION, SOLUTION SUBCUTANEOUS at 12:10

## 2019-12-04 RX ADMIN — PROPOFOL 200 MG: 10 INJECTION, EMULSION INTRAVENOUS at 07:37

## 2019-12-04 RX ADMIN — INSULIN LISPRO 6 UNITS: 100 INJECTION, SOLUTION INTRAVENOUS; SUBCUTANEOUS at 18:26

## 2019-12-04 RX ADMIN — LINEZOLID 600 MG: 600 INJECTION, SOLUTION INTRAVENOUS at 04:24

## 2019-12-04 RX ADMIN — FENTANYL CITRATE 25 MCG: 50 INJECTION INTRAMUSCULAR; INTRAVENOUS at 09:14

## 2019-12-04 RX ADMIN — MIDAZOLAM 2 MG: 1 INJECTION INTRAMUSCULAR; INTRAVENOUS at 07:37

## 2019-12-04 RX ADMIN — LIDOCAINE HYDROCHLORIDE 100 MG: 20 INJECTION, SOLUTION EPIDURAL; INFILTRATION; INTRACAUDAL; PERINEURAL at 07:37

## 2019-12-04 RX ADMIN — SODIUM CHLORIDE: 9 INJECTION, SOLUTION INTRAVENOUS at 07:30

## 2019-12-04 RX ADMIN — ASPIRIN 81 MG: 81 TABLET, COATED ORAL at 12:11

## 2019-12-04 RX ADMIN — INSULIN LISPRO 6 UNITS: 100 INJECTION, SOLUTION INTRAVENOUS; SUBCUTANEOUS at 12:10

## 2019-12-04 RX ADMIN — OXYCODONE HYDROCHLORIDE AND ACETAMINOPHEN 2 TABLET: 5; 325 TABLET ORAL at 16:41

## 2019-12-04 RX ADMIN — RIVAROXABAN 2.5 MG: 2.5 TABLET, FILM COATED ORAL at 14:11

## 2019-12-04 RX ADMIN — LINEZOLID 600 MG: 600 INJECTION, SOLUTION INTRAVENOUS at 16:26

## 2019-12-04 RX ADMIN — ATORVASTATIN CALCIUM 40 MG: 40 TABLET, FILM COATED ORAL at 12:11

## 2019-12-04 RX ADMIN — METRONIDAZOLE 500 MG: 500 TABLET ORAL at 14:11

## 2019-12-04 RX ADMIN — DEXAMETHASONE SODIUM PHOSPHATE 8 MG: 4 INJECTION, SOLUTION INTRAMUSCULAR; INTRAVENOUS at 07:50

## 2019-12-04 RX ADMIN — HYDROMORPHONE HYDROCHLORIDE 0.5 MG: 1 INJECTION, SOLUTION INTRAMUSCULAR; INTRAVENOUS; SUBCUTANEOUS at 10:04

## 2019-12-04 RX ADMIN — SODIUM CHLORIDE, PRESERVATIVE FREE 10 ML: 5 INJECTION INTRAVENOUS at 12:12

## 2019-12-04 RX ADMIN — ROCURONIUM BROMIDE 10 MG: 10 INJECTION INTRAVENOUS at 07:37

## 2019-12-04 RX ADMIN — METOPROLOL SUCCINATE 25 MG: 25 TABLET, EXTENDED RELEASE ORAL at 12:11

## 2019-12-04 RX ADMIN — ONDANSETRON 4 MG: 2 INJECTION INTRAMUSCULAR; INTRAVENOUS at 07:50

## 2019-12-04 RX ADMIN — FENTANYL CITRATE 25 MCG: 50 INJECTION INTRAMUSCULAR; INTRAVENOUS at 09:21

## 2019-12-04 RX ADMIN — HYDROMORPHONE HYDROCHLORIDE 1 MG: 1 INJECTION, SOLUTION INTRAMUSCULAR; INTRAVENOUS; SUBCUTANEOUS at 18:31

## 2019-12-04 RX ADMIN — RIVAROXABAN 2.5 MG: 2.5 TABLET, FILM COATED ORAL at 21:19

## 2019-12-04 RX ADMIN — FENTANYL CITRATE 50 MCG: 50 INJECTION INTRAMUSCULAR; INTRAVENOUS at 09:51

## 2019-12-04 RX ADMIN — SUGAMMADEX 200 MG: 100 INJECTION, SOLUTION INTRAVENOUS at 09:43

## 2019-12-04 RX ADMIN — FAMOTIDINE 20 MG: 20 TABLET ORAL at 20:50

## 2019-12-04 RX ADMIN — CYANOCOBALAMIN TAB 1000 MCG 1000 MCG: 1000 TAB at 12:12

## 2019-12-04 RX ADMIN — FAMOTIDINE 20 MG: 20 TABLET ORAL at 12:11

## 2019-12-04 RX ADMIN — INSULIN GLARGINE 10 UNITS: 100 INJECTION, SOLUTION SUBCUTANEOUS at 10:00

## 2019-12-04 RX ADMIN — CHLORTHALIDONE 50 MG: 25 TABLET ORAL at 12:11

## 2019-12-04 RX ADMIN — SODIUM CHLORIDE: 9 INJECTION, SOLUTION INTRAVENOUS at 16:26

## 2019-12-04 RX ADMIN — HYDROMORPHONE HYDROCHLORIDE 1 MG: 1 INJECTION, SOLUTION INTRAMUSCULAR; INTRAVENOUS; SUBCUTANEOUS at 12:49

## 2019-12-04 RX ADMIN — METRONIDAZOLE 500 MG: 500 INJECTION, SOLUTION INTRAVENOUS at 07:37

## 2019-12-04 RX ADMIN — INSULIN LISPRO 2 UNITS: 100 INJECTION, SOLUTION INTRAVENOUS; SUBCUTANEOUS at 20:50

## 2019-12-04 RX ADMIN — CEFTRIAXONE 2 G: 2 INJECTION, POWDER, FOR SOLUTION INTRAMUSCULAR; INTRAVENOUS at 12:10

## 2019-12-04 RX ADMIN — LISINOPRIL 40 MG: 40 TABLET ORAL at 12:10

## 2019-12-04 RX ADMIN — METRONIDAZOLE 500 MG: 500 TABLET ORAL at 20:50

## 2019-12-04 ASSESSMENT — ENCOUNTER SYMPTOMS: SHORTNESS OF BREATH: 1

## 2019-12-04 ASSESSMENT — PULMONARY FUNCTION TESTS
PIF_VALUE: 1
PIF_VALUE: 28
PIF_VALUE: 30
PIF_VALUE: 29
PIF_VALUE: 29
PIF_VALUE: 28
PIF_VALUE: 0
PIF_VALUE: 21
PIF_VALUE: 5
PIF_VALUE: 30
PIF_VALUE: 28
PIF_VALUE: 15
PIF_VALUE: 28
PIF_VALUE: 4
PIF_VALUE: 27
PIF_VALUE: 1
PIF_VALUE: 28
PIF_VALUE: 27
PIF_VALUE: 2
PIF_VALUE: 1
PIF_VALUE: 4
PIF_VALUE: 4
PIF_VALUE: 5
PIF_VALUE: 29
PIF_VALUE: 25
PIF_VALUE: 1
PIF_VALUE: 27
PIF_VALUE: 0
PIF_VALUE: 26
PIF_VALUE: 29
PIF_VALUE: 28
PIF_VALUE: 7
PIF_VALUE: 28
PIF_VALUE: 27
PIF_VALUE: 29
PIF_VALUE: 28
PIF_VALUE: 28
PIF_VALUE: 13
PIF_VALUE: 0
PIF_VALUE: 28
PIF_VALUE: 28
PIF_VALUE: 5
PIF_VALUE: 28
PIF_VALUE: 26
PIF_VALUE: 28
PIF_VALUE: 28
PIF_VALUE: 2
PIF_VALUE: 29
PIF_VALUE: 0
PIF_VALUE: 0
PIF_VALUE: 29
PIF_VALUE: 20
PIF_VALUE: 29
PIF_VALUE: 3
PIF_VALUE: 28
PIF_VALUE: 27
PIF_VALUE: 28
PIF_VALUE: 27
PIF_VALUE: 28
PIF_VALUE: 28
PIF_VALUE: 0
PIF_VALUE: 28
PIF_VALUE: 3
PIF_VALUE: 30
PIF_VALUE: 27
PIF_VALUE: 4
PIF_VALUE: 29
PIF_VALUE: 1
PIF_VALUE: 28
PIF_VALUE: 2
PIF_VALUE: 4
PIF_VALUE: 29
PIF_VALUE: 6
PIF_VALUE: 1
PIF_VALUE: 13
PIF_VALUE: 1
PIF_VALUE: 0
PIF_VALUE: 31
PIF_VALUE: 4
PIF_VALUE: 0
PIF_VALUE: 2
PIF_VALUE: 29
PIF_VALUE: 28
PIF_VALUE: 6
PIF_VALUE: 3
PIF_VALUE: 28
PIF_VALUE: 8
PIF_VALUE: 1
PIF_VALUE: 28
PIF_VALUE: 25
PIF_VALUE: 30
PIF_VALUE: 3
PIF_VALUE: 3
PIF_VALUE: 0
PIF_VALUE: 28
PIF_VALUE: 28
PIF_VALUE: 4
PIF_VALUE: 2
PIF_VALUE: 5
PIF_VALUE: 27
PIF_VALUE: 26
PIF_VALUE: 3
PIF_VALUE: 25
PIF_VALUE: 29
PIF_VALUE: 28
PIF_VALUE: 29
PIF_VALUE: 29
PIF_VALUE: 28
PIF_VALUE: 29
PIF_VALUE: 4
PIF_VALUE: 6
PIF_VALUE: 28
PIF_VALUE: 0
PIF_VALUE: 27
PIF_VALUE: 29
PIF_VALUE: 8
PIF_VALUE: 28
PIF_VALUE: 6
PIF_VALUE: 4
PIF_VALUE: 28
PIF_VALUE: 0
PIF_VALUE: 28
PIF_VALUE: 4
PIF_VALUE: 6
PIF_VALUE: 28
PIF_VALUE: 4
PIF_VALUE: 28
PIF_VALUE: 29
PIF_VALUE: 28
PIF_VALUE: 28
PIF_VALUE: 31
PIF_VALUE: 5
PIF_VALUE: 28
PIF_VALUE: 29
PIF_VALUE: 25
PIF_VALUE: 6
PIF_VALUE: 0
PIF_VALUE: 5
PIF_VALUE: 28
PIF_VALUE: 27
PIF_VALUE: 27
PIF_VALUE: 5
PIF_VALUE: 28
PIF_VALUE: 29
PIF_VALUE: 0
PIF_VALUE: 28
PIF_VALUE: 28

## 2019-12-04 ASSESSMENT — PAIN DESCRIPTION - ONSET
ONSET: GRADUAL
ONSET: ON-GOING
ONSET: AWAKENED FROM SLEEP
ONSET: ON-GOING

## 2019-12-04 ASSESSMENT — PAIN DESCRIPTION - LOCATION
LOCATION: LEG

## 2019-12-04 ASSESSMENT — PAIN DESCRIPTION - DESCRIPTORS
DESCRIPTORS: DISCOMFORT
DESCRIPTORS: THROBBING
DESCRIPTORS: DISCOMFORT
DESCRIPTORS: THROBBING
DESCRIPTORS: DISCOMFORT
DESCRIPTORS: ACHING

## 2019-12-04 ASSESSMENT — PAIN SCALES - GENERAL
PAINLEVEL_OUTOF10: 4
PAINLEVEL_OUTOF10: 10
PAINLEVEL_OUTOF10: 0
PAINLEVEL_OUTOF10: 10
PAINLEVEL_OUTOF10: 10
PAINLEVEL_OUTOF10: 9
PAINLEVEL_OUTOF10: 0
PAINLEVEL_OUTOF10: 10
PAINLEVEL_OUTOF10: 10
PAINLEVEL_OUTOF10: 9
PAINLEVEL_OUTOF10: 9
PAINLEVEL_OUTOF10: 10
PAINLEVEL_OUTOF10: 0
PAINLEVEL_OUTOF10: 9
PAINLEVEL_OUTOF10: 10

## 2019-12-04 ASSESSMENT — PAIN DESCRIPTION - PROGRESSION
CLINICAL_PROGRESSION: NOT CHANGED

## 2019-12-04 ASSESSMENT — PAIN DESCRIPTION - PAIN TYPE
TYPE: SURGICAL PAIN

## 2019-12-04 ASSESSMENT — PAIN DESCRIPTION - ORIENTATION
ORIENTATION: RIGHT

## 2019-12-04 ASSESSMENT — PAIN - FUNCTIONAL ASSESSMENT
PAIN_FUNCTIONAL_ASSESSMENT: PREVENTS OR INTERFERES SOME ACTIVE ACTIVITIES AND ADLS
PAIN_FUNCTIONAL_ASSESSMENT: PREVENTS OR INTERFERES WITH ALL ACTIVE AND SOME PASSIVE ACTIVITIES
PAIN_FUNCTIONAL_ASSESSMENT: PREVENTS OR INTERFERES WITH ALL ACTIVE AND SOME PASSIVE ACTIVITIES
PAIN_FUNCTIONAL_ASSESSMENT: 0-10
PAIN_FUNCTIONAL_ASSESSMENT: PREVENTS OR INTERFERES WITH ALL ACTIVE AND SOME PASSIVE ACTIVITIES

## 2019-12-04 ASSESSMENT — PAIN DESCRIPTION - FREQUENCY
FREQUENCY: CONTINUOUS

## 2019-12-05 LAB
ALBUMIN SERPL-MCNC: 2.5 G/DL (ref 3.4–5)
ANION GAP SERPL CALCULATED.3IONS-SCNC: 13 MMOL/L (ref 3–16)
BUN BLDV-MCNC: 16 MG/DL (ref 7–20)
CALCIUM SERPL-MCNC: 8.4 MG/DL (ref 8.3–10.6)
CHLORIDE BLD-SCNC: 97 MMOL/L (ref 99–110)
CO2: 24 MMOL/L (ref 21–32)
CREAT SERPL-MCNC: 0.9 MG/DL (ref 0.9–1.3)
GFR AFRICAN AMERICAN: >60
GFR NON-AFRICAN AMERICAN: >60
GLUCOSE BLD-MCNC: 133 MG/DL (ref 70–99)
GLUCOSE BLD-MCNC: 180 MG/DL (ref 70–99)
GLUCOSE BLD-MCNC: 208 MG/DL (ref 70–99)
GLUCOSE BLD-MCNC: 228 MG/DL (ref 70–99)
GLUCOSE BLD-MCNC: 241 MG/DL (ref 70–99)
HCT VFR BLD CALC: 26.3 % (ref 40.5–52.5)
HEMOGLOBIN: 8.4 G/DL (ref 13.5–17.5)
MCH RBC QN AUTO: 26.1 PG (ref 26–34)
MCHC RBC AUTO-ENTMCNC: 31.8 G/DL (ref 31–36)
MCV RBC AUTO: 82.1 FL (ref 80–100)
PDW BLD-RTO: 15.6 % (ref 12.4–15.4)
PERFORMED ON: ABNORMAL
PHOSPHORUS: 2.4 MG/DL (ref 2.5–4.9)
PLATELET # BLD: 451 K/UL (ref 135–450)
PMV BLD AUTO: 7 FL (ref 5–10.5)
POTASSIUM SERPL-SCNC: 3.5 MMOL/L (ref 3.5–5.1)
RBC # BLD: 3.21 M/UL (ref 4.2–5.9)
SODIUM BLD-SCNC: 134 MMOL/L (ref 136–145)
WBC # BLD: 13.6 K/UL (ref 4–11)

## 2019-12-05 PROCEDURE — 6370000000 HC RX 637 (ALT 250 FOR IP): Performed by: SURGERY

## 2019-12-05 PROCEDURE — 6370000000 HC RX 637 (ALT 250 FOR IP): Performed by: INTERNAL MEDICINE

## 2019-12-05 PROCEDURE — 80069 RENAL FUNCTION PANEL: CPT

## 2019-12-05 PROCEDURE — 2580000003 HC RX 258: Performed by: INTERNAL MEDICINE

## 2019-12-05 PROCEDURE — 6360000002 HC RX W HCPCS: Performed by: INTERNAL MEDICINE

## 2019-12-05 PROCEDURE — 36592 COLLECT BLOOD FROM PICC: CPT

## 2019-12-05 PROCEDURE — 97530 THERAPEUTIC ACTIVITIES: CPT

## 2019-12-05 PROCEDURE — APPSS30 APP SPLIT SHARED TIME 16-30 MINUTES: Performed by: NURSE PRACTITIONER

## 2019-12-05 PROCEDURE — APPNB30 APP NON BILLABLE TIME 0-30 MINS: Performed by: NURSE PRACTITIONER

## 2019-12-05 PROCEDURE — 85027 COMPLETE CBC AUTOMATED: CPT

## 2019-12-05 PROCEDURE — 99232 SBSQ HOSP IP/OBS MODERATE 35: CPT | Performed by: INTERNAL MEDICINE

## 2019-12-05 PROCEDURE — 94760 N-INVAS EAR/PLS OXIMETRY 1: CPT

## 2019-12-05 PROCEDURE — 6360000002 HC RX W HCPCS: Performed by: SURGERY

## 2019-12-05 PROCEDURE — 97535 SELF CARE MNGMENT TRAINING: CPT

## 2019-12-05 PROCEDURE — 97530 THERAPEUTIC ACTIVITIES: CPT | Performed by: PHYSICAL THERAPIST

## 2019-12-05 PROCEDURE — 99024 POSTOP FOLLOW-UP VISIT: CPT | Performed by: SURGERY

## 2019-12-05 PROCEDURE — 2060000000 HC ICU INTERMEDIATE R&B

## 2019-12-05 RX ADMIN — INSULIN GLARGINE 10 UNITS: 100 INJECTION, SOLUTION SUBCUTANEOUS at 08:44

## 2019-12-05 RX ADMIN — RIVAROXABAN 2.5 MG: 2.5 TABLET, FILM COATED ORAL at 20:21

## 2019-12-05 RX ADMIN — CYANOCOBALAMIN TAB 1000 MCG 1000 MCG: 1000 TAB at 08:42

## 2019-12-05 RX ADMIN — METRONIDAZOLE 500 MG: 500 TABLET ORAL at 22:27

## 2019-12-05 RX ADMIN — LISINOPRIL 40 MG: 40 TABLET ORAL at 08:42

## 2019-12-05 RX ADMIN — CLOPIDOGREL BISULFATE 75 MG: 75 TABLET ORAL at 08:42

## 2019-12-05 RX ADMIN — METOPROLOL SUCCINATE 25 MG: 25 TABLET, EXTENDED RELEASE ORAL at 08:42

## 2019-12-05 RX ADMIN — CHLORTHALIDONE 50 MG: 25 TABLET ORAL at 08:42

## 2019-12-05 RX ADMIN — FAMOTIDINE 20 MG: 20 TABLET ORAL at 08:42

## 2019-12-05 RX ADMIN — POTASSIUM CHLORIDE 40 MEQ: 1500 TABLET, EXTENDED RELEASE ORAL at 10:24

## 2019-12-05 RX ADMIN — INSULIN LISPRO 4 UNITS: 100 INJECTION, SOLUTION INTRAVENOUS; SUBCUTANEOUS at 08:44

## 2019-12-05 RX ADMIN — INSULIN LISPRO 5 UNITS: 100 INJECTION, SOLUTION INTRAVENOUS; SUBCUTANEOUS at 08:44

## 2019-12-05 RX ADMIN — FAMOTIDINE 20 MG: 20 TABLET ORAL at 20:21

## 2019-12-05 RX ADMIN — CEFTRIAXONE 2 G: 2 INJECTION, POWDER, FOR SOLUTION INTRAMUSCULAR; INTRAVENOUS at 12:32

## 2019-12-05 RX ADMIN — INSULIN LISPRO 2 UNITS: 100 INJECTION, SOLUTION INTRAVENOUS; SUBCUTANEOUS at 12:31

## 2019-12-05 RX ADMIN — METRONIDAZOLE 500 MG: 500 TABLET ORAL at 05:40

## 2019-12-05 RX ADMIN — INSULIN LISPRO 4 UNITS: 100 INJECTION, SOLUTION INTRAVENOUS; SUBCUTANEOUS at 17:32

## 2019-12-05 RX ADMIN — INSULIN LISPRO 5 UNITS: 100 INJECTION, SOLUTION INTRAVENOUS; SUBCUTANEOUS at 12:31

## 2019-12-05 RX ADMIN — OXYCODONE HYDROCHLORIDE AND ACETAMINOPHEN 2 TABLET: 5; 325 TABLET ORAL at 05:40

## 2019-12-05 RX ADMIN — LINEZOLID 600 MG: 600 INJECTION, SOLUTION INTRAVENOUS at 04:18

## 2019-12-05 RX ADMIN — LINEZOLID 600 MG: 600 INJECTION, SOLUTION INTRAVENOUS at 15:33

## 2019-12-05 RX ADMIN — INSULIN LISPRO 5 UNITS: 100 INJECTION, SOLUTION INTRAVENOUS; SUBCUTANEOUS at 17:33

## 2019-12-05 RX ADMIN — ASPIRIN 81 MG: 81 TABLET, COATED ORAL at 08:42

## 2019-12-05 RX ADMIN — ATORVASTATIN CALCIUM 40 MG: 40 TABLET, FILM COATED ORAL at 08:42

## 2019-12-05 RX ADMIN — OXYCODONE HYDROCHLORIDE AND ACETAMINOPHEN 2 TABLET: 5; 325 TABLET ORAL at 10:24

## 2019-12-05 RX ADMIN — METRONIDAZOLE 500 MG: 500 TABLET ORAL at 15:33

## 2019-12-05 RX ADMIN — RIVAROXABAN 2.5 MG: 2.5 TABLET, FILM COATED ORAL at 08:42

## 2019-12-05 RX ADMIN — OXYCODONE HYDROCHLORIDE AND ACETAMINOPHEN 2 TABLET: 5; 325 TABLET ORAL at 20:21

## 2019-12-05 RX ADMIN — INSULIN GLARGINE 10 UNITS: 100 INJECTION, SOLUTION SUBCUTANEOUS at 20:22

## 2019-12-05 RX ADMIN — SODIUM CHLORIDE, PRESERVATIVE FREE 10 ML: 5 INJECTION INTRAVENOUS at 08:43

## 2019-12-05 RX ADMIN — SODIUM CHLORIDE, PRESERVATIVE FREE 10 ML: 5 INJECTION INTRAVENOUS at 20:22

## 2019-12-05 ASSESSMENT — PAIN SCALES - GENERAL
PAINLEVEL_OUTOF10: 4
PAINLEVEL_OUTOF10: 6
PAINLEVEL_OUTOF10: 0
PAINLEVEL_OUTOF10: 3
PAINLEVEL_OUTOF10: 9
PAINLEVEL_OUTOF10: 4
PAINLEVEL_OUTOF10: 5
PAINLEVEL_OUTOF10: 2

## 2019-12-05 ASSESSMENT — PAIN DESCRIPTION - DESCRIPTORS
DESCRIPTORS: SPASM
DESCRIPTORS: ACHING
DESCRIPTORS: ACHING

## 2019-12-05 ASSESSMENT — PAIN - FUNCTIONAL ASSESSMENT
PAIN_FUNCTIONAL_ASSESSMENT: PREVENTS OR INTERFERES SOME ACTIVE ACTIVITIES AND ADLS

## 2019-12-05 ASSESSMENT — PAIN DESCRIPTION - FREQUENCY
FREQUENCY: CONTINUOUS
FREQUENCY: INTERMITTENT
FREQUENCY: CONTINUOUS

## 2019-12-05 ASSESSMENT — PAIN DESCRIPTION - ORIENTATION
ORIENTATION: RIGHT
ORIENTATION: RIGHT

## 2019-12-05 ASSESSMENT — PAIN DESCRIPTION - ONSET
ONSET: GRADUAL
ONSET: ON-GOING
ONSET: GRADUAL

## 2019-12-05 ASSESSMENT — PAIN DESCRIPTION - PAIN TYPE
TYPE: SURGICAL PAIN

## 2019-12-05 ASSESSMENT — PAIN DESCRIPTION - PROGRESSION
CLINICAL_PROGRESSION: GRADUALLY WORSENING
CLINICAL_PROGRESSION: GRADUALLY WORSENING

## 2019-12-05 ASSESSMENT — PAIN DESCRIPTION - LOCATION
LOCATION: LEG
LOCATION: OTHER (COMMENT)
LOCATION: LEG

## 2019-12-06 LAB
ALBUMIN SERPL-MCNC: 2.5 G/DL (ref 3.4–5)
ANION GAP SERPL CALCULATED.3IONS-SCNC: 11 MMOL/L (ref 3–16)
BUN BLDV-MCNC: 13 MG/DL (ref 7–20)
CALCIUM SERPL-MCNC: 8.4 MG/DL (ref 8.3–10.6)
CHLORIDE BLD-SCNC: 98 MMOL/L (ref 99–110)
CO2: 27 MMOL/L (ref 21–32)
CREAT SERPL-MCNC: 0.8 MG/DL (ref 0.9–1.3)
GFR AFRICAN AMERICAN: >60
GFR NON-AFRICAN AMERICAN: >60
GLUCOSE BLD-MCNC: 132 MG/DL (ref 70–99)
GLUCOSE BLD-MCNC: 147 MG/DL (ref 70–99)
GLUCOSE BLD-MCNC: 151 MG/DL (ref 70–99)
GLUCOSE BLD-MCNC: 157 MG/DL (ref 70–99)
GLUCOSE BLD-MCNC: 192 MG/DL (ref 70–99)
HCT VFR BLD CALC: 25.1 % (ref 40.5–52.5)
HEMOGLOBIN: 8.1 G/DL (ref 13.5–17.5)
MCH RBC QN AUTO: 26.6 PG (ref 26–34)
MCHC RBC AUTO-ENTMCNC: 32.2 G/DL (ref 31–36)
MCV RBC AUTO: 82.5 FL (ref 80–100)
PDW BLD-RTO: 15.5 % (ref 12.4–15.4)
PERFORMED ON: ABNORMAL
PHOSPHORUS: 2.5 MG/DL (ref 2.5–4.9)
PLATELET # BLD: 412 K/UL (ref 135–450)
PMV BLD AUTO: 7.1 FL (ref 5–10.5)
POTASSIUM SERPL-SCNC: 3.7 MMOL/L (ref 3.5–5.1)
RBC # BLD: 3.05 M/UL (ref 4.2–5.9)
SODIUM BLD-SCNC: 136 MMOL/L (ref 136–145)
WBC # BLD: 13.2 K/UL (ref 4–11)

## 2019-12-06 PROCEDURE — 85027 COMPLETE CBC AUTOMATED: CPT

## 2019-12-06 PROCEDURE — 94760 N-INVAS EAR/PLS OXIMETRY 1: CPT

## 2019-12-06 PROCEDURE — 2580000003 HC RX 258: Performed by: INTERNAL MEDICINE

## 2019-12-06 PROCEDURE — 6370000000 HC RX 637 (ALT 250 FOR IP): Performed by: INTERNAL MEDICINE

## 2019-12-06 PROCEDURE — 97530 THERAPEUTIC ACTIVITIES: CPT | Performed by: PHYSICAL THERAPIST

## 2019-12-06 PROCEDURE — 99232 SBSQ HOSP IP/OBS MODERATE 35: CPT | Performed by: INTERNAL MEDICINE

## 2019-12-06 PROCEDURE — 6370000000 HC RX 637 (ALT 250 FOR IP): Performed by: SURGERY

## 2019-12-06 PROCEDURE — 97530 THERAPEUTIC ACTIVITIES: CPT

## 2019-12-06 PROCEDURE — 6370000000 HC RX 637 (ALT 250 FOR IP): Performed by: NURSE PRACTITIONER

## 2019-12-06 PROCEDURE — 6360000002 HC RX W HCPCS: Performed by: INTERNAL MEDICINE

## 2019-12-06 PROCEDURE — APPNB30 APP NON BILLABLE TIME 0-30 MINS: Performed by: NURSE PRACTITIONER

## 2019-12-06 PROCEDURE — 6360000002 HC RX W HCPCS: Performed by: SURGERY

## 2019-12-06 PROCEDURE — 80069 RENAL FUNCTION PANEL: CPT

## 2019-12-06 PROCEDURE — APPSS30 APP SPLIT SHARED TIME 16-30 MINUTES: Performed by: NURSE PRACTITIONER

## 2019-12-06 PROCEDURE — 99024 POSTOP FOLLOW-UP VISIT: CPT | Performed by: SURGERY

## 2019-12-06 PROCEDURE — 2060000000 HC ICU INTERMEDIATE R&B

## 2019-12-06 RX ORDER — LACTOBACILLUS RHAMNOSUS GG 10B CELL
1 CAPSULE ORAL 2 TIMES DAILY WITH MEALS
Status: DISCONTINUED | OUTPATIENT
Start: 2019-12-06 | End: 2019-12-11 | Stop reason: HOSPADM

## 2019-12-06 RX ADMIN — CYANOCOBALAMIN TAB 1000 MCG 1000 MCG: 1000 TAB at 09:20

## 2019-12-06 RX ADMIN — INSULIN LISPRO 5 UNITS: 100 INJECTION, SOLUTION INTRAVENOUS; SUBCUTANEOUS at 09:21

## 2019-12-06 RX ADMIN — METOPROLOL SUCCINATE 25 MG: 25 TABLET, EXTENDED RELEASE ORAL at 09:20

## 2019-12-06 RX ADMIN — METRONIDAZOLE 500 MG: 500 TABLET ORAL at 14:04

## 2019-12-06 RX ADMIN — LINEZOLID 600 MG: 600 INJECTION, SOLUTION INTRAVENOUS at 16:49

## 2019-12-06 RX ADMIN — CHLORTHALIDONE 50 MG: 25 TABLET ORAL at 09:20

## 2019-12-06 RX ADMIN — SODIUM CHLORIDE, PRESERVATIVE FREE 10 ML: 5 INJECTION INTRAVENOUS at 09:20

## 2019-12-06 RX ADMIN — METRONIDAZOLE 500 MG: 500 TABLET ORAL at 06:03

## 2019-12-06 RX ADMIN — CEFTRIAXONE 2 G: 2 INJECTION, POWDER, FOR SOLUTION INTRAMUSCULAR; INTRAVENOUS at 14:05

## 2019-12-06 RX ADMIN — INSULIN LISPRO 2 UNITS: 100 INJECTION, SOLUTION INTRAVENOUS; SUBCUTANEOUS at 14:05

## 2019-12-06 RX ADMIN — FAMOTIDINE 20 MG: 20 TABLET ORAL at 21:06

## 2019-12-06 RX ADMIN — METRONIDAZOLE 500 MG: 500 TABLET ORAL at 21:50

## 2019-12-06 RX ADMIN — CLOPIDOGREL BISULFATE 75 MG: 75 TABLET ORAL at 09:20

## 2019-12-06 RX ADMIN — INSULIN GLARGINE 10 UNITS: 100 INJECTION, SOLUTION SUBCUTANEOUS at 21:51

## 2019-12-06 RX ADMIN — SODIUM CHLORIDE, PRESERVATIVE FREE 10 ML: 5 INJECTION INTRAVENOUS at 21:07

## 2019-12-06 RX ADMIN — OXYCODONE HYDROCHLORIDE AND ACETAMINOPHEN 2 TABLET: 5; 325 TABLET ORAL at 03:19

## 2019-12-06 RX ADMIN — ATORVASTATIN CALCIUM 40 MG: 40 TABLET, FILM COATED ORAL at 09:20

## 2019-12-06 RX ADMIN — LINEZOLID 600 MG: 600 INJECTION, SOLUTION INTRAVENOUS at 03:19

## 2019-12-06 RX ADMIN — LISINOPRIL 40 MG: 40 TABLET ORAL at 09:20

## 2019-12-06 RX ADMIN — INSULIN LISPRO 5 UNITS: 100 INJECTION, SOLUTION INTRAVENOUS; SUBCUTANEOUS at 14:05

## 2019-12-06 RX ADMIN — SODIUM CHLORIDE 50 ML/HR: 9 INJECTION, SOLUTION INTRAVENOUS at 06:02

## 2019-12-06 RX ADMIN — Medication 1 CAPSULE: at 21:06

## 2019-12-06 RX ADMIN — INSULIN GLARGINE 10 UNITS: 100 INJECTION, SOLUTION SUBCUTANEOUS at 09:20

## 2019-12-06 RX ADMIN — RIVAROXABAN 2.5 MG: 2.5 TABLET, FILM COATED ORAL at 09:20

## 2019-12-06 RX ADMIN — INSULIN LISPRO 2 UNITS: 100 INJECTION, SOLUTION INTRAVENOUS; SUBCUTANEOUS at 09:21

## 2019-12-06 RX ADMIN — RIVAROXABAN 2.5 MG: 2.5 TABLET, FILM COATED ORAL at 21:07

## 2019-12-06 RX ADMIN — FAMOTIDINE 20 MG: 20 TABLET ORAL at 09:20

## 2019-12-06 RX ADMIN — INSULIN LISPRO 1 UNITS: 100 INJECTION, SOLUTION INTRAVENOUS; SUBCUTANEOUS at 21:51

## 2019-12-06 RX ADMIN — OXYCODONE HYDROCHLORIDE AND ACETAMINOPHEN 2 TABLET: 5; 325 TABLET ORAL at 21:50

## 2019-12-06 RX ADMIN — ASPIRIN 81 MG: 81 TABLET, COATED ORAL at 09:20

## 2019-12-06 ASSESSMENT — PAIN SCALES - GENERAL
PAINLEVEL_OUTOF10: 0
PAINLEVEL_OUTOF10: 7
PAINLEVEL_OUTOF10: 8

## 2019-12-07 LAB
ALBUMIN SERPL-MCNC: 2.4 G/DL (ref 3.4–5)
ANION GAP SERPL CALCULATED.3IONS-SCNC: 13 MMOL/L (ref 3–16)
BUN BLDV-MCNC: 10 MG/DL (ref 7–20)
CALCIUM SERPL-MCNC: 8.6 MG/DL (ref 8.3–10.6)
CHLORIDE BLD-SCNC: 96 MMOL/L (ref 99–110)
CO2: 26 MMOL/L (ref 21–32)
CREAT SERPL-MCNC: 0.7 MG/DL (ref 0.9–1.3)
GFR AFRICAN AMERICAN: >60
GFR NON-AFRICAN AMERICAN: >60
GLUCOSE BLD-MCNC: 138 MG/DL (ref 70–99)
GLUCOSE BLD-MCNC: 148 MG/DL (ref 70–99)
GLUCOSE BLD-MCNC: 181 MG/DL (ref 70–99)
GLUCOSE BLD-MCNC: 191 MG/DL (ref 70–99)
GLUCOSE BLD-MCNC: 196 MG/DL (ref 70–99)
HCT VFR BLD CALC: 24.6 % (ref 40.5–52.5)
HEMOGLOBIN: 8 G/DL (ref 13.5–17.5)
MCH RBC QN AUTO: 26.9 PG (ref 26–34)
MCHC RBC AUTO-ENTMCNC: 32.7 G/DL (ref 31–36)
MCV RBC AUTO: 82.2 FL (ref 80–100)
PDW BLD-RTO: 15.8 % (ref 12.4–15.4)
PERFORMED ON: ABNORMAL
PHOSPHORUS: 2.9 MG/DL (ref 2.5–4.9)
PLATELET # BLD: 381 K/UL (ref 135–450)
PMV BLD AUTO: 7.3 FL (ref 5–10.5)
POTASSIUM SERPL-SCNC: 3.8 MMOL/L (ref 3.5–5.1)
RBC # BLD: 2.99 M/UL (ref 4.2–5.9)
SODIUM BLD-SCNC: 135 MMOL/L (ref 136–145)
WBC # BLD: 11 K/UL (ref 4–11)

## 2019-12-07 PROCEDURE — 99024 POSTOP FOLLOW-UP VISIT: CPT | Performed by: SURGERY

## 2019-12-07 PROCEDURE — 6370000000 HC RX 637 (ALT 250 FOR IP): Performed by: INTERNAL MEDICINE

## 2019-12-07 PROCEDURE — 2580000003 HC RX 258: Performed by: INTERNAL MEDICINE

## 2019-12-07 PROCEDURE — 6370000000 HC RX 637 (ALT 250 FOR IP): Performed by: SURGERY

## 2019-12-07 PROCEDURE — 94760 N-INVAS EAR/PLS OXIMETRY 1: CPT

## 2019-12-07 PROCEDURE — 6370000000 HC RX 637 (ALT 250 FOR IP): Performed by: NURSE PRACTITIONER

## 2019-12-07 PROCEDURE — 36592 COLLECT BLOOD FROM PICC: CPT

## 2019-12-07 PROCEDURE — 80069 RENAL FUNCTION PANEL: CPT

## 2019-12-07 PROCEDURE — 6360000002 HC RX W HCPCS: Performed by: SURGERY

## 2019-12-07 PROCEDURE — 2060000000 HC ICU INTERMEDIATE R&B

## 2019-12-07 PROCEDURE — 85027 COMPLETE CBC AUTOMATED: CPT

## 2019-12-07 PROCEDURE — 6360000002 HC RX W HCPCS: Performed by: INTERNAL MEDICINE

## 2019-12-07 RX ADMIN — Medication 1 CAPSULE: at 08:42

## 2019-12-07 RX ADMIN — FAMOTIDINE 20 MG: 20 TABLET ORAL at 22:09

## 2019-12-07 RX ADMIN — INSULIN GLARGINE 10 UNITS: 100 INJECTION, SOLUTION SUBCUTANEOUS at 08:43

## 2019-12-07 RX ADMIN — INSULIN LISPRO 1 UNITS: 100 INJECTION, SOLUTION INTRAVENOUS; SUBCUTANEOUS at 22:10

## 2019-12-07 RX ADMIN — SODIUM CHLORIDE, PRESERVATIVE FREE 10 ML: 5 INJECTION INTRAVENOUS at 08:41

## 2019-12-07 RX ADMIN — INSULIN LISPRO 2 UNITS: 100 INJECTION, SOLUTION INTRAVENOUS; SUBCUTANEOUS at 17:21

## 2019-12-07 RX ADMIN — RIVAROXABAN 2.5 MG: 2.5 TABLET, FILM COATED ORAL at 09:00

## 2019-12-07 RX ADMIN — METRONIDAZOLE 500 MG: 500 TABLET ORAL at 22:09

## 2019-12-07 RX ADMIN — METOPROLOL SUCCINATE 25 MG: 25 TABLET, EXTENDED RELEASE ORAL at 08:42

## 2019-12-07 RX ADMIN — INSULIN LISPRO 2 UNITS: 100 INJECTION, SOLUTION INTRAVENOUS; SUBCUTANEOUS at 13:41

## 2019-12-07 RX ADMIN — CEFTRIAXONE 2 G: 2 INJECTION, POWDER, FOR SOLUTION INTRAMUSCULAR; INTRAVENOUS at 13:34

## 2019-12-07 RX ADMIN — ATORVASTATIN CALCIUM 40 MG: 40 TABLET, FILM COATED ORAL at 08:41

## 2019-12-07 RX ADMIN — CYANOCOBALAMIN TAB 1000 MCG 1000 MCG: 1000 TAB at 08:41

## 2019-12-07 RX ADMIN — LINEZOLID 600 MG: 600 INJECTION, SOLUTION INTRAVENOUS at 16:42

## 2019-12-07 RX ADMIN — METRONIDAZOLE 500 MG: 500 TABLET ORAL at 13:34

## 2019-12-07 RX ADMIN — FAMOTIDINE 20 MG: 20 TABLET ORAL at 08:41

## 2019-12-07 RX ADMIN — ASPIRIN 81 MG: 81 TABLET, COATED ORAL at 08:42

## 2019-12-07 RX ADMIN — INSULIN LISPRO 5 UNITS: 100 INJECTION, SOLUTION INTRAVENOUS; SUBCUTANEOUS at 13:34

## 2019-12-07 RX ADMIN — RIVAROXABAN 2.5 MG: 2.5 TABLET, FILM COATED ORAL at 22:09

## 2019-12-07 RX ADMIN — LISINOPRIL 40 MG: 40 TABLET ORAL at 08:42

## 2019-12-07 RX ADMIN — INSULIN GLARGINE 10 UNITS: 100 INJECTION, SOLUTION SUBCUTANEOUS at 22:10

## 2019-12-07 RX ADMIN — CLOPIDOGREL BISULFATE 75 MG: 75 TABLET ORAL at 08:42

## 2019-12-07 RX ADMIN — INSULIN LISPRO 5 UNITS: 100 INJECTION, SOLUTION INTRAVENOUS; SUBCUTANEOUS at 16:42

## 2019-12-07 RX ADMIN — METRONIDAZOLE 500 MG: 500 TABLET ORAL at 05:31

## 2019-12-07 RX ADMIN — LINEZOLID 600 MG: 600 INJECTION, SOLUTION INTRAVENOUS at 04:21

## 2019-12-07 RX ADMIN — SODIUM CHLORIDE, PRESERVATIVE FREE 10 ML: 5 INJECTION INTRAVENOUS at 22:09

## 2019-12-07 RX ADMIN — Medication 1 CAPSULE: at 16:42

## 2019-12-07 RX ADMIN — CHLORTHALIDONE 50 MG: 25 TABLET ORAL at 08:41

## 2019-12-07 RX ADMIN — INSULIN LISPRO 5 UNITS: 100 INJECTION, SOLUTION INTRAVENOUS; SUBCUTANEOUS at 08:43

## 2019-12-07 ASSESSMENT — PAIN SCALES - GENERAL
PAINLEVEL_OUTOF10: 0
PAINLEVEL_OUTOF10: 0

## 2019-12-08 LAB
ALBUMIN SERPL-MCNC: 2.4 G/DL (ref 3.4–5)
ANION GAP SERPL CALCULATED.3IONS-SCNC: 10 MMOL/L (ref 3–16)
BASOPHILS ABSOLUTE: 0.1 K/UL (ref 0–0.2)
BASOPHILS RELATIVE PERCENT: 0.7 %
BUN BLDV-MCNC: 16 MG/DL (ref 7–20)
CALCIUM SERPL-MCNC: 8.9 MG/DL (ref 8.3–10.6)
CHLORIDE BLD-SCNC: 96 MMOL/L (ref 99–110)
CO2: 27 MMOL/L (ref 21–32)
CREAT SERPL-MCNC: 0.8 MG/DL (ref 0.9–1.3)
EOSINOPHILS ABSOLUTE: 0.1 K/UL (ref 0–0.6)
EOSINOPHILS RELATIVE PERCENT: 0.9 %
GFR AFRICAN AMERICAN: >60
GFR NON-AFRICAN AMERICAN: >60
GLUCOSE BLD-MCNC: 155 MG/DL (ref 70–99)
GLUCOSE BLD-MCNC: 173 MG/DL (ref 70–99)
GLUCOSE BLD-MCNC: 187 MG/DL (ref 70–99)
GLUCOSE BLD-MCNC: 214 MG/DL (ref 70–99)
GLUCOSE BLD-MCNC: 233 MG/DL (ref 70–99)
HCT VFR BLD CALC: 26.4 % (ref 40.5–52.5)
HEMOGLOBIN: 8.7 G/DL (ref 13.5–17.5)
LYMPHOCYTES ABSOLUTE: 2.5 K/UL (ref 1–5.1)
LYMPHOCYTES RELATIVE PERCENT: 24.7 %
MAGNESIUM: 1.8 MG/DL (ref 1.8–2.4)
MCH RBC QN AUTO: 27.1 PG (ref 26–34)
MCHC RBC AUTO-ENTMCNC: 32.8 G/DL (ref 31–36)
MCV RBC AUTO: 82.7 FL (ref 80–100)
MONOCYTES ABSOLUTE: 0.6 K/UL (ref 0–1.3)
MONOCYTES RELATIVE PERCENT: 6 %
NEUTROPHILS ABSOLUTE: 6.8 K/UL (ref 1.7–7.7)
NEUTROPHILS RELATIVE PERCENT: 67.7 %
PDW BLD-RTO: 15.9 % (ref 12.4–15.4)
PERFORMED ON: ABNORMAL
PHOSPHORUS: 3 MG/DL (ref 2.5–4.9)
PLATELET # BLD: 361 K/UL (ref 135–450)
PMV BLD AUTO: 7.3 FL (ref 5–10.5)
POTASSIUM SERPL-SCNC: 4.1 MMOL/L (ref 3.5–5.1)
RBC # BLD: 3.2 M/UL (ref 4.2–5.9)
SODIUM BLD-SCNC: 133 MMOL/L (ref 136–145)
WBC # BLD: 10.1 K/UL (ref 4–11)

## 2019-12-08 PROCEDURE — 6370000000 HC RX 637 (ALT 250 FOR IP): Performed by: NURSE PRACTITIONER

## 2019-12-08 PROCEDURE — 6360000002 HC RX W HCPCS: Performed by: SURGERY

## 2019-12-08 PROCEDURE — 6370000000 HC RX 637 (ALT 250 FOR IP): Performed by: INTERNAL MEDICINE

## 2019-12-08 PROCEDURE — 6370000000 HC RX 637 (ALT 250 FOR IP): Performed by: SURGERY

## 2019-12-08 PROCEDURE — 2580000003 HC RX 258: Performed by: INTERNAL MEDICINE

## 2019-12-08 PROCEDURE — 80069 RENAL FUNCTION PANEL: CPT

## 2019-12-08 PROCEDURE — 6360000002 HC RX W HCPCS: Performed by: INTERNAL MEDICINE

## 2019-12-08 PROCEDURE — 2060000000 HC ICU INTERMEDIATE R&B

## 2019-12-08 PROCEDURE — 85025 COMPLETE CBC W/AUTO DIFF WBC: CPT

## 2019-12-08 PROCEDURE — 83735 ASSAY OF MAGNESIUM: CPT

## 2019-12-08 RX ORDER — LACTOBACILLUS RHAMNOSUS GG 10B CELL
1 CAPSULE ORAL 2 TIMES DAILY WITH MEALS
Qty: 60 CAPSULE | Refills: 0 | Status: SHIPPED | OUTPATIENT
Start: 2019-12-08 | End: 2020-01-07

## 2019-12-08 RX ADMIN — LISINOPRIL 40 MG: 40 TABLET ORAL at 08:47

## 2019-12-08 RX ADMIN — INSULIN LISPRO 2 UNITS: 100 INJECTION, SOLUTION INTRAVENOUS; SUBCUTANEOUS at 17:17

## 2019-12-08 RX ADMIN — CHLORTHALIDONE 50 MG: 25 TABLET ORAL at 08:27

## 2019-12-08 RX ADMIN — METOPROLOL SUCCINATE 25 MG: 25 TABLET, EXTENDED RELEASE ORAL at 08:46

## 2019-12-08 RX ADMIN — RIVAROXABAN 2.5 MG: 2.5 TABLET, FILM COATED ORAL at 22:38

## 2019-12-08 RX ADMIN — LINEZOLID 600 MG: 600 INJECTION, SOLUTION INTRAVENOUS at 05:44

## 2019-12-08 RX ADMIN — INSULIN LISPRO 5 UNITS: 100 INJECTION, SOLUTION INTRAVENOUS; SUBCUTANEOUS at 16:00

## 2019-12-08 RX ADMIN — ATORVASTATIN CALCIUM 40 MG: 40 TABLET, FILM COATED ORAL at 08:47

## 2019-12-08 RX ADMIN — INSULIN LISPRO 5 UNITS: 100 INJECTION, SOLUTION INTRAVENOUS; SUBCUTANEOUS at 12:36

## 2019-12-08 RX ADMIN — SODIUM CHLORIDE, PRESERVATIVE FREE 10 ML: 5 INJECTION INTRAVENOUS at 22:38

## 2019-12-08 RX ADMIN — INSULIN GLARGINE 10 UNITS: 100 INJECTION, SOLUTION SUBCUTANEOUS at 22:38

## 2019-12-08 RX ADMIN — SODIUM CHLORIDE, PRESERVATIVE FREE 10 ML: 5 INJECTION INTRAVENOUS at 08:46

## 2019-12-08 RX ADMIN — METRONIDAZOLE 500 MG: 500 TABLET ORAL at 12:36

## 2019-12-08 RX ADMIN — Medication 1 CAPSULE: at 17:17

## 2019-12-08 RX ADMIN — INSULIN LISPRO 1 UNITS: 100 INJECTION, SOLUTION INTRAVENOUS; SUBCUTANEOUS at 22:39

## 2019-12-08 RX ADMIN — FAMOTIDINE 20 MG: 20 TABLET ORAL at 08:46

## 2019-12-08 RX ADMIN — METRONIDAZOLE 500 MG: 500 TABLET ORAL at 05:44

## 2019-12-08 RX ADMIN — CLOPIDOGREL BISULFATE 75 MG: 75 TABLET ORAL at 08:46

## 2019-12-08 RX ADMIN — CEFTRIAXONE 2 G: 2 INJECTION, POWDER, FOR SOLUTION INTRAMUSCULAR; INTRAVENOUS at 12:36

## 2019-12-08 RX ADMIN — INSULIN LISPRO 4 UNITS: 100 INJECTION, SOLUTION INTRAVENOUS; SUBCUTANEOUS at 09:42

## 2019-12-08 RX ADMIN — INSULIN GLARGINE 10 UNITS: 100 INJECTION, SOLUTION SUBCUTANEOUS at 08:53

## 2019-12-08 RX ADMIN — INSULIN LISPRO 5 UNITS: 100 INJECTION, SOLUTION INTRAVENOUS; SUBCUTANEOUS at 08:54

## 2019-12-08 RX ADMIN — INSULIN LISPRO 4 UNITS: 100 INJECTION, SOLUTION INTRAVENOUS; SUBCUTANEOUS at 12:36

## 2019-12-08 RX ADMIN — METRONIDAZOLE 500 MG: 500 TABLET ORAL at 22:38

## 2019-12-08 RX ADMIN — ASPIRIN 81 MG: 81 TABLET, COATED ORAL at 08:47

## 2019-12-08 RX ADMIN — Medication 1 CAPSULE: at 08:47

## 2019-12-08 RX ADMIN — LINEZOLID 600 MG: 600 INJECTION, SOLUTION INTRAVENOUS at 16:00

## 2019-12-08 RX ADMIN — CYANOCOBALAMIN TAB 1000 MCG 1000 MCG: 1000 TAB at 08:47

## 2019-12-08 RX ADMIN — RIVAROXABAN 2.5 MG: 2.5 TABLET, FILM COATED ORAL at 08:53

## 2019-12-08 RX ADMIN — FAMOTIDINE 20 MG: 20 TABLET ORAL at 22:38

## 2019-12-08 ASSESSMENT — PAIN SCALES - GENERAL
PAINLEVEL_OUTOF10: 0
PAINLEVEL_OUTOF10: 0

## 2019-12-09 LAB
ALBUMIN SERPL-MCNC: 2.5 G/DL (ref 3.4–5)
ANION GAP SERPL CALCULATED.3IONS-SCNC: 11 MMOL/L (ref 3–16)
BASOPHILS ABSOLUTE: 0.1 K/UL (ref 0–0.2)
BASOPHILS RELATIVE PERCENT: 1 %
BUN BLDV-MCNC: 17 MG/DL (ref 7–20)
CALCIUM SERPL-MCNC: 8.8 MG/DL (ref 8.3–10.6)
CHLORIDE BLD-SCNC: 97 MMOL/L (ref 99–110)
CO2: 26 MMOL/L (ref 21–32)
CREAT SERPL-MCNC: 0.8 MG/DL (ref 0.9–1.3)
EOSINOPHILS ABSOLUTE: 0.1 K/UL (ref 0–0.6)
EOSINOPHILS RELATIVE PERCENT: 1.4 %
GFR AFRICAN AMERICAN: >60
GFR NON-AFRICAN AMERICAN: >60
GLUCOSE BLD-MCNC: 136 MG/DL (ref 70–99)
GLUCOSE BLD-MCNC: 151 MG/DL (ref 70–99)
GLUCOSE BLD-MCNC: 194 MG/DL (ref 70–99)
GLUCOSE BLD-MCNC: 198 MG/DL (ref 70–99)
GLUCOSE BLD-MCNC: 201 MG/DL (ref 70–99)
HCT VFR BLD CALC: 34.1 % (ref 40.5–52.5)
HEMOGLOBIN: 10.8 G/DL (ref 13.5–17.5)
LYMPHOCYTES ABSOLUTE: 2.4 K/UL (ref 1–5.1)
LYMPHOCYTES RELATIVE PERCENT: 32.9 %
MCH RBC QN AUTO: 26 PG (ref 26–34)
MCHC RBC AUTO-ENTMCNC: 31.7 G/DL (ref 31–36)
MCV RBC AUTO: 82.2 FL (ref 80–100)
MONOCYTES ABSOLUTE: 0.3 K/UL (ref 0–1.3)
MONOCYTES RELATIVE PERCENT: 4.7 %
NEUTROPHILS ABSOLUTE: 4.4 K/UL (ref 1.7–7.7)
NEUTROPHILS RELATIVE PERCENT: 60 %
PDW BLD-RTO: 15.8 % (ref 12.4–15.4)
PERFORMED ON: ABNORMAL
PHOSPHORUS: 3.3 MG/DL (ref 2.5–4.9)
PLATELET # BLD: 293 K/UL (ref 135–450)
PMV BLD AUTO: 7.3 FL (ref 5–10.5)
POTASSIUM SERPL-SCNC: 3.7 MMOL/L (ref 3.5–5.1)
RBC # BLD: 4.15 M/UL (ref 4.2–5.9)
SODIUM BLD-SCNC: 134 MMOL/L (ref 136–145)
WBC # BLD: 7.3 K/UL (ref 4–11)

## 2019-12-09 PROCEDURE — 97535 SELF CARE MNGMENT TRAINING: CPT

## 2019-12-09 PROCEDURE — 97530 THERAPEUTIC ACTIVITIES: CPT | Performed by: PHYSICAL THERAPIST

## 2019-12-09 PROCEDURE — 2060000000 HC ICU INTERMEDIATE R&B

## 2019-12-09 PROCEDURE — 6370000000 HC RX 637 (ALT 250 FOR IP): Performed by: SURGERY

## 2019-12-09 PROCEDURE — 6370000000 HC RX 637 (ALT 250 FOR IP): Performed by: NURSE PRACTITIONER

## 2019-12-09 PROCEDURE — 97530 THERAPEUTIC ACTIVITIES: CPT

## 2019-12-09 PROCEDURE — 6370000000 HC RX 637 (ALT 250 FOR IP): Performed by: INTERNAL MEDICINE

## 2019-12-09 PROCEDURE — 99024 POSTOP FOLLOW-UP VISIT: CPT | Performed by: SURGERY

## 2019-12-09 PROCEDURE — 94760 N-INVAS EAR/PLS OXIMETRY 1: CPT

## 2019-12-09 PROCEDURE — 2580000003 HC RX 258: Performed by: INTERNAL MEDICINE

## 2019-12-09 PROCEDURE — 6360000002 HC RX W HCPCS: Performed by: INTERNAL MEDICINE

## 2019-12-09 PROCEDURE — 85025 COMPLETE CBC W/AUTO DIFF WBC: CPT

## 2019-12-09 PROCEDURE — 99232 SBSQ HOSP IP/OBS MODERATE 35: CPT | Performed by: INTERNAL MEDICINE

## 2019-12-09 PROCEDURE — 80069 RENAL FUNCTION PANEL: CPT

## 2019-12-09 PROCEDURE — 6360000002 HC RX W HCPCS: Performed by: SURGERY

## 2019-12-09 RX ORDER — DOXYCYCLINE HYCLATE 100 MG
100 TABLET ORAL EVERY 12 HOURS SCHEDULED
Status: DISCONTINUED | OUTPATIENT
Start: 2019-12-09 | End: 2019-12-11 | Stop reason: HOSPADM

## 2019-12-09 RX ORDER — INSULIN GLARGINE 100 [IU]/ML
12 INJECTION, SOLUTION SUBCUTANEOUS 2 TIMES DAILY
Status: DISCONTINUED | OUTPATIENT
Start: 2019-12-09 | End: 2019-12-11 | Stop reason: HOSPADM

## 2019-12-09 RX ADMIN — SODIUM CHLORIDE, PRESERVATIVE FREE 10 ML: 5 INJECTION INTRAVENOUS at 08:30

## 2019-12-09 RX ADMIN — ASPIRIN 81 MG: 81 TABLET, COATED ORAL at 08:28

## 2019-12-09 RX ADMIN — OXYCODONE HYDROCHLORIDE AND ACETAMINOPHEN 2 TABLET: 5; 325 TABLET ORAL at 17:51

## 2019-12-09 RX ADMIN — INSULIN GLARGINE 10 UNITS: 100 INJECTION, SOLUTION SUBCUTANEOUS at 09:01

## 2019-12-09 RX ADMIN — INSULIN LISPRO 5 UNITS: 100 INJECTION, SOLUTION INTRAVENOUS; SUBCUTANEOUS at 17:52

## 2019-12-09 RX ADMIN — LISINOPRIL 40 MG: 40 TABLET ORAL at 08:30

## 2019-12-09 RX ADMIN — METOPROLOL SUCCINATE 25 MG: 25 TABLET, EXTENDED RELEASE ORAL at 08:29

## 2019-12-09 RX ADMIN — METRONIDAZOLE 500 MG: 500 TABLET ORAL at 21:01

## 2019-12-09 RX ADMIN — SODIUM CHLORIDE, PRESERVATIVE FREE 10 ML: 5 INJECTION INTRAVENOUS at 21:02

## 2019-12-09 RX ADMIN — INSULIN LISPRO 2 UNITS: 100 INJECTION, SOLUTION INTRAVENOUS; SUBCUTANEOUS at 09:02

## 2019-12-09 RX ADMIN — FAMOTIDINE 20 MG: 20 TABLET ORAL at 08:29

## 2019-12-09 RX ADMIN — LINEZOLID 600 MG: 600 INJECTION, SOLUTION INTRAVENOUS at 15:33

## 2019-12-09 RX ADMIN — INSULIN LISPRO 4 UNITS: 100 INJECTION, SOLUTION INTRAVENOUS; SUBCUTANEOUS at 17:52

## 2019-12-09 RX ADMIN — Medication 1 CAPSULE: at 17:51

## 2019-12-09 RX ADMIN — RIVAROXABAN 2.5 MG: 2.5 TABLET, FILM COATED ORAL at 08:30

## 2019-12-09 RX ADMIN — INSULIN LISPRO 1 UNITS: 100 INJECTION, SOLUTION INTRAVENOUS; SUBCUTANEOUS at 21:02

## 2019-12-09 RX ADMIN — CYANOCOBALAMIN TAB 1000 MCG 1000 MCG: 1000 TAB at 08:30

## 2019-12-09 RX ADMIN — INSULIN GLARGINE 12 UNITS: 100 INJECTION, SOLUTION SUBCUTANEOUS at 21:02

## 2019-12-09 RX ADMIN — DOXYCYCLINE HYCLATE 100 MG: 100 TABLET, COATED ORAL at 21:01

## 2019-12-09 RX ADMIN — ATORVASTATIN CALCIUM 40 MG: 40 TABLET, FILM COATED ORAL at 08:28

## 2019-12-09 RX ADMIN — Medication 1 CAPSULE: at 08:29

## 2019-12-09 RX ADMIN — CHLORTHALIDONE 50 MG: 25 TABLET ORAL at 08:29

## 2019-12-09 RX ADMIN — CEFTRIAXONE 2 G: 2 INJECTION, POWDER, FOR SOLUTION INTRAMUSCULAR; INTRAVENOUS at 13:17

## 2019-12-09 RX ADMIN — INSULIN LISPRO 2 UNITS: 100 INJECTION, SOLUTION INTRAVENOUS; SUBCUTANEOUS at 13:16

## 2019-12-09 RX ADMIN — LINEZOLID 600 MG: 600 INJECTION, SOLUTION INTRAVENOUS at 05:37

## 2019-12-09 RX ADMIN — CLOPIDOGREL BISULFATE 75 MG: 75 TABLET ORAL at 08:29

## 2019-12-09 RX ADMIN — FAMOTIDINE 20 MG: 20 TABLET ORAL at 21:01

## 2019-12-09 RX ADMIN — INSULIN LISPRO 5 UNITS: 100 INJECTION, SOLUTION INTRAVENOUS; SUBCUTANEOUS at 13:17

## 2019-12-09 RX ADMIN — INSULIN LISPRO 5 UNITS: 100 INJECTION, SOLUTION INTRAVENOUS; SUBCUTANEOUS at 09:02

## 2019-12-09 RX ADMIN — OXYCODONE HYDROCHLORIDE AND ACETAMINOPHEN 2 TABLET: 5; 325 TABLET ORAL at 00:48

## 2019-12-09 RX ADMIN — RIVAROXABAN 2.5 MG: 2.5 TABLET, FILM COATED ORAL at 21:02

## 2019-12-09 RX ADMIN — METRONIDAZOLE 500 MG: 500 TABLET ORAL at 05:38

## 2019-12-09 RX ADMIN — METRONIDAZOLE 500 MG: 500 TABLET ORAL at 14:01

## 2019-12-09 ASSESSMENT — PAIN DESCRIPTION - ORIENTATION
ORIENTATION: RIGHT
ORIENTATION: RIGHT

## 2019-12-09 ASSESSMENT — PAIN DESCRIPTION - PAIN TYPE
TYPE: ACUTE PAIN
TYPE: ACUTE PAIN

## 2019-12-09 ASSESSMENT — PAIN SCALES - GENERAL
PAINLEVEL_OUTOF10: 0
PAINLEVEL_OUTOF10: 8
PAINLEVEL_OUTOF10: 0
PAINLEVEL_OUTOF10: 8
PAINLEVEL_OUTOF10: 0
PAINLEVEL_OUTOF10: 8

## 2019-12-09 ASSESSMENT — PAIN DESCRIPTION - LOCATION
LOCATION: KNEE
LOCATION: LEG

## 2019-12-09 ASSESSMENT — PAIN DESCRIPTION - FREQUENCY
FREQUENCY: CONTINUOUS
FREQUENCY: CONTINUOUS

## 2019-12-09 ASSESSMENT — PAIN DESCRIPTION - DESCRIPTORS
DESCRIPTORS: TIGHTNESS;SQUEEZING
DESCRIPTORS: THROBBING;STABBING

## 2019-12-09 ASSESSMENT — PAIN - FUNCTIONAL ASSESSMENT: PAIN_FUNCTIONAL_ASSESSMENT: ACTIVITIES ARE NOT PREVENTED

## 2019-12-09 ASSESSMENT — PAIN DESCRIPTION - PROGRESSION
CLINICAL_PROGRESSION: GRADUALLY WORSENING
CLINICAL_PROGRESSION: NOT CHANGED

## 2019-12-09 ASSESSMENT — PAIN DESCRIPTION - ONSET
ONSET: ON-GOING
ONSET: ON-GOING

## 2019-12-10 LAB
ALBUMIN SERPL-MCNC: 2.5 G/DL (ref 3.4–5)
ANION GAP SERPL CALCULATED.3IONS-SCNC: 12 MMOL/L (ref 3–16)
BUN BLDV-MCNC: 18 MG/DL (ref 7–20)
CALCIUM SERPL-MCNC: 9.2 MG/DL (ref 8.3–10.6)
CHLORIDE BLD-SCNC: 99 MMOL/L (ref 99–110)
CO2: 26 MMOL/L (ref 21–32)
CREAT SERPL-MCNC: 0.8 MG/DL (ref 0.9–1.3)
GFR AFRICAN AMERICAN: >60
GFR NON-AFRICAN AMERICAN: >60
GLUCOSE BLD-MCNC: 116 MG/DL (ref 70–99)
GLUCOSE BLD-MCNC: 121 MG/DL (ref 70–99)
GLUCOSE BLD-MCNC: 133 MG/DL (ref 70–99)
GLUCOSE BLD-MCNC: 137 MG/DL (ref 70–99)
GLUCOSE BLD-MCNC: 169 MG/DL (ref 70–99)
PERFORMED ON: ABNORMAL
PHOSPHORUS: 3.6 MG/DL (ref 2.5–4.9)
POTASSIUM SERPL-SCNC: 3.8 MMOL/L (ref 3.5–5.1)
SODIUM BLD-SCNC: 137 MMOL/L (ref 136–145)

## 2019-12-10 PROCEDURE — 6370000000 HC RX 637 (ALT 250 FOR IP): Performed by: NURSE PRACTITIONER

## 2019-12-10 PROCEDURE — 2580000003 HC RX 258: Performed by: INTERNAL MEDICINE

## 2019-12-10 PROCEDURE — 2060000000 HC ICU INTERMEDIATE R&B

## 2019-12-10 PROCEDURE — 6370000000 HC RX 637 (ALT 250 FOR IP): Performed by: INTERNAL MEDICINE

## 2019-12-10 PROCEDURE — 6370000000 HC RX 637 (ALT 250 FOR IP): Performed by: SURGERY

## 2019-12-10 PROCEDURE — 80069 RENAL FUNCTION PANEL: CPT

## 2019-12-10 PROCEDURE — 99024 POSTOP FOLLOW-UP VISIT: CPT | Performed by: SURGERY

## 2019-12-10 PROCEDURE — 94760 N-INVAS EAR/PLS OXIMETRY 1: CPT

## 2019-12-10 PROCEDURE — 97530 THERAPEUTIC ACTIVITIES: CPT | Performed by: PHYSICAL THERAPIST

## 2019-12-10 RX ADMIN — INSULIN LISPRO 8 UNITS: 100 INJECTION, SOLUTION INTRAVENOUS; SUBCUTANEOUS at 13:39

## 2019-12-10 RX ADMIN — FAMOTIDINE 20 MG: 20 TABLET ORAL at 10:01

## 2019-12-10 RX ADMIN — METRONIDAZOLE 500 MG: 500 TABLET ORAL at 04:49

## 2019-12-10 RX ADMIN — DOXYCYCLINE HYCLATE 100 MG: 100 TABLET, COATED ORAL at 10:01

## 2019-12-10 RX ADMIN — INSULIN LISPRO 2 UNITS: 100 INJECTION, SOLUTION INTRAVENOUS; SUBCUTANEOUS at 13:39

## 2019-12-10 RX ADMIN — METRONIDAZOLE 500 MG: 500 TABLET ORAL at 13:37

## 2019-12-10 RX ADMIN — CLOPIDOGREL BISULFATE 75 MG: 75 TABLET ORAL at 10:01

## 2019-12-10 RX ADMIN — OXYCODONE HYDROCHLORIDE AND ACETAMINOPHEN 2 TABLET: 5; 325 TABLET ORAL at 22:44

## 2019-12-10 RX ADMIN — DOXYCYCLINE HYCLATE 100 MG: 100 TABLET, COATED ORAL at 20:58

## 2019-12-10 RX ADMIN — INSULIN GLARGINE 12 UNITS: 100 INJECTION, SOLUTION SUBCUTANEOUS at 20:59

## 2019-12-10 RX ADMIN — RIVAROXABAN 2.5 MG: 2.5 TABLET, FILM COATED ORAL at 21:02

## 2019-12-10 RX ADMIN — Medication 1 CAPSULE: at 18:37

## 2019-12-10 RX ADMIN — CHLORTHALIDONE 50 MG: 25 TABLET ORAL at 10:00

## 2019-12-10 RX ADMIN — INSULIN LISPRO 8 UNITS: 100 INJECTION, SOLUTION INTRAVENOUS; SUBCUTANEOUS at 18:37

## 2019-12-10 RX ADMIN — FAMOTIDINE 20 MG: 20 TABLET ORAL at 20:58

## 2019-12-10 RX ADMIN — INSULIN GLARGINE 12 UNITS: 100 INJECTION, SOLUTION SUBCUTANEOUS at 10:01

## 2019-12-10 RX ADMIN — ATORVASTATIN CALCIUM 40 MG: 40 TABLET, FILM COATED ORAL at 10:00

## 2019-12-10 RX ADMIN — INSULIN LISPRO 8 UNITS: 100 INJECTION, SOLUTION INTRAVENOUS; SUBCUTANEOUS at 10:15

## 2019-12-10 RX ADMIN — Medication 1 CAPSULE: at 11:23

## 2019-12-10 RX ADMIN — CYANOCOBALAMIN TAB 1000 MCG 1000 MCG: 1000 TAB at 10:01

## 2019-12-10 RX ADMIN — METRONIDAZOLE 500 MG: 500 TABLET ORAL at 20:57

## 2019-12-10 RX ADMIN — LISINOPRIL 40 MG: 40 TABLET ORAL at 11:24

## 2019-12-10 RX ADMIN — RIVAROXABAN 2.5 MG: 2.5 TABLET, FILM COATED ORAL at 10:01

## 2019-12-10 RX ADMIN — METOPROLOL SUCCINATE 25 MG: 25 TABLET, EXTENDED RELEASE ORAL at 11:24

## 2019-12-10 RX ADMIN — ASPIRIN 81 MG: 81 TABLET, COATED ORAL at 10:00

## 2019-12-10 ASSESSMENT — PAIN SCALES - GENERAL
PAINLEVEL_OUTOF10: 0
PAINLEVEL_OUTOF10: 0
PAINLEVEL_OUTOF10: 7
PAINLEVEL_OUTOF10: 7
PAINLEVEL_OUTOF10: 2
PAINLEVEL_OUTOF10: 7
PAINLEVEL_OUTOF10: 0

## 2019-12-10 ASSESSMENT — PAIN DESCRIPTION - ONSET: ONSET: ON-GOING

## 2019-12-10 ASSESSMENT — PAIN - FUNCTIONAL ASSESSMENT: PAIN_FUNCTIONAL_ASSESSMENT: ACTIVITIES ARE NOT PREVENTED

## 2019-12-10 ASSESSMENT — PAIN DESCRIPTION - PAIN TYPE: TYPE: ACUTE PAIN

## 2019-12-11 VITALS
OXYGEN SATURATION: 95 % | HEART RATE: 78 BPM | TEMPERATURE: 97.9 F | DIASTOLIC BLOOD PRESSURE: 74 MMHG | SYSTOLIC BLOOD PRESSURE: 155 MMHG | BODY MASS INDEX: 40.89 KG/M2 | WEIGHT: 254.41 LBS | RESPIRATION RATE: 16 BRPM | HEIGHT: 66 IN

## 2019-12-11 LAB
GLUCOSE BLD-MCNC: 123 MG/DL (ref 70–99)
GLUCOSE BLD-MCNC: 146 MG/DL (ref 70–99)
GLUCOSE BLD-MCNC: 166 MG/DL (ref 70–99)
PERFORMED ON: ABNORMAL

## 2019-12-11 PROCEDURE — 6370000000 HC RX 637 (ALT 250 FOR IP): Performed by: INTERNAL MEDICINE

## 2019-12-11 PROCEDURE — 99024 POSTOP FOLLOW-UP VISIT: CPT | Performed by: SURGERY

## 2019-12-11 PROCEDURE — 6370000000 HC RX 637 (ALT 250 FOR IP): Performed by: SURGERY

## 2019-12-11 PROCEDURE — APPNB30 APP NON BILLABLE TIME 0-30 MINS: Performed by: NURSE PRACTITIONER

## 2019-12-11 PROCEDURE — 6370000000 HC RX 637 (ALT 250 FOR IP): Performed by: NURSE PRACTITIONER

## 2019-12-11 PROCEDURE — APPSS30 APP SPLIT SHARED TIME 16-30 MINUTES: Performed by: NURSE PRACTITIONER

## 2019-12-11 RX ORDER — OXYCODONE HYDROCHLORIDE AND ACETAMINOPHEN 5; 325 MG/1; MG/1
2 TABLET ORAL EVERY 4 HOURS PRN
Qty: 12 TABLET | Refills: 0 | Status: SHIPPED | OUTPATIENT
Start: 2019-12-11 | End: 2019-12-14

## 2019-12-11 RX ORDER — DOXYCYCLINE HYCLATE 100 MG
100 TABLET ORAL EVERY 12 HOURS SCHEDULED
Qty: 16 TABLET | Refills: 0
Start: 2019-12-11 | End: 2019-12-19

## 2019-12-11 RX ORDER — INSULIN GLARGINE 100 [IU]/ML
12 INJECTION, SOLUTION SUBCUTANEOUS 2 TIMES DAILY
Qty: 1 VIAL | Refills: 0
Start: 2019-12-11

## 2019-12-11 RX ORDER — METRONIDAZOLE 500 MG/1
500 TABLET ORAL EVERY 8 HOURS SCHEDULED
Qty: 6 TABLET | Refills: 0
Start: 2019-12-11 | End: 2019-12-13

## 2019-12-11 RX ADMIN — LISINOPRIL 40 MG: 40 TABLET ORAL at 08:24

## 2019-12-11 RX ADMIN — DOXYCYCLINE HYCLATE 100 MG: 100 TABLET, COATED ORAL at 08:24

## 2019-12-11 RX ADMIN — METRONIDAZOLE 500 MG: 500 TABLET ORAL at 05:16

## 2019-12-11 RX ADMIN — METRONIDAZOLE 500 MG: 500 TABLET ORAL at 15:44

## 2019-12-11 RX ADMIN — FAMOTIDINE 20 MG: 20 TABLET ORAL at 08:24

## 2019-12-11 RX ADMIN — RIVAROXABAN 2.5 MG: 2.5 TABLET, FILM COATED ORAL at 11:47

## 2019-12-11 RX ADMIN — INSULIN LISPRO 2 UNITS: 100 INJECTION, SOLUTION INTRAVENOUS; SUBCUTANEOUS at 08:28

## 2019-12-11 RX ADMIN — METOPROLOL SUCCINATE 25 MG: 25 TABLET, EXTENDED RELEASE ORAL at 08:24

## 2019-12-11 RX ADMIN — INSULIN LISPRO 8 UNITS: 100 INJECTION, SOLUTION INTRAVENOUS; SUBCUTANEOUS at 17:40

## 2019-12-11 RX ADMIN — ATORVASTATIN CALCIUM 40 MG: 40 TABLET, FILM COATED ORAL at 08:24

## 2019-12-11 RX ADMIN — Medication 1 CAPSULE: at 17:41

## 2019-12-11 RX ADMIN — CHLORTHALIDONE 50 MG: 25 TABLET ORAL at 08:24

## 2019-12-11 RX ADMIN — ASPIRIN 81 MG: 81 TABLET, COATED ORAL at 08:24

## 2019-12-11 RX ADMIN — CYANOCOBALAMIN TAB 1000 MCG 1000 MCG: 1000 TAB at 08:24

## 2019-12-11 RX ADMIN — INSULIN LISPRO 8 UNITS: 100 INJECTION, SOLUTION INTRAVENOUS; SUBCUTANEOUS at 11:47

## 2019-12-11 RX ADMIN — Medication 1 CAPSULE: at 08:24

## 2019-12-11 RX ADMIN — INSULIN LISPRO 8 UNITS: 100 INJECTION, SOLUTION INTRAVENOUS; SUBCUTANEOUS at 08:25

## 2019-12-11 RX ADMIN — INSULIN GLARGINE 12 UNITS: 100 INJECTION, SOLUTION SUBCUTANEOUS at 08:24

## 2019-12-11 RX ADMIN — INSULIN LISPRO 2 UNITS: 100 INJECTION, SOLUTION INTRAVENOUS; SUBCUTANEOUS at 11:51

## 2019-12-11 RX ADMIN — CLOPIDOGREL BISULFATE 75 MG: 75 TABLET ORAL at 08:24

## 2019-12-11 ASSESSMENT — PAIN DESCRIPTION - FREQUENCY
FREQUENCY: CONTINUOUS
FREQUENCY: CONTINUOUS

## 2019-12-11 ASSESSMENT — PAIN DESCRIPTION - PROGRESSION
CLINICAL_PROGRESSION: NOT CHANGED
CLINICAL_PROGRESSION: GRADUALLY IMPROVING

## 2019-12-11 ASSESSMENT — PAIN DESCRIPTION - ONSET
ONSET: ON-GOING
ONSET: ON-GOING

## 2019-12-11 ASSESSMENT — PAIN DESCRIPTION - LOCATION
LOCATION: KNEE
LOCATION: KNEE

## 2019-12-11 ASSESSMENT — PAIN DESCRIPTION - PAIN TYPE
TYPE: ACUTE PAIN
TYPE: ACUTE PAIN

## 2019-12-11 ASSESSMENT — PAIN - FUNCTIONAL ASSESSMENT
PAIN_FUNCTIONAL_ASSESSMENT: ACTIVITIES ARE NOT PREVENTED
PAIN_FUNCTIONAL_ASSESSMENT: PREVENTS OR INTERFERES SOME ACTIVE ACTIVITIES AND ADLS

## 2019-12-11 ASSESSMENT — PAIN DESCRIPTION - ORIENTATION
ORIENTATION: RIGHT
ORIENTATION: RIGHT

## 2019-12-11 ASSESSMENT — PAIN SCALES - GENERAL
PAINLEVEL_OUTOF10: 0
PAINLEVEL_OUTOF10: 4
PAINLEVEL_OUTOF10: 8
PAINLEVEL_OUTOF10: 0

## 2019-12-11 ASSESSMENT — PAIN DESCRIPTION - DESCRIPTORS
DESCRIPTORS: DISCOMFORT
DESCRIPTORS: THROBBING

## 2019-12-18 ENCOUNTER — TELEPHONE (OUTPATIENT)
Dept: INFECTIOUS DISEASES | Age: 49
End: 2019-12-18

## 2019-12-20 ENCOUNTER — TELEPHONE (OUTPATIENT)
Dept: SURGERY | Age: 49
End: 2019-12-20

## 2019-12-20 DIAGNOSIS — Z89.511 S/P BKA (BELOW KNEE AMPUTATION), RIGHT (HCC): Primary | ICD-10-CM

## 2019-12-31 ENCOUNTER — TELEPHONE (OUTPATIENT)
Dept: SURGERY | Age: 49
End: 2019-12-31

## 2020-01-02 NOTE — TELEPHONE ENCOUNTER
Message left for Meño Chase- as he has insurance in [place], and lives in [place], I have no way of knowing what is covered, what is not. Can he go to [facility] wound care? And is this care for his amp? Wound that he was being seen for was removed with his amputation. Asked her to return call. Raheel Jeffries is scheduled for tomorrow with Dr. Dejuan Andrew.

## 2020-01-03 ENCOUNTER — OFFICE VISIT (OUTPATIENT)
Dept: SURGERY | Age: 50
End: 2020-01-03

## 2020-01-03 VITALS — DIASTOLIC BLOOD PRESSURE: 99 MMHG | HEART RATE: 95 BPM | SYSTOLIC BLOOD PRESSURE: 169 MMHG

## 2020-01-03 PROBLEM — S88.119A BELOW KNEE AMPUTATION (HCC): Status: ACTIVE | Noted: 2020-01-03

## 2020-01-03 PROCEDURE — 99024 POSTOP FOLLOW-UP VISIT: CPT | Performed by: SURGERY

## 2020-01-03 NOTE — PROGRESS NOTES
Daily Progress Note   Katelin De La Cruz MD      1/3/2020    Chief Complaint   Patient presents with    Post-Op Check     Patient here for 4 week S/P Right BKA- 12/4. HISTORY OF PRESENT ILLNESS:                The patient is a 47 y.o. male who presents with a post op visit for right BKA. He is having staples removed today. He is also here with Isaac from [employer] to be fitted for a .      Past Medical History:   Diagnosis Date    Angina effort     Arthritis     CAD (coronary artery disease)     Carotid stenosis     Diabetes mellitus with neurological manifestations, uncontrolled ([address]     Diabetic foot ulcer ([address] 3/13/2019    Diarrhea     DEAN (dyspnea on exertion) 1/27/2015    Hyperlipidemia     Hypertension     MRSA (methicillin resistant staph aureus) culture positive 11/21/2019    foot    Obesity     Type II or unspecified type diabetes mellitus without mention of complication, not stated as uncontrolled        Past Surgical History:   Procedure Laterality Date    CARDIAC CATHETERIZATION      CAROTID ENDARTERECTOMY Right 4-9-2015    CHOLECYSTECTOMY      FOOT DEBRIDEMENT Right 11/22/2019    RIGHT FOOT INCISION AND DRAINAGE performed by Mine Pendleton DPM at [address] [place] [address] Right 12/4/2019    RIGHT BELOW KNEE AMPUTATION performed by Phoenix Recinos MD at [place] [address] OFFICE/OUTPT [facility] Left 10/5/2018    AMPUTATION LEFT GREAT DIGIT performed by Mine Pendleton DPM at [place] [address] OFFICE/OUTPT [facility] Left 10/22/2018    INCISION AND DRAINAGE LEFT FOOT FIRST AND SECOND RAY AMPUTATION (DIGITS ONE, TWO AND METARSAL ONE AND TWO) performed by Mine Pendleton DPM at [place] [address] OFFICE/OUTPT [facility] Left 10/26/2018    LEFT FOOT WOUND DEBRIDEMENT WITH PRIMARY CLOSURE performed by Mine Pendleton DPM at [place] [address] OFFICE/OUTPT VISIT,PROCEDURE ONLY Left 11/2/2018    INCISION AND DRAINAGE WITH TRANSMETATARSAL (CULTURELLE) capsule, Take 1 capsule by mouth 2 times daily (with meals), Disp: 60 capsule, Rfl: 0    rivaroxaban (XARELTO) 2.5 MG TABS tablet, Take 1 tablet by mouth 2 times daily, Disp: 60 tablet, Rfl: 0    HUMALOG MIX 75/25 KWIKPEN (75-25) 100 UNIT/ML SUPN injection pen, Inject 38 Units into the skin 2 times daily (with meals) With breakfast and dinner, Disp: , Rfl: 2    chlorthalidone (HYGROTEN) 50 MG tablet, Take 1 tablet by mouth daily, Disp: , Rfl: 0    TRULICITY 1.5 JJ/6.6XK SOPN, INJECT 1.5 MG UNDER THE SKIN ONCE PER WEEK, Disp: 2 mL, Rfl: 3    blood glucose test strips (FREESTYLE LITE) strip, 1 each by In Vitro route 3 times daily As needed. , Disp: 100 each, Rfl: 11    atorvastatin (LIPITOR) 40 MG tablet, Take 1 tablet by mouth every morning, Disp: 30 tablet, Rfl: 0    clopidogrel (PLAVIX) 75 MG tablet, Take 1 tablet by mouth daily, Disp: 30 tablet, Rfl: 3    metoprolol succinate (TOPROL XL) 25 MG extended release tablet, Take 25 mg by mouth daily, Disp: , Rfl:     vitamin B-12 (CYANOCOBALAMIN) 1000 MCG tablet, Take 1,000 mcg by mouth daily, Disp: , Rfl: 2    ranitidine (ZANTAC) 150 MG tablet, Take 150 mg by mouth 2 times daily, Disp: , Rfl: 1    Multiple Vitamins-Minerals (THERAPEUTIC MULTIVITAMIN-MINERALS) tablet, Take 1 tablet by mouth daily, Disp: , Rfl:     Blood Glucose Monitoring Suppl (FREESTYLE LITE) INEZ, 1 Device by Does not apply route 3 times daily (before meals), Disp: 1 Device, Rfl: 0    FREESTYLE LANCETS MISC, 1 each by Does not apply route daily, Disp: 100 each, Rfl: 3    aspirin (ASPIRIN LOW DOSE) 81 MG EC tablet, Take 1 tablet by mouth daily, Disp: 30 tablet, Rfl: 0    Insulin Pen Needle (B-D UF III MINI PEN NEEDLES) 31G X 5 MM MISC, 1 each by Does not apply route 4 times daily (before meals and nightly), Disp: 150 each, Rfl: 6    lisinopril (PRINIVIL;ZESTRIL) 40 MG tablet, Take 40 mg by mouth every morning , Disp: , Rfl:     Patient has no known allergies.     Vitals:

## 2020-01-03 NOTE — PATIENT INSTRUCTIONS
Mr. Aquiles Don is healing well so far, and is going to be fitted for a  by Shiva Shelley from [employer]. Mr. Aquiles Don can take the cast off to sleep, but MUST put it back on when he awakens. He can return as needed.

## 2020-01-07 ENCOUNTER — HOSPITAL ENCOUNTER (OUTPATIENT)
Dept: PHYSICAL THERAPY | Age: 50
Setting detail: THERAPIES SERIES
Discharge: HOME | End: 2020-01-07
Payer: COMMERCIAL

## 2020-01-09 ENCOUNTER — APPOINTMENT (OUTPATIENT)
Dept: PHYSICAL THERAPY | Age: 50
End: 2020-01-09
Payer: COMMERCIAL

## 2020-01-14 ENCOUNTER — APPOINTMENT (OUTPATIENT)
Dept: PHYSICAL THERAPY | Age: 50
End: 2020-01-14
Payer: COMMERCIAL

## 2020-01-15 ENCOUNTER — HOSPITAL ENCOUNTER (OUTPATIENT)
Dept: PHYSICAL THERAPY | Age: 50
Setting detail: THERAPIES SERIES
Discharge: HOME | End: 2020-01-15
Payer: COMMERCIAL

## 2020-01-15 PROCEDURE — 97530 THERAPEUTIC ACTIVITIES: CPT | Performed by: CHIROPRACTOR

## 2020-01-15 PROCEDURE — 97161 PT EVAL LOW COMPLEX 20 MIN: CPT | Performed by: CHIROPRACTOR

## 2020-01-15 NOTE — FLOWSHEET NOTE
activities to restore or maintain balance, coordination, kinesthetic sense, posture, motor skill, proprioception for self-care, mobility, lifting, and ambulation. Therapeutic Activities:    [x] (28460) Provided verbal/tactile cueing to address functional limitations related to loss of mobility, strength, balance, and coordination. Gait Training:  [] (96963) Provided training and instruction to the patient for proper postural muscle recruitment and positioning with ambulation re-education     Home Exercise Program:    [] (27874) Reviewed/Progressed HEP activities related to strengthening, flexibility, endurance, ROM for functional self-care, mobility, lifting and ambulation   [] (30582) Reviewed/Progressed HEP activities related to improving balance, coordination, kinesthetic sense, posture, motor skill, proprioception for self-care, mobility, lifting, and ambulation      Manual Treatments:  MFR / STM / Oscillations-Mobs:  G-I, II, III, IV / Manipulation / MLD  [] (85106) Provided manual therapy to mobilize  soft tissue/joints/fluid for the purpose of modulating pain, promoting relaxation, increasing ROM, reducing/eliminating soft tissue swelling/inflammation/restriction, improving soft tissue extensibility and allowing for proper ROM for normal function with self- care, mobility, lifting and ambulation.         Timed Code Treatment Minutes: 30   Total Treatment Minutes: 45     [x] EVAL (LOW) 48277   [] EVAL (MOD) 81540   [] EVAL (HIGH) 83780   [] RE-EVAL   [] TE (39270) x     [] Aquatic (27909) x  [] NMR (16156)   x  [] Aquatic Group (28292) x  [] Manual (81550) x    [] Ultrasound (39495) x  [x] TA (14014) x2  [] Mech Traction (88309)  [] Ionto (79714)           [] ES (un) (89495):   [] Vasopump (62586) [] Other:      Assessment  [x] Patient tolerated treatment well [] Patient limited by fatigue  [] Patient limited by pain  [] Patient limited by other medical complications  [] Other:     Prognosis: [x]

## 2020-01-15 NOTE — PROGRESS NOTES
Physical Therapy  Initial Assessment  Date: 1/15/2020  Patient Name: Mara Pickering  MRN: [id number]  : 1970          Restrictions  Restrictions/Precautions  Restrictions/Precautions: Fall Risk(Low risk of falling during transfers. Not ambulatory at this point)    Subjective   General  Chart Reviewed: Yes  Patient assessed for rehabilitation services?: Yes  Additional Pertinent Hx: Arthritis, obesity, L mid-foot amputauion (wearing AFO) 2018, CAD, DM, HTN  Family / Caregiver Present: No  Referring Practitioner: Dr. Monik Leal  Referral Date : 19  Diagnosis: R BKA  PT Visit Information  Onset Date: (R amputation 19)  [facility] [place]  Total # of Visits Approved: 12  Total # of Visits to Date: 1  Subjective  Subjective: No c/o pain.  Waiting to be measured for prosthesis  Pain Screening  Patient Currently in Pain: (0/10)       Objective     Observation/Palpation  Posture: Fair  Edema: (Wearing stump )    PROM RLE (degrees)  RLE PROM: (Knee 0-110)  PROM LLE (degrees)  LLE PROM: (Knee 0-110)    Strength RLE  Strength RLE: WFL  Strength LLE  Strength LLE: WFL  Strength RUE  Strength RUE: WFL  Strength LUE  Strength LUE: WFL        Sensation  Overall Sensation Status: WFL     Transfers  Comment: Independent with transfers up/down from sitting       Ambulation  Ambulation?: (States that he was amb with a cane for the last year prior to his R BKA)                            Assessment   Conditions Requiring Skilled Therapeutic Intervention  Body structures, Functions, Activity limitations: Decreased functional mobility   Assessment: Prior level of function: Independent amb  Prognosis: Good  Decision Making: Low Complexity  REQUIRES PT FOLLOW UP: Yes  Activity Tolerance  Activity Tolerance: Patient Tolerated treatment well         Plan   Plan  Times per week: 2x/wk x 6-8 weeks  Current Treatment Recommendations: Home Exercise Program, Strengthening, Gait Training    G-Code OutComes Score  LEFS Total Score: 5 (01/15/20 5662)                                                        Goals  Short term goals  Time Frame for Short term goals: 4 weeks  Short term goal 1: Be independent with home   Short term goal 2: Begin gait training once receiving Prosthesis (with rolling walker)  Long term goals  Time Frame for Long term goals : 8 weeks  Long term goal 1: Amb with  prosthesis and a cane  Long term goal 2: Independent with up/down stairs  Patient Goals   Patient goals :  \"Be able to walk\"             Lyndsey SOLANO#[id number]

## 2020-01-16 ENCOUNTER — APPOINTMENT (OUTPATIENT)
Dept: PHYSICAL THERAPY | Age: 50
End: 2020-01-16
Payer: COMMERCIAL

## 2020-01-17 ENCOUNTER — APPOINTMENT (OUTPATIENT)
Dept: PHYSICAL THERAPY | Age: 50
End: 2020-01-17
Payer: COMMERCIAL

## 2020-01-21 ENCOUNTER — APPOINTMENT (OUTPATIENT)
Dept: PHYSICAL THERAPY | Age: 50
End: 2020-01-21
Payer: COMMERCIAL

## 2020-01-23 ENCOUNTER — APPOINTMENT (OUTPATIENT)
Dept: PHYSICAL THERAPY | Age: 50
End: 2020-01-23
Payer: COMMERCIAL

## 2020-01-24 ENCOUNTER — HOSPITAL ENCOUNTER (OUTPATIENT)
Dept: PHYSICAL THERAPY | Age: 50
Setting detail: THERAPIES SERIES
Discharge: HOME | End: 2020-01-24
Payer: COMMERCIAL

## 2020-01-24 NOTE — PROGRESS NOTES
Physical Therapy      Physical Therapy  Cancellation/No-show Note  Patient Name:  Elicia Martinez  :  1970   Date:  2020  Cancelled visits to date: 2  No-shows to date: 1    For today's appointment patient:  []  Cancelled  []  Rescheduled appointment  [x]  No-show     Reason given by patient:  []  Patient ill  []  Conflicting appointment  []  No transportation    []  Conflict with work  []  No reason given  []  Other:     Comments:       Electronically signed by:  Luz Elena Butt ON#[id number]

## 2020-01-28 ENCOUNTER — APPOINTMENT (OUTPATIENT)
Dept: PHYSICAL THERAPY | Age: 50
End: 2020-01-28
Payer: COMMERCIAL

## 2020-01-29 ENCOUNTER — HOSPITAL ENCOUNTER (OUTPATIENT)
Dept: PHYSICAL THERAPY | Age: 50
Setting detail: THERAPIES SERIES
Discharge: HOME | End: 2020-01-29
Payer: COMMERCIAL

## 2020-01-29 PROCEDURE — 97110 THERAPEUTIC EXERCISES: CPT

## 2020-01-29 NOTE — FLOWSHEET NOTE
A[facility]atic [facility] (93736) x  [] Manual (33765) x    [] Ultrasound (12467) x  [] TA (16194) x2  [] Mech Traction (27080)  [] Ionto (16389)           [] ES (un) (99648):   [] Vasopump (53288) [] Other:      Assessment  [x] Patient tolerated treatment well [] Patient limited by fatigue  [] Patient limited by pain  [] Patient limited by other medical complications  [x] Other: residual leg checked, looks good. Small scab noted medial incision. Reinforced importance of daily self checks. Able to self propel/ steer WC in/ out PT well.      Prognosis: [x] Good [] Fair  [] Poor    Goals:                 Patient Requires Follow-up: [x] Yes  [] No    Plan:   [x] Continue per plan of care [] Alter current plan (see comments)  [] Plan of care initiated [] Hold pending MD visit [] Discharge    Plan for Next Session:  Increase activity level    Electronically signed by:  Alireza Reed, PTA  [id number]

## 2020-01-30 ENCOUNTER — APPOINTMENT (OUTPATIENT)
Dept: PHYSICAL THERAPY | Age: 50
End: 2020-01-30
Payer: COMMERCIAL

## 2020-01-31 ENCOUNTER — HOSPITAL ENCOUNTER (OUTPATIENT)
Dept: PHYSICAL THERAPY | Age: 50
Setting detail: THERAPIES SERIES
Discharge: HOME | End: 2020-01-31
Payer: COMMERCIAL

## 2020-01-31 PROCEDURE — 97110 THERAPEUTIC EXERCISES: CPT | Performed by: CHIROPRACTOR

## 2020-01-31 NOTE — FLOWSHEET NOTE
Physical Therapy Daily Treatment Note  Date:  2020    Patient Name:  Zackery Carroll    :  1970  MRN: [id number]    Restrictions/Precautions:  Fall Risk(Low risk of falling during transfers. Not ambulatory at this point)  Pertinent Medical History: Arthritis, obesity, L mid-foot amputauion (wearing AFO) 2018, CAD, DM, HTN    Medical/Treatment Diagnosis Information:  · Diagnosis: R BKA       Insurance/Certification information:   [employer]  Physician Information:      Dr. Anabelle Limon of care signed (Y/N):  Inbox    Visit# / total visits:  3/12-  Pain level: 0/10     Functional Outcomes Measure:  Test: LEFS  Score: 5    Progress Note: []  Yes  [x]  No  Next due by: Visit #10      History of Injury:   L mid-foot amp 2018                                   R BKA on  due to Diabetic foot abcess and osteomyelitis    Subjective:   Has been measured for prosthesis earlier today. States it should be ready in 3 weeks  Objective:   Observation:    Test measurements:      Exercises:  Exercise/Equipment Resistance/Repetitions Other comments        Reviewed home exer, and stump care          Practiced amb in P-bars Did well - 3 lengths + 2 lengths Declined trial of walker, reports does not like it, and does not use his at home. Fatigued after gt. Reports arms and L leg tired   Nu-step         #9 X 5 min         FAQ 3# - 3x10       R/L              UBE  F/B X 3 min SBA/ stand pivot transfer                  FAQ 3# - 3x10   R/L    SLR 3# - 3x10  R    SAQ 3# - 3x10  R     S-L abd 3# - 3x10  R    Prone ext 0# x 10 R                     Other Therapeutic Activities:      Home Exercise Program:  Voiced understanding of home exer    Manual Treatments:     Modalities:     Progression Towards Functional goals:  [] Patient is progressing as expected towards functional goals listed. [] Progression is slowed due to complexities listed. [] Progression has been slowed due to co-morbidities.   [x] Plan just

## 2020-02-05 ENCOUNTER — HOSPITAL ENCOUNTER (OUTPATIENT)
Dept: PHYSICAL THERAPY | Age: 50
Setting detail: THERAPIES SERIES
Discharge: HOME | End: 2020-02-05
Payer: COMMERCIAL

## 2020-02-05 PROCEDURE — 97110 THERAPEUTIC EXERCISES: CPT

## 2020-02-05 NOTE — FLOWSHEET NOTE
[facility] (09308) x  [] Manual (35742) x    [] Ultrasound (66920) x  [] TA (40401) x2  [] Mech Traction (07486)  [] Ionto (55060)           [] ES (un) (96735):   [] Vasopump (36414) [] Other:      Assessment  [x] Patient tolerated treatment well [] Patient limited by fatigue  [] Patient limited by pain  [] Patient limited by other medical complications  [] Other:     Prognosis: [x] Good [] Fair  [] Poor    Goals:                 Patient Requires Follow-up: [x] Yes  [] No    Plan:   [x] Continue per plan of care [] Alter current plan (see comments)  [] Plan of care initiated [] Hold pending MD visit [] Discharge    Plan for Next Session:  Increase activity level    Electronically signed by:  Randell Hua, PTA  [id number]

## 2020-02-21 ENCOUNTER — APPOINTMENT (OUTPATIENT)
Dept: PHYSICAL THERAPY | Age: 50
End: 2020-02-21
Payer: COMMERCIAL

## 2020-02-24 ENCOUNTER — APPOINTMENT (OUTPATIENT)
Dept: PHYSICAL THERAPY | Age: 50
End: 2020-02-24
Payer: COMMERCIAL

## 2020-02-26 ENCOUNTER — APPOINTMENT (OUTPATIENT)
Dept: PHYSICAL THERAPY | Age: 50
End: 2020-02-26
Payer: COMMERCIAL

## 2020-06-24 NOTE — DISCHARGE INSTR - COC
Spoke with MD and she states she would like to see patient for a video visit to discuss alternative therapies since she did cannot tolerate medications    I spoke with patient and she does not have smart phone to do visit. She has an order for US patient would like to get this done first    She is going to call to schedule and then give us a call back. Administered    Influenza, Quadv, 6 mo and older, IM, PF (Flulaval, Fluarix) 10/24/2018    Tdap (Boostrix, Adacel) 10/02/2018       Active Problems:  Patient Active Problem List   Diagnosis Code    Carotid artery stenosis without cerebral infarction I65.29    Diabetes mellitus type 2, insulin dependent ([facility] E11.9, Z79.4    Obese E66.9    Hypertension I10    Other and unspecified hyperlipidemia E78.5    Diabetes mellitus with peripheral circulatory disorder ([facility]) E11.51    Carotid stenosis I65.29    Type 2 diabetes mellitus with atherosclerosis of native arteries of extremity with rest pain ([facility]) E11.51, I70.229    Atherosclerosis of native artery of extremity with ulceration ([facility]) I70.25    Critical ischemia of lower extremity I99.8    PVD (peripheral vascular disease) ([facility]) I73.9    Osteomyelitis of toe of left foot ([facility]) M86.9    Osteomyelitis ([facility]) M86.9    Sepsis ([facility] A41.9    Diabetic foot infection ([facility]) E11.628, U55.0    Neutrophilic leukocytosis F74.2    Fever chills R50.9    Coronary artery disease involving native coronary artery of native heart without angina pectoris I25.10    Foot abscess, left L02.612    Abscess or cellulitis of foot L03.119, L02.619    Pseudomonas infection B96.5    Gangrene of foot ([facility]) I96       Isolation/Infection:   Isolation          Contact        Patient Infection Status     Infection Encounter Level?  Added Added By Resolved Resolved By Review Date Onset Date    MRSA No 10/23/18 Scarlett Patiño RN        10/9/18 abscess          Nurse Assessment:  Last Vital Signs: BP (!) 180/98   Pulse 87   Temp 99.2 °F (37.3 °C) (Oral)   Resp 18   Ht 5' 6\" (1.676 m)   Wt 255 lb 11.7 oz (116 kg)   SpO2 97%   BMI 41.28 kg/m²     Last documented pain score (0-10 scale): Pain Level: 0  Last Weight:   Wt Readings from Last 1 Encounters:   11/05/18 255 lb 11.7 oz (116 kg)     Mental Status:  oriented and alert    IV Access:  - PICC - site  R Upper Arm, insertion order):  wheelchair and walker  Other Treatments:    dressing change on left foot:  Applied betadine-soaked Adaptic to surgical site followed by gauze dressing, ABD pads x 2, Kerlix roll, and an ACE bandage up to the ankle for mild compression. Patient will need  daily dressing changes at home    Patient's personal belongings (please select all that are sent with patient):  Indira    RN SIGNATURE:  Electronically signed by Jamie Bernstein RN on 11/5/18 at 4:39 PM    CASE MANAGEMENT/SOCIAL WORK SECTION    Inpatient Status Date: 11/1/18    Readmission Risk Assessment Score:  17    Discharging to Facility/ Agency   · [facility].   · Address: [place] [address] [address] [facility]. ,[address]  · [address] [facility]  · Fax: [phone]    HOME [facility]FUSION PHARMACY:  Name:     [facility]  Address: [facility]. [facility] [address]  Phone:    [phone]  Fax:        [phone]    / signature: Electronically signed by Gracie Renner on 11/5/18 at 11:16 AM    PHYSICIAN SECTION    Prognosis: Good    Condition at Discharge: Stable    Rehab Potential (if transferring to Rehab): Good    Recommended Labs or Other Treatments After Discharge:   IV Zosyn x 13.5 gm x as continuous infusion x Q 24 HR X 3 WEEKS  Oral Linezolid x 600 mg  X Q 12 HR X 3 WEEKS  Oral Fluconazole x 200 mg x daily x 14 days  CBC with diff , CMP, ESR, CRP weekly  Fax results to  [phone]  Follow up  x 3 weeks    Lulu Ryan MD  [facility] [address]  [facility] Sharma Physician  Phone: [phone]   Fax : [phone]      Physician Certification: I certify the above information and transfer of [facility]  is necessary for the continuing treatment of the diagnosis listed and that he requires Home Care for greater 30 days.      Update Admission H&P: No change in H&P    PHYSICIAN SIGNATURE:  Electronically signed by Anselmo Orourke MD on 11/5/18 at 4:59 PM

## (undated) DEVICE — SPONGE GZ W4XL4IN COT 12 PLY TYP VII WVN C FLD DSGN

## (undated) DEVICE — STATION ISOLATN W1775XH205IN D675IN ICU WALL OR GLS MT P2

## (undated) DEVICE — GLOVE SURG SZ 8 L12IN FNGR THK87MIL WHT LTX FREE

## (undated) DEVICE — DRAIN SURG L49IN DIA1/8IN 10IN H SIL W/O TRCR END PERF

## (undated) DEVICE — BANDAGE COMPR W4INXL12FT E DISP ESMARCH EVEN

## (undated) DEVICE — TUBING, SUCTION, 1/4" X 12', STRAIGHT: Brand: MEDLINE

## (undated) DEVICE — STAPLER SKIN H3.9MM WIRE DIA0.58MM CRWN 6.9MM 35 STPL ROT

## (undated) DEVICE — GLOVE SURG SZ 85 L12IN FNGR THK87MIL WHT LTX FREE

## (undated) DEVICE — PAD,NON-ADHERENT,3X8,STERILE,LF,1/PK: Brand: MEDLINE

## (undated) DEVICE — SOLUTION IV IRRIG POUR BRL 0.9% SODIUM CHL 2F7124

## (undated) DEVICE — T-DRAPE,EXTREMITY,STERILE: Brand: MEDLINE

## (undated) DEVICE — INTENDED FOR TISSUE SEPARATION, AND OTHER PROCEDURES THAT REQUIRE A SHARP SURGICAL BLADE TO PUNCTURE OR CUT.: Brand: BARD-PARKER ® STAINLESS STEEL BLADES

## (undated) DEVICE — NEEDLE HYPO 18GA L1.5IN THN WALL PIVOTING SHLD BVL ORIENTED

## (undated) DEVICE — GOWN SIRUS NONREIN XL W/TWL: Brand: MEDLINE INDUSTRIES, INC.

## (undated) DEVICE — NON-ADHERENT STRIPS,OIL EMULSION: Brand: CURITY

## (undated) DEVICE — COVER LT HNDL BLU PLAS

## (undated) DEVICE — PAD,ABDOMINAL,8"X7.5",STERILE,LF,1/PK: Brand: MEDLINE

## (undated) DEVICE — SUTURE VCRL SZ 2-0 L18IN ABSRB UD POLYGLACTIN 910 BRAID TIE J111T

## (undated) DEVICE — Z DISCONTINUED USE 2273271 SOLUTION PREP 4OZ 10% POVIDONE IOD BTL FLIP TOP CAP

## (undated) DEVICE — COTTON UNDERCAST PADDING,CRIMPED FINISH: Brand: WEBRIL

## (undated) DEVICE — STOCKINETTE ORTH CAST UNIV 5 FTX4 IN 2 PLY TBLR RAYON

## (undated) DEVICE — SHEET,DRAPE,53X77,STERILE: Brand: MEDLINE

## (undated) DEVICE — Z DISCONTINUED BY MEDLINE USE 2711682 TRAY SKIN PREP DRY W/ PREM GLV

## (undated) DEVICE — GAUZE,PACKING STRIP,IODOFORM,1/2"X5YD,ST: Brand: CURAD

## (undated) DEVICE — GOWN,SIRUS,POLYRNF,BRTHSLV,XL,30/CS: Brand: MEDLINE

## (undated) DEVICE — SOLUTION SCRB 4OZ 10% POVIDONE IOD ANTIMIC BTL

## (undated) DEVICE — COVER,MAYO STAND,STERILE: Brand: MEDLINE

## (undated) DEVICE — SOLUTION PREP POVIDONE IOD FOR SKIN MUCOUS MEM PRIOR TO

## (undated) DEVICE — SUTURE VCRL SZ 3-0 L27IN ABSRB UD L36MM CT-1 1/2 CIR J258H

## (undated) DEVICE — VI-DRAPE ISOLATION BAG: Brand: CONVERTORS

## (undated) DEVICE — PADDING UNDERCAST W4INXL4YD 100% COT CRIMPED FINISH WBRL II

## (undated) DEVICE — 2108 SERIES SAGITTAL BLADE AGGRESSIVE  (25.0 X 1.19 X 85.0MM)

## (undated) DEVICE — 4-PORT MANIFOLD: Brand: NEPTUNE 2

## (undated) DEVICE — SYRINGE MED 10ML LUERLOCK TIP W/O SFTY DISP

## (undated) DEVICE — 3M™ COBAN™ NL STERILE NON-LATEX SELF-ADHERENT WRAP, 2086S, 6 IN X 5 YD (15 CM X 4,5 M), 12 ROLLS/CASE: Brand: 3M™ COBAN™

## (undated) DEVICE — SYRINGE IRRIG 60ML SFT PLIABLE BLB EZ TO GRP 1 HND USE W/

## (undated) DEVICE — PENCIL ES L3M BTTN SWCH S STL HEX LOK BLDE ELECTRD HOLSTER

## (undated) DEVICE — CANISTER, RIGID, 1200CC: Brand: MEDLINE INDUSTRIES, INC.

## (undated) DEVICE — STRIP WND PK W0.25INXL5YD IODO GZ TIGHTLY WVN CURAD

## (undated) DEVICE — Device

## (undated) DEVICE — ELECTRODE PT RET AD L9FT HI MOIST COND ADH HYDRGEL CORDED

## (undated) DEVICE — THIN OFFSET (9.0 X 0.38 X 25.0MM)

## (undated) DEVICE — CONTAINER LAB 16OZ W/LID TRANSLCNT 100/C: Brand: MEDEGEN MEDICAL PRODUCTS, LLC

## (undated) DEVICE — BANDAGE,GAUZE,BULKEE II,4.5"X4.1YD,STRL: Brand: MEDLINE

## (undated) DEVICE — MINOR SET UP PK

## (undated) DEVICE — ELECTROSURGICAL PENCIL BUTTON SWITCH NON COATED BLADE ELECTRODE 10 FT (3 M) CORD HOLSTER: Brand: MEGADYNE

## (undated) DEVICE — ZIMMER® STERILE DISPOSABLE TOURNIQUET CUFF WITH PLC, DUAL PORT, SINGLE BLADDER, 18 IN. (46 CM)

## (undated) DEVICE — WAX SURG 2.5GM HEMSTAT BNE BEESWAX PARAFFIN ISO PALMITATE

## (undated) DEVICE — KIT OR ROOM TURNOVER W/STRAP

## (undated) DEVICE — NEEDLE HYPO 25GA L1.5IN BVL ORIENTED ECLIPSE

## (undated) DEVICE — GAUZE,SPONGE,4"X4",16PLY,XRAY,STRL,LF: Brand: MEDLINE

## (undated) DEVICE — SUTURE VCRL SZ 3-0 L27IN ABSRB UD L19MM PS-2 3/8 CIR PRIM J427H

## (undated) DEVICE — X-RAY DETECTABLE SPONGES,16 PLY: Brand: VISTEC

## (undated) DEVICE — MAJOR SET UP PK

## (undated) DEVICE — CHLORAPREP 26ML ORANGE

## (undated) DEVICE — KIT EVAC 0.13IN RECT TB DIA10FR 400CC PVC 3 SPR Y CONN DRN

## (undated) DEVICE — CLEANER,CAUTERY TIP,2X2",STERILE: Brand: MEDLINE

## (undated) DEVICE — SUTURE VCRL SZ 2-0 L18IN ABSRB UD CT-1 L36MM 1/2 CIR J839D

## (undated) DEVICE — SUTURE ETHLN SZ 3-0 L18IN NONABSORBABLE BLK FS-1 L24MM 3/8 663H

## (undated) DEVICE — ATTACHMENT SMK EVAC FOR ES PNCL ACCUVAC

## (undated) DEVICE — SOLUTION SCRB 4OZ 7.5% POVIDONE IOD FLIP TOP CAP

## (undated) DEVICE — SUTURE PERMAHAND SZ 4-0 L18IN NONABSORBABLE BLK SILK BRAID A183H

## (undated) DEVICE — SUTURE ABSORBABLE BRAIDED 2-0 CT-1 27 IN UD VICRYL J259H

## (undated) DEVICE — SPONGE LAP W18XL18IN WHT COT 4 PLY FLD STRUNG RADPQ DISP ST

## (undated) DEVICE — SKIN MARKER REGULAR TIP WITH RULER CAP AND LABELS: Brand: DEVON

## (undated) DEVICE — BANDAGE COMPR W4INXL15FT BGE E SGL LAYERED CLP CLSR

## (undated) DEVICE — PICO 7 10CM X 30CM: Brand: PICO™ 7

## (undated) DEVICE — BANDAGE COMPR W6INXL10YD ST M E WHITE/BEIGE

## (undated) DEVICE — SUTURE ETHLN SZ 2-0 L18IN NONABSORBABLE BLK L26MM FS 3/8 664G

## (undated) DEVICE — CURITY NON-ADHERENT STRIPS: Brand: CURITY

## (undated) DEVICE — CONTAINER SPEC 16OZ W/LID

## (undated) DEVICE — MERCY HEALTH WEST TURNOVER: Brand: MEDLINE INDUSTRIES, INC.

## (undated) DEVICE — SHOE POSTOP L MAN 11-13 UNIV FOAM TRICOT SEMI FLX SKID

## (undated) DEVICE — SUTURE PERMAHAND SZ 2-0 L30IN NONABSORBABLE BLK SILK W/O A305H